# Patient Record
Sex: FEMALE | Race: WHITE | Employment: OTHER | URBAN - METROPOLITAN AREA
[De-identification: names, ages, dates, MRNs, and addresses within clinical notes are randomized per-mention and may not be internally consistent; named-entity substitution may affect disease eponyms.]

---

## 2017-04-25 ENCOUNTER — OFFICE VISIT (OUTPATIENT)
Dept: FAMILY MEDICINE CLINIC | Age: 62
End: 2017-04-25

## 2017-04-25 VITALS
DIASTOLIC BLOOD PRESSURE: 80 MMHG | BODY MASS INDEX: 32.01 KG/M2 | RESPIRATION RATE: 20 BRPM | TEMPERATURE: 101.7 F | WEIGHT: 175 LBS | SYSTOLIC BLOOD PRESSURE: 120 MMHG | HEART RATE: 96 BPM | OXYGEN SATURATION: 97 %

## 2017-04-25 DIAGNOSIS — R50.9 FEVER, UNSPECIFIED FEVER CAUSE: ICD-10-CM

## 2017-04-25 DIAGNOSIS — R05.9 COUGH: Primary | ICD-10-CM

## 2017-04-25 DIAGNOSIS — R09.81 SINUS CONGESTION: ICD-10-CM

## 2017-04-25 PROCEDURE — 99213 OFFICE O/P EST LOW 20 MIN: CPT | Performed by: NURSE PRACTITIONER

## 2017-04-25 RX ORDER — AZITHROMYCIN 250 MG/1
250 TABLET, FILM COATED ORAL DAILY
Qty: 6 TABLET | Refills: 0 | Status: SHIPPED | OUTPATIENT
Start: 2017-04-25 | End: 2017-08-02 | Stop reason: ALTCHOICE

## 2017-04-25 ASSESSMENT — ENCOUNTER SYMPTOMS
NAUSEA: 1
DIARRHEA: 1
SORE THROAT: 1
WHEEZING: 1
COUGH: 1

## 2017-05-17 RX ORDER — LISINOPRIL 20 MG/1
TABLET ORAL
Qty: 90 TABLET | Refills: 0 | Status: SHIPPED | OUTPATIENT
Start: 2017-05-17 | End: 2017-09-11 | Stop reason: SDUPTHER

## 2017-06-15 DIAGNOSIS — B00.9 HSV-2 (HERPES SIMPLEX VIRUS 2) INFECTION: ICD-10-CM

## 2017-06-16 RX ORDER — VALACYCLOVIR HYDROCHLORIDE 500 MG/1
TABLET, FILM COATED ORAL
Qty: 90 TABLET | Refills: 0 | Status: SHIPPED | OUTPATIENT
Start: 2017-06-16 | End: 2017-09-14 | Stop reason: SDUPTHER

## 2017-06-16 RX ORDER — ATENOLOL 100 MG/1
TABLET ORAL
Qty: 90 TABLET | Refills: 1 | Status: SHIPPED | OUTPATIENT
Start: 2017-06-16 | End: 2019-02-13

## 2017-08-02 ENCOUNTER — OFFICE VISIT (OUTPATIENT)
Dept: FAMILY MEDICINE CLINIC | Age: 62
End: 2017-08-02

## 2017-08-02 VITALS
BODY MASS INDEX: 34.16 KG/M2 | DIASTOLIC BLOOD PRESSURE: 80 MMHG | HEART RATE: 63 BPM | SYSTOLIC BLOOD PRESSURE: 124 MMHG | WEIGHT: 174 LBS | HEIGHT: 60 IN | RESPIRATION RATE: 16 BRPM | OXYGEN SATURATION: 99 %

## 2017-08-02 DIAGNOSIS — Z00.00 ANNUAL PHYSICAL EXAM: Primary | ICD-10-CM

## 2017-08-02 DIAGNOSIS — M72.2 PLANTAR FASCIITIS: ICD-10-CM

## 2017-08-02 DIAGNOSIS — L82.1 SEBORRHEIC KERATOSES: ICD-10-CM

## 2017-08-02 LAB
A/G RATIO: 1.3 (ref 1.1–2.2)
ALBUMIN SERPL-MCNC: 4.3 G/DL (ref 3.4–5)
ALP BLD-CCNC: 162 U/L (ref 40–129)
ALT SERPL-CCNC: 51 U/L (ref 10–40)
ANION GAP SERPL CALCULATED.3IONS-SCNC: 14 MMOL/L (ref 3–16)
AST SERPL-CCNC: 38 U/L (ref 15–37)
BILIRUB SERPL-MCNC: 0.8 MG/DL (ref 0–1)
BUN BLDV-MCNC: 9 MG/DL (ref 7–20)
CALCIUM SERPL-MCNC: 9.8 MG/DL (ref 8.3–10.6)
CHLORIDE BLD-SCNC: 99 MMOL/L (ref 99–110)
CHOLESTEROL, TOTAL: 218 MG/DL (ref 0–199)
CO2: 24 MMOL/L (ref 21–32)
CREAT SERPL-MCNC: <0.5 MG/DL (ref 0.6–1.2)
GFR AFRICAN AMERICAN: >60
GFR NON-AFRICAN AMERICAN: >60
GLOBULIN: 3.4 G/DL
GLUCOSE BLD-MCNC: 82 MG/DL (ref 70–99)
HCT VFR BLD CALC: 40.4 % (ref 36–48)
HDLC SERPL-MCNC: 78 MG/DL (ref 40–60)
HEMOGLOBIN: 13.6 G/DL (ref 12–16)
HEPATITIS C ANTIBODY INTERPRETATION: NORMAL
LDL CHOLESTEROL CALCULATED: 102 MG/DL
MCH RBC QN AUTO: 36.5 PG (ref 26–34)
MCHC RBC AUTO-ENTMCNC: 33.7 G/DL (ref 31–36)
MCV RBC AUTO: 108.2 FL (ref 80–100)
PDW BLD-RTO: 13 % (ref 12.4–15.4)
PLATELET # BLD: 234 K/UL (ref 135–450)
PMV BLD AUTO: 9 FL (ref 5–10.5)
POTASSIUM SERPL-SCNC: 4.5 MMOL/L (ref 3.5–5.1)
RBC # BLD: 3.73 M/UL (ref 4–5.2)
SODIUM BLD-SCNC: 137 MMOL/L (ref 136–145)
TOTAL PROTEIN: 7.7 G/DL (ref 6.4–8.2)
TRIGL SERPL-MCNC: 192 MG/DL (ref 0–150)
TSH REFLEX FT4: 2.23 UIU/ML (ref 0.27–4.2)
VITAMIN D 25-HYDROXY: 42.1 NG/ML
VLDLC SERPL CALC-MCNC: 38 MG/DL
WBC # BLD: 7.7 K/UL (ref 4–11)

## 2017-08-02 PROCEDURE — 99396 PREV VISIT EST AGE 40-64: CPT | Performed by: FAMILY MEDICINE

## 2017-08-02 PROCEDURE — 36415 COLL VENOUS BLD VENIPUNCTURE: CPT | Performed by: FAMILY MEDICINE

## 2017-08-02 ASSESSMENT — PATIENT HEALTH QUESTIONNAIRE - PHQ9
SUM OF ALL RESPONSES TO PHQ QUESTIONS 1-9: 0
2. FEELING DOWN, DEPRESSED OR HOPELESS: 0
1. LITTLE INTEREST OR PLEASURE IN DOING THINGS: 0
SUM OF ALL RESPONSES TO PHQ9 QUESTIONS 1 & 2: 0

## 2017-08-03 DIAGNOSIS — D75.89 MACROCYTOSIS: Primary | ICD-10-CM

## 2017-08-03 LAB
FOLATE: 19 NG/ML (ref 4.78–24.2)
HIV-1 AND HIV-2 ANTIBODIES: NORMAL
VITAMIN B-12: 421 PG/ML (ref 211–911)

## 2017-09-11 RX ORDER — METOPROLOL SUCCINATE 50 MG/1
TABLET, EXTENDED RELEASE ORAL
Qty: 90 TABLET | Refills: 1 | Status: SHIPPED | OUTPATIENT
Start: 2017-09-11 | End: 2018-05-06 | Stop reason: SDUPTHER

## 2017-09-11 RX ORDER — LISINOPRIL 20 MG/1
TABLET ORAL
Qty: 90 TABLET | Refills: 1 | Status: SHIPPED | OUTPATIENT
Start: 2017-09-11 | End: 2018-03-19 | Stop reason: SDUPTHER

## 2018-03-19 RX ORDER — LISINOPRIL 20 MG/1
TABLET ORAL
Qty: 90 TABLET | Refills: 0 | Status: SHIPPED | OUTPATIENT
Start: 2018-03-19 | End: 2018-06-20 | Stop reason: SDUPTHER

## 2018-05-07 RX ORDER — METOPROLOL SUCCINATE 50 MG/1
TABLET, EXTENDED RELEASE ORAL
Qty: 90 TABLET | Refills: 3 | Status: ON HOLD | OUTPATIENT
Start: 2018-05-07 | End: 2019-07-19 | Stop reason: HOSPADM

## 2018-06-07 ENCOUNTER — TELEPHONE (OUTPATIENT)
Dept: FAMILY MEDICINE CLINIC | Age: 63
End: 2018-06-07

## 2018-06-07 RX ORDER — SULFACETAMIDE SODIUM 100 MG/ML
1 SOLUTION/ DROPS OPHTHALMIC 4 TIMES DAILY
Qty: 1 BOTTLE | Refills: 0 | Status: SHIPPED | OUTPATIENT
Start: 2018-06-07 | End: 2018-06-12

## 2018-06-20 RX ORDER — LISINOPRIL 20 MG/1
TABLET ORAL
Qty: 90 TABLET | Refills: 0 | Status: SHIPPED | OUTPATIENT
Start: 2018-06-20 | End: 2018-09-21 | Stop reason: SDUPTHER

## 2018-09-21 DIAGNOSIS — B00.9 HSV-2 (HERPES SIMPLEX VIRUS 2) INFECTION: ICD-10-CM

## 2018-09-21 RX ORDER — VALACYCLOVIR HYDROCHLORIDE 500 MG/1
TABLET, FILM COATED ORAL
Qty: 90 TABLET | Refills: 0 | Status: SHIPPED | OUTPATIENT
Start: 2018-09-21 | End: 2019-01-02 | Stop reason: SDUPTHER

## 2018-09-21 RX ORDER — LISINOPRIL 20 MG/1
TABLET ORAL
Qty: 90 TABLET | Refills: 0 | Status: SHIPPED | OUTPATIENT
Start: 2018-09-21 | End: 2019-01-02 | Stop reason: SDUPTHER

## 2019-02-13 ENCOUNTER — OFFICE VISIT (OUTPATIENT)
Dept: FAMILY MEDICINE CLINIC | Age: 64
End: 2019-02-13
Payer: COMMERCIAL

## 2019-02-13 VITALS
BODY MASS INDEX: 36.32 KG/M2 | HEIGHT: 60 IN | OXYGEN SATURATION: 98 % | WEIGHT: 185 LBS | HEART RATE: 74 BPM | DIASTOLIC BLOOD PRESSURE: 72 MMHG | RESPIRATION RATE: 16 BRPM | SYSTOLIC BLOOD PRESSURE: 108 MMHG

## 2019-02-13 DIAGNOSIS — I10 BENIGN ESSENTIAL HTN: ICD-10-CM

## 2019-02-13 DIAGNOSIS — Z12.39 BREAST CANCER SCREENING: ICD-10-CM

## 2019-02-13 DIAGNOSIS — B00.9 HSV-2 (HERPES SIMPLEX VIRUS 2) INFECTION: ICD-10-CM

## 2019-02-13 DIAGNOSIS — Z23 NEED FOR VACCINATION FOR PNEUMOCOCCUS: ICD-10-CM

## 2019-02-13 DIAGNOSIS — Z00.00 ANNUAL PHYSICAL EXAM: Primary | ICD-10-CM

## 2019-02-13 DIAGNOSIS — M25.562 CHRONIC PAIN OF LEFT KNEE: ICD-10-CM

## 2019-02-13 DIAGNOSIS — G89.29 CHRONIC PAIN OF LEFT KNEE: ICD-10-CM

## 2019-02-13 LAB
A/G RATIO: 1.2 (ref 1.1–2.2)
ALBUMIN SERPL-MCNC: 4.1 G/DL (ref 3.4–5)
ALP BLD-CCNC: 117 U/L (ref 40–129)
ALT SERPL-CCNC: 16 U/L (ref 10–40)
ANION GAP SERPL CALCULATED.3IONS-SCNC: 16 MMOL/L (ref 3–16)
AST SERPL-CCNC: 20 U/L (ref 15–37)
BILIRUB SERPL-MCNC: 0.9 MG/DL (ref 0–1)
BUN BLDV-MCNC: 7 MG/DL (ref 7–20)
CALCIUM SERPL-MCNC: 10.3 MG/DL (ref 8.3–10.6)
CHLORIDE BLD-SCNC: 100 MMOL/L (ref 99–110)
CHOLESTEROL, TOTAL: 207 MG/DL (ref 0–199)
CO2: 23 MMOL/L (ref 21–32)
CREAT SERPL-MCNC: 0.5 MG/DL (ref 0.6–1.2)
GFR AFRICAN AMERICAN: >60
GFR NON-AFRICAN AMERICAN: >60
GLOBULIN: 3.4 G/DL
GLUCOSE BLD-MCNC: 103 MG/DL (ref 70–99)
HDLC SERPL-MCNC: 74 MG/DL (ref 40–60)
LDL CHOLESTEROL CALCULATED: 116 MG/DL
POTASSIUM SERPL-SCNC: 5.2 MMOL/L (ref 3.5–5.1)
SODIUM BLD-SCNC: 139 MMOL/L (ref 136–145)
TOTAL PROTEIN: 7.5 G/DL (ref 6.4–8.2)
TRIGL SERPL-MCNC: 85 MG/DL (ref 0–150)
VLDLC SERPL CALC-MCNC: 17 MG/DL

## 2019-02-13 PROCEDURE — 99397 PER PM REEVAL EST PAT 65+ YR: CPT | Performed by: FAMILY MEDICINE

## 2019-02-13 PROCEDURE — 90670 PCV13 VACCINE IM: CPT | Performed by: FAMILY MEDICINE

## 2019-02-13 PROCEDURE — 90471 IMMUNIZATION ADMIN: CPT | Performed by: FAMILY MEDICINE

## 2019-02-13 PROCEDURE — 36415 COLL VENOUS BLD VENIPUNCTURE: CPT | Performed by: FAMILY MEDICINE

## 2019-02-13 PROCEDURE — G8484 FLU IMMUNIZE NO ADMIN: HCPCS | Performed by: FAMILY MEDICINE

## 2019-02-13 RX ORDER — VALACYCLOVIR HYDROCHLORIDE 500 MG/1
TABLET, FILM COATED ORAL
Qty: 90 TABLET | Refills: 3 | Status: SHIPPED | OUTPATIENT
Start: 2019-02-13 | End: 2019-12-27

## 2019-02-13 ASSESSMENT — PATIENT HEALTH QUESTIONNAIRE - PHQ9
SUM OF ALL RESPONSES TO PHQ QUESTIONS 1-9: 0
2. FEELING DOWN, DEPRESSED OR HOPELESS: 0
1. LITTLE INTEREST OR PLEASURE IN DOING THINGS: 0
SUM OF ALL RESPONSES TO PHQ QUESTIONS 1-9: 0
SUM OF ALL RESPONSES TO PHQ9 QUESTIONS 1 & 2: 0

## 2019-02-13 ASSESSMENT — ENCOUNTER SYMPTOMS
DIARRHEA: 0
WHEEZING: 0
COLOR CHANGE: 0
SHORTNESS OF BREATH: 0
EYES NEGATIVE: 1
COUGH: 0
NAUSEA: 0
ABDOMINAL PAIN: 0
CONSTIPATION: 0
ABDOMINAL DISTENTION: 0
CHEST TIGHTNESS: 0
BLOOD IN STOOL: 0
VOMITING: 0

## 2019-02-14 DIAGNOSIS — E87.5 HYPERKALEMIA: Primary | ICD-10-CM

## 2019-02-14 DIAGNOSIS — R73.01 IFG (IMPAIRED FASTING GLUCOSE): ICD-10-CM

## 2019-02-20 ENCOUNTER — OFFICE VISIT (OUTPATIENT)
Dept: ORTHOPEDIC SURGERY | Age: 64
End: 2019-02-20
Payer: COMMERCIAL

## 2019-02-20 VITALS — BODY MASS INDEX: 35.34 KG/M2 | WEIGHT: 180 LBS | HEIGHT: 60 IN

## 2019-02-20 DIAGNOSIS — M17.12 ARTHRITIS OF LEFT KNEE: ICD-10-CM

## 2019-02-20 DIAGNOSIS — M25.562 LEFT KNEE PAIN, UNSPECIFIED CHRONICITY: Primary | ICD-10-CM

## 2019-02-20 PROCEDURE — 4040F PNEUMOC VAC/ADMIN/RCVD: CPT | Performed by: ORTHOPAEDIC SURGERY

## 2019-02-20 PROCEDURE — G8427 DOCREV CUR MEDS BY ELIG CLIN: HCPCS | Performed by: ORTHOPAEDIC SURGERY

## 2019-02-20 PROCEDURE — 1036F TOBACCO NON-USER: CPT | Performed by: ORTHOPAEDIC SURGERY

## 2019-02-20 PROCEDURE — G8484 FLU IMMUNIZE NO ADMIN: HCPCS | Performed by: ORTHOPAEDIC SURGERY

## 2019-02-20 PROCEDURE — G8400 PT W/DXA NO RESULTS DOC: HCPCS | Performed by: ORTHOPAEDIC SURGERY

## 2019-02-20 PROCEDURE — G8417 CALC BMI ABV UP PARAM F/U: HCPCS | Performed by: ORTHOPAEDIC SURGERY

## 2019-02-20 PROCEDURE — 20611 DRAIN/INJ JOINT/BURSA W/US: CPT | Performed by: ORTHOPAEDIC SURGERY

## 2019-02-20 PROCEDURE — 99203 OFFICE O/P NEW LOW 30 MIN: CPT | Performed by: ORTHOPAEDIC SURGERY

## 2019-02-20 PROCEDURE — 1123F ACP DISCUSS/DSCN MKR DOCD: CPT | Performed by: ORTHOPAEDIC SURGERY

## 2019-02-20 PROCEDURE — 3017F COLORECTAL CA SCREEN DOC REV: CPT | Performed by: ORTHOPAEDIC SURGERY

## 2019-02-20 PROCEDURE — 1090F PRES/ABSN URINE INCON ASSESS: CPT | Performed by: ORTHOPAEDIC SURGERY

## 2019-02-20 PROCEDURE — 1101F PT FALLS ASSESS-DOCD LE1/YR: CPT | Performed by: ORTHOPAEDIC SURGERY

## 2019-03-20 ENCOUNTER — OFFICE VISIT (OUTPATIENT)
Dept: ORTHOPEDIC SURGERY | Age: 64
End: 2019-03-20
Payer: COMMERCIAL

## 2019-03-20 VITALS — HEIGHT: 60 IN | WEIGHT: 179.9 LBS | BODY MASS INDEX: 35.32 KG/M2

## 2019-03-20 DIAGNOSIS — M17.12 ARTHRITIS OF LEFT KNEE: Primary | ICD-10-CM

## 2019-03-20 PROCEDURE — 1036F TOBACCO NON-USER: CPT | Performed by: ORTHOPAEDIC SURGERY

## 2019-03-20 PROCEDURE — G8400 PT W/DXA NO RESULTS DOC: HCPCS | Performed by: ORTHOPAEDIC SURGERY

## 2019-03-20 PROCEDURE — 1090F PRES/ABSN URINE INCON ASSESS: CPT | Performed by: ORTHOPAEDIC SURGERY

## 2019-03-20 PROCEDURE — 1101F PT FALLS ASSESS-DOCD LE1/YR: CPT | Performed by: ORTHOPAEDIC SURGERY

## 2019-03-20 PROCEDURE — L1830 KO IMMOB CANVAS LONG PRE OTS: HCPCS | Performed by: ORTHOPAEDIC SURGERY

## 2019-03-20 PROCEDURE — 99213 OFFICE O/P EST LOW 20 MIN: CPT | Performed by: ORTHOPAEDIC SURGERY

## 2019-03-20 PROCEDURE — 3017F COLORECTAL CA SCREEN DOC REV: CPT | Performed by: ORTHOPAEDIC SURGERY

## 2019-03-20 PROCEDURE — G8417 CALC BMI ABV UP PARAM F/U: HCPCS | Performed by: ORTHOPAEDIC SURGERY

## 2019-03-20 PROCEDURE — 4040F PNEUMOC VAC/ADMIN/RCVD: CPT | Performed by: ORTHOPAEDIC SURGERY

## 2019-03-20 PROCEDURE — G8484 FLU IMMUNIZE NO ADMIN: HCPCS | Performed by: ORTHOPAEDIC SURGERY

## 2019-03-20 PROCEDURE — 1123F ACP DISCUSS/DSCN MKR DOCD: CPT | Performed by: ORTHOPAEDIC SURGERY

## 2019-03-20 PROCEDURE — G8427 DOCREV CUR MEDS BY ELIG CLIN: HCPCS | Performed by: ORTHOPAEDIC SURGERY

## 2019-04-25 ENCOUNTER — TELEPHONE (OUTPATIENT)
Dept: ORTHOPEDIC SURGERY | Age: 64
End: 2019-04-25

## 2019-04-29 ENCOUNTER — TELEPHONE (OUTPATIENT)
Dept: ORTHOPEDIC SURGERY | Age: 64
End: 2019-04-29

## 2019-04-29 NOTE — TELEPHONE ENCOUNTER
ORTHOPAEDIC NURSE NAVIGATOR SUMMARY NOTE  RN attempted to reach pt pre-op. Information pulled from Epic. Anticipated Date of Surgery: 5/23/19    Using OrthoVitals? No, Are they Registered:  NA   If No, why not? N/A    Attended Pre-Op Education Class: no   If No, why not? PCP: Juan Pablo Stallworth MD   Phone #: [phone]    Date of PCP Visit for H&P:5/10/19    Any Noted Concerns from PCP prior to surgery:  No   If Yes, what concerns?:        IS THE PATIENT IN A PAIN MANAGEMENT PROGRAM?:        Review of Past Medical History Reveals History of:      Critical Lab Values:   Hgb/Hct:   Hemoglobin (g/dL)   Date Value   08/02/2017 13.6   /  Hematocrit (%)   Date Value   08/02/2017 40.4      HgbA1C:  No results found for: LABA1C   Albumin:    Lab Results   Component Value Date    LABALBU 4.1 02/13/2019      BUN/Cr:   BUN (mg/dL)   Date Value   02/13/2019 7   /  CREATININE (mg/dL)   Date Value   02/13/2019 0.5 (L)      BMI:    BMI Readings from Last 1 Encounters:   03/20/19 35.13 kg/m²        Coronary Artery Disease/HTN/CHF History: Yes- HTN managed by PCP and meds      Cardiologist:     Cardiac Clearance Necessary: No    Date of Cardiac Clearance Appt: On Plavix? No,  If YES, when will they stop taking? Final Cardiac Recommendations:N/A   -On any anticoagulation-None listed       Diabetes History: No    Most Recent HgbA1C: N/A    PCP or Endocrine Recommendations: N/A    Nutritionist/Dietician Consult Scheduled: N/A    Final Plan For Diabetic Control: N/A   Pulmonary: COPD/Emphysema/ Use of home oxygen:      Alcohol use:           DVT Risk Stratification:  Unknown      Vascular Consult Ordered:  NA    Date of Vascular Appt:     Hematology/Oncology Consult Ordered:  NA    Date of Hematology/Oncology Appt:     Final Recommendation For DVT Prophylaxis:   -Smoking history or use of estrogen-         BMI Greater than 40 at time of scheduling?: No    Has Surgeon been notified of BMI concern?  Not

## 2019-05-10 ENCOUNTER — HOSPITAL ENCOUNTER (OUTPATIENT)
Dept: PREADMISSION TESTING | Age: 64
Discharge: HOME | End: 2019-05-14
Payer: COMMERCIAL

## 2019-05-10 ENCOUNTER — OFFICE VISIT (OUTPATIENT)
Dept: FAMILY MEDICINE CLINIC | Age: 64
End: 2019-05-10
Payer: COMMERCIAL

## 2019-05-10 VITALS
BODY MASS INDEX: 35.7 KG/M2 | TEMPERATURE: 98 F | SYSTOLIC BLOOD PRESSURE: 122 MMHG | OXYGEN SATURATION: 100 % | DIASTOLIC BLOOD PRESSURE: 80 MMHG | HEART RATE: 73 BPM | WEIGHT: 182.8 LBS

## 2019-05-10 DIAGNOSIS — Z01.818 PRE-OP EXAMINATION: Primary | ICD-10-CM

## 2019-05-10 DIAGNOSIS — M17.12 ARTHRITIS OF KNEE, LEFT: ICD-10-CM

## 2019-05-10 LAB
ABO/RH: NORMAL
ALBUMIN SERPL-MCNC: 4.2 G/DL (ref 3.4–5)
ANION GAP SERPL CALCULATED.3IONS-SCNC: 16 MMOL/L (ref 3–16)
ANTIBODY SCREEN: NORMAL
APTT: 33.6 SEC (ref 26–36)
BACTERIA: ABNORMAL /HPF
BASOPHILS ABSOLUTE: 0.1 K/UL (ref 0–0.2)
BASOPHILS RELATIVE PERCENT: 0.7 %
BILIRUBIN URINE: NEGATIVE
BLOOD, URINE: NEGATIVE
BUN BLDV-MCNC: 6 MG/DL (ref 7–20)
CALCIUM SERPL-MCNC: 10.1 MG/DL (ref 8.3–10.6)
CHLORIDE BLD-SCNC: 100 MMOL/L (ref 99–110)
CLARITY: CLEAR
CO2: 24 MMOL/L (ref 21–32)
COLOR: YELLOW
CREAT SERPL-MCNC: <0.5 MG/DL (ref 0.6–1.2)
EKG ATRIAL RATE: 72 BPM
EKG DIAGNOSIS: NORMAL
EKG P AXIS: 23 DEGREES
EKG P-R INTERVAL: 206 MS
EKG Q-T INTERVAL: 384 MS
EKG QRS DURATION: 86 MS
EKG QTC CALCULATION (BAZETT): 420 MS
EKG R AXIS: 30 DEGREES
EKG T AXIS: 13 DEGREES
EKG VENTRICULAR RATE: 72 BPM
EOSINOPHILS ABSOLUTE: 0 K/UL (ref 0–0.6)
EOSINOPHILS RELATIVE PERCENT: 0.5 %
EPITHELIAL CELLS, UA: ABNORMAL /HPF
GFR AFRICAN AMERICAN: >60
GFR NON-AFRICAN AMERICAN: >60
GLUCOSE BLD-MCNC: 97 MG/DL (ref 70–99)
GLUCOSE URINE: NEGATIVE MG/DL
HCT VFR BLD CALC: 40.3 % (ref 36–48)
HEMOGLOBIN: 13.9 G/DL (ref 12–16)
INR BLD: 1.06 (ref 0.86–1.14)
KETONES, URINE: NEGATIVE MG/DL
LEUKOCYTE ESTERASE, URINE: ABNORMAL
LYMPHOCYTES ABSOLUTE: 1.6 K/UL (ref 1–5.1)
LYMPHOCYTES RELATIVE PERCENT: 16.6 %
MCH RBC QN AUTO: 37.2 PG (ref 26–34)
MCHC RBC AUTO-ENTMCNC: 34.5 G/DL (ref 31–36)
MCV RBC AUTO: 107.9 FL (ref 80–100)
MICROSCOPIC EXAMINATION: YES
MONOCYTES ABSOLUTE: 1 K/UL (ref 0–1.3)
MONOCYTES RELATIVE PERCENT: 10.3 %
NEUTROPHILS ABSOLUTE: 7.1 K/UL (ref 1.7–7.7)
NEUTROPHILS RELATIVE PERCENT: 71.9 %
NITRITE, URINE: POSITIVE
PDW BLD-RTO: 13.4 % (ref 12.4–15.4)
PH UA: 6 (ref 5–8)
PLATELET # BLD: 237 K/UL (ref 135–450)
PMV BLD AUTO: 9.3 FL (ref 5–10.5)
POTASSIUM SERPL-SCNC: 4.6 MMOL/L (ref 3.5–5.1)
PROTEIN UA: NEGATIVE MG/DL
PROTHROMBIN TIME: 12.1 SEC (ref 9.8–13)
RBC # BLD: 3.73 M/UL (ref 4–5.2)
RBC UA: ABNORMAL /HPF (ref 0–2)
SODIUM BLD-SCNC: 140 MMOL/L (ref 136–145)
SPECIFIC GRAVITY UA: 1.01 (ref 1–1.03)
TRANSFERRIN: 205 MG/DL (ref 200–360)
URINE TYPE: ABNORMAL
UROBILINOGEN, URINE: 0.2 E.U./DL
WBC # BLD: 9.9 K/UL (ref 4–11)
WBC UA: ABNORMAL /HPF (ref 0–5)

## 2019-05-10 PROCEDURE — 81001 URINALYSIS AUTO W/SCOPE: CPT

## 2019-05-10 PROCEDURE — G8417 CALC BMI ABV UP PARAM F/U: HCPCS | Performed by: NURSE PRACTITIONER

## 2019-05-10 PROCEDURE — 99242 OFF/OP CONSLTJ NEW/EST SF 20: CPT | Performed by: NURSE PRACTITIONER

## 2019-05-10 PROCEDURE — 87077 CULTURE AEROBIC IDENTIFY: CPT

## 2019-05-10 PROCEDURE — 36415 COLL VENOUS BLD VENIPUNCTURE: CPT

## 2019-05-10 PROCEDURE — 82040 ASSAY OF SERUM ALBUMIN: CPT

## 2019-05-10 PROCEDURE — G8427 DOCREV CUR MEDS BY ELIG CLIN: HCPCS | Performed by: NURSE PRACTITIONER

## 2019-05-10 PROCEDURE — 93005 ELECTROCARDIOGRAM TRACING: CPT | Performed by: ORTHOPAEDIC SURGERY

## 2019-05-10 PROCEDURE — 84466 ASSAY OF TRANSFERRIN: CPT

## 2019-05-10 PROCEDURE — 80048 BASIC METABOLIC PNL TOTAL CA: CPT

## 2019-05-10 PROCEDURE — 85610 PROTHROMBIN TIME: CPT

## 2019-05-10 PROCEDURE — 85730 THROMBOPLASTIN TIME PARTIAL: CPT

## 2019-05-10 PROCEDURE — 86901 BLOOD TYPING SEROLOGIC RH(D): CPT

## 2019-05-10 PROCEDURE — 93010 ELECTROCARDIOGRAM REPORT: CPT | Performed by: INTERNAL MEDICINE

## 2019-05-10 PROCEDURE — 86900 BLOOD TYPING SEROLOGIC ABO: CPT

## 2019-05-10 PROCEDURE — 83036 HEMOGLOBIN GLYCOSYLATED A1C: CPT

## 2019-05-10 PROCEDURE — 85025 COMPLETE CBC W/AUTO DIFF WBC: CPT

## 2019-05-10 PROCEDURE — 87186 SC STD MICRODIL/AGAR DIL: CPT

## 2019-05-10 PROCEDURE — 86850 RBC ANTIBODY SCREEN: CPT

## 2019-05-10 PROCEDURE — 87086 URINE CULTURE/COLONY COUNT: CPT

## 2019-05-10 PROCEDURE — 1090F PRES/ABSN URINE INCON ASSESS: CPT | Performed by: NURSE PRACTITIONER

## 2019-05-10 SDOH — HEALTH STABILITY: MENTAL HEALTH: HOW MANY STANDARD DRINKS CONTAINING ALCOHOL DO YOU HAVE ON A TYPICAL DAY?: 3 OR 4

## 2019-05-10 NOTE — PROGRESS NOTES
[facility]  [phone]  Fax: [phone]   Pre-operative History and Physical      DIAGNOSIS:  Left knee pain/arthritis     PROCEDURE:  LEFT TOTAL KNEE REPLACEMENT [employer]      History Obtained From:  patient    HISTORY OF PRESENT ILLNESS:    The patient is a 70 y.o. female with significant past medical history of left knee pain and arthritis who presents today for a pre-op. Past Medical History:   Diagnosis Date    Arthritis     EtOH dependence ([address]     HSV-2 (herpes simplex virus 2) infection     Hypertension     Tx since late 45s    Kidney stone      Past Surgical History:   Procedure Laterality Date    COLONOSCOPY  1999    Rectal ulcer, s/p cautery    COLONOSCOPY  13    next due 10 yrs   [address] [facility]    Ovaries in. Had fibroids, DUB. Current Outpatient Medications   Medication Sig Dispense Refill    verapamil (CALAN SR) 180 MG extended release tablet TAKE 1 TABLET BY MOUTH EVERY EVENING 90 tablet 0    valACYclovir (VALTREX) 500 MG tablet TAKE 1 TABLET BY MOUTH EVERY DAY 90 tablet 3    lisinopril (PRINIVIL;ZESTRIL) 20 MG tablet TAKE 1 TABLET BY MOUTH DAILY 90 tablet 3    metoprolol succinate (TOPROL XL) 50 MG extended release tablet TAKE 1 TABLET BY MOUTH EVERY DAY, TO REPLACE ATENOLOL 90 tablet 3     No current facility-administered medications for this visit. Allergies:  Patient has no known allergies. History of allergic reaction to anesthesia:  No     Social History     Tobacco Use   Smoking Status Former Smoker    Packs/day: 1.50    Years: 5.00    Pack years: 7.50    Last attempt to quit: 1977    Years since quittin.3   Smokeless Tobacco Never Used     The patient states she drinks 10 per week.     Family History   Problem Relation Age of Onset    Cancer Father 61        Lung    Hypertension Father     Cancer Brother         Bladder,  64    Diabetes Other     Heart Failure Other        REVIEW OF SYSTEMS:    CONSTITUTIONAL:  negative  EYES:  positive for  glasses  HEENT:  negative  RESPIRATORY:  negative  CARDIOVASCULAR:  negative  GASTROINTESTINAL:  negative  GENITOURINARY:  negative  INTEGUMENT/BREAST:  negative  HEMATOLOGIC/LYMPHATIC:  negative  ALLERGIC/IMMUNOLOGIC:  negative  ENDOCRINE:  negative  MUSCULOSKELETAL:  positive for  arthralgias  NEUROLOGICAL:  negative    PHYSICAL EXAM:      /80   Pulse 73   Temp 98 °F (36.7 °C) (Oral)   Wt 182 lb 12.8 oz (82.9 kg)   SpO2 100%   BMI 35.70 kg/m²     CONSTITUTIONAL:  awake, alert, cooperative, no apparent distress, and appears stated age    Eyes:  Lids and lashes normal, pupils equal, round and reactive to light, extra ocular muscles intact, sclera clear, conjunctiva normal    Head/ENT:  Normocephalic, without obvious abnormality, atramatic, sinuses nontender on palpation, external ears without lesions, oral pharynx with moist mucus membranes, tonsils without erythema or exudates, gums normal and good dentition. Neck:  Supple, symmetrical, trachea midline, no adenopathy, thyroid symmetric, not enlarged and no tenderness, skin normal, No carotid bruit    Heart:  Normal apical impulse, regular rate and rhythm, normal S1 and S2, no S3 or S4, and no murmur noted    Lungs:  No increased work of breathing, good air exchange, clear to auscultation bilaterally, no crackles or wheezing    Abdomen:  Normal bowel sounds, soft, non-distended, non-tender, no masses palpated, no hepatosplenomegally    Extremities:  No clubbing, cyanosis, or edema    NEUROLOGIC:  Awake, alert, oriented to name, place and time. Cranial nerves II-XII are grossly intact. Motor is 5 out of 5 bilaterally. DATA:  Will have EKG completed today at [facility]    Will have labs completed today at [address] [place] [address] Avenue:    1. Patient is a 70 y.o. female with above specified procedure planned on 5/23/19 with Dr. Anneliese Xie at [facility]. She will have EKG completed today at [facility]. 2. Stop NSAIDs, vitamins, aspirin medications 7-10 days prior to procedure.   3.Patient is cleared for surgery    Emre Chavez, [address], [address] [place] [address]  [phone]

## 2019-05-11 LAB
ESTIMATED AVERAGE GLUCOSE: 102.5 MG/DL
HBA1C MFR BLD: 5.2 %

## 2019-05-12 LAB
ORGANISM: ABNORMAL
URINE CULTURE, ROUTINE: ABNORMAL
URINE CULTURE, ROUTINE: ABNORMAL

## 2019-05-20 RX ORDER — SULFAMETHOXAZOLE AND TRIMETHOPRIM 800; 160 MG/1; MG/1
1 TABLET ORAL 2 TIMES DAILY
Qty: 20 TABLET | Refills: 0 | Status: SHIPPED | OUTPATIENT
Start: 2019-05-20 | End: 2019-05-30

## 2019-05-20 NOTE — PROGRESS NOTES
Obstructive Sleep Apnea (ZAKI) Screening     Patient:  Jamila Nicole    YOB: 1955      Medical Record #:  [id number]                     Date:  5/20/2019     1. Are you a loud and/or regular snorer? []  Yes       [x] No    2. Have you been observed to gasp or stop breathing during sleep? []  Yes       [x] No    3. Do you feel tired or groggy upon awakening or do you awaken with a headache?           []  Yes       [x] No    4. Are you often tired or fatigued during the wake time hours? []  Yes       [x] No    5. Do you fall asleep sitting, reading, watching TV or driving? []  Yes       [x] No    6. Do you often have problems with memory or concentration? []  Yes       [x] No    **If patient's score is ? 3 they are considered high risk for ZAKI. Notify the anesthesiologist of the high risk and document in focus note. Note:  If the patient's BMI is more than 35 kg m¯² , has neck circumference > 40 cm, and/or high blood pressure the risk is greater (© American Sleep Apnea Association, 2006).

## 2019-05-23 ENCOUNTER — ANESTHESIA (OUTPATIENT)
Dept: OPERATING ROOM | Age: 64
DRG: 470 | End: 2019-05-23
Payer: COMMERCIAL

## 2019-05-23 ENCOUNTER — ANESTHESIA EVENT (OUTPATIENT)
Dept: OPERATING ROOM | Age: 64
DRG: 470 | End: 2019-05-23
Payer: COMMERCIAL

## 2019-05-23 ENCOUNTER — HOSPITAL ENCOUNTER (INPATIENT)
Age: 64
LOS: 1 days | Discharge: HOME HEALTH | DRG: 470 | End: 2019-05-24
Attending: ORTHOPAEDIC SURGERY | Admitting: ORTHOPAEDIC SURGERY
Payer: COMMERCIAL

## 2019-05-23 ENCOUNTER — APPOINTMENT (OUTPATIENT)
Dept: GENERAL RADIOLOGY | Age: 64
DRG: 470 | End: 2019-05-23
Attending: ORTHOPAEDIC SURGERY
Payer: COMMERCIAL

## 2019-05-23 VITALS — SYSTOLIC BLOOD PRESSURE: 76 MMHG | OXYGEN SATURATION: 100 % | DIASTOLIC BLOOD PRESSURE: 66 MMHG

## 2019-05-23 DIAGNOSIS — M17.12 ARTHRITIS OF LEFT KNEE: ICD-10-CM

## 2019-05-23 DIAGNOSIS — Z96.652 STATUS POST TOTAL LEFT KNEE REPLACEMENT: Primary | ICD-10-CM

## 2019-05-23 LAB
ABO/RH: NORMAL
ANTIBODY SCREEN: NORMAL

## 2019-05-23 PROCEDURE — 1200000000 HC SEMI PRIVATE

## 2019-05-23 PROCEDURE — 86901 BLOOD TYPING SEROLOGIC RH(D): CPT

## 2019-05-23 PROCEDURE — 6370000000 HC RX 637 (ALT 250 FOR IP): Performed by: ANESTHESIOLOGY

## 2019-05-23 PROCEDURE — 2500000003 HC RX 250 WO HCPCS: Performed by: NURSE ANESTHETIST, CERTIFIED REGISTERED

## 2019-05-23 PROCEDURE — 2580000003 HC RX 258: Performed by: ORTHOPAEDIC SURGERY

## 2019-05-23 PROCEDURE — 2700000000 HC OXYGEN THERAPY PER DAY

## 2019-05-23 PROCEDURE — 2580000003 HC RX 258: Performed by: ANESTHESIOLOGY

## 2019-05-23 PROCEDURE — 6360000002 HC RX W HCPCS: Performed by: ORTHOPAEDIC SURGERY

## 2019-05-23 PROCEDURE — C9290 INJ, BUPIVACAINE LIPOSOME: HCPCS | Performed by: ORTHOPAEDIC SURGERY

## 2019-05-23 PROCEDURE — 6360000002 HC RX W HCPCS: Performed by: PHYSICIAN ASSISTANT

## 2019-05-23 PROCEDURE — 0SRD0J9 REPLACEMENT OF LEFT KNEE JOINT WITH SYNTHETIC SUBSTITUTE, CEMENTED, OPEN APPROACH: ICD-10-PCS | Performed by: ORTHOPAEDIC SURGERY

## 2019-05-23 PROCEDURE — 97110 THERAPEUTIC EXERCISES: CPT

## 2019-05-23 PROCEDURE — 97166 OT EVAL MOD COMPLEX 45 MIN: CPT

## 2019-05-23 PROCEDURE — 97530 THERAPEUTIC ACTIVITIES: CPT

## 2019-05-23 PROCEDURE — 76942 ECHO GUIDE FOR BIOPSY: CPT | Performed by: ANESTHESIOLOGY

## 2019-05-23 PROCEDURE — 3700000001 HC ADD 15 MINUTES (ANESTHESIA): Performed by: ORTHOPAEDIC SURGERY

## 2019-05-23 PROCEDURE — 2580000003 HC RX 258: Performed by: PHYSICIAN ASSISTANT

## 2019-05-23 PROCEDURE — 97162 PT EVAL MOD COMPLEX 30 MIN: CPT

## 2019-05-23 PROCEDURE — 7100000000 HC PACU RECOVERY - FIRST 15 MIN: Performed by: ORTHOPAEDIC SURGERY

## 2019-05-23 PROCEDURE — 6360000002 HC RX W HCPCS: Performed by: ANESTHESIOLOGY

## 2019-05-23 PROCEDURE — 86850 RBC ANTIBODY SCREEN: CPT

## 2019-05-23 PROCEDURE — 3700000000 HC ANESTHESIA ATTENDED CARE: Performed by: ORTHOPAEDIC SURGERY

## 2019-05-23 PROCEDURE — 3600000006 HC SURGERY LEVEL 6 BASE: Performed by: ORTHOPAEDIC SURGERY

## 2019-05-23 PROCEDURE — 2500000003 HC RX 250 WO HCPCS: Performed by: ANESTHESIOLOGY

## 2019-05-23 PROCEDURE — 88305 TISSUE EXAM BY PATHOLOGIST: CPT

## 2019-05-23 PROCEDURE — C1776 JOINT DEVICE (IMPLANTABLE): HCPCS | Performed by: ORTHOPAEDIC SURGERY

## 2019-05-23 PROCEDURE — C1713 ANCHOR/SCREW BN/BN,TIS/BN: HCPCS | Performed by: ORTHOPAEDIC SURGERY

## 2019-05-23 PROCEDURE — 73560 X-RAY EXAM OF KNEE 1 OR 2: CPT

## 2019-05-23 PROCEDURE — 2720000010 HC SURG SUPPLY STERILE: Performed by: ORTHOPAEDIC SURGERY

## 2019-05-23 PROCEDURE — 7100000001 HC PACU RECOVERY - ADDTL 15 MIN: Performed by: ORTHOPAEDIC SURGERY

## 2019-05-23 PROCEDURE — 2500000003 HC RX 250 WO HCPCS: Performed by: ORTHOPAEDIC SURGERY

## 2019-05-23 PROCEDURE — 2709999900 HC NON-CHARGEABLE SUPPLY: Performed by: ORTHOPAEDIC SURGERY

## 2019-05-23 PROCEDURE — 6370000000 HC RX 637 (ALT 250 FOR IP): Performed by: PHYSICIAN ASSISTANT

## 2019-05-23 PROCEDURE — 6360000002 HC RX W HCPCS: Performed by: NURSE ANESTHETIST, CERTIFIED REGISTERED

## 2019-05-23 PROCEDURE — 3600000016 HC SURGERY LEVEL 6 ADDTL 15MIN: Performed by: ORTHOPAEDIC SURGERY

## 2019-05-23 PROCEDURE — 88311 DECALCIFY TISSUE: CPT

## 2019-05-23 PROCEDURE — 86900 BLOOD TYPING SEROLOGIC ABO: CPT

## 2019-05-23 PROCEDURE — 97116 GAIT TRAINING THERAPY: CPT

## 2019-05-23 PROCEDURE — 64447 NJX AA&/STRD FEMORAL NRV IMG: CPT | Performed by: ANESTHESIOLOGY

## 2019-05-23 PROCEDURE — 94761 N-INVAS EAR/PLS OXIMETRY MLT: CPT

## 2019-05-23 DEVICE — LEGION PS OXINIUM FEMORAL SZ 4 LEFT
Type: IMPLANTABLE DEVICE | Site: KNEE | Status: FUNCTIONAL
Brand: LEGION

## 2019-05-23 DEVICE — GENESIS II RESURFACING PATELLAR                                    PROSTHESIS  29MM
Type: IMPLANTABLE DEVICE | Site: KNEE | Status: FUNCTIONAL
Brand: GENESIS II

## 2019-05-23 DEVICE — RALLY HV BONE CEMENT 40 GRAMS
Type: IMPLANTABLE DEVICE | Site: KNEE | Status: FUNCTIONAL
Brand: RALLY

## 2019-05-23 DEVICE — GENESIS II NON-POROUS TIBIAL                                    BASEPLATE SIZE 3 LEFT
Type: IMPLANTABLE DEVICE | Site: KNEE | Status: FUNCTIONAL
Brand: GENESIS II

## 2019-05-23 DEVICE — LEGION POSTERIOR STABILIZED HIGH                                    FLEX HIGHLY CROSS LINKED                                    POLYETHYLENE SIZE 3-4 11MM
Type: IMPLANTABLE DEVICE | Site: KNEE | Status: FUNCTIONAL
Brand: LEGION

## 2019-05-23 RX ORDER — CELECOXIB 100 MG/1
100 CAPSULE ORAL 2 TIMES DAILY
Status: DISCONTINUED | OUTPATIENT
Start: 2019-05-23 | End: 2019-05-24 | Stop reason: HOSPADM

## 2019-05-23 RX ORDER — SODIUM CHLORIDE, SODIUM LACTATE, POTASSIUM CHLORIDE, CALCIUM CHLORIDE 600; 310; 30; 20 MG/100ML; MG/100ML; MG/100ML; MG/100ML
INJECTION, SOLUTION INTRAVENOUS CONTINUOUS
Status: DISCONTINUED | OUTPATIENT
Start: 2019-05-23 | End: 2019-05-23

## 2019-05-23 RX ORDER — ACETAMINOPHEN 500 MG
1000 TABLET ORAL ONCE
Status: COMPLETED | OUTPATIENT
Start: 2019-05-23 | End: 2019-05-23

## 2019-05-23 RX ORDER — PROMETHAZINE HYDROCHLORIDE 25 MG/ML
6.25 INJECTION, SOLUTION INTRAMUSCULAR; INTRAVENOUS
Status: DISCONTINUED | OUTPATIENT
Start: 2019-05-23 | End: 2019-05-23 | Stop reason: HOSPADM

## 2019-05-23 RX ORDER — MORPHINE SULFATE 2 MG/ML
2 INJECTION, SOLUTION INTRAMUSCULAR; INTRAVENOUS
Status: DISCONTINUED | OUTPATIENT
Start: 2019-05-23 | End: 2019-05-24 | Stop reason: HOSPADM

## 2019-05-23 RX ORDER — OXYCODONE HYDROCHLORIDE AND ACETAMINOPHEN 5; 325 MG/1; MG/1
2 TABLET ORAL EVERY 4 HOURS PRN
Status: DISCONTINUED | OUTPATIENT
Start: 2019-05-23 | End: 2019-05-24 | Stop reason: HOSPADM

## 2019-05-23 RX ORDER — MORPHINE SULFATE 2 MG/ML
2 INJECTION, SOLUTION INTRAMUSCULAR; INTRAVENOUS EVERY 5 MIN PRN
Status: DISCONTINUED | OUTPATIENT
Start: 2019-05-23 | End: 2019-05-23 | Stop reason: HOSPADM

## 2019-05-23 RX ORDER — LORAZEPAM 2 MG/ML
2 INJECTION INTRAMUSCULAR
Status: DISCONTINUED | OUTPATIENT
Start: 2019-05-23 | End: 2019-05-24 | Stop reason: HOSPADM

## 2019-05-23 RX ORDER — DOCUSATE SODIUM 100 MG/1
100 CAPSULE, LIQUID FILLED ORAL 2 TIMES DAILY
Status: DISCONTINUED | OUTPATIENT
Start: 2019-05-23 | End: 2019-05-24 | Stop reason: HOSPADM

## 2019-05-23 RX ORDER — LORAZEPAM 2 MG/ML
3 INJECTION INTRAMUSCULAR
Status: DISCONTINUED | OUTPATIENT
Start: 2019-05-23 | End: 2019-05-24 | Stop reason: HOSPADM

## 2019-05-23 RX ORDER — SULFAMETHOXAZOLE AND TRIMETHOPRIM 800; 160 MG/1; MG/1
1 TABLET ORAL EVERY 12 HOURS SCHEDULED
Status: DISCONTINUED | OUTPATIENT
Start: 2019-05-23 | End: 2019-05-24 | Stop reason: HOSPADM

## 2019-05-23 RX ORDER — CELECOXIB 100 MG/1
200 CAPSULE ORAL ONCE
Status: COMPLETED | OUTPATIENT
Start: 2019-05-23 | End: 2019-05-23

## 2019-05-23 RX ORDER — SODIUM CHLORIDE 0.9 % (FLUSH) 0.9 %
10 SYRINGE (ML) INJECTION EVERY 12 HOURS SCHEDULED
Status: DISCONTINUED | OUTPATIENT
Start: 2019-05-23 | End: 2019-05-24 | Stop reason: HOSPADM

## 2019-05-23 RX ORDER — SODIUM CHLORIDE 0.9 % (FLUSH) 0.9 %
10 SYRINGE (ML) INJECTION PRN
Status: DISCONTINUED | OUTPATIENT
Start: 2019-05-23 | End: 2019-05-24 | Stop reason: HOSPADM

## 2019-05-23 RX ORDER — HYDRALAZINE HYDROCHLORIDE 20 MG/ML
5 INJECTION INTRAMUSCULAR; INTRAVENOUS EVERY 10 MIN PRN
Status: DISCONTINUED | OUTPATIENT
Start: 2019-05-23 | End: 2019-05-23 | Stop reason: HOSPADM

## 2019-05-23 RX ORDER — ONDANSETRON 2 MG/ML
4 INJECTION INTRAMUSCULAR; INTRAVENOUS
Status: DISCONTINUED | OUTPATIENT
Start: 2019-05-23 | End: 2019-05-23 | Stop reason: HOSPADM

## 2019-05-23 RX ORDER — MORPHINE SULFATE 2 MG/ML
1 INJECTION, SOLUTION INTRAMUSCULAR; INTRAVENOUS EVERY 5 MIN PRN
Status: DISCONTINUED | OUTPATIENT
Start: 2019-05-23 | End: 2019-05-23 | Stop reason: HOSPADM

## 2019-05-23 RX ORDER — LORAZEPAM 1 MG/1
4 TABLET ORAL
Status: DISCONTINUED | OUTPATIENT
Start: 2019-05-23 | End: 2019-05-24 | Stop reason: HOSPADM

## 2019-05-23 RX ORDER — LIDOCAINE HYDROCHLORIDE 10 MG/ML
0.3 INJECTION, SOLUTION EPIDURAL; INFILTRATION; INTRACAUDAL; PERINEURAL
Status: DISCONTINUED | OUTPATIENT
Start: 2019-05-23 | End: 2019-05-23 | Stop reason: HOSPADM

## 2019-05-23 RX ORDER — BUPIVACAINE HYDROCHLORIDE 5 MG/ML
INJECTION, SOLUTION EPIDURAL; INTRACAUDAL PRN
Status: DISCONTINUED | OUTPATIENT
Start: 2019-05-23 | End: 2019-05-23 | Stop reason: SDUPTHER

## 2019-05-23 RX ORDER — MIDAZOLAM HYDROCHLORIDE 1 MG/ML
INJECTION INTRAMUSCULAR; INTRAVENOUS PRN
Status: DISCONTINUED | OUTPATIENT
Start: 2019-05-23 | End: 2019-05-23 | Stop reason: SDUPTHER

## 2019-05-23 RX ORDER — LORAZEPAM 1 MG/1
2 TABLET ORAL
Status: DISCONTINUED | OUTPATIENT
Start: 2019-05-23 | End: 2019-05-24 | Stop reason: HOSPADM

## 2019-05-23 RX ORDER — LISINOPRIL 20 MG/1
20 TABLET ORAL DAILY
Status: DISCONTINUED | OUTPATIENT
Start: 2019-05-23 | End: 2019-05-23

## 2019-05-23 RX ORDER — MEPERIDINE HYDROCHLORIDE 50 MG/ML
12.5 INJECTION INTRAMUSCULAR; INTRAVENOUS; SUBCUTANEOUS EVERY 5 MIN PRN
Status: DISCONTINUED | OUTPATIENT
Start: 2019-05-23 | End: 2019-05-23 | Stop reason: HOSPADM

## 2019-05-23 RX ORDER — LORAZEPAM 1 MG/1
3 TABLET ORAL
Status: DISCONTINUED | OUTPATIENT
Start: 2019-05-23 | End: 2019-05-24 | Stop reason: HOSPADM

## 2019-05-23 RX ORDER — OXYCODONE HYDROCHLORIDE AND ACETAMINOPHEN 5; 325 MG/1; MG/1
1 TABLET ORAL EVERY 4 HOURS PRN
Status: DISCONTINUED | OUTPATIENT
Start: 2019-05-23 | End: 2019-05-24 | Stop reason: HOSPADM

## 2019-05-23 RX ORDER — LIDOCAINE HYDROCHLORIDE 20 MG/ML
INJECTION, SOLUTION INFILTRATION; PERINEURAL PRN
Status: DISCONTINUED | OUTPATIENT
Start: 2019-05-23 | End: 2019-05-23 | Stop reason: SDUPTHER

## 2019-05-23 RX ORDER — ONDANSETRON 2 MG/ML
4 INJECTION INTRAMUSCULAR; INTRAVENOUS EVERY 6 HOURS PRN
Status: DISCONTINUED | OUTPATIENT
Start: 2019-05-23 | End: 2019-05-24 | Stop reason: HOSPADM

## 2019-05-23 RX ORDER — OXYCODONE HYDROCHLORIDE AND ACETAMINOPHEN 5; 325 MG/1; MG/1
1 TABLET ORAL PRN
Status: DISCONTINUED | OUTPATIENT
Start: 2019-05-23 | End: 2019-05-23 | Stop reason: HOSPADM

## 2019-05-23 RX ORDER — SODIUM CHLORIDE 0.9 % (FLUSH) 0.9 %
10 SYRINGE (ML) INJECTION PRN
Status: DISCONTINUED | OUTPATIENT
Start: 2019-05-23 | End: 2019-05-23 | Stop reason: HOSPADM

## 2019-05-23 RX ORDER — MORPHINE SULFATE 4 MG/ML
4 INJECTION, SOLUTION INTRAMUSCULAR; INTRAVENOUS
Status: DISCONTINUED | OUTPATIENT
Start: 2019-05-23 | End: 2019-05-24 | Stop reason: HOSPADM

## 2019-05-23 RX ORDER — SODIUM CHLORIDE 0.9 % (FLUSH) 0.9 %
10 SYRINGE (ML) INJECTION EVERY 12 HOURS SCHEDULED
Status: DISCONTINUED | OUTPATIENT
Start: 2019-05-23 | End: 2019-05-23 | Stop reason: HOSPADM

## 2019-05-23 RX ORDER — OXYCODONE HYDROCHLORIDE AND ACETAMINOPHEN 5; 325 MG/1; MG/1
2 TABLET ORAL PRN
Status: DISCONTINUED | OUTPATIENT
Start: 2019-05-23 | End: 2019-05-23 | Stop reason: HOSPADM

## 2019-05-23 RX ORDER — LORAZEPAM 2 MG/ML
1 INJECTION INTRAMUSCULAR
Status: DISCONTINUED | OUTPATIENT
Start: 2019-05-23 | End: 2019-05-24 | Stop reason: HOSPADM

## 2019-05-23 RX ORDER — CYCLOBENZAPRINE HCL 10 MG
10 TABLET ORAL 3 TIMES DAILY PRN
Status: DISCONTINUED | OUTPATIENT
Start: 2019-05-23 | End: 2019-05-24 | Stop reason: HOSPADM

## 2019-05-23 RX ORDER — METOPROLOL SUCCINATE 50 MG/1
50 TABLET, EXTENDED RELEASE ORAL DAILY
Status: DISCONTINUED | OUTPATIENT
Start: 2019-05-23 | End: 2019-05-23

## 2019-05-23 RX ORDER — DIPHENHYDRAMINE HYDROCHLORIDE 50 MG/ML
12.5 INJECTION INTRAMUSCULAR; INTRAVENOUS
Status: DISCONTINUED | OUTPATIENT
Start: 2019-05-23 | End: 2019-05-23 | Stop reason: HOSPADM

## 2019-05-23 RX ORDER — LORAZEPAM 1 MG/1
1 TABLET ORAL
Status: DISCONTINUED | OUTPATIENT
Start: 2019-05-23 | End: 2019-05-24 | Stop reason: HOSPADM

## 2019-05-23 RX ORDER — CYCLOBENZAPRINE HCL 10 MG
10 TABLET ORAL ONCE
Status: COMPLETED | OUTPATIENT
Start: 2019-05-23 | End: 2019-05-23

## 2019-05-23 RX ORDER — SULFAMETHOXAZOLE AND TRIMETHOPRIM 800; 160 MG/1; MG/1
1 TABLET ORAL 2 TIMES DAILY
Status: DISCONTINUED | OUTPATIENT
Start: 2019-05-23 | End: 2019-05-23

## 2019-05-23 RX ORDER — VALACYCLOVIR HYDROCHLORIDE 500 MG/1
500 TABLET, FILM COATED ORAL DAILY
Status: DISCONTINUED | OUTPATIENT
Start: 2019-05-23 | End: 2019-05-24 | Stop reason: HOSPADM

## 2019-05-23 RX ORDER — BUPIVACAINE HYDROCHLORIDE 5 MG/ML
INJECTION, SOLUTION EPIDURAL; INTRACAUDAL PRN
Status: DISCONTINUED | OUTPATIENT
Start: 2019-05-23 | End: 2019-05-23

## 2019-05-23 RX ORDER — SODIUM CHLORIDE 9 MG/ML
INJECTION, SOLUTION INTRAVENOUS CONTINUOUS
Status: DISCONTINUED | OUTPATIENT
Start: 2019-05-23 | End: 2019-05-24 | Stop reason: HOSPADM

## 2019-05-23 RX ORDER — PROPOFOL 10 MG/ML
INJECTION, EMULSION INTRAVENOUS CONTINUOUS PRN
Status: DISCONTINUED | OUTPATIENT
Start: 2019-05-23 | End: 2019-05-23 | Stop reason: SDUPTHER

## 2019-05-23 RX ORDER — LORAZEPAM 2 MG/ML
4 INJECTION INTRAMUSCULAR
Status: DISCONTINUED | OUTPATIENT
Start: 2019-05-23 | End: 2019-05-24 | Stop reason: HOSPADM

## 2019-05-23 RX ORDER — LABETALOL HYDROCHLORIDE 5 MG/ML
5 INJECTION, SOLUTION INTRAVENOUS EVERY 10 MIN PRN
Status: DISCONTINUED | OUTPATIENT
Start: 2019-05-23 | End: 2019-05-23 | Stop reason: HOSPADM

## 2019-05-23 RX ORDER — PROPOFOL 10 MG/ML
INJECTION, EMULSION INTRAVENOUS PRN
Status: DISCONTINUED | OUTPATIENT
Start: 2019-05-23 | End: 2019-05-23 | Stop reason: SDUPTHER

## 2019-05-23 RX ADMIN — SODIUM CHLORIDE: 9 INJECTION, SOLUTION INTRAVENOUS at 23:47

## 2019-05-23 RX ADMIN — PROPOFOL 50 MG: 10 INJECTION, EMULSION INTRAVENOUS at 08:22

## 2019-05-23 RX ADMIN — OXYCODONE HYDROCHLORIDE AND ACETAMINOPHEN 2 TABLET: 5; 325 TABLET ORAL at 20:33

## 2019-05-23 RX ADMIN — PHENYLEPHRINE HYDROCHLORIDE 40 MCG: 10 INJECTION INTRAVENOUS at 08:27

## 2019-05-23 RX ADMIN — PHENYLEPHRINE HYDROCHLORIDE 40 MCG: 10 INJECTION INTRAVENOUS at 09:09

## 2019-05-23 RX ADMIN — PHENYLEPHRINE HYDROCHLORIDE 40 MCG: 10 INJECTION INTRAVENOUS at 08:35

## 2019-05-23 RX ADMIN — CELECOXIB 200 MG: 100 CAPSULE ORAL at 07:43

## 2019-05-23 RX ADMIN — PHENYLEPHRINE HYDROCHLORIDE 40 MCG: 10 INJECTION INTRAVENOUS at 09:18

## 2019-05-23 RX ADMIN — PHENYLEPHRINE HYDROCHLORIDE 40 MCG: 10 INJECTION INTRAVENOUS at 08:43

## 2019-05-23 RX ADMIN — SULFAMETHOXAZOLE AND TRIMETHOPRIM 1 TABLET: 800; 160 TABLET ORAL at 17:20

## 2019-05-23 RX ADMIN — PROPOFOL 100 MCG/KG/MIN: 10 INJECTION, EMULSION INTRAVENOUS at 08:20

## 2019-05-23 RX ADMIN — PHENYLEPHRINE HYDROCHLORIDE 40 MCG: 10 INJECTION INTRAVENOUS at 08:58

## 2019-05-23 RX ADMIN — PHENYLEPHRINE HYDROCHLORIDE 40 MCG: 10 INJECTION INTRAVENOUS at 08:39

## 2019-05-23 RX ADMIN — PROPOFOL 50 MG: 10 INJECTION, EMULSION INTRAVENOUS at 08:33

## 2019-05-23 RX ADMIN — SODIUM CHLORIDE, POTASSIUM CHLORIDE, SODIUM LACTATE AND CALCIUM CHLORIDE: 600; 310; 30; 20 INJECTION, SOLUTION INTRAVENOUS at 07:43

## 2019-05-23 RX ADMIN — BUPIVACAINE HYDROCHLORIDE 30 ML: 5 INJECTION, SOLUTION EPIDURAL; INTRACAUDAL; PERINEURAL at 07:46

## 2019-05-23 RX ADMIN — PHENYLEPHRINE HYDROCHLORIDE 40 MCG: 10 INJECTION INTRAVENOUS at 08:31

## 2019-05-23 RX ADMIN — CELECOXIB 100 MG: 100 CAPSULE ORAL at 22:35

## 2019-05-23 RX ADMIN — MIDAZOLAM HYDROCHLORIDE 4 MG: 2 INJECTION, SOLUTION INTRAMUSCULAR; INTRAVENOUS at 07:46

## 2019-05-23 RX ADMIN — SODIUM CHLORIDE: 9 INJECTION, SOLUTION INTRAVENOUS at 10:20

## 2019-05-23 RX ADMIN — Medication 2 G: at 08:24

## 2019-05-23 RX ADMIN — OXYCODONE HYDROCHLORIDE AND ACETAMINOPHEN 2 TABLET: 5; 325 TABLET ORAL at 15:36

## 2019-05-23 RX ADMIN — LIDOCAINE HYDROCHLORIDE 40 MG: 20 INJECTION, SOLUTION INFILTRATION; PERINEURAL at 08:22

## 2019-05-23 RX ADMIN — SODIUM CHLORIDE, POTASSIUM CHLORIDE, SODIUM LACTATE AND CALCIUM CHLORIDE: 600; 310; 30; 20 INJECTION, SOLUTION INTRAVENOUS at 09:00

## 2019-05-23 RX ADMIN — PROPOFOL 50 MG: 10 INJECTION, EMULSION INTRAVENOUS at 08:28

## 2019-05-23 RX ADMIN — ACETAMINOPHEN 1000 MG: 500 TABLET ORAL at 07:43

## 2019-05-23 RX ADMIN — PHENYLEPHRINE HYDROCHLORIDE 40 MCG: 10 INJECTION INTRAVENOUS at 09:05

## 2019-05-23 RX ADMIN — PHENYLEPHRINE HYDROCHLORIDE 40 MCG: 10 INJECTION INTRAVENOUS at 09:02

## 2019-05-23 RX ADMIN — PHENYLEPHRINE HYDROCHLORIDE 40 MCG: 10 INJECTION INTRAVENOUS at 08:48

## 2019-05-23 RX ADMIN — DOCUSATE SODIUM 100 MG: 100 CAPSULE, LIQUID FILLED ORAL at 15:36

## 2019-05-23 RX ADMIN — Medication 2 G: at 17:20

## 2019-05-23 RX ADMIN — PHENYLEPHRINE HYDROCHLORIDE 40 MCG: 10 INJECTION INTRAVENOUS at 08:53

## 2019-05-23 RX ADMIN — PHENYLEPHRINE HYDROCHLORIDE 40 MCG: 10 INJECTION INTRAVENOUS at 09:24

## 2019-05-23 RX ADMIN — CYCLOBENZAPRINE HYDROCHLORIDE 10 MG: 10 TABLET, FILM COATED ORAL at 15:36

## 2019-05-23 RX ADMIN — PHENYLEPHRINE HYDROCHLORIDE 40 MCG: 10 INJECTION INTRAVENOUS at 09:14

## 2019-05-23 RX ADMIN — DOCUSATE SODIUM 100 MG: 100 CAPSULE, LIQUID FILLED ORAL at 20:33

## 2019-05-23 RX ADMIN — CYCLOBENZAPRINE HYDROCHLORIDE 10 MG: 10 TABLET, FILM COATED ORAL at 07:43

## 2019-05-23 RX ADMIN — PHENYLEPHRINE HYDROCHLORIDE 40 MCG: 10 INJECTION INTRAVENOUS at 09:28

## 2019-05-23 RX ADMIN — SODIUM CHLORIDE, POTASSIUM CHLORIDE, SODIUM LACTATE AND CALCIUM CHLORIDE: 600; 310; 30; 20 INJECTION, SOLUTION INTRAVENOUS at 08:02

## 2019-05-23 RX ADMIN — VALACYCLOVIR HYDROCHLORIDE 500 MG: 500 TABLET, FILM COATED ORAL at 15:36

## 2019-05-23 ASSESSMENT — PULMONARY FUNCTION TESTS
PIF_VALUE: 0
PIF_VALUE: 1
PIF_VALUE: 0
PIF_VALUE: 1
PIF_VALUE: 0
PIF_VALUE: 2
PIF_VALUE: 0

## 2019-05-23 ASSESSMENT — PAIN DESCRIPTION - ORIENTATION
ORIENTATION: LEFT

## 2019-05-23 ASSESSMENT — PAIN DESCRIPTION - PAIN TYPE
TYPE: ACUTE PAIN
TYPE: SURGICAL PAIN
TYPE: CHRONIC PAIN
TYPE: SURGICAL PAIN
TYPE: ACUTE PAIN
TYPE: SURGICAL PAIN
TYPE: ACUTE PAIN;SURGICAL PAIN

## 2019-05-23 ASSESSMENT — PAIN DESCRIPTION - LOCATION
LOCATION: KNEE

## 2019-05-23 ASSESSMENT — PAIN SCALES - GENERAL
PAINLEVEL_OUTOF10: 6
PAINLEVEL_OUTOF10: 5
PAINLEVEL_OUTOF10: 5
PAINLEVEL_OUTOF10: 3
PAINLEVEL_OUTOF10: 3
PAINLEVEL_OUTOF10: 0
PAINLEVEL_OUTOF10: 4
PAINLEVEL_OUTOF10: 7
PAINLEVEL_OUTOF10: 7

## 2019-05-23 NOTE — PROGRESS NOTES
4 Eyes Skin Assessment     The patient is being assess for   Admission    I agree that 2 RN's have performed a thorough Head to Toe Skin Assessment on the patient. ALL assessment sites listed below have been assessed. Areas assessed by both nurses:   [x]   Head, Face, and Ears   [x]   Shoulders, Back, and Chest, Abdomen  [x]   Arms, Elbows, and Hands   [x]   Coccyx, Sacrum, and Ischium  [x]   Legs, Feet, and Heels        Pt free from break down    **SHARE this note so that the co-signing nurse is able to place an eSignature**    Co-signer eSignature: Electronically signed by Inez Coughlin RN on 5/23/19 at 7:22 AM    Does the Patient have Skin Breakdown?   No          Genaro Prevention initiated:  No   Wound Care Orders initiated:  No      [facility] nurse consulted for Pressure Injury (Stage 3,4, Unstageable, DTI, NWPT, Complex wounds)and New or Established Ostomies:  No      Primary Nurse eSignature: Electronically signed by Parrish Brennan RN on 5/23/19 at 7:21 AM

## 2019-05-23 NOTE — CONSULTS
Hospital Medicine  Consult History & Physical        Chief Complaint:  Left knee osteoarthritis    Date of Service: Pt seen/examined in consultation on 5/23/2019. History Of Present Illness:      70 y.o. female who we are asked to see/evaluate by Jus Sheffield MD for medical management . Patient is up in bed, comfortable, not in distress. Denies any chest pain shortness of breath or dizziness. Past Medical History:        Diagnosis Date    Arthritis     EtOH dependence ([address]     HSV-2 (herpes simplex virus 2) infection     Hypertension     Tx since late 45s    Kidney stone        Past Surgical History:        Procedure Laterality Date    COLONOSCOPY  02/16/1999    Rectal ulcer, s/p cautery    COLONOSCOPY  4/19/13    next due 10 yrs   [address] [facility]    Ovaries in. Had fibroids, DUB. Medications Prior to Admission:    Prior to Admission medications    Medication Sig Start Date End Date Taking? Authorizing Provider   sulfamethoxazole-trimethoprim (BACTRIM DS;SEPTRA DS) 800-160 MG per tablet Take 1 tablet by mouth 2 times daily for 10 days 5/20/19 5/30/19 Yes Carol Del Cid [place], [place]   verapamil (CALAN SR) 180 MG extended release tablet TAKE 1 TABLET BY MOUTH EVERY EVENING 2/13/19  Yes Kristi Navas MD   valACYclovir (VALTREX) 500 MG tablet TAKE 1 TABLET BY MOUTH EVERY DAY 2/13/19  Yes Mindi Siddiqui MD   lisinopril (PRINIVIL;ZESTRIL) 20 MG tablet TAKE 1 TABLET BY MOUTH DAILY 1/2/19  Yes Mindi Siddiqui MD   metoprolol succinate (TOPROL XL) 50 MG extended release tablet TAKE 1 TABLET BY MOUTH EVERY DAY, TO REPLACE ATENOLOL 5/7/18  Yes Mindi Siddiqui MD       Allergies:  Patient has no known allergies. Social History:      The patient currently lives at home    TOBACCO:   reports that she quit smoking about 42 years ago. She has a 7.50 pack-year smoking history.  She has never used smokeless tobacco.  ETOH:   reports that she drinks about 21.0 oz of alcohol per week. Family History:      Reviewed in detail and negative for DM, CAD, Cancer, CVA. Positive as follows:        Problem Relation Age of Onset    Cancer Father 61        Lung    Hypertension Father     Cancer Brother         Bladder,  64    Diabetes Other     Heart Failure Other        REVIEW OF SYSTEMS:   Pertinent positives as noted in the HPI. All other systems reviewed and negative. PHYSICAL EXAM PERFORMED:  BP 96/68   Pulse 66   Temp 97.3 °F (36.3 °C) (Oral)   Resp 16   Ht 5' 2\" (1.575 m)   Wt 181 lb 6.4 oz (82.3 kg)   SpO2 100%   BMI 33.18 kg/m²   General appearance: No apparent distress, appears stated age and cooperative. HEENT: Normal cephalic, atraumatic without obvious deformity. Pupils equal, round, and reactive to light. Extra ocular muscles intact. Conjunctivae/corneas clear. Neck: Supple, with full range of motion. No jugular venous distention. Trachea midline. Respiratory:  Normal respiratory effort. Clear to auscultation, bilaterally without Rales/Wheezes/Rhonchi. Cardiovascular: Regular rate and rhythm with normal S1/S2 without murmurs, rubs or gallops. Abdomen: Soft, non-tender, non-distended with normal bowel sounds. Musculoskeletal: Status post left total knee replacement No clubbing, cyanosis or edema bilaterally. Skin: Skin color, texture, turgor normal.  No rashes or lesions. Neurologic:  Neurovascularly intact without any focal sensory/motor deficits. Cranial nerves: II-XII intact, grossly non-focal.  Psychiatric: Alert and oriented, thought content appropriate, normal insight  Capillary Refill: Brisk,< 3 seconds   Peripheral Pulses: +2 palpable, equal bilaterally     Labs:     No results for input(s): WBC, HGB, HCT, PLT in the last 72 hours. No results for input(s): NA, K, CL, CO2, BUN, CREATININE, CALCIUM, PHOS in the last 72 hours.     Invalid input(s): MAGNES  No results for input(s): AST, ALT, BILIDIR, BILITOT, ALKPHOS in the last 72 hours. No results for input(s): INR in the last 72 hours. No results for input(s): Abe Aver in the last 72 hours. Urinalysis:    Lab Results   Component Value Date    NITRU POSITIVE 05/10/2019    WBCUA  05/10/2019    BACTERIA 4+ 05/10/2019    RBCUA 0-2 05/10/2019    BLOODU Negative 05/10/2019    SPECGRAV 1.010 05/10/2019    GLUCOSEU Negative 05/10/2019       Radiology: I have reviewed the radiology reports with the following interpretation:     XR KNEE LEFT (1-2 VIEWS)   Final Result   Status post left knee arthroplasty as described above. EKG:  I have reviewed the EKG with the following interpretation:    @ekgread@      ASSESSMENT:    Active Hospital Problems    Diagnosis Date Noted    Status post total left knee replacement [Z96.652] 05/23/2019     Priority: High    Benign essential HTN [I10] 02/13/2019       PLAN:    Osteoarthritis of left knee, status post left total knee replacement, admitted under orthopedic surgery service, pain management, wound care and therapy as per surgery. Hypertension, reported stable with current regimen, currently patient is hypotensive, will hold home regimen, continue IV fluid, monitor. Patient was started on Bactrim DS perioperatively by orthopedic surgery, in view of hypotension would consider discontinuing Bactrim and advice on different antibiotic to start with. Monitor renal function daily.     DVT Prophylaxis: Eliquis  Diet: DIET GENERAL;  Code Status: Full Code    PT/OT Eval Status: Active and ongoing    Dispo - as per surgery    Thank you for the consultation, will follow up as needed    Michail Bence MD

## 2019-05-23 NOTE — PROGRESS NOTES
Consult has been PerfectServed to [facility] on 5/23/2019 at 13:55.   Israel Dinero  5/23/2019

## 2019-05-23 NOTE — ANESTHESIA PRE PROCEDURE
Arthritis     [address] dependence ([facility] [address]     HSV-2 (herpes simplex virus 2) infection     Hypertension     Tx since late 45s    Kidney stone        Past Surgical History:        Procedure Laterality Date    COLONOSCOPY  1999    Rectal ulcer, s/p cautery    COLONOSCOPY  13    next due 10 yrs   [address] [facility]    Ovaries in. Had fibroids, DUB. Social History:    Social History     Tobacco Use    Smoking status: Former Smoker     Packs/day: 1.50     Years: 5.00     Pack years: 7.50     Last attempt to quit: 1977     Years since quittin.4    Smokeless tobacco: Never Used   Substance Use Topics    Alcohol use: Yes     Alcohol/week: 21.0 oz     Types: 35 Cans of beer per week     Drinks per session: 3 or 4     Comment: 4-5 beers per noc                                Counseling given: Not Answered      Vital Signs (Current):   Vitals:    19 1055 19 0700   BP:  120/66   Pulse:  79   Resp:  16   Temp:  97.2 °F (36.2 °C)   TempSrc:  Temporal   SpO2:  97%   Weight: 182 lb (82.6 kg) 181 lb 6.4 oz (82.3 kg)   Height: 5' 2\" (1.575 m) 5' 2\" (1.575 m)                                              BP Readings from Last 3 Encounters:   19 120/66   05/10/19 122/80   19 108/72       NPO Status: Time of last liquid consumption:                         Time of last solid consumption:                         Date of last liquid consumption: 19                        Date of last solid food consumption: 19    BMI:   Wt Readings from Last 3 Encounters:   19 181 lb 6.4 oz (82.3 kg)   05/10/19 182 lb 12.8 oz (82.9 kg)   19 179 lb 14.3 oz (81.6 kg)     Body mass index is 33.18 kg/m².     CBC:   Lab Results   Component Value Date    WBC 9.9 05/10/2019    RBC 3.73 05/10/2019    HGB 13.9 05/10/2019    HCT 40.3 05/10/2019    .9 05/10/2019    RDW 13.4 05/10/2019     05/10/2019       CMP:   Lab Results Component Value Date     05/10/2019    K 4.6 05/10/2019     05/10/2019    CO2 24 05/10/2019    BUN 6 05/10/2019    CREATININE <0.5 05/10/2019    GFRAA >60 05/10/2019    AGRATIO 1.2 02/13/2019    LABGLOM >60 05/10/2019    GLUCOSE 97 05/10/2019    PROT 7.5 02/13/2019    CALCIUM 10.1 05/10/2019    BILITOT 0.9 02/13/2019    ALKPHOS 117 02/13/2019    AST 20 02/13/2019    ALT 16 02/13/2019       POC Tests: No results for input(s): POCGLU, POCNA, POCK, POCCL, POCBUN, POCHEMO, POCHCT in the last 72 hours. Coags:   Lab Results   Component Value Date    PROTIME 12.1 05/10/2019    INR 1.06 05/10/2019    APTT 33.6 05/10/2019       HCG (If Applicable): No results found for: PREGTESTUR, PREGSERUM, HCG, HCGQUANT     ABGs: No results found for: PHART, PO2ART, PWM9WSJ, RLV5FEB, BEART, M4VLOTXR     Type & Screen (If Applicable):  No results found for: LABABO, LABRH    Anesthesia Evaluation   no history of anesthetic complications:   Airway: Mallampati: II  TM distance: >3 FB   Neck ROM: full  Mouth opening: > = 3 FB Dental: normal exam         Pulmonary:                             ROS comment: H/o tob   Cardiovascular:    (+) hypertension:,                   Neuro/Psych:   (+) psychiatric history:            GI/Hepatic/Renal:   (+) renal disease: kidney stones,          ROS comment: EtOH use abuse. Endo/Other: Negative Endo/Other ROS                    Abdominal:           Vascular:                                   Pre-Operative Diagnosis: ARTHRITIS LEFT KNEE    70 y.o.   BMI:  Body mass index is 33.18 kg/m².      Vitals:    05/20/19 1055 05/23/19 0700   BP:  120/66   Pulse:  79   Resp:  16   Temp:  97.2 °F (36.2 °C)   TempSrc:  Temporal   SpO2:  97%   Weight: 182 lb (82.6 kg) 181 lb 6.4 oz (82.3 kg)   Height: 5' 2\" (1.575 m) 5' 2\" (1.575 m)       No Known Allergies    Social History     Tobacco Use    Smoking status: Former Smoker     Packs/day: 1.50     Years: 5.00     Pack years: 7.50     Last attempt to quit: 1977     Years since quittin.4    Smokeless tobacco: Never Used   Substance Use Topics    Alcohol use: Yes     Alcohol/week: 21.0 oz     Types: 35 Cans of beer per week     Drinks per session: 3 or 4     Comment: 4-5 beers per noc       LABS:    CBC  Lab Results   Component Value Date/Time    WBC 9.9 05/10/2019 10:00 AM    HGB 13.9 05/10/2019 10:00 AM    HCT 40.3 05/10/2019 10:00 AM     05/10/2019 10:00 AM     RENAL  Lab Results   Component Value Date/Time     05/10/2019 10:00 AM    K 4.6 05/10/2019 10:00 AM     05/10/2019 10:00 AM    CO2 24 05/10/2019 10:00 AM    BUN 6 (L) 05/10/2019 10:00 AM    CREATININE <0.5 (L) 05/10/2019 10:00 AM    GLUCOSE 97 05/10/2019 10:00 AM     COAGS  Lab Results   Component Value Date/Time    PROTIME 12.1 05/10/2019 10:00 AM    INR 1.06 05/10/2019 10:00 AM    APTT 33.6 05/10/2019 10:00 AM        Anesthesia Plan      spinal     ASA 2     (Risks, benefits and alternatives of spinal anesthetic (+/- GA if needed) discussed with pt as well as ultrasound guided adductor canal block and ipack block for post op analgesia. Questions answered. Willing to proceed.)  Induction: intravenous. Anesthetic plan and risks discussed with patient.                       Bertrand Petty MD   2019

## 2019-05-23 NOTE — PROGRESS NOTES
Occupational Therapy   Occupational Therapy Initial Assessment/Treatment  Date: 2019   Patient Name: Arpita Castillo  MRN: [id number]     : 1955    Date of Service: 2019    Discharge Recommendations:  24 hour supervision or assist  OT Equipment Recommendations  Other: CTA    Assessment   Performance deficits / Impairments: Decreased functional mobility ; Decreased safe awareness;Decreased balance;Decreased ADL status; Decreased high-level IADLs;Decreased strength  Assessment: Pt 71 yo female POD #0 L knee replacement. Pt functioning below baseline and reported higher PLOF. Pt previously independent for all adls, skilled OT services needed prior to d/c. Prognosis: Good  Decision Making: Medium Complexity  Patient Education: role of OT, discharge planning, safety  REQUIRES OT FOLLOW UP: Yes  Activity Tolerance  Activity Tolerance: Patient Tolerated treatment well  Activity Tolerance: BP 99/68 at rest, BP 11/71 standing, /80 after ambulation  Safety Devices  Safety Devices in place: Yes  Type of devices: Call light within reach; Chair alarm in place; Left in chair;Gait belt;Nurse notified           Patient Diagnosis(es): The encounter diagnosis was Arthritis of left knee. has a past medical history of Arthritis, EtOH dependence ([facility] [address] HSV-2 (herpes simplex virus 2) infection, Hypertension, and Kidney stone. has a past surgical history that includes Hysterectomy (); Dilation and curettage of uterus (); Colonoscopy (1999); Colonoscopy (13); and Total knee arthroplasty (Left, 2019). Restrictions  Restrictions/Precautions  Restrictions/Precautions: Weight Bearing, General Precautions, Fall Risk  Required Braces or Orthoses?: Yes  Lower Extremity Weight Bearing Restrictions  Left Lower Extremity Weight Bearing: Weight Bearing As Tolerated  Required Braces or Orthoses  Left Lower Extremity Brace: Knee Immobilizer  LLE Brace Type:  May remove once patient can do a

## 2019-05-23 NOTE — PROGRESS NOTES
4 Eyes Skin Assessment     The patient is being assess for   Post-Op Surgical    I agree that 2 RN's have performed a thorough Head to Toe Skin Assessment on the patient. ALL assessment sites listed below have been assessed. Areas assessed by both nurses:   [x]   Head, Face, and Ears   [x]   Shoulders, Back, and Chest, Abdomen  [x]   Arms, Elbows, and Hands   [x]   Coccyx, Sacrum, and Ischium  [x]   Legs, Feet, and Heels            Co-signer eSignature: Electronically signed by Gonzalo Temple RN on 5/23/19 at 1:13 PM    Does the Patient have Skin Breakdown?   No          Genaro Prevention initiated:  No   Wound Care Orders initiated:  No      WOC nurse consulted for Pressure Injury (Stage 3,4, Unstageable, DTI, NWPT, Complex wounds)and New or Established Ostomies:  No      Primary Nurse eSignature: Electronically signed by Raphael Link RN on 5/23/19 at 1:34 PM

## 2019-05-23 NOTE — BRIEF OP NOTE
Brief Postoperative Note  ______________________________________________________________    Patient: Petr oNel  YOB: 1955  MRN: [id number]  Date of Procedure: 5/23/2019    Pre-Op Diagnosis: ARTHRITIS LEFT KNEE    Post-Op Diagnosis: Same       Procedure(s):  LEFT TOTAL KNEE REPLACEMENT            [employer]    Anesthesia: Spinal    Surgeon(s):  Essie Bueno MD    Assistant: Baudilio WADE    Estimated Blood Loss (mL): 100     Complications: None    Specimens:   ID Type Source Tests Collected by Time Destination   A : LEFT KNEE BONE Specimen Joint, Knee SURGICAL PATHOLOGY Essie Bueno MD 5/23/2019 3898        Implants:  Implant Name Type Inv.  Item Serial No.  Lot No. LRB No. Used   CEMENT BONE RALLY HV Cement CEMENT BONE RALLY HV  [employer]  [id number] Left 2   IMPL KNEE FEM COMP OXINIUM DESTINEE  4 Knee IMPL KNEE FEM COMP OXINIUM DESTINEE  4  [employer]  [id number] Left 1   IMPL KNEE PATELLA COMP RESURF 29MM Knee IMPL KNEE PATELLA COMP RESURF 29MM  [employer]  [id number] Left 1   IMPL KNEE TIB BASE NON PORS SZ 3 Knee IMPL KNEE TIB BASE NON PORS  3  [employer]  [id number] Left 1   IMPL KNEE INSRT LGN PS HI-FLX XLPE SZ3-4 11MM Knee IMPL KNEE INSRT LGN PS HI-FLX XLPE SZ3-4 11MM  [employer]  [id number] Left 1         Drains: * No LDAs found *    Findings: OA    SHERI Calderon  Date: 5/23/2019  Time: 9:59 AM

## 2019-05-23 NOTE — H&P
I have reviewed the history and physical and examined the patient and find no relevant changes. I have reviewed with the patient and/or family the risks, benefits, and alternatives to the procedure. Controlled Substances Monitoring:     RX Monitoring 5/23/2019   Attestation The Prescription Monitoring Report for this patient was reviewed today. Acute Pain Prescriptions Severe pain not adequately treated with lower dose. Chronic Pain Routine Monitoring No signs of potential drug abuse or diversion identified: otherwise, see note documentation       Patient being given increased dosage/quantity of opoid pain medication in excess of OSMB guidelines which noted a 30 MED daily of opioids due to the fact that he/she has undergone major orthopaedic surgery as outlined in rule 4731-11-13. Dosages and further duration of the pain medication will be re-evaluated at her post op visit in 2 weeks. Patient was educated on dosing expectations and limits of prescribing as a result of the new [facility] Board rules enacted August 31, 2017. Please also note that this is not the initial opoid prescription issued to this patient but a continuation of medication utilized during the hospital admission as noted in the medical record. OARRS report has also been utilized to screen for any abuse history or suspicious activity as outlined in [place]. All efforts have been taken to prevent abuse potential and misuse of opioid medications including education, screening, and close clinical follow up.

## 2019-05-23 NOTE — PROGRESS NOTES
Physical Therapy    Facility/Department: [place] [address] - MED SURG/ORTHO  Initial Assessment    NAME: Rhona Rivera  : 1955  MRN: [id number]    Date of Service: 2019    Discharge Recommendations:  Home with assist PRN, Outpatient PT   PT Equipment Recommendations  Equipment Needed: Yes  Mobility Devices: [place]: Rolling    Assessment   Body structures, Functions, Activity limitations: Decreased functional mobility ; Decreased endurance;Decreased ROM; Decreased sensation;Decreased strength;Decreased balance;Decreased safe awareness  Assessment: Pt is 71 yo female who presents s/p left TKA. Pt SBA-CGA with mobility with RW this date. BP stable throughout session and pt denies dizziness throughout session. Pain stable throughout session. Pt would benefit from continued skilled therapy to address deficits. Recommend home with assist prn and OP PT at d/c. Treatment Diagnosis: decreased (I) with mobility s/p TKA  Specific instructions for Next Treatment: progress mobility as tolerated  Prognosis: Good  Decision Making: Medium Complexity  Patient Education: Role of PT, safety with mobility, POC, d/c recs, knee immobilizer, WBAT; pt verbalized understanding  Barriers to Learning: none  REQUIRES PT FOLLOW UP: Yes  Activity Tolerance  Activity Tolerance: Patient Tolerated treatment well  Activity Tolerance: BP seated /74, HR 79; SPO2 98% on room air; post ambulation seated in chair /80       Patient Diagnosis(es): The encounter diagnosis was Arthritis of left knee. has a past medical history of Arthritis, EtOH dependence ([facility] [address] HSV-2 (herpes simplex virus 2) infection, Hypertension, and Kidney stone. has a past surgical history that includes Hysterectomy (); Dilation and curettage of uterus (); Colonoscopy (1999); Colonoscopy (13); and Total knee arthroplasty (Left, 2019).     Restrictions  Restrictions/Precautions  Restrictions/Precautions: Weight Bearing, General Precautions, Fall Risk  Required Braces or Orthoses?: Yes  Lower Extremity Weight Bearing Restrictions  Left Lower Extremity Weight Bearing: Weight Bearing As Tolerated  Required Braces or Orthoses  Left Lower Extremity Brace: Knee Immobilizer  LLE Brace Type:  May remove once patient can do a straight leg raise   Vision/Hearing  Vision: Within Functional Limits  Hearing: Within functional limits     Subjective  General  Chart Reviewed: Yes  Patient assessed for rehabilitation services?: Yes  Response To Previous Treatment: Not applicable  Family / Caregiver Present: No  Referring Practitioner: SHERI Israel  Referral Date : 05/23/19  Diagnosis: left TKA 5/23/19  Follows Commands: Within Functional Limits  General Comment  Comments: Pt resting in bed on approach; RN cleared pt for therapy  Subjective  Subjective: pt agreeable to therapy  Pain Screening  Patient Currently in Pain: Yes(reports pain 5/10 throughout session)  Pain Assessment  Pain Assessment: 0-10  Pain Level: 5  Pain Type: Acute pain;Surgical pain  Pain Location: Knee  Non-Pharmaceutical Pain Intervention(s): Ambulation/Increased Activity;Repositioned;Cold applied  Response to Pain Intervention: Patient Satisfied       Orientation  Orientation  Overall Orientation Status: Within Functional Limits  Social/Functional History  Social/Functional History  Lives With: Alone  Type of Home: House(condo)  Home Layout: Two level, Able to Live on Main level with bedroom/bathroom  Home Access: Stairs to enter without rails  Entrance Stairs - Number of Steps: 1 MABEL  Bathroom Shower/Tub: Tub/Shower unit  Bathroom Toilet: Standard  ADL Assistance: Independent  Homemaking Responsibilities: Yes  Meal Prep Responsibility: Primary  Laundry Responsibility: Primary  Cleaning Responsibility: Primary  Bill Paying/Finance Responsibility: Primary  Shopping Responsibility: Primary  Ambulation Assistance: Independent  Transfer Assistance: Independent  Active : Yes  Occupation: Full time employment  Type of occupation: [employer]  Leisure & Hobbies: bike  Additional Comments: Daughter will be staying with pt initally    Objective   RLE AROM; WFL  AROM LLE (degrees)  LLE General AROM: hip and ankle WFL; knee ~0-90 degrees  Strength RLE  Strength RLE: WFL  Strength LLE  Comment: Hip and ankle WFL; knee 3/5     Sensation  Overall Sensation Status: WFL     Bed mobility  Supine to Sit: Minimal assistance(HOB elevated, cues for technique)  Sit to Supine: Unable to assess(pt up in chair at end of session)     Transfers  Sit to Stand: Contact guard assistance  Stand to sit: Contact guard assistance     Ambulation  Ambulation?: Yes  WB Status: WBAT LLE  Ambulation 1  Surface: level tile  Device: Rolling Walker  Other Apparatus: Knee Immobilizer  Assistance: Contact guard assistance;Stand by assistance  Gait Deviations: Slow Kate;Decreased step length;Decreased step height  Comments: Cues for sequencing, no LOB or unsteadiness   Distance: 15 ft    Balance  Sitting - Static: Good  Sitting - Dynamic: Good  Standing - Static: +;Fair  Standing - Dynamic: Fair     Exercises  Straight Leg Raise: x 10 LLE  Quad Sets: x 10 BLE  Heelslides: x 10 LLE supine and seated  Gluteal Sets: x 10 BLE  Knee Short Arc Quad: x 10 LLE  Ankle Pumps: x 10 BLE     Plan   Plan  Times per week: BID 7 days/wk  Times per day: Twice a day  Specific instructions for Next Treatment: progress mobility as tolerated  Current Treatment Recommendations: Strengthening, Gait Training, Patient/Caregiver Education & Training, ROM, Stair training, Equipment Evaluation, Education, & procurement, Balance Training, Neuromuscular Re-education, Functional Mobility Training, Endurance Training, Home Exercise Program, Transfer Training, Safety Education & Training  Safety Devices  Type of devices:  All fall risk precautions in place, Call light within reach, Gait belt, Chair alarm in place, Nurse notified, Left in chair, Patient at risk for falls             AM-PAC Score     AM-PAC Inpatient Mobility without Stair Climbing Raw Score : 15  AM-PAC Inpatient without Stair Climbing T-Scale Score : 43.03  Mobility Inpatient CMS 0-100% Score: 47.43  Mobility Inpatient without Stair CMS G-Code Modifier : CK       Goals  Short term goals  Time Frame for Short term goals: 3 days (5/26) unless otherwise specified  Short term goal 1: Pt will be indep with bed mobility. Short term goal 2: pt will be mod I with transfers with LRAD. Short term goal 3: Pt will be mod I with ambulation 100 ft. Short term goal 4: Pt will negotiate curb step with RW and SBA. Short term goal 5: 5/24: Pt will be indep with TKA protocol exercises.   Patient Goals   Patient goals : \"to go home\"       Therapy Time   Individual Concurrent Group Co-treatment   Time In 3963         Time Out 1438         Minutes 40         Timed Code Treatment Minutes: 10 [facility], [address] [facility], T #[id number]

## 2019-05-23 NOTE — CARE COORDINATION
[facility]    Referral received from JOHN Reis MSW with request for home care- Skilled Nursing,  Physical Therapy and Occupational Therapy. Pt is part of the Ortho Bundle Program.  Demographics verified by MSW. Writer has reviewed H&P and most recent ortho note. Writer is aware the pt is s/p left total knee replacement with Dr. Florin Stovall on 5/23/19, see Brief Op Note. I will continue to follow patient for needs, and arrange [place] [address] services prior to DC. Should pt dc over the weekend please fax facesheet, order, CAM, and H&P to [facility].      Dawson Kenneyd RN CTN with [facility] [address]  DAC:[phone]

## 2019-05-23 NOTE — ANESTHESIA PROCEDURE NOTES
Peripheral Block    Patient location during procedure: pre-op  Staffing  Anesthesiologist: Justin Devi MD  Performed: anesthesiologist   Preanesthetic Checklist  Completed: patient identified, site marked, surgical consent, pre-op evaluation, timeout performed, IV checked, risks and benefits discussed, monitors and equipment checked, anesthesia consent given, oxygen available and patient being monitored  Peripheral Block  Patient position: supine  Prep: ChloraPrep  Patient monitoring: cardiac monitor, continuous pulse ox, frequent blood pressure checks and IV access  Block type: Femoral  Laterality: left  Injection technique: single-shot  Procedures: ultrasound guided  Infiltration strength: 1 %  Dose: 3 mL  Adductor canal (Low Femoral)  Needle  Needle type: combined needle/nerve stimulator   Needle gauge: 21 G  Needle length: 10 cm  Needle localization: ultrasound guidance  Assessment  Injection assessment: negative aspiration for heme, no paresthesia on injection and local visualized surrounding nerve on ultrasound  Paresthesia pain: none  Slow fractionated injection: yes  Hemodynamics: stable  Additional Notes  Immediately prior to procedure a \"time out\" was called to verify the correct patient, allergies, laterality, procedure and equipment. Time out performed with  RN    Local Anesthetic: 0.5 %  Bupivacaine plus epi 1 to 200K  Amount: 20 ml  in 5 ml increments after negative aspiration each time.         Reason for block: post-op pain management and at surgeon's request

## 2019-05-23 NOTE — ANESTHESIA PROCEDURE NOTES
Peripheral Block    Patient location during procedure: pre-op  Staffing  Anesthesiologist: Lilibeth Seymour MD  Performed: anesthesiologist   Preanesthetic Checklist  Completed: patient identified, site marked, surgical consent, pre-op evaluation, timeout performed, IV checked, risks and benefits discussed, monitors and equipment checked, anesthesia consent given, oxygen available and patient being monitored  Peripheral Block  Patient position: right lateral decubitus  Prep: ChloraPrep  Patient monitoring: continuous pulse ox, frequent blood pressure checks and IV access  Block type: iPacks  Laterality: left  Injection technique: single-shot  Procedures: ultrasound guided  Local infiltration: lidocaine  Infiltration strength: 1 %  Dose: 3 mL  Local infiltration: lidocaine  Needle  Needle type: combined needle/nerve stimulator   Needle gauge: 21 G  Needle length: 10 cm  Needle localization: ultrasound guidance  Assessment  Injection assessment: negative aspiration for heme, no paresthesia on injection and local visualized surrounding nerve on ultrasound  Paresthesia pain: none  Slow fractionated injection: yes  Hemodynamics: stable  Additional Notes  Immediately prior to procedure a \"time out\" was called to verify the correct patient, allergies, laterality, procedure and equipment. Time out performed with  RN    Local Anesthetic: 0.125 %  Bupivacaine  Plus epi 1 to 400K Amount: 20 ml  in 5 ml increments after negative aspiration each time.         Reason for block: post-op pain management and at surgeon's request

## 2019-05-24 VITALS
TEMPERATURE: 97.8 F | DIASTOLIC BLOOD PRESSURE: 69 MMHG | SYSTOLIC BLOOD PRESSURE: 108 MMHG | WEIGHT: 181.4 LBS | OXYGEN SATURATION: 94 % | RESPIRATION RATE: 16 BRPM | HEIGHT: 62 IN | HEART RATE: 89 BPM | BODY MASS INDEX: 33.38 KG/M2

## 2019-05-24 LAB
ANION GAP SERPL CALCULATED.3IONS-SCNC: 10 MMOL/L (ref 3–16)
BUN BLDV-MCNC: 11 MG/DL (ref 7–20)
CALCIUM SERPL-MCNC: 8.4 MG/DL (ref 8.3–10.6)
CHLORIDE BLD-SCNC: 95 MMOL/L (ref 99–110)
CO2: 22 MMOL/L (ref 21–32)
CREAT SERPL-MCNC: 0.9 MG/DL (ref 0.6–1.2)
GFR AFRICAN AMERICAN: >60
GFR NON-AFRICAN AMERICAN: >60
GLUCOSE BLD-MCNC: 100 MG/DL (ref 70–99)
HCT VFR BLD CALC: 27.7 % (ref 36–48)
HEMOGLOBIN: 9.6 G/DL (ref 12–16)
MCH RBC QN AUTO: 37.5 PG (ref 26–34)
MCHC RBC AUTO-ENTMCNC: 34.8 G/DL (ref 31–36)
MCV RBC AUTO: 107.8 FL (ref 80–100)
PDW BLD-RTO: 12.9 % (ref 12.4–15.4)
PLATELET # BLD: 159 K/UL (ref 135–450)
PMV BLD AUTO: 8.2 FL (ref 5–10.5)
POTASSIUM REFLEX MAGNESIUM: 3.9 MMOL/L (ref 3.5–5.1)
RBC # BLD: 2.57 M/UL (ref 4–5.2)
SODIUM BLD-SCNC: 127 MMOL/L (ref 136–145)
WBC # BLD: 10.4 K/UL (ref 4–11)

## 2019-05-24 PROCEDURE — 6360000002 HC RX W HCPCS: Performed by: PHYSICIAN ASSISTANT

## 2019-05-24 PROCEDURE — 97530 THERAPEUTIC ACTIVITIES: CPT

## 2019-05-24 PROCEDURE — 97535 SELF CARE MNGMENT TRAINING: CPT

## 2019-05-24 PROCEDURE — 97110 THERAPEUTIC EXERCISES: CPT

## 2019-05-24 PROCEDURE — 6370000000 HC RX 637 (ALT 250 FOR IP): Performed by: PHYSICIAN ASSISTANT

## 2019-05-24 PROCEDURE — 36415 COLL VENOUS BLD VENIPUNCTURE: CPT

## 2019-05-24 PROCEDURE — 97116 GAIT TRAINING THERAPY: CPT

## 2019-05-24 PROCEDURE — 80048 BASIC METABOLIC PNL TOTAL CA: CPT

## 2019-05-24 PROCEDURE — 6370000000 HC RX 637 (ALT 250 FOR IP): Performed by: ANESTHESIOLOGY

## 2019-05-24 PROCEDURE — 85027 COMPLETE CBC AUTOMATED: CPT

## 2019-05-24 RX ORDER — PSEUDOEPHEDRINE HCL 30 MG
100 TABLET ORAL 2 TIMES DAILY
Qty: 30 CAPSULE | Refills: 0 | Status: SHIPPED | OUTPATIENT
Start: 2019-05-24 | End: 2019-07-19 | Stop reason: ALTCHOICE

## 2019-05-24 RX ORDER — OXYCODONE HYDROCHLORIDE AND ACETAMINOPHEN 5; 325 MG/1; MG/1
1-2 TABLET ORAL EVERY 4 HOURS PRN
Qty: 36 TABLET | Refills: 0 | Status: SHIPPED | OUTPATIENT
Start: 2019-05-24 | End: 2019-05-27

## 2019-05-24 RX ADMIN — Medication 2 G: at 00:38

## 2019-05-24 RX ADMIN — CELECOXIB 100 MG: 100 CAPSULE ORAL at 09:07

## 2019-05-24 RX ADMIN — OXYCODONE HYDROCHLORIDE AND ACETAMINOPHEN 1 TABLET: 5; 325 TABLET ORAL at 01:44

## 2019-05-24 RX ADMIN — APIXABAN 2.5 MG: 2.5 TABLET, FILM COATED ORAL at 09:06

## 2019-05-24 RX ADMIN — OXYCODONE HYDROCHLORIDE AND ACETAMINOPHEN 2 TABLET: 5; 325 TABLET ORAL at 15:31

## 2019-05-24 RX ADMIN — DOCUSATE SODIUM 100 MG: 100 CAPSULE, LIQUID FILLED ORAL at 09:07

## 2019-05-24 RX ADMIN — VALACYCLOVIR HYDROCHLORIDE 500 MG: 500 TABLET, FILM COATED ORAL at 09:56

## 2019-05-24 RX ADMIN — OXYCODONE HYDROCHLORIDE AND ACETAMINOPHEN 2 TABLET: 5; 325 TABLET ORAL at 09:07

## 2019-05-24 RX ADMIN — SULFAMETHOXAZOLE AND TRIMETHOPRIM 1 TABLET: 800; 160 TABLET ORAL at 05:46

## 2019-05-24 ASSESSMENT — PAIN SCALES - GENERAL
PAINLEVEL_OUTOF10: 6
PAINLEVEL_OUTOF10: 8
PAINLEVEL_OUTOF10: 6
PAINLEVEL_OUTOF10: 8

## 2019-05-24 ASSESSMENT — PAIN DESCRIPTION - ORIENTATION
ORIENTATION: LEFT

## 2019-05-24 ASSESSMENT — PAIN DESCRIPTION - PAIN TYPE
TYPE: SURGICAL PAIN

## 2019-05-24 ASSESSMENT — PAIN DESCRIPTION - LOCATION
LOCATION: KNEE

## 2019-05-24 ASSESSMENT — PAIN DESCRIPTION - DESCRIPTORS: DESCRIPTORS: ACHING

## 2019-05-24 NOTE — DISCHARGE SUMMARY
Department of Orthopedic Surgery  Nurse Practitioner   Discharge Summary    The Rober Geiger is a 70 y.o. female underwent total knee replacement procedure without complication. Rober Geiger was admitted to the floor following Her recovery in the PACU.      Discharge Diagnosis  left Knee Replacement    Current Inpatient Medications    Current Facility-Administered Medications: celecoxib (CELEBREX) capsule 100 mg, 100 mg, Oral, BID  cyclobenzaprine (FLEXERIL) tablet 10 mg, 10 mg, Oral, TID PRN  valACYclovir (VALTREX) tablet 500 mg, 500 mg, Oral, Daily  0.9 % sodium chloride infusion, , Intravenous, Continuous  sodium chloride flush 0.9 % injection 10 mL, 10 mL, Intravenous, 2 times per day  sodium chloride flush 0.9 % injection 10 mL, 10 mL, Intravenous, PRN  oxyCODONE-acetaminophen (PERCOCET) 5-325 MG per tablet 1 tablet, 1 tablet, Oral, Q4H PRN **OR** oxyCODONE-acetaminophen (PERCOCET) 5-325 MG per tablet 2 tablet, 2 tablet, Oral, Q4H PRN  morphine (PF) injection 2 mg, 2 mg, Intravenous, Q2H PRN **OR** morphine injection 4 mg, 4 mg, Intravenous, Q2H PRN  docusate sodium (COLACE) capsule 100 mg, 100 mg, Oral, BID  ondansetron (ZOFRAN) injection 4 mg, 4 mg, Intravenous, Q6H PRN  apixaban (ELIQUIS) tablet 2.5 mg, 2.5 mg, Oral, BID  LORazepam (ATIVAN) tablet 1 mg, 1 mg, Oral, Q1H PRN **OR** LORazepam (ATIVAN) injection 1 mg, 1 mg, Intravenous, Q1H PRN **OR** LORazepam (ATIVAN) tablet 2 mg, 2 mg, Oral, Q1H PRN **OR** LORazepam (ATIVAN) injection 2 mg, 2 mg, Intravenous, Q1H PRN **OR** LORazepam (ATIVAN) tablet 3 mg, 3 mg, Oral, Q1H PRN **OR** LORazepam (ATIVAN) injection 3 mg, 3 mg, Intravenous, Q1H PRN **OR** LORazepam (ATIVAN) tablet 4 mg, 4 mg, Oral, Q1H PRN **OR** LORazepam (ATIVAN) injection 4 mg, 4 mg, Intravenous, Q1H PRN  sulfamethoxazole-trimethoprim (BACTRIM DS;SEPTRA DS) 800-160 MG per tablet 1 tablet, 1 tablet, Oral, 2 times per day    Post-operatively the patients diet was advanced as tolerated expectations and limits of prescribing as a result of the new [facility] rules enacted August 31, 2017. Please also note that this is not the initial opoid prescription issued to this patient but a continuation of medication utilized during the hospital admission as noted in the medical record. OARRS report has also been utilized to screen for any abuse history or suspicious activity as outlined in [place]. All efforts have been taken to prevent abuse potential and misuse of opioid medications including education, screening, and close clinical follow up.

## 2019-05-24 NOTE — PROGRESS NOTES
Good  Patient Education: Role of PT, safety with mobility, POC, d/c recs, knee immobilizer, WBAT; pt verbalized understanding  REQUIRES PT FOLLOW UP: Yes  Activity Tolerance  Activity Tolerance: Patient Tolerated treatment well       AM-PAC Score  AM-PAC Inpatient Mobility Raw Score : 23  AM-PAC Inpatient T-Scale Score : 56.93  Mobility Inpatient CMS 0-100% Score: 11.2  Mobility Inpatient CMS G-Code Modifier : CI          Goals  Short term goals  Time Frame for Short term goals: 3 days (5/26) unless otherwise specified  Short term goal 1: Pt will be indep with bed mobility.; goal met 5/24/19  Short term goal 2: pt will be mod I with transfers with LRAD.; goal met 5/24/19  Short term goal 3: Pt will be mod I with ambulation 100 ft.; goal met 5/24/19  Short term goal 4: Pt will negotiate curb step with RW and SBA.; goal met 5/24/19  Short term goal 5: 5/24: Pt will be indep with TKA protocol exercises.; goal met 5/24/19  Patient Goals   Patient goals : \"to go home\"    Plan    Plan  Times per week: BID 7 days/wk  Times per day: Twice a day  Specific instructions for Next Treatment: progress mobility as tolerated  Current Treatment Recommendations: Strengthening, Gait Training, Patient/Caregiver Education & Training, ROM, Stair training, Equipment Evaluation, Education, & procurement, Balance Training, Neuromuscular Re-education, Functional Mobility Training, Endurance Training, Home Exercise Program, Transfer Training, Safety Education & Training  Safety Devices  Type of devices:  All fall risk precautions in place, Call light within reach, Gait belt, Chair alarm in place, Nurse notified, Left in chair, Patient at risk for falls     Therapy Time   Individual Concurrent Group Co-treatment   Time In 0820         Time Out 0859         Minutes 100 [facility] [place], PT

## 2019-05-24 NOTE — PROGRESS NOTES
Department of Orthopedic Surgery  Nurse Practitioner   Progress Note    Subjective:     Post-Operative Day: 1 Status Post left Total Knee Arthroplasty  Systemic or Specific Complaints:No Complaints. Patient sitting up in the chair. Pain well controlled. Denies dizziness, lightheadedness, chest pain, SOB. Pt ready to go home. No family at bedside, coming at 1430. Objective:     Patient Vitals for the past 24 hrs:   BP Temp Temp src Pulse Resp SpO2   05/24/19 0745 108/69 97.8 °F (36.6 °C) Oral 89 16 --   05/24/19 0317 111/63 97.7 °F (36.5 °C) Oral 75 16 94 %   05/23/19 2332 (!) 140/75 97.5 °F (36.4 °C) Oral 78 18 92 %   05/23/19 2019 112/74 98 °F (36.7 °C) Oral 73 18 99 %   05/23/19 1540 114/78 97.7 °F (36.5 °C) Oral 69 18 99 %   05/23/19 1457 133/81 -- -- 58 -- --   05/23/19 1447 125/74 -- -- 58 19 97 %   05/23/19 1439 121/68 97.3 °F (36.3 °C) Oral 61 18 96 %       General: alert, appears stated age, cooperative and no distress   Wound: Wound clean and dry no evidence of infection. Motion: Painful range of Motion   DVT Exam: No evidence of DVT seen on physical exam.       Knee swollen but thigh soft to palpation. Moving foot and ankle. Good distal pulses. Data Review  CBC:   Lab Results   Component Value Date    WBC 10.4 05/24/2019    RBC 2.57 05/24/2019    HGB 9.6 05/24/2019    HCT 27.7 05/24/2019     05/24/2019       Assessment:     Status Post left Total Knee Arthroplasty. Doing well postoperatively. Plan:      1: Discharge today, Return to Clinic: 2 weeks : Post-op labs reviewed and stable. Acute blood loss anemia, expected after surgery. Monitor. Medicine following. Hyponatremia noted, medicine okay with discharge since orthostatics okay. Pt to have rechecked. 2:  Continue Deep venous thrombosis prophylaxis - Eliquis  3:  Continue physical therapy, OT, WBAT  4:  Continue Pain Control  5:  Discharge home today with [facility] and assistance. Pt will need bloodwork checked with [facility].  Scripts sent

## 2019-05-24 NOTE — PLAN OF CARE
Problem: Pain:  Goal: Control of acute pain  Description  Control of acute pain  Outcome: Ongoing  Note:   Pt rating pain on scale of 0-10. Instructed to use call light when needing something for pain. Pt verbalizes understanding. Will continue to monitor. Problem: Falls - Risk of:  Goal: Will remain free from falls  Description  Will remain free from falls  Outcome: Ongoing  Note:   Patient encouraged to call out when needing assistance. Bed locked and in the lowest position. Bedside table, pt belongings, along with call light within reach. Verbal understanding from pt.

## 2019-05-24 NOTE — CARE COORDINATION
[facility]    Spoke with pt at bedside about [facility], all questions answered. Pt is agreeable to SN/PT/OT, pt is part of the Ortho Bundle Program. Pt aware agency will call to schedule SOC, pt prefers for her cell phone to be called first. Pt is agreeable to having a SOC within 2 days of DC. Demographics verified. Orders will be faxed to [facility]. Should pt dc over the weekend please fax facesheet, order, CAM, and H&P to [facility]. Update: 2:08- dc order noted. Orders faxed to [facility]. See CAM, pt to have labs drawn at home.    Ann Dinh RN CTN with [facility] [address]  TOS:[phone]

## 2019-05-24 NOTE — PROGRESS NOTES
Patient educated on discharge instructions. IV removed and dressing was changed.  Patient to follow up with melissa

## 2019-05-24 NOTE — PROGRESS NOTES
Hospitalist Progress Note      PCP: Clifford Carreon MD    Date of Admission: 5/23/2019    Chief Complaint: OA knee    Hospital Course:      Subjective:   Patient is up in chair, comfortable, not in distress. Denies any chest pain or shortness of breath. No new event overnight noted. Medications:  Reviewed    Infusion Medications    sodium chloride 75 mL/hr at 05/23/19 2347     Scheduled Medications    celecoxib  100 mg Oral BID    valACYclovir  500 mg Oral Daily    sodium chloride flush  10 mL Intravenous 2 times per day    docusate sodium  100 mg Oral BID    apixaban  2.5 mg Oral BID    sulfamethoxazole-trimethoprim  1 tablet Oral 2 times per day     PRN Meds: cyclobenzaprine, sodium chloride flush, oxyCODONE-acetaminophen **OR** oxyCODONE-acetaminophen, morphine **OR** morphine, ondansetron, LORazepam **OR** LORazepam **OR** LORazepam **OR** LORazepam **OR** LORazepam **OR** LORazepam **OR** LORazepam **OR** LORazepam    No intake or output data in the 24 hours ending 05/24/19 1054    Physical Exam Performed:    /69   Pulse 89   Temp 97.8 °F (36.6 °C) (Oral)   Resp 16   Ht 5' 2\" (1.575 m)   Wt 181 lb 6.4 oz (82.3 kg)   SpO2 94%   BMI 33.18 kg/m²     General appearance: No apparent distress, appears stated age and cooperative. HEENT: Pupils equal, round, and reactive to light. Conjunctivae/corneas clear. Neck: Supple, with full range of motion. No jugular venous distention. Trachea midline. Respiratory:  Normal respiratory effort. Clear to auscultation, bilaterally without Rales/Wheezes/Rhonchi. Cardiovascular: Regular rate and rhythm with normal S1/S2 without murmurs, rubs or gallops. Abdomen: Soft, non-tender, non-distended with normal bowel sounds. Musculoskeletal: No clubbing, cyanosis or edema bilaterally. Full range of motion without deformity. Skin: Skin color, texture, turgor normal.  No rashes or lesions.   Neurologic:  Neurovascularly intact without any focal sensory/motor deficits. Cranial nerves: II-XII intact, grossly non-focal.  Psychiatric: Alert and oriented, thought content appropriate, normal insight  Capillary Refill: Brisk,< 3 seconds   Peripheral Pulses: +2 palpable, equal bilaterally       Labs:   Recent Labs     05/24/19  0750   WBC 10.4   HGB 9.6*   HCT 27.7*        Recent Labs     05/24/19  0750   *   K 3.9   CL 95*   CO2 22   BUN 11   CREATININE 0.9   CALCIUM 8.4       Urinalysis:      Lab Results   Component Value Date    NITRU POSITIVE 05/10/2019    WBCUA  05/10/2019    BACTERIA 4+ 05/10/2019    RBCUA 0-2 05/10/2019    BLOODU Negative 05/10/2019    SPECGRAV 1.010 05/10/2019    GLUCOSEU Negative 05/10/2019       Radiology:  XR KNEE LEFT (1-2 VIEWS)   Final Result   Status post left knee arthroplasty as described above. Assessment/Plan:    Active Hospital Problems    Diagnosis    Status post total left knee replacement [Z96.652]     Priority: High    Benign essential HTN [I10]     Osteoarthritis of left knee, status post left total knee replacement, admitted under orthopedic surgery service, pain management, wound care and therapy as per surgery.     Hypertension, reported stable with current regimen, currently patient is hypotensive, will hold home regimen, continue IV fluid, monitor. Hold blood pressure medication upon discharge, patient was educated upon checking blood pressure at home closely and close follow-up with primary care physician as outpatient. Clinically improving, check orthostatic blood pressure. Mild hyponatremia, likely due to volume depletion, encourage by mouth intake.       DVT Prophylaxis: Eliquis  Diet: DIET GENERAL;  Code Status: Full Code    PT/OT Eval Status: Ongoing    Dispo - as per surgery    Moisés Espinoza MD

## 2019-05-24 NOTE — OP NOTE
[address] [address]                                OPERATIVE REPORT    PATIENT NAME: Maryjane Gomes                   :        1955  MED REC NO:   [id number]                          ROOM:       [id number]  ACCOUNT NO:   [de-identified]                           ADMIT DATE: 2019  PROVIDER:     Johny Corbett MD    DATE OF PROCEDURE:  2019    SERVICE:  Orthopedic Surgery. SURGEON:  Germaine Gonzalez MD    FIRST ASSISTANT:  SHERI Aguilera    PREOPERATIVE DIAGNOSIS:  Severe osteoarthritis of the left knee, 715. 36. POSTOPERATIVE DIAGNOSIS:  Severe osteoarthritis of the left knee,  715.36. OPERATIVE PROCEDURE:  Left Reed and Nephew total knee replacement -  [facility]. Size 4 Oxinium femoral component, size 3 tibial component,  11-mm deep flexion XLPE polyethylene insert, and 29-mm onlay patella. TOURNIQUET TIME:  350 mmHg for 37 minutes. DRAINS:  One Hemovac. COMPLICATIONS:  None. X-RAYS:  None. ANTIBIOTICS:  As per SCIP protocol, based upon patient's age, weight,  and allergies. INSTRUMENT COUNT:  Correct x2. ESTIMATED BLOOD LOSS:  Less than 100 mL. DISPOSITION:  To the recovery room. X-rays in the recovery room ordered  of the operative knee. INDICATIONS FOR SURGERY:  She is 70years old. The above-mentioned  patient presents for elective total knee replacement arthroplasty on the  date of surgery. The history is that the patient has well-outlined  records from [facility]. The patient has failed  nonoperative measures with respect to control of patient's pain and  function. There has been an extensive discussion regarding the risks,  benefits, and potential complications of this procedure, as well as  normal rehabilitative protocol.   The patient specifically voiced  understanding to concerns with respect to surgical infection, deep vein  thrombosis, dystrophy, arthrofibrosis, delayed rehabilitation potential,  need for manipulation, periprosthetic fractures, neurologic injury, etc.  The patient voiced understanding to all of this and elected to have the  procedure done. All questions were answered. DETAILS OF SURGERY:  The patient was seen in the preanesthesia holding  area, and I personally initialed the operative site and answered any  further questions. They were then taken to the operating room, where  anesthesia was induced and maintained. This was well documented in  their records from the hospital with respect to the anesthesia personnel  and the type of anesthesia which they performed, including nerve blocks. The patient's leg was then positioned, prepped and draped in the usual  fashion for total knee replacement arthroplasty. ChloraPrep was  utilized as the preparation. The left leg was prepped and draped, and  Ioban drape was applied after the surgical incision was marked. A  longitudinal incision was made and then carried down through the skin  and soft tissues after inflation of the pneumatic tourniquet at 350  mmHg. A medial arthrotomy was performed with an incision passing just  along the most medial side of the extensor mechanism and the vastus  medialis obliquus. The patella was then retracted laterally. At this  point, careful attention was made to removal of osteophytes and soft  tissue balancing, with removal of any adhesions, particularly around the  lateral peripatellar region. Utilizing the PATHEOS and [employer] protocol for the above-mentioned  prosthesis, the course of femoral and tibial cuts were made. Femoral  cuts were addressed first.  The distal cut first protocol was used with  the patient ultimately being sized for the aforementioned trial.  This  gave excellent fixation both in the anteroposterior and mediolateral  dimensions. Attention was then drawn toward the tibia.   The preliminary cut was made  in the tibia, removing approximately 2 mm of bone based upon the  patient's overall tibial anatomy. The trial tibia was sized and placed  in the aforementioned thickness and size as mentioned above. This gave  excellent position and range of motion after trial components were  utilized. Any further tissue balancing that was necessary was performed  at this juncture. The patella was addressed. Approximately 10 mm of bone was removed from  the patella using an oscillating saw. The onlay patellar component was  sized and appropriately positioned. The knee was then taken through a  trial range of motion with excellent patellar tracking. At this point, copious irrigation was performed with the pulsatile  lavage, and the bone was prepped for cement. The permanent components  were then cemented into position. After the cement hardened, the knee  was again taken through a range of motion and gave the same excellent  alignment and stability. The permanent polyethylene prosthesis was applied to the tibia. The  pneumatic tourniquet was deflated at this point, and hemostasis was  obtained with Bovie electrocauterization. The wound was then closed in  a layered fashion. A Hemovac drain had been placed into the lateral  gutter. The patient was stable and taken to the recovery room with a knee  immobilization device in place.         Robbin Delacruz MD    D: 05/24/2019 13:27:50       T: 05/24/2019 17:51:05     [email or web address]  Job#: [id number]     Doc#: [id number]    CC:

## 2019-05-24 NOTE — PROGRESS NOTES
Occupational Therapy  Facility/Department: [address] [address] - MED SURG/ORTHO  Daily Treatment Note  NAME: Gerarda Babinski  : 1955  MRN: [id number]    Date of Service: 2019    Discharge Recommendations:  Home with assist PRN  OT Equipment Recommendations  Equipment Needed: No    Assessment   Performance deficits / Impairments: Decreased functional mobility ; Decreased safe awareness;Decreased balance;Decreased ADL status; Decreased high-level IADLs;Decreased strength  Assessment: Pt pleasant and agreeable to treatment, with improved level of assistance for functional mobility/transfers and verbalizing understanding of all education provided this session. Pt and daughter report no OT concerns for home. Patient Education: safety, energy conservation, dressing/bathing techniques   REQUIRES OT FOLLOW UP: Yes  Activity Tolerance  Activity Tolerance: Patient Tolerated treatment well  Activity Tolerance: Orthostatics taken: BP supine 110/73 (HR 67), /61 (HR 90) at EOB, /53 (HR 94) after ~1 minute standing   Safety Devices  Safety Devices in place: Yes  Type of devices: Call light within reach; Chair alarm in place; Left in chair;Gait belt;Nurse notified         Patient Diagnosis(es): The primary encounter diagnosis was Status post total left knee replacement. A diagnosis of Arthritis of left knee was also pertinent to this visit. has a past medical history of Arthritis, EtOH dependence ([address] HSV-2 (herpes simplex virus 2) infection, Hypertension, and Kidney stone. has a past surgical history that includes Hysterectomy (); Dilation and curettage of uterus (); Colonoscopy (1999); Colonoscopy (13); and Total knee arthroplasty (Left, 2019).     Restrictions  Restrictions/Precautions  Restrictions/Precautions: Weight Bearing, General Precautions, Fall Risk  Required Braces or Orthoses?: Yes  Lower Extremity Weight Bearing Restrictions  Left Lower Extremity Weight Bearing: Weight Bearing As Tolerated  Required Braces or Orthoses  Left Lower Extremity Brace: Knee Immobilizer  LLE Brace Type: May remove once patient can do a straight leg raise   Subjective   General  Chart Reviewed: Yes  Patient assessed for rehabilitation services?: Yes  Family / Caregiver Present: Yes(Pt's daughter)  Diagnosis: s/p left Total Knee Arthroplasty 19  Subjective  Subjective: Pt up in chair on arrival, pleasant and agreeable to treatment  General Comment  Comments: Per RN okay to treat  Pain Assessment  Pain Assessment: 0-10  Pain Level: 8  Pain Type: Surgical pain  Pain Location: Knee  Pain Orientation: Left  Pain Descriptors: Aching  Non-Pharmaceutical Pain Intervention(s): Repositioned; Ambulation/Increased Activity;Cold applied  Response to Pain Intervention: Patient Satisfied  Vital Signs  Orthostatic B/P and Pulse?: Yes  Blood Pressure Lyin/73  Pulse Lyin PER MINUTE  Blood Pressure Sittin/61  Pulse Sittin PER MINUTE  Blood Pressure Standin/53  Pulse Standin PER MINUTE  Patient Currently in Pain: Yes  Orthostatic B/P and Pulse?: Yes   Orientation  Orientation  Overall Orientation Status: Within Functional Limits  Objective    ADL  LE Dressing: Modified independent (seated to doff/don B socks)  Additional Comments: Pt verbalized understanding of education on energy conservation for use with ADL/IADL, and dressing/bathing techniques. Pt agreeable to having daughter present for initial bathing at d/c.          Balance  Sitting Balance: Independent  Standing Balance: Modified independent   Standing Balance  Time: 2-3 minutes, 3-4 minutes  Activity: mobility  Functional Mobility  Functional - Mobility Device: Standard Walker  Activity: Other  Assist Level: Supervision  Bed mobility  Supine to Sit: Modified independent(HOB elevated)  Sit to Supine: Modified independent(HOB flat)  Transfers  Sit to stand: Modified independent  Stand to sit: Modified independent Cognition  Overall Cognitive Status: [facility] Transfers: Pt verbalized understanding of a safe car transfer this session. Plan   Plan  Times per week: 6x/wk  Current Treatment Recommendations: Endurance Training, Strengthening, Functional Mobility Training, Self-Care / ADL, Balance Training    Goals  Short term goals  Time Frame for Short term goals: 1 week (5/29/19)  Short term goal 1: Pt will verbalize understanding of car transfer. (5/25/19)- GOAL MET 5/24  Short term goal 2: Pt will complete LB dressing with min A. - GOAL MET 5/24  Short term goal 3: Pt will complete toilet transfer with SBA. - GOAL MET (via observation of transfers 5/24)  Patient Goals   Patient goals : Garry Celis ride my bike when this is all over\"       Therapy Time   Individual Concurrent Group Co-treatment   Time In 1130         Time Out 1155         Minutes 25         Timed Code Treatment Minutes: 25 Minutes     This note to serve as d/c summary should pt d/c prior to next session.     Iggy Doran, OTR/L

## 2019-05-27 ENCOUNTER — HOSPITAL ENCOUNTER (EMERGENCY)
Age: 64
Discharge: HOME | End: 2019-05-28
Attending: EMERGENCY MEDICINE
Payer: COMMERCIAL

## 2019-05-27 DIAGNOSIS — M79.89 LEG SWELLING: Primary | ICD-10-CM

## 2019-05-27 DIAGNOSIS — R58 POSTOPERATIVE ECCHYMOSIS: ICD-10-CM

## 2019-05-27 LAB
A/G RATIO: 0.9 (ref 1.1–2.2)
ALBUMIN SERPL-MCNC: 3.2 G/DL (ref 3.4–5)
ALP BLD-CCNC: 84 U/L (ref 40–129)
ALT SERPL-CCNC: 13 U/L (ref 10–40)
ANION GAP SERPL CALCULATED.3IONS-SCNC: 12 MMOL/L (ref 3–16)
AST SERPL-CCNC: 26 U/L (ref 15–37)
BILIRUB SERPL-MCNC: 1.1 MG/DL (ref 0–1)
BUN BLDV-MCNC: 5 MG/DL (ref 7–20)
CALCIUM SERPL-MCNC: 9 MG/DL (ref 8.3–10.6)
CHLORIDE BLD-SCNC: 96 MMOL/L (ref 99–110)
CO2: 20 MMOL/L (ref 21–32)
CREAT SERPL-MCNC: <0.5 MG/DL (ref 0.6–1.2)
GFR AFRICAN AMERICAN: >60
GFR NON-AFRICAN AMERICAN: >60
GLOBULIN: 3.4 G/DL
GLUCOSE BLD-MCNC: 109 MG/DL (ref 70–99)
HCT VFR BLD CALC: 22.7 % (ref 36–48)
HEMOGLOBIN: 8 G/DL (ref 12–16)
INR BLD: 1.22 (ref 0.86–1.14)
LACTIC ACID: 1.1 MMOL/L (ref 0.4–2)
MCH RBC QN AUTO: 37.2 PG (ref 26–34)
MCHC RBC AUTO-ENTMCNC: 35.1 G/DL (ref 31–36)
MCV RBC AUTO: 106.2 FL (ref 80–100)
PDW BLD-RTO: 12.9 % (ref 12.4–15.4)
PLATELET # BLD: 201 K/UL (ref 135–450)
PMV BLD AUTO: 8 FL (ref 5–10.5)
POTASSIUM SERPL-SCNC: 3.9 MMOL/L (ref 3.5–5.1)
PROTHROMBIN TIME: 13.9 SEC (ref 9.8–13)
RBC # BLD: 2.14 M/UL (ref 4–5.2)
SODIUM BLD-SCNC: 128 MMOL/L (ref 136–145)
TOTAL PROTEIN: 6.6 G/DL (ref 6.4–8.2)
WBC # BLD: 9 K/UL (ref 4–11)

## 2019-05-27 PROCEDURE — 6370000000 HC RX 637 (ALT 250 FOR IP): Performed by: NURSE PRACTITIONER

## 2019-05-27 PROCEDURE — 80053 COMPREHEN METABOLIC PANEL: CPT

## 2019-05-27 PROCEDURE — 96360 HYDRATION IV INFUSION INIT: CPT

## 2019-05-27 PROCEDURE — 85610 PROTHROMBIN TIME: CPT

## 2019-05-27 PROCEDURE — 83605 ASSAY OF LACTIC ACID: CPT

## 2019-05-27 PROCEDURE — 2580000003 HC RX 258: Performed by: NURSE PRACTITIONER

## 2019-05-27 PROCEDURE — 99283 EMERGENCY DEPT VISIT LOW MDM: CPT

## 2019-05-27 PROCEDURE — 85027 COMPLETE CBC AUTOMATED: CPT

## 2019-05-27 RX ORDER — 0.9 % SODIUM CHLORIDE 0.9 %
1000 INTRAVENOUS SOLUTION INTRAVENOUS ONCE
Status: COMPLETED | OUTPATIENT
Start: 2019-05-27 | End: 2019-05-28

## 2019-05-27 RX ORDER — OXYCODONE HYDROCHLORIDE AND ACETAMINOPHEN 5; 325 MG/1; MG/1
1 TABLET ORAL ONCE
Status: COMPLETED | OUTPATIENT
Start: 2019-05-27 | End: 2019-05-27

## 2019-05-27 RX ADMIN — SODIUM CHLORIDE 1000 ML: 9 INJECTION, SOLUTION INTRAVENOUS at 23:36

## 2019-05-27 RX ADMIN — OXYCODONE HYDROCHLORIDE AND ACETAMINOPHEN 1 TABLET: 5; 325 TABLET ORAL at 23:51

## 2019-05-27 ASSESSMENT — PAIN SCALES - GENERAL
PAINLEVEL_OUTOF10: 4
PAINLEVEL_OUTOF10: 6

## 2019-05-28 ENCOUNTER — CARE COORDINATION (OUTPATIENT)
Dept: CASE MANAGEMENT | Age: 64
End: 2019-05-28

## 2019-05-28 VITALS
RESPIRATION RATE: 16 BRPM | SYSTOLIC BLOOD PRESSURE: 105 MMHG | HEART RATE: 88 BPM | HEIGHT: 62 IN | TEMPERATURE: 97.9 F | BODY MASS INDEX: 33.13 KG/M2 | WEIGHT: 180 LBS | OXYGEN SATURATION: 97 % | DIASTOLIC BLOOD PRESSURE: 63 MMHG

## 2019-05-28 DIAGNOSIS — Z96.652 STATUS POST TOTAL LEFT KNEE REPLACEMENT: Primary | ICD-10-CM

## 2019-05-28 PROCEDURE — 96361 HYDRATE IV INFUSION ADD-ON: CPT

## 2019-05-28 RX ORDER — OXYCODONE HYDROCHLORIDE AND ACETAMINOPHEN 5; 325 MG/1; MG/1
1 TABLET ORAL EVERY 4 HOURS PRN
Qty: 28 TABLET | Refills: 0 | Status: SHIPPED | OUTPATIENT
Start: 2019-05-28 | End: 2019-06-04

## 2019-05-28 ASSESSMENT — PAIN SCALES - GENERAL: PAINLEVEL_OUTOF10: 3

## 2019-05-28 NOTE — CARE COORDINATION
Spoke with patient. Incision status: States dressing dry and intact today and appears no drainage. Patient went to ED yesterday for increased drainage from dressing and dressing changed and compression dressing applied. Edema/Swelling: States still has a lot of swelling but has improved some since yesterday. Discussed ice and elevating above heart. Pain level and status: States pain is tolerable with pain meds. Use of pain medications: States wanted to be off of pain meds, but has been taking them, states running low, and requesting a refill. Notified physician office. Bowels: No complaints. Home Care Agency active: [facility] nurse and therapist working today. Do you have all of your medications: Yes, states taking eliquis.     Changes in medications: No    Follow up appointments:    Future Appointments   Date Time Provider Diego Trujillo   6/6/2019 12:30 PM MD SLADE Hardy AND HERNESTO Webster BSN  Care Transition Coordinator for ortho bundle  [phone]

## 2019-05-28 NOTE — ED PROVIDER NOTES
[facility] Emergency Department    CHIEF COMPLAINT  Post-op Problem (Pt reports had left total knee with Dr Dorota Chatman on this past thursday, pt reports worsening bruising down left leg into feet, pt also reports dressing has started to have some bloody drainage. pt denies any CP/SOB. Pt reports pain is controlled. )      HISTORY OF PRESENT ILLNESS  Sergio Baird is a 70 y.o. female who presents to the ED complaining of a swelling and bruising to left leg over the past 2 days. Patient had a total knee replacement on Thursday denies any complications and was discharged home on Friday. Patient is on eliquis and denies missing any doses. Patient reports that over the past 2 days she has noticed increased swelling and bruising to left leg. Patient also reports noting blood from incision. No other drainage noted per patient. No fever or chills. No dizziness or lightheadedness. No chest pain or shortness of breath. No numbness or tingling in extremity. No unilateral weakness. Patient denies any fall or trauma. Patient reports that she has been weightbearing as direct it with use of walker. Patient reports that she has not started physical therapy yet but has been doing home exercises that she was instructed on doing. Patient reports that she has not been standing for long periods of time or over use. Patient denies any worsening pain associated with bruising and swelling. No other complaints, modifying factors or associated symptoms. Nursing notes reviewed. Past Medical History:   Diagnosis Date    Arthritis     EtOH dependence ([facility] [address]     HSV-2 (herpes simplex virus 2) infection     Hypertension     Tx since late 45s    Kidney stone      Past Surgical History:   Procedure Laterality Date    COLONOSCOPY  02/16/1999    Rectal ulcer, s/p cautery    COLONOSCOPY  4/19/13    next due 10 yrs   [address]    Ovaries in. Had fibroids, DUB. Medications   Medication Sig Dispense Refill    oxyCODONE-acetaminophen (PERCOCET) 5-325 MG per tablet Take 1-2 tablets by mouth every 4 hours as needed for Pain for up to 3 days. S/P LEFT TOTAL KNEE REPLACEMENT 36 tablet 0    apixaban (ELIQUIS) 2.5 MG TABS tablet Take 1 tablet by mouth 2 times daily for 12 days 24 tablet 0    docusate sodium (COLACE, DULCOLAX) 100 MG CAPS Take 100 mg by mouth 2 times daily 30 capsule 0    sulfamethoxazole-trimethoprim (BACTRIM DS;SEPTRA DS) 800-160 MG per tablet Take 1 tablet by mouth 2 times daily for 10 days 20 tablet 0    valACYclovir (VALTREX) 500 MG tablet TAKE 1 TABLET BY MOUTH EVERY DAY 90 tablet 3     No Known Allergies    REVIEW OF SYSTEMS  10 systems reviewed, pertinent positives per HPI otherwise noted to be negative    PHYSICAL EXAM  BP (!) 142/77   Pulse 114   Temp 97.9 °F (36.6 °C) (Oral)   Resp 18   Ht 5' 2\" (1.575 m)   Wt 180 lb (81.6 kg)   SpO2 99%   BMI 32.92 kg/m²   GENERAL APPEARANCE: Awake and alert. Cooperative. No acute distress. Non toxic in appearance. HEAD: Normocephalic. Atraumatic. EYES: PERRL. EOM's grossly intact. ENT: Mucous membranes are pink and moist.   NECK: Supple. HEART: RRR. No murmurs. LUNGS: Respirations unlabored. CTAB. Good air exchange. Speaking comfortably in full sentences. ABDOMEN: Soft. Non-distended. Non-tender. No guarding or rebound. EXTREMITIES: left lower extremity edema, +1 pedal edema. Brisk cap refill. Pulses palpable bilateral radial/post tib +2/2 equal bilaterally. Decreased flexion and range of motion of the left lower extremity due to recent surgery. All extremities neurovascularly intact. SKIN: Warm and dry. No acute rashes. Various stages of ecchymotic areas noted to entire left lower extremity. Incision present to anterior aspect of left patella with minimal dried blood noted to dressing. No other exudate noted. Blister present right of incision at distal end of suture line.  No sloughing or streaking present. NEUROLOGICAL: Alert and oriented. No focal/motor deficits. No gross facial drooping. Strength 5/5, sensation intact. PSYCHIATRIC: Normal mood and affect. RADIOLOGY  No radiological imaging needed at this time. ED COURSE   I have evaluated this patient in collaboration with Dr Alena Melo.      Vital signs stable. Patient was given home prescribed dose of Percocet while in emergency department for pain, with good relief. CBC revealed no leukocytosis with a hemoglobin of 8.0 and hematocrit of 22.7. CMP revealed slight dehydration. Patient was given 1L NS bolus. Lactic acid 1.1. PT/INR 13.9 and 1.22. Consult was performed per myself with on call orthopedic surgeon regarding patient's chief complaint and ED course of treatment and findings. All results were discussed with orthopedic surgeon. Recommendations for compression, ice, and elevation of the extremity for patient until follow-up with Dr. Anneliese Xie. Patient has physical therapy tomorrow and recommendations by on-call orthopedic surgeon to follow up with them and if any concerns they will send her over to the office for further evaluation and treatment. Patient aware of plan of care and follow-up. A discussion was had with Mrs. Santacruz regarding ED findings, results and follow up. All questions were answered. Patient will follow up with  Dr Anneliese Xie in 1 day for further evaluation/treatment. Patient will return to ED for new/worsening symptoms. Patient  comfortable upon discharge. Return precautions were discussed in detail with patient. Patient agreeable with plan of care, treatment, and discharge at this time.     MDM  Results for orders placed or performed during the hospital encounter of 05/27/19   CBC   Result Value Ref Range    WBC 9.0 4.0 - 11.0 K/uL    RBC 2.14 (L) 4.00 - 5.20 M/uL    Hemoglobin 8.0 (L) 12.0 - 16.0 g/dL    Hematocrit 22.7 (L) 36.0 - 48.0 %    .2 (H) 80.0 - 100.0 fL    MCH 37.2 (H) 26.0 - 34.0 pg    MCHC 35.1 31.0 -

## 2019-05-28 NOTE — ED NOTES
Replaced pt mepliex with a new mepliex dressing and wrapped with 4 inch ace wrap. Pt tolerated well and had family at bedside. RN notified.       Marcelo Padillaer  05/28/19 0022

## 2019-05-30 ENCOUNTER — TELEPHONE (OUTPATIENT)
Dept: ORTHOPEDIC SURGERY | Age: 64
End: 2019-05-30

## 2019-05-31 ENCOUNTER — CARE COORDINATION (OUTPATIENT)
Dept: CASE MANAGEMENT | Age: 64
End: 2019-05-31

## 2019-05-31 NOTE — CARE COORDINATION
Spoke with patient. Incision status: States dressing has bloody drainage. States going to change dressing today, states it has been changed twice since home. Discussed monitoring drainage, states light pink/bloody. Will notify physician if not stopping. Edema/Swelling: States swelling present. Continues to ice and elevate. Pain level and status: States pain is tolerable. Use of pain medications: States taking 2-3 pain pills everyday. Bowels: States bowels are moving. Home Care Agency active: Continues with nursing and therapy.     Do you have all of your medications: Yes    Changes in medications: No    Follow up appointments:    Future Appointments   Date Time Provider Diego Trujillo   2019 12:30 PM Elysa Apgar, MD [facility] HERNESTO Trujillo BSN  Care Transition Coordinator for ortho bundle  [phone]

## 2019-06-03 ENCOUNTER — CARE COORDINATION (OUTPATIENT)
Dept: CASE MANAGEMENT | Age: 64
End: 2019-06-03

## 2019-06-03 ENCOUNTER — OFFICE VISIT (OUTPATIENT)
Dept: ORTHOPEDIC SURGERY | Age: 64
End: 2019-06-03

## 2019-06-03 VITALS — WEIGHT: 179.9 LBS | HEIGHT: 62 IN | BODY MASS INDEX: 33.1 KG/M2

## 2019-06-03 DIAGNOSIS — Z96.652 STATUS POST TOTAL LEFT KNEE REPLACEMENT: ICD-10-CM

## 2019-06-03 DIAGNOSIS — M25.562 LEFT KNEE PAIN, UNSPECIFIED CHRONICITY: Primary | ICD-10-CM

## 2019-06-03 PROCEDURE — 99024 POSTOP FOLLOW-UP VISIT: CPT | Performed by: ORTHOPAEDIC SURGERY

## 2019-06-03 NOTE — PROGRESS NOTES
Status post left  total knee: 5/23/2019    History of present illness: This patient presents today for initial postop check. She started to notice some drainage about 48 hours ago involving her LEFT knee after a dressing change. Since then, she's had ongoing blood-tinged serous drainage out of the inferior aspect of the incision. She has 1 more day of her blood thinner. She's been using some additional gauze at home to pad at this area. Pain control has been satisfactory with oral medications. There have been no fevers or chills. She is really not taking any more narcotic pain medication. Pain Assessment  Location of Pain: Knee  Location Modifiers: Left  Severity of Pain: 3  Quality of Pain: Dull, Aching, Sharp  Duration of Pain: A few hours  Frequency of Pain: Several times daily  Aggravating Factors: Bending, Stretching, Walking  Limiting Behavior: Some  Relieving Factors: Rest  Result of Injury: No  Work-Related Injury: No  Are there other pain locations you wish to document?: No]    Physical examination: She has some blood-tinged serous drainage out of the distal 20% of the incision. Trace erythema but no lymphangitic streaking. Mild swelling as expected. . Range of motion is 0 degrees of extension to 80 degrees of flexion. There is expected swelling with no evidence of DVT and a negative Homans sign. Neurovascular exam is intact. Radiographs: 3 X-rays taken today including AP, lateral, and skyline views of the Left knee show excellent alignment of the prosthesis. No evidence of septic or aseptic loosening or periprosthetic fractures. Assessment/plan:     The inferior aspect of this patient's incision is draining. He was using sterile technique I am going to place nylon suture. We will put a clean dry dressing and clean the area well. Today she finished up antibiotics for a urinary tract infection. .  We will see her back on Thursday for wound check.   I have personally performed and/or participated in the history, exam and medical decision making and agree with all pertinent clinical information. I have also reviewed and agree with the past medical, family and social history unless otherwise noted. This dictation was performed with a verbal recognition program (DRAGON) and it was checked for errors. It is possible that there are still dictated errors within this office note. If so, please bring any errors to my attention for an addendum. All efforts were made to ensure that this office note is accurate.           Ryann Sánchez MD

## 2019-06-04 ENCOUNTER — TELEPHONE (OUTPATIENT)
Dept: FAMILY MEDICINE CLINIC | Age: 64
End: 2019-06-04

## 2019-06-04 NOTE — TELEPHONE ENCOUNTER
Pls have her resume the lisinopril only. Have her check her bp 1-2 times per wk and call us in 2 wk with a list of readings.

## 2019-06-06 ENCOUNTER — OFFICE VISIT (OUTPATIENT)
Dept: ORTHOPEDIC SURGERY | Age: 64
End: 2019-06-06

## 2019-06-06 VITALS — HEIGHT: 62 IN | BODY MASS INDEX: 33.1 KG/M2 | WEIGHT: 179.9 LBS

## 2019-06-06 DIAGNOSIS — Z96.652 STATUS POST TOTAL LEFT KNEE REPLACEMENT: Primary | ICD-10-CM

## 2019-06-06 PROCEDURE — 99024 POSTOP FOLLOW-UP VISIT: CPT | Performed by: PHYSICIAN ASSISTANT

## 2019-06-06 RX ORDER — HYDROCODONE BITARTRATE AND ACETAMINOPHEN 5; 325 MG/1; MG/1
1 TABLET ORAL EVERY 6 HOURS PRN
Qty: 28 TABLET | Refills: 0 | Status: SHIPPED | OUTPATIENT
Start: 2019-06-06 | End: 2019-06-13

## 2019-06-06 NOTE — PROGRESS NOTES
5/23/2019 LEFT total knee replacement  Wound check    History of present illness: This patient returns today for wound check. She was having some drainage along the inferior aspect of her incision at her last visit earlier this week. 2 nylon sutures were placed using sterile technique and overall, she is seen a decrease in her drainage. She still has some scant rating it at the end of the day. She had finished up her antibiotic just yesterday for urinary tract infection. She denies any worsening pain or constitutional symptoms. Pain control has been satisfactory with oral medications. There have been no fevers or chills. Pain Assessment  Location of Pain: Knee  Location Modifiers: Left  Severity of Pain: 5  Quality of Pain: Dull, Sharp, Aching  Duration of Pain: Persistent  Frequency of Pain: Intermittent  Aggravating Factors: Bending, Stretching, Walking  Limiting Behavior: Some  Relieving Factors: Rest  Result of Injury: No  Work-Related Injury: No  Are there other pain locations you wish to document?: No]    Physical examination: The incision is clean, dry, and intact with minimal drainage or signs of infection. Range of motion is 0 degrees of extension to 95 degrees of flexion. There is expected swelling with no evidence of DVT and a negative Homans sign. Neurovascular exam is intact. Assessment/plan:   The patient's drainage has significantly decreased. She is given a monitor for evidence of infection such as increased drainage or erythema. She is given a follow-up in approximately 10 days for reevaluation and suture removal.  She will call next week if her incision appears more erythematous or painful and we will bring her in for a wound check.   I will send her a refill of pain medication, she is using this and frequently

## 2019-06-10 ENCOUNTER — CARE COORDINATION (OUTPATIENT)
Dept: CASE MANAGEMENT | Age: 64
End: 2019-06-10

## 2019-06-10 DIAGNOSIS — Z96.652 S/P TKR (TOTAL KNEE REPLACEMENT), LEFT: Primary | ICD-10-CM

## 2019-06-10 NOTE — CARE COORDINATION
Called patient for updates. Left message for return call.   Brian Braden BSN  Care Transition Coordinator for ortho bundle  [phone]

## 2019-06-12 ENCOUNTER — HOSPITAL ENCOUNTER (OUTPATIENT)
Dept: PHYSICAL THERAPY | Age: 64
Setting detail: THERAPIES SERIES
Discharge: HOME | End: 2019-06-12
Payer: COMMERCIAL

## 2019-06-12 PROCEDURE — 97140 MANUAL THERAPY 1/> REGIONS: CPT | Performed by: PHYSICAL THERAPIST

## 2019-06-12 PROCEDURE — 97161 PT EVAL LOW COMPLEX 20 MIN: CPT | Performed by: PHYSICAL THERAPIST

## 2019-06-12 PROCEDURE — 97110 THERAPEUTIC EXERCISES: CPT | Performed by: PHYSICAL THERAPIST

## 2019-06-12 PROCEDURE — G0283 ELEC STIM OTHER THAN WOUND: HCPCS | Performed by: PHYSICAL THERAPIST

## 2019-06-12 NOTE — PLAN OF CARE
[facility], [address]  Phone: [phone]  Fax [phone]     Physical Therapy Certification    Dear Referring Practitioner: Dori Lang MD,    We had the pleasure of evaluating the following patient for physical therapy services at [address] [place] [address]. A summary of our findings can be found in the initial assessment below. This includes our plan of care. If you have any questions or concerns regarding these findings, please do not hesitate to contact me at the office phone number checked above. Thank you for the referral.       Physician Signature:_______________________________Date:__________________  By signing above (or electronic signature), therapists plan is approved by physician    Patient: Rhona Rivera   : 1955   MRN: [id number]  Referring Physician: Referring Practitioner: Dori Lang MD      Evaluation Date: 2019      Medical Diagnosis Information:  Diagnosis: S95.289 (ICD-10-CM) - S/P TKR (total knee replacement), left   Treatment Diagnosis: L Knee paoin (M25.562)                                         Insurance information: PT Insurance Information:  BCBS  - 5000D-100%-$0CP-20PT - NEEDS AUTH       Precautions/ Contra-indications: TKR protocol  Latex Allergy:  [x]NO      []YES NO pacemaker NO adgsive allergy  Preferred Language for Healthcare:   [x]English       []other:    SUBJECTIVE: Patient stated complaint:Pt states that she was so swollen in the LLE and foot was purple and she went to the ER. She was given fluids and sent home.     Relevant Medical History:NA  Functional Disability Index: LEFS 55%    Height: 5'2\" Weight: 180lbs  Pain Scale: 3-5/10  Easing factors: ice  Provocative factors: walking, bening     Type: [x]Constant   [x]Intermittent  []Radiating []Localized []other:     Numbness/Tingling:  Lateral knee numb    Occupation/School: office work    Living Status/Prior Level of Function: Independent with ADLs and IADLs, usually rides bike trail 3-4x week 5-10 miles at a time. Pt is currently not working I office (working at home) Pt has 12 steps with rail and goes one step at a time as she has for 10 years. OBJECTIVE:     ROM LEFT RIGHT   HIP Flex     HIP Abd     HIP Ext     HIP IR     HIP ER     Knee ext GA with QS:0    Knee Flex On SB:95    Ankle PF     Ankle DF     Ankle In     Ankle Ev     Strength  LEFT RIGHT   HIP Flexors     HIP Abductors     HIP Ext     Hip ER     Knee EXT (quad)     Knee Flex (HS)     Ankle DF     Ankle PF     Ankle Inv     Ankle EV     Quad tone Poor+    Circumference  IP  MP  SP   (cm)  41  47  52          Reflexes/Sensation: NT due to post op stats   []Dermatomes/Myotomes intact    [x]Reflexes equal and normal bilaterally   []Other:    Joint mobility: patella   []Normal    [x]Hypo   []Hyper    Palpation: some pitting edema laterally. Pt LLE is slightly discolored with edema to ankle. No significant pitting at ankle. No compression stocking. Functional Mobility/Transfers: WNL with some difficulty    Posture: Genu varus RLE    Bandages/Dressings/Incisions: incision has two post surgical sutures where it had broken open. Entire length of incision looks closed and dry, no redness. Pt instructed on moisturizing with bacitracin. Gait: (include devices/WB status) Currently using standard walker which is more of a hindrance then help. She is able to ambulate with training with SPC in R hand, minimal limp. Orthopedic Special Tests: No evidence of DVT (no warmth, TTP,drainage) and Jesu's (-)                        [x] Patient history, allergies, meds reviewed. Medical chart reviewed. See intake form. Review Of Systems (ROS):  [x]Performed Review of systems (Integumentary, CardioPulmonary, Neurological) by intake and observation. Intake form has been scanned into medical record.  Patient has been instructed to contact their primary care physician regarding ROS issues if not already being addressed at this time. Co-morbidities/Complexities (which will affect course of rehabilitation):   []None           Arthritic conditions   []Rheumatoid arthritis (M05.9)  [x]Osteoarthritis (M19.91)   Cardiovascular conditions   [x]Hypertension (I10)  []Hyperlipidemia (E78.5)  []Angina pectoris (I20)  []Atherosclerosis (I70)   Musculoskeletal conditions   []Disc pathology   []Congenital spine pathologies   []Prior surgical intervention  []Osteoporosis (M81.8)  []Osteopenia (M85.8)   Endocrine conditions   []Hypothyroid (E03.9)  []Hyperthyroid Gastrointestinal conditions   []Constipation (P59.30)   Metabolic conditions   []Morbid obesity (E66.01)  []Diabetes type 1(E10.65) or 2 (E11.65)   []Neuropathy (G60.9)     Pulmonary conditions   []Asthma (J45)  []Coughing   []COPD (J44.9)   Psychological Disorders  []Anxiety (F41.9)  []Depression (F32.9)   []Other:   []Other:          Barriers to/and or personal factors that will affect rehab potential:              []Age  []Sex              []Motivation/Lack of Motivation                        []Co-Morbidities              []Cognitive Function, education/learning barriers              []Environmental, home barriers              []profession/work barriers  []past PT/medical experience  []other:  Justification:     Falls Risk Assessment (30 days): TUG 26 s  [] Falls Risk assessed and no intervention required.   [x] Falls Risk assessed and Patient requires intervention due to being higher risk   TUG score (>12s at risk):     [x] Falls education provided, including  Cane and walker use, flexibility and strength instruction        ASSESSMENT:   Functional Impairments:     []Noted lumbar/proximal hip/LE joint hypomobility   [x]Decreased LE functional ROM   [x]Decreased core/proximal hip strength and neuromuscular control   [x]Decreased LE functional strength   [x]Reduced balance/proprioceptive comorbidities that impact the plan of care;  [x]1-2 personal factors and/or comorbidities that impact the plan of care  []3 personal factors and/or comorbidities that impact the plan of care  [x] An examination of body systems using standardized tests and measures addressing any of the following: body structures and functions (impairments), activity limitations, and/or participation restrictions;:  [] a total of 1-2 or more elements   [] a total of 3 or more elements   [] a total of 4 or more elements   [x] A clinical presentation with:  [] stable and/or uncomplicated characteristics   [x] evolving clinical presentation with changing characteristics  [] unstable and unpredictable characteristics;   [x] Clinical decision making of [x] low, [] moderate, [] high complexity using standardized patient assessment instrument and/or measurable assessment of functional outcome. [x] EVAL (LOW) 97520 (typically 20 minutes face-to-face)  [] EVAL (MOD) 53063 (typically 30 minutes face-to-face)  [] EVAL (HIGH) 30682 (typically 45 minutes face-to-face)  [] RE-EVAL       PLAN:   Frequency/Duration:  2 days per week for 8 Weeks:  Interventions:  [x]  Therapeutic exercise including: strength training, ROM, for Lower extremity and core   [x]  NMR activation and proprioception for LE, Glutes and Core   [x]  Manual therapy as indicated for LE, Hip and spine to include: Dry Needling/IASTM, STM, PROM, Gr I-IV mobilizations, manipulation. [x] Modalities as needed that may include: thermal agents, E-stim, Biofeedback, US, iontophoresis as indicated  [x] Patient education on joint protection, postural re-education, activity modification, progression of HEP. HEP instruction: HEP instruction was provided and handouts given to include:  (see scanned forms)    GOALS:  Patient stated goal: To walk normally again    Therapist goals for Patient:   Short Term Goals: To be achieved in: 2 weeks  1.  Independent in HEP and progression per patient tolerance, in order to prevent re-injury. 2. Patient will have a decrease in pain to facilitate improvement in movement, function, and ADLs as indicated by Functional Deficits. Long Term Goals: To be achieved in: 8 weeks  1. Disability index score of 25% or less for the LEFS to assist with reaching prior level of function. 2. Patient will demonstrate increased AROM to 0-120 to allow for proper joint functioning as indicated by patients Functional Deficits. 3. Patient will demonstrate an increase in Strength to good proximal hip strength and control, 4+/5 in LE to allow for proper functional mobility as indicated by patients Functional Deficits. 4. Patient will return to daily household functional activities without increased symptoms or restriction.    5. Pt will be bale to ambulate community distances with no AD including getting to and from work.(patient specific functional goal)       Electronically signed by:  Nicholas Posadas [id number]

## 2019-06-12 NOTE — FLOWSHEET NOTE
[facility],  [address] [place]  Phone: [phone]  Fax [phone]        Physical Therapy Daily Treatment Note  Date:  2019    Patient Name:  Ryan Urrutia    :  1955  MRN: [id number]       Date of Onset of injury/condition:sx:19    Medical/Treatment Diagnosis Information:  · Diagnosis: J79.510 (ICD-10-CM) - S/P TKR (total knee replacement), left  · Treatment Diagnosis: L Knee paoin (O53.592)  Insurance/Certification information:  PT Insurance Information:  BCBS  - 5000D-100%-$0CP-20PT - NEEDS AUTH    Physician Information:  Referring Practitioner: Arpit Faustin MD    Plan of care signed (Y/N): N       Date of Patient follow up with Physician: 19    Date of onset of PT:  19      POC Due: 18  OP NOTE Due: 19  10th VISIT NOTE  Completed:        Latex Allergy:  [x]NO      []YES  Preferred Language for Healthcare:   [x]English       []other:     Visit # Insurance Allowable Requires auth   1 20    []no        [x]yes:     Pain level:  3-5/10     SUBJECTIVE:  See eval    OBJECTIVE: See eval   Observation:    Palpation:     OBJECTIVE  Test used    Initial score 19   Current Score   Pain Summary 0-1/10 2-5/10    Functional questionnaire LEFS 55%                ROM KE 0     KF 95                            Strength KE      KF      Quad tone POOR +                          RESTRICTIONS/PRECAUTIONS: per protocol    Exercises/Interventions: unless otherwise noted interventions are performed on involved limb/side    Therapeutic Ex (15') Sets/Reps/Sec Notes   Seated HS stretch table side 3x30\" HEP   Strap stretch for gastroc 3x30\" HEP   QS  10x10\" HEP   SB rolls for FLX 20x5\" HEP   SLR FLX 10x HEP                                           Pt education/family education  x5' importance of icing (pt has not been) and elevating to decrease LE edema. Icincg 3x daily min for now.         Manual Intervention (8')     Edema massage and patellar mobility  8'                             NMR re-education                                       Therapeutic Exercise and NMR EXR  [x] (04132) Provided verbal/tactile cueing for activities related to strengthening, flexibility, endurance, ROM for improvements in extrenmity and core control with self care, mobility, lifting, ambulation.  [] (48516) Provided verbal/tactile cueing for activities related to improving balance, coordination, kinesthetic sense, posture, motor skill, proprioception  to assist with exremity and core control in self care, mobility, lifting, ambulation and eccentric single leg and arm  control. NMR and Therapeutic Activities:    [] (89852 or 26738) Provided verbal/tactile cueing for activities related to improving balance, coordination, kinesthetic sense, posture, motor skill, proprioception and motor activation to allow for proper function of core and extremities with self care and ADLs  [] (28599) Gait Re-education- Provided training and instruction to the patient for proper LE, core and proximal hip recruitment and positioning and eccentric body weight control with ambulation re-education including up and down stairs     Home Exercise Program:    [x] (65897) Reviewed/Progressed HEP activities related to strengthening, flexibility, endurance, ROM of core and extremity for functional self-care, mobility, lifting and ambulation.  [] (87752)Reviewed/Progressed HEP activities related to improving balance, coordination, kinesthetic sense, posture, motor skill, proprioception of core and extremity for functional self-care, mobility, lifting and ambulation.       Manual Treatments:  PROM / STM / Oscillations-Mobs:  G-I, II, III, IV (PA's, Inf., Post.)  [x] (88455) Provided manual therapy to mobilize upper and/or LE joints, spine, soft tissue/joints for the purpose of modulating pain, promoting relaxation,  increasing ROM, reducing/eliminating soft tissue swelling/inflammation/restriction, improving soft tissue extensibility and allowing for proper ROM for normal function with self care, mobility, lifting and ambulation. Modalities:  HV/CP x15' med/lat joint line    Charges:  Timed Code Treatment Minutes: 23   Total Treatment Minutes: 58     [x] EVAL (LOW) 45206 (typically 20 minutes face-to-face)  [] EVAL (MOD) 97162 (typically 30 minutes face-to-face)  [] EVAL (HIGH) 29988 (typically 45 minutes face-to-face)  [] RE-EVAL     [x] LC(71419) x 1    [] IONTO  [] NMR (65951) x     [] VASO  [x] Manual (20285) x 1     [] Other:  [] TA x      [] Mech Traction (61588)  [] ES(attended) (58841)      [x] ES (un) (66154):     GOALS:     Progression Towards Functional goals:  [] Patient is progressing as expected towards functional goals listed. [] Progression is slowed due to complexities listed. [] Progression has been slowed due to co-morbidities. [x] Plan just implemented, too soon to assess goals progression  [] Other:     ASSESSMENT:  See eval for current assessment.     Treatment/Activity Tolerance:  [x] Patient tolerated treatment well [] Patient limited by fatique  [] Patient limited by pain  [] Patient limited by other medical complications  [] Other:     Prognosis: [x] Good [] Fair  [] Poor    Patient Requires Follow-up: [x] Yes  [] No    PLAN: See eval for full POC  [] Continue per plan of care [] Alter current plan (see comments)  [x] Plan of care initiated [] Hold pending MD visit [] Discharge    Electronically signed by: Talat Calderón, [id number]

## 2019-06-17 ENCOUNTER — CARE COORDINATION (OUTPATIENT)
Dept: CASE MANAGEMENT | Age: 64
End: 2019-06-17

## 2019-06-17 ENCOUNTER — TELEPHONE (OUTPATIENT)
Dept: FAMILY MEDICINE CLINIC | Age: 64
End: 2019-06-17

## 2019-06-17 ENCOUNTER — OFFICE VISIT (OUTPATIENT)
Dept: ORTHOPEDIC SURGERY | Age: 64
End: 2019-06-17

## 2019-06-17 VITALS — WEIGHT: 179.9 LBS | BODY MASS INDEX: 33.1 KG/M2 | HEIGHT: 62 IN

## 2019-06-17 DIAGNOSIS — Z96.652 S/P TKR (TOTAL KNEE REPLACEMENT), LEFT: Primary | ICD-10-CM

## 2019-06-17 PROCEDURE — 99024 POSTOP FOLLOW-UP VISIT: CPT | Performed by: PHYSICIAN ASSISTANT

## 2019-06-17 NOTE — PROGRESS NOTES
Status post LEFT total knee replacement from 5/23/2019    This patient returns today for a wound check. She was having some drainage along the inferior aspect of her incision and about 10 days ago 2 stitches were placed by myself. She is doing much better. She really has not had any drainage now in several days. 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 She is ambulate with a cane. She has minimal pain. She denies any constitutional symptoms. Upon inspection the patient has a well-healed surgical incision. 2 nylon sutures in place. No active drainage. Mild swelling as anticipated. Range of motion is about 0 to 100 degrees today. Ligamentously stable. Negative Homans sign    Assessment/plan  Status post LEFT total knee replacement    The patient's incision is healing well. She has no active drainage. She is been using some antibiotic ointment and her skin is a little bit soft. Therefore I recommended that her stitches stay in until Thursday. If the physical therapy team is comfortable, I will have them remove her stitches on Thursday, otherwise they can come get me in clinic and I will remove them.

## 2019-06-18 ENCOUNTER — HOSPITAL ENCOUNTER (OUTPATIENT)
Dept: PHYSICAL THERAPY | Age: 64
Setting detail: THERAPIES SERIES
Discharge: HOME | End: 2019-06-18
Payer: COMMERCIAL

## 2019-06-18 PROCEDURE — G0283 ELEC STIM OTHER THAN WOUND: HCPCS

## 2019-06-18 PROCEDURE — 97140 MANUAL THERAPY 1/> REGIONS: CPT

## 2019-06-18 PROCEDURE — 97110 THERAPEUTIC EXERCISES: CPT

## 2019-06-18 NOTE — FLOWSHEET NOTE
[facility],  [address] [place]  Phone: [phone]  Fax [phone]        Physical Therapy Daily Treatment Note  Date:  2019    Patient Name:  Kvng Sands    :  1955  MRN: [id number]       Date of Onset of injury/condition:sx:19    Medical/Treatment Diagnosis Information:  · Diagnosis: A78.781 (ICD-10-CM) - S/P TKR (total knee replacement), left  · Treatment Diagnosis: L Knee paoin (I18.809)  Insurance/Certification information:  PT Insurance Information:  BCBS  - 5000D-100%-$0CP-20PT - NEEDS AUTH    Physician Information:  Referring Practitioner: Cesar Hemphill MD    Plan of care signed (Y/N): N       Date of Patient follow up with Physician: 19    Date of onset of PT:  19      POC Due: 18  OP NOTE Due: 19  10th VISIT NOTE  Completed:        Latex Allergy:  [x]NO      []YES  Preferred Language for Healthcare:   [x]English       []other:     Visit # Insurance Allowable Requires auth   2 20    []no        [x]yes:     Pain level:  3-4/10     SUBJECTIVE:  Pt states she thought she would be much better by now, states she has been doing her exercises. States she has been sleeping with a pillow underneath her knee.     OBJECTIVE: See eval   Observation: pt with difficulty laying w/ knee straight for manuals    Palpation:     OBJECTIVE  Test used    Initial score 19   Current Score   Pain Summary 0-1/10 2-5/10    Functional questionnaire LEFS 55%                ROM KE 0     KF 95                            Strength KE      KF      Quad tone POOR +                          RESTRICTIONS/PRECAUTIONS: per protocol    Exercises/Interventions: unless otherwise noted interventions are performed on involved limb/side    Therapeutic Ex  Sets/Reps/Sec Notes   Seated HS stretch table side 3x30\" HEP   Incline stretch 3x30\" HEP   Heel slides 10\" x 10 HEP   SLR FLX 2 x 10 HEP; cues for quad SAQ 2 x 10    Bridge w/ ADD 2 x 10    DHR 2 10   Bike for ROM 5' oscillations                       Pt ed 3' Importance of elevating from ankle, nothing under knee. Edu on healing process and rate        Manual Intervention      Edema massage and patellar mobility   HS stretch  PROM flex 10'                             NMR re-education                                       Therapeutic Exercise and NMR EXR  [x] (76052) Provided verbal/tactile cueing for activities related to strengthening, flexibility, endurance, ROM for improvements in extrenmity and core control with self care, mobility, lifting, ambulation.  [] (62528) Provided verbal/tactile cueing for activities related to improving balance, coordination, kinesthetic sense, posture, motor skill, proprioception  to assist with exremity and core control in self care, mobility, lifting, ambulation and eccentric single leg and arm  control. NMR and Therapeutic Activities:    [] (42819 or 74530) Provided verbal/tactile cueing for activities related to improving balance, coordination, kinesthetic sense, posture, motor skill, proprioception and motor activation to allow for proper function of core and extremities with self care and ADLs  [] (18985) Gait Re-education- Provided training and instruction to the patient for proper LE, core and proximal hip recruitment and positioning and eccentric body weight control with ambulation re-education including up and down stairs     Home Exercise Program:    [x] (43680) Reviewed/Progressed HEP activities related to strengthening, flexibility, endurance, ROM of core and extremity for functional self-care, mobility, lifting and ambulation.  [] (21847)Reviewed/Progressed HEP activities related to improving balance, coordination, kinesthetic sense, posture, motor skill, proprioception of core and extremity for functional self-care, mobility, lifting and ambulation.       Manual Treatments:  PROM / STM / Oscillations-Mobs: G-I, II, III, IV (PA's, Inf., Post.)  [x] (22522) Provided manual therapy to mobilize upper and/or LE joints, spine, soft tissue/joints for the purpose of modulating pain, promoting relaxation,  increasing ROM, reducing/eliminating soft tissue swelling/inflammation/restriction, improving soft tissue extensibility and allowing for proper ROM for normal function with self care, mobility, lifting and ambulation. Modalities:  HV/CP x15' med/lat joint line    Charges:  Timed Code Treatment Minutes: 45   Total Treatment Minutes: 60     [] EVAL (LOW) 10023 (typically 20 minutes face-to-face)  [] EVAL (MOD) 93772 (typically 30 minutes face-to-face)  [] EVAL (HIGH) 26567 (typically 45 minutes face-to-face)  [] RE-EVAL     [x] TR(97507) x 2    [] IONTO  [] NMR (77695) x     [] VASO  [x] Manual (33430) x 1     [] Other:  [] TA x      [] Mech Traction (00835)  [] ES(attended) (25460)      [x] ES (un) (69071):     GOALS:   Patient stated goal: To walk normally again     Therapist goals for Patient:   Short Term Goals: To be achieved in: 2 weeks  1. Independent in HEP and progression per patient tolerance, in order to prevent re-injury. 2. Patient will have a decrease in pain to facilitate improvement in movement, function, and ADLs as indicated by Functional Deficits.     Long Term Goals: To be achieved in: 8 weeks  1. Disability index score of 25% or less for the LEFS to assist with reaching prior level of function. 2. Patient will demonstrate increased AROM to 0-120 to allow for proper joint functioning as indicated by patients Functional Deficits. 3. Patient will demonstrate an increase in Strength to good proximal hip strength and control, 4+/5 in LE to allow for proper functional mobility as indicated by patients Functional Deficits. 4. Patient will return to daily household functional activities without increased symptoms or restriction.    5. Pt will be bale to ambulate community distances with no AD including getting to and from work.(patient specific functional goal)         Progression Towards Functional goals:  [] Patient is progressing as expected towards functional goals listed. [] Progression is slowed due to complexities listed. [] Progression has been slowed due to co-morbidities. [x] Plan just implemented, too soon to assess goals progression  [] Other:     ASSESSMENT:  Pt did well with TE, was reminded to not sleep with anything under the knee as she was having discomfort with full extension during manuals today. Educated on the healing process after surgery and that it is a major surgery that will take more than one month to return to normal as this was her previous expectation.     Treatment/Activity Tolerance:  [x] Patient tolerated treatment well [] Patient limited by fatique  [] Patient limited by pain  [] Patient limited by other medical complications  [] Other:     Prognosis: [x] Good [] Fair  [] Poor    Patient Requires Follow-up: [x] Yes  [] No    PLAN: See eval for full POC  [x] Continue per plan of care [] Alter current plan (see comments)  [] Plan of care initiated [] Hold pending MD visit [] Discharge    Electronically signed by: Claus Dickey, [facility] ,DPT [id number]

## 2019-06-20 ENCOUNTER — HOSPITAL ENCOUNTER (OUTPATIENT)
Dept: PHYSICAL THERAPY | Age: 64
Setting detail: THERAPIES SERIES
Discharge: HOME | End: 2019-06-20
Payer: COMMERCIAL

## 2019-06-20 PROCEDURE — 97140 MANUAL THERAPY 1/> REGIONS: CPT | Performed by: PHYSICAL THERAPIST

## 2019-06-20 PROCEDURE — 97110 THERAPEUTIC EXERCISES: CPT | Performed by: PHYSICAL THERAPIST

## 2019-06-20 PROCEDURE — G0283 ELEC STIM OTHER THAN WOUND: HCPCS | Performed by: PHYSICAL THERAPIST

## 2019-06-20 NOTE — FLOWSHEET NOTE
[facility],  [address] [place]  Phone: [phone]  Fax [phone]        Physical Therapy Daily Treatment Note  Date:  2019    Patient Name:  Petr Noel    :  1955  MRN: [id number]       Date of Onset of injury/condition:sx:19    Medical/Treatment Diagnosis Information:  · Diagnosis: P04.171 (ICD-10-CM) - S/P TKR (total knee replacement), left  · Treatment Diagnosis: L Knee pain (K42.077)  Insurance/Certification information:  PT Insurance Information:  BCBS  - 5000D-100%-$0CP-20PT - NEEDS AUTH    Physician Information:  Referring Practitioner: Andreia Rachel MD    Plan of care signed (Y/N): N       Date of Patient follow up with Physician: 19    Date of onset of PT:  19      POC Due: 18  OP NOTE Due: 19   10th VISIT NOTE  Completed:        Latex Allergy:  [x]NO      []YES  Preferred Language for Healthcare:   [x]English       []other:     Visit # Insurance Allowable Requires auth   3 20    []no        [x]yes:     Pain level:  5/10     SUBJECTIVE:  She states it is painful when she first gets up, she is icing but not taking any meds for it. She says we are supposed to take her stitches out.      OBJECTIVE: Robles Paredes removed sutures in PT clinic this visit   Observation: knee remains swollen and warm   Palpation:     OBJECTIVE  Test used    Initial score 19   Current Score   Pain Summary 0-1/10 2-5/10    Functional questionnaire LEFS 55%                ROM KE 0     KF 95                            Strength KE      KF      Quad tone POOR +                          RESTRICTIONS/PRECAUTIONS: per protocol    Exercises/Interventions: unless otherwise noted interventions are performed on involved limb/side    Therapeutic Ex (27') Sets/Reps/Sec Notes   Seated HS stretch table side 3x30\" HEP   Incline stretch 3x30\" HEP   Heel slides 10\" x 10 HEP   SLR FLX 3 x 10 HEP; cues for quad SAQ 2 x 10x3\" (1#)    Bridge w/ ADD 3 x 10    DHR 2x10 10   Bike for ROM 6' oscillations   SB rolls for FLX With strap 20x5\"    Mini squats with HHS 10x              Pt ed 3' Importance of elevating from ankle, nothing under knee. Edu on healing process and rate        Manual Intervention (10')     Edema massage and patellar mobility   HS stretch  PROM flex 10'                             NMR re-education (3')     SLS with HHS when needed 10x10\" LLE                                 Therapeutic Exercise and NMR EXR  [x] (15883) Provided verbal/tactile cueing for activities related to strengthening, flexibility, endurance, ROM for improvements in extrenmity and core control with self care, mobility, lifting, ambulation.  [] (20741) Provided verbal/tactile cueing for activities related to improving balance, coordination, kinesthetic sense, posture, motor skill, proprioception  to assist with exremity and core control in self care, mobility, lifting, ambulation and eccentric single leg and arm  control.      NMR and Therapeutic Activities:    [] (72971 or 42576) Provided verbal/tactile cueing for activities related to improving balance, coordination, kinesthetic sense, posture, motor skill, proprioception and motor activation to allow for proper function of core and extremities with self care and ADLs  [] (33398) Gait Re-education- Provided training and instruction to the patient for proper LE, core and proximal hip recruitment and positioning and eccentric body weight control with ambulation re-education including up and down stairs     Home Exercise Program:    [x] (62992) Reviewed/Progressed HEP activities related to strengthening, flexibility, endurance, ROM of core and extremity for functional self-care, mobility, lifting and ambulation.  [] (47174)Reviewed/Progressed HEP activities related to improving balance, coordination, kinesthetic sense, posture, motor skill, proprioception of core and extremity for functional self-care, mobility, lifting and ambulation. Manual Treatments:  PROM / STM / Oscillations-Mobs:  G-I, II, III, IV (PA's, Inf., Post.)  [x] (63709) Provided manual therapy to mobilize upper and/or LE joints, spine, soft tissue/joints for the purpose of modulating pain, promoting relaxation,  increasing ROM, reducing/eliminating soft tissue swelling/inflammation/restriction, improving soft tissue extensibility and allowing for proper ROM for normal function with self care, mobility, lifting and ambulation. Modalities:  HV/CP x15' med/lat joint line    Charges:  Timed Code Treatment Minutes: 40   Total Treatment Minutes: 58     [] EVAL (LOW) 45971 (typically 20 minutes face-to-face)  [] EVAL (MOD) 15147 (typically 30 minutes face-to-face)  [] EVAL (HIGH) 08798 (typically 45 minutes face-to-face)  [] RE-EVAL     [x] IL(67082) x 2    [] IONTO  [] NMR (96732) x     [] VASO  [x] Manual (89386) x 1     [] Other:  [] TA x      [] Mech Traction (78235)  [] ES(attended) (97340)      [x] ES (un) (60521):     GOALS:   Patient stated goal: To walk normally again     Therapist goals for Patient:   Short Term Goals: To be achieved in: 2 weeks  1. Independent in HEP and progression per patient tolerance, in order to prevent re-injury. 2. Patient will have a decrease in pain to facilitate improvement in movement, function, and ADLs as indicated by Functional Deficits.     Long Term Goals: To be achieved in: 8 weeks  1. Disability index score of 25% or less for the LEFS to assist with reaching prior level of function. 2. Patient will demonstrate increased AROM to 0-120 to allow for proper joint functioning as indicated by patients Functional Deficits. 3. Patient will demonstrate an increase in Strength to good proximal hip strength and control, 4+/5 in LE to allow for proper functional mobility as indicated by patients Functional Deficits.    4. Patient will return to daily household functional activities without increased symptoms or restriction. 5. Pt will be bale to ambulate community distances with no AD including getting to and from work.(patient specific functional goal)         Progression Towards Functional goals:  [] Patient is progressing as expected towards functional goals listed. [] Progression is slowed due to complexities listed. [] Progression has been slowed due to co-morbidities. [x] Plan just implemented, too soon to assess goals progression  [] Other:     ASSESSMENT:  PT demonstrates decreased edema and lower leg swelling. She still has knee edema and warmth. Pt has not increased her reps of HEP since onset of PT and was stringly encouraged to increase all present theres to 3 sets of 10 at home so we may progress.      Treatment/Activity Tolerance:  [x] Patient tolerated treatment well [] Patient limited by fatique  [] Patient limited by pain  [] Patient limited by other medical complications  [] Other:     Prognosis: [x] Good [] Fair  [] Poor    Patient Requires Follow-up: [x] Yes  [] No    PLAN: See eval for full POC; ROM NV  [x] Continue per plan of care [] Alter current plan (see comments)  [] Plan of care initiated [] Hold pending MD visit [] Discharge    Electronically signed by: Kleber Craven, [address] [facility] ,[id number]

## 2019-06-25 ENCOUNTER — CARE COORDINATION (OUTPATIENT)
Dept: CASE MANAGEMENT | Age: 64
End: 2019-06-25

## 2019-06-25 NOTE — CARE COORDINATION
Called patient for updates. Left message for return call.   Marialuisa SCOTTN  Care Transition Coordinator for [facility]  [phone]

## 2019-06-26 ENCOUNTER — HOSPITAL ENCOUNTER (OUTPATIENT)
Dept: PHYSICAL THERAPY | Age: 64
Setting detail: THERAPIES SERIES
Discharge: HOME | End: 2019-06-26
Payer: COMMERCIAL

## 2019-06-26 PROCEDURE — 97110 THERAPEUTIC EXERCISES: CPT | Performed by: PHYSICAL THERAPIST

## 2019-06-26 PROCEDURE — G0283 ELEC STIM OTHER THAN WOUND: HCPCS | Performed by: PHYSICAL THERAPIST

## 2019-06-26 PROCEDURE — 97140 MANUAL THERAPY 1/> REGIONS: CPT | Performed by: PHYSICAL THERAPIST

## 2019-06-26 NOTE — FLOWSHEET NOTE
[facility], [address] [place]  Phone: [phone]  Fax [phone]        Physical Therapy Daily Treatment Note  Date:  2019    Patient Name:  Fátima Negron    :  1955  MRN: [id number]       Date of Onset of injury/condition:sx:19    Medical/Treatment Diagnosis Information:  · Diagnosis: W83.558 (ICD-10-CM) - S/P TKR (total knee replacement), left  · Treatment Diagnosis: L Knee pain (I64.926)  Insurance/Certification information:  PT Insurance Information:  BCBS  - 5000D-100%-$0CP-20PT - NEEDS AUTH    Physician Information:  Referring Practitioner: Mami Cordero MD    Plan of care signed (Y/N): Y       Date of Patient follow up with Physician: 19    Date of onset of PT:  19      POC Due: 18  OP NOTE Due: 19   10th VISIT NOTE  Completed:        Latex Allergy:  [x]NO      []YES  Preferred Language for Healthcare:   [x]English       []other:     Visit # Insurance Allowable Requires auth   4 20    []no        [x]yes:     Pain level:  3-4/10     SUBJECTIVE:  Pt states her knee feels good. Pain is not off and on. Was sitting at desk for 3 hours so a little stiff. OBJECTIVE:    Observation: knee remains swollen and warm. Lower leg min swollen still.    Palpation:numbness lateral knee     OBJECTIVE  Test used    Initial score 19   Current Score  19   Pain Summary 0-1/10 2-5/10    Functional questionnaire LEFS 55%                ROM KE 0     KF 95 105                           Strength KE      KF      Quad tone POOR +                          RESTRICTIONS/PRECAUTIONS: per protocol    Exercises/Interventions: unless otherwise noted interventions are performed on involved limb/side    Therapeutic Ex (25') Sets/Reps/Sec Notes   Seated HS stretch table side 3x30\" HEP   Incline stretch 4x30\" HEP   Heel slides 10\" x 10 HEP   SLR FLX 3 x 10 HEP; cues for quad   SAQ supine 2 x 10x3\"  1x10x3\" (1#)    Bridge w/ ADD 2 x 10x3\"    10   Bike for ROM 5' oscillations   SB rolls for FLX With strap 20x5\"    Mini squats with HHS 10x    LAQ LLE EOB  (1#) 10x3\"  10x3\"                             Pt ed 3' importance of elevating and icing        Manual Intervention (10')     Edema massage and patellar mobility   HS stretch  PROM flex 10'                             NMR re-education (3')     SLS with HHS when needed 10x10\" LLE                                 Therapeutic Exercise and NMR EXR  [x] (68790) Provided verbal/tactile cueing for activities related to strengthening, flexibility, endurance, ROM for improvements in extrenmity and core control with self care, mobility, lifting, ambulation.  [] (80842) Provided verbal/tactile cueing for activities related to improving balance, coordination, kinesthetic sense, posture, motor skill, proprioception  to assist with exremity and core control in self care, mobility, lifting, ambulation and eccentric single leg and arm  control.      NMR and Therapeutic Activities:    [] (21678 or 55308) Provided verbal/tactile cueing for activities related to improving balance, coordination, kinesthetic sense, posture, motor skill, proprioception and motor activation to allow for proper function of core and extremities with self care and ADLs  [] (94540) Gait Re-education- Provided training and instruction to the patient for proper LE, core and proximal hip recruitment and positioning and eccentric body weight control with ambulation re-education including up and down stairs     Home Exercise Program:    [x] (26102) Reviewed/Progressed HEP activities related to strengthening, flexibility, endurance, ROM of core and extremity for functional self-care, mobility, lifting and ambulation.  [] (55645)Reviewed/Progressed HEP activities related to improving balance, coordination, kinesthetic sense, posture, motor skill, proprioception of core and extremity for functional self-care, mobility, lifting and ambulation. Manual Treatments:  PROM / STM / Oscillations-Mobs:  G-I, II, III, IV (PA's, Inf., Post.)  [x] (41733) Provided manual therapy to mobilize upper and/or LE joints, spine, soft tissue/joints for the purpose of modulating pain, promoting relaxation,  increasing ROM, reducing/eliminating soft tissue swelling/inflammation/restriction, improving soft tissue extensibility and allowing for proper ROM for normal function with self care, mobility, lifting and ambulation. Modalities:  HV/CP x15' med/lat joint line    Charges:  Timed Code Treatment Minutes: 38   Total Treatment Minutes: 53     [] EVAL (LOW) 32358 (typically 20 minutes face-to-face)  [] EVAL (MOD) 64650 (typically 30 minutes face-to-face)  [] EVAL (HIGH) 76367 (typically 45 minutes face-to-face)  [] RE-EVAL     [x] AK(00769) x 2    [] IONTO  [] NMR (03137) x     [] VASO  [x] Manual (89590) x 1     [] Other:  [] TA x      [] Mech Traction (55030)  [] ES(attended) (93932)      [x] ES (un) (72956):     GOALS:   Patient stated goal: To walk normally again     Therapist goals for Patient:   Short Term Goals: To be achieved in: 2 weeks  1. Independent in HEP and progression per patient tolerance, in order to prevent re-injury. 2. Patient will have a decrease in pain to facilitate improvement in movement, function, and ADLs as indicated by Functional Deficits.     Long Term Goals: To be achieved in: 8 weeks  1. Disability index score of 25% or less for the LEFS to assist with reaching prior level of function. 2. Patient will demonstrate increased AROM to 0-120 to allow for proper joint functioning as indicated by patients Functional Deficits. 3. Patient will demonstrate an increase in Strength to good proximal hip strength and control, 4+/5 in LE to allow for proper functional mobility as indicated by patients Functional Deficits.    4. Patient will return to daily household functional activities without increased symptoms or restriction. 5. Pt will be bale to ambulate community distances with no AD including getting to and from work.(patient specific functional goal)         Progression Towards Functional goals:  [] Patient is progressing as expected towards functional goals listed. [] Progression is slowed due to complexities listed. [] Progression has been slowed due to co-morbidities. [x] Plan just implemented, too soon to assess goals progression  [] Other:     ASSESSMENT:  Knee continues to become swollen and warm after session. Treatment/Activity Tolerance:  [x] Patient tolerated treatment well [] Patient limited by fatique  [] Patient limited by pain  [] Patient limited by other medical complications  [] Other:     Prognosis: [x] Good [] Fair  [] Poor    Patient Requires Follow-up: [x] Yes  [] No    PLAN: See eval for full POC.   [x] Continue per plan of care [] Alter current plan (see comments)  [] Plan of care initiated [] Hold pending MD visit [] Discharge    Electronically signed by: Arben Enriquez [place] ,[id number]

## 2019-06-28 ENCOUNTER — HOSPITAL ENCOUNTER (OUTPATIENT)
Dept: PHYSICAL THERAPY | Age: 64
Setting detail: THERAPIES SERIES
Discharge: HOME | End: 2019-06-28
Payer: COMMERCIAL

## 2019-06-28 PROCEDURE — 97110 THERAPEUTIC EXERCISES: CPT

## 2019-06-28 PROCEDURE — 97140 MANUAL THERAPY 1/> REGIONS: CPT

## 2019-06-28 PROCEDURE — G0283 ELEC STIM OTHER THAN WOUND: HCPCS

## 2019-06-28 NOTE — FLOWSHEET NOTE
[facility], [address] [place]  Phone: [phone]  Fax [phone]        Physical Therapy Daily Treatment Note  Date:  2019    Patient Name:  Altagracia Ji    :  1955  MRN: [id number]       Date of Onset of injury/condition:sx:19    Medical/Treatment Diagnosis Information:  · Diagnosis: F30.233 (ICD-10-CM) - S/P TKR (total knee replacement), left  · Treatment Diagnosis: L Knee pain (E57.600)  Insurance/Certification information:  PT Insurance Information:  BCBS  - 5000D-100%-$0CP-20PT - NEEDS AUTH    Physician Information:  Referring Practitioner: [place] Bartholomew MD    Plan of care signed (Y/N): Y       Date of Patient follow up with Physician: 19    Date of onset of PT:  19      POC Due: 18  OP NOTE Due: 19   10th VISIT NOTE  Completed:        Latex Allergy:  [x]NO      []YES  Preferred Language for Healthcare:   [x]English       []other:     Visit # Insurance Allowable Requires auth   5 20    []no        [x]yes:     Pain level:  0-2/10     SUBJECTIVE:  Pt states she felt good after last visit. OBJECTIVE:    Observation: knee remains swollen and warm. Lower leg min swollen still.    Palpation:numbness lateral knee     OBJECTIVE  Test used    Initial score 19   Current Score  19   Pain Summary 0-1/10 2-5/10    Functional questionnaire LEFS 55%                ROM KE 0     KF 95 105                           Strength KE      KF      Quad tone POOR +                          RESTRICTIONS/PRECAUTIONS: per protocol    Exercises/Interventions: unless otherwise noted interventions are performed on involved limb/side    Therapeutic Ex  Sets/Reps/Sec Notes   Seated HS stretch table side 3x30\" HEP   Incline stretch 4x30\" HEP   Heel slides 10\" x 10 HEP   SLR FLX 3 x 10 HEP; cues for quad        Bridge w/ ADD 2 x 10x3\"    DHR 2x10    Bike for ROM 5' oscillations   SB rolls for FLX With strap 20x5\"    Mini squats 2 x 15 cues   LAQ LLE EOB  (3#) 2 x 10                             Pt ed 3' importance of elevating and icing        Manual Intervention (10')     Edema massage and patellar mobility   HS stretch  PROM flex 10'                             NMR re-education (3')     SLS  10x10\" LLE    FSU 4\" 2 x 10    LSU 4\" 10x difficulty                      Therapeutic Exercise and NMR EXR  [x] (21938) Provided verbal/tactile cueing for activities related to strengthening, flexibility, endurance, ROM for improvements in extrenmity and core control with self care, mobility, lifting, ambulation.  [] (11141) Provided verbal/tactile cueing for activities related to improving balance, coordination, kinesthetic sense, posture, motor skill, proprioception  to assist with exremity and core control in self care, mobility, lifting, ambulation and eccentric single leg and arm  control. NMR and Therapeutic Activities:    [] (69169 or 35260) Provided verbal/tactile cueing for activities related to improving balance, coordination, kinesthetic sense, posture, motor skill, proprioception and motor activation to allow for proper function of core and extremities with self care and ADLs  [] (13321) Gait Re-education- Provided training and instruction to the patient for proper LE, core and proximal hip recruitment and positioning and eccentric body weight control with ambulation re-education including up and down stairs     Home Exercise Program:    [x] (55008) Reviewed/Progressed HEP activities related to strengthening, flexibility, endurance, ROM of core and extremity for functional self-care, mobility, lifting and ambulation.  [] (79071)Reviewed/Progressed HEP activities related to improving balance, coordination, kinesthetic sense, posture, motor skill, proprioception of core and extremity for functional self-care, mobility, lifting and ambulation.       Manual Treatments:  PROM / STM / Oscillations-Mobs: G-I, II, III, IV (PA's, Inf., Post.)  [x] (87241) Provided manual therapy to mobilize upper and/or LE joints, spine, soft tissue/joints for the purpose of modulating pain, promoting relaxation,  increasing ROM, reducing/eliminating soft tissue swelling/inflammation/restriction, improving soft tissue extensibility and allowing for proper ROM for normal function with self care, mobility, lifting and ambulation. Modalities:  HV/CP x15' med/lat joint line    Charges:  Timed Code Treatment Minutes: 45   Total Treatment Minutes: 60     [] EVAL (LOW) 11304 (typically 20 minutes face-to-face)  [] EVAL (MOD) 08904 (typically 30 minutes face-to-face)  [] EVAL (HIGH) 68540 (typically 45 minutes face-to-face)  [] RE-EVAL     [x] HL(77897) x 2    [] IONTO  [] NMR (59817) x     [] VASO  [x] Manual (85759) x 1     [] Other:  [] TA x      [] Mech Traction (65917)  [] ES(attended) (90808)      [x] ES (un) (20149):     GOALS:   Patient stated goal: To walk normally again     Therapist goals for Patient:   Short Term Goals: To be achieved in: 2 weeks  1. Independent in HEP and progression per patient tolerance, in order to prevent re-injury. 2. Patient will have a decrease in pain to facilitate improvement in movement, function, and ADLs as indicated by Functional Deficits.     Long Term Goals: To be achieved in: 8 weeks  1. Disability index score of 25% or less for the LEFS to assist with reaching prior level of function. 2. Patient will demonstrate increased AROM to 0-120 to allow for proper joint functioning as indicated by patients Functional Deficits. 3. Patient will demonstrate an increase in Strength to good proximal hip strength and control, 4+/5 in LE to allow for proper functional mobility as indicated by patients Functional Deficits. 4. Patient will return to daily household functional activities without increased symptoms or restriction.    5. Pt will be bale to ambulate community distances with no AD including

## 2019-07-02 ENCOUNTER — HOSPITAL ENCOUNTER (OUTPATIENT)
Dept: PHYSICAL THERAPY | Age: 64
Setting detail: THERAPIES SERIES
Discharge: HOME | End: 2019-07-02
Payer: COMMERCIAL

## 2019-07-02 ENCOUNTER — CARE COORDINATION (OUTPATIENT)
Dept: CASE MANAGEMENT | Age: 64
End: 2019-07-02

## 2019-07-02 PROCEDURE — 97110 THERAPEUTIC EXERCISES: CPT

## 2019-07-02 PROCEDURE — G0283 ELEC STIM OTHER THAN WOUND: HCPCS

## 2019-07-02 PROCEDURE — 97140 MANUAL THERAPY 1/> REGIONS: CPT

## 2019-07-02 NOTE — FLOWSHEET NOTE
15 cues   LAQ w/ ADD  (3#) 2 x 15                             Pt ed 3' importance of elevating and icing        Manual Intervention      Edema massage and patellar mobility   Hip flexor stretch  PROM flex 10'                             NMR re-education      SLS airex 10x10\" LLE    FSU 6\" 2 x 10 LIDIA support   LSU 6\" 20x difficulty                      Therapeutic Exercise and NMR EXR  [x] (65404) Provided verbal/tactile cueing for activities related to strengthening, flexibility, endurance, ROM for improvements in extrenmity and core control with self care, mobility, lifting, ambulation.  [] (82367) Provided verbal/tactile cueing for activities related to improving balance, coordination, kinesthetic sense, posture, motor skill, proprioception  to assist with exremity and core control in self care, mobility, lifting, ambulation and eccentric single leg and arm  control. NMR and Therapeutic Activities:    [] (13798 or 96341) Provided verbal/tactile cueing for activities related to improving balance, coordination, kinesthetic sense, posture, motor skill, proprioception and motor activation to allow for proper function of core and extremities with self care and ADLs  [] (78137) Gait Re-education- Provided training and instruction to the patient for proper LE, core and proximal hip recruitment and positioning and eccentric body weight control with ambulation re-education including up and down stairs     Home Exercise Program:    [x] (72301) Reviewed/Progressed HEP activities related to strengthening, flexibility, endurance, ROM of core and extremity for functional self-care, mobility, lifting and ambulation.  [] (88517)Reviewed/Progressed HEP activities related to improving balance, coordination, kinesthetic sense, posture, motor skill, proprioception of core and extremity for functional self-care, mobility, lifting and ambulation.       Manual Treatments:  PROM / STM / Oscillations-Mobs:  G-I, II, III, IV (PA's, work.(patient specific functional goal)         Progression Towards Functional goals:  [x] Patient is progressing as expected towards functional goals listed. [] Progression is slowed due to complexities listed. [] Progression has been slowed due to co-morbidities. [] Plan just implemented, too soon to assess goals progression  [] Other:     ASSESSMENT:  Pt able to progress to higher box height for step ups forward and sideways. Ambulation improving without AD. Overall making good progress towards goals.     Treatment/Activity Tolerance:  [x] Patient tolerated treatment well [] Patient limited by fatique  [] Patient limited by pain  [] Patient limited by other medical complications  [] Other:     Prognosis: [x] Good [] Fair  [] Poor    Patient Requires Follow-up: [x] Yes  [] No    PLAN: Hip strengthening  [x] Continue per plan of care [] Alter current plan (see comments)  [] Plan of care initiated [] Hold pending MD visit [] Discharge    Electronically signed by: Emeka Leiva PT ,[id number]

## 2019-07-05 ENCOUNTER — HOSPITAL ENCOUNTER (OUTPATIENT)
Dept: PHYSICAL THERAPY | Age: 64
Setting detail: THERAPIES SERIES
Discharge: HOME | End: 2019-07-05
Payer: COMMERCIAL

## 2019-07-05 PROCEDURE — 97110 THERAPEUTIC EXERCISES: CPT

## 2019-07-05 PROCEDURE — 97140 MANUAL THERAPY 1/> REGIONS: CPT

## 2019-07-05 PROCEDURE — G0283 ELEC STIM OTHER THAN WOUND: HCPCS

## 2019-07-05 PROCEDURE — 97016 VASOPNEUMATIC DEVICE THERAPY: CPT

## 2019-07-05 PROCEDURE — 97112 NEUROMUSCULAR REEDUCATION: CPT

## 2019-07-05 NOTE — FLOWSHEET NOTE
[facility],  [address] [place]  Phone: [phone]  Fax [phone]        Physical Therapy Daily Treatment Note  Date:  2019    Patient Name:  Swati Lancaster    :  1955  MRN: [id number]       Date of Onset of injury/condition:sx:19    Medical/Treatment Diagnosis Information:  · Diagnosis: N45.277 (ICD-10-CM) - S/P TKR (total knee replacement), left  · Treatment Diagnosis: L Knee pain (A13.614)  Insurance/Certification information:  PT Insurance Information:  BCBS  - 5000D-100%-$0CP-20PT - NEEDS AUTH    Physician Information:  Referring Practitioner: Matt Goldstein MD    Plan of care signed (Y/N): Y       Date of Patient follow up with Physician: 19    Date of onset of PT:  19      POC Due: 18  OP NOTE Due: 19   10th VISIT NOTE  Completed:        Latex Allergy:  [x]NO      []YES  Preferred Language for Healthcare:   [x]English       []other:     Visit # Insurance Allowable Requires auth   7     []no        [x]yes:     Pain level:  0-2/10     SUBJECTIVE:  Pt states her knee is stiff but has no pain. Pt only uses cane for long distances. OBJECTIVE:    Observation: Edema in L knee.  Antalgic gait w/o AD    Palpation:      OBJECTIVE  Test used    Initial score 19   Current Score  19   Pain Summary 0-1/10 2-5/10 0/10   Functional questionnaire LEFS 55%                ROM KE 0     KF 95 121 deg                           Strength KE      KF      Quad tone POOR + Good-                         RESTRICTIONS/PRECAUTIONS: per protocol    Exercises/Interventions: unless otherwise noted interventions are performed on involved limb/side    Therapeutic Ex  Sets/Reps/Sec Notes   HEP   Incline stretch 3x30\"         HEP; cues for quad   SLR abd 2 x 12 Cues for hip, +HEP   +HEP        Bike for ROM 5' Oscillations 2\", full rotation 3\"   Heel slides 10\" x 10 121 deg, +HEP   Mini squats w/ ball in between knees 2 x 15 Modified for back pain. Cues for knee alignment. LAQ w/ ADD  (3#) 2 x 15                             Pt ed 6' importance of elevating and icing, benefits of appropriate anti-inflammatory use as prescribed by Dr., [facility]. Manual Intervention      Edema massage and patellar mobility   Hip flexor stretch  PROM flex 18'                             NMR re-education      SLS airex 10x10\" LLE Cues for abdominal and glute contraction   FSU 6\" 2 x 10 LIDIA support   LSU 6\" 20x LIDIA support   Gait training 2' Antalgic gait, cues for increased heel strike and TKE   Stair training 2' Reciprocal sequence            Therapeutic Exercise and NMR EXR  [x] (15044) Provided verbal/tactile cueing for activities related to strengthening, flexibility, endurance, ROM for improvements in extrenmity and core control with self care, mobility, lifting, ambulation.  [] (75469) Provided verbal/tactile cueing for activities related to improving balance, coordination, kinesthetic sense, posture, motor skill, proprioception  to assist with exremity and core control in self care, mobility, lifting, ambulation and eccentric single leg and arm  control. NMR and Therapeutic Activities:    [x] (47160 or 27621) Provided verbal/tactile cueing for activities related to improving balance, coordination, kinesthetic sense, posture, motor skill, proprioception and motor activation to allow for proper function of core and extremities with self care and ADLs  [] (73509) Gait Re-education- Provided training and instruction to the patient for proper LE, core and proximal hip recruitment and positioning and eccentric body weight control with ambulation re-education including up and down stairs     Home Exercise Program:    [x] (61145) Reviewed/Progressed HEP activities related to strengthening, flexibility, endurance, ROM of core and extremity for functional self-care, mobility, lifting and ambulation.   []

## 2019-07-09 ENCOUNTER — HOSPITAL ENCOUNTER (OUTPATIENT)
Dept: PHYSICAL THERAPY | Age: 64
Setting detail: THERAPIES SERIES
Discharge: HOME | End: 2019-07-09
Payer: COMMERCIAL

## 2019-07-09 ENCOUNTER — CARE COORDINATION (OUTPATIENT)
Dept: CASE MANAGEMENT | Age: 64
End: 2019-07-09

## 2019-07-09 PROCEDURE — 97140 MANUAL THERAPY 1/> REGIONS: CPT | Performed by: PHYSICAL THERAPIST

## 2019-07-09 PROCEDURE — G0283 ELEC STIM OTHER THAN WOUND: HCPCS | Performed by: PHYSICAL THERAPIST

## 2019-07-09 PROCEDURE — 97110 THERAPEUTIC EXERCISES: CPT | Performed by: PHYSICAL THERAPIST

## 2019-07-09 NOTE — FLOWSHEET NOTE
Ecc.                               Inv.        Sole[employer] Bilat. Ecc.                           Inv.                             Ladders                Square               Jump/Hop  Low                      Med.                      High                                                            Modality HV/CP 15'   Initials                             DB/JLW   Time spent one on one (workers comp)    Time spent with PT assistant

## 2019-07-09 NOTE — CARE COORDINATION
Called patient for updates. Left message for return call.   Mateo Stephens BSN  Care Transition Coordinator for ortho bundle  [phone]

## 2019-07-11 ENCOUNTER — OFFICE VISIT (OUTPATIENT)
Dept: ORTHOPEDIC SURGERY | Age: 64
End: 2019-07-11

## 2019-07-11 ENCOUNTER — HOSPITAL ENCOUNTER (OUTPATIENT)
Dept: PHYSICAL THERAPY | Age: 64
Setting detail: THERAPIES SERIES
Discharge: HOME | End: 2019-07-11
Payer: COMMERCIAL

## 2019-07-11 VITALS — BODY MASS INDEX: 33.1 KG/M2 | WEIGHT: 179.9 LBS | HEIGHT: 62 IN

## 2019-07-11 DIAGNOSIS — Z96.652 S/P TKR (TOTAL KNEE REPLACEMENT), LEFT: Primary | ICD-10-CM

## 2019-07-11 PROCEDURE — 97110 THERAPEUTIC EXERCISES: CPT | Performed by: PHYSICAL THERAPIST

## 2019-07-11 PROCEDURE — 97140 MANUAL THERAPY 1/> REGIONS: CPT | Performed by: PHYSICAL THERAPIST

## 2019-07-11 PROCEDURE — 99024 POSTOP FOLLOW-UP VISIT: CPT | Performed by: ORTHOPAEDIC SURGERY

## 2019-07-11 NOTE — FLOWSHEET NOTE
[facility], [address] [place]  Phone: [phone]  Fax [phone]        Physical Therapy Daily Treatment Note  Date:  2019    Patient Name:  Jessica Luna    :  1955  MRN: [id number]       Date of Onset of injury/condition:sx:19    Medical/Treatment Diagnosis Information:  · Diagnosis: X93.378 (ICD-10-CM) - S/P TKR (total knee replacement), left  · Treatment Diagnosis: L Knee pain (N29.163)  Insurance/Certification information:  PT Insurance Information:  BCBS  - 5000D-100%-$0CP-20PT - NEEDS AUTH    Physician Information:  Referring Practitioner: Marisa Rebollar MD    Plan of care signed (Y/N): Y       Date of Patient follow up with Physician: 19    Date of onset of PT:  19      POC Due: 18  OP NOTE Due: 19   10th VISIT NOTE  Completed:        Latex Allergy:  [x]NO      []YES  Preferred Language for Healthcare:   [x]English       []other:     Visit # Insurance Allowable Requires auth   9 20    []no        [x]yes:     Pain level:  0-2/10     SUBJECTIVE:  Not using cane. Feels good, has been making herself use the stairs properly to increase confidence. Pt is in townhouse and can navigate stairs without difficulty. Going back to work in 2 weeks. Able to put sock and shoe on. OBJECTIVE:    Observation: Decreased edema in L knee. Good patellar mobility.    Palpation:      OBJECTIVE  Test used    Initial score 19   Current Score  19   Pain Summary 0-1/10 2-5/10 0/10   Functional questionnaire LEFS 55% 24%               ROM KE 0 0    KF 95 125 deg                           Strength KE  4+/5    KF  4+/5    Quad tone POOR + Good    HF  5/5                   RESTRICTIONS/PRECAUTIONS: per protocol    Exercises/Interventions: unless otherwise noted interventions are performed on involved limb/side    Therapeutic Ex (23') Sets/Reps/Sec Notes   Seated HS stretch table side 3x30\"    Incline stretch 3x30\"    Bike for ROM warm up 5'                                                                  Pt ed DC plan, RTW, vacation, HP pass issued  x8'    Re measurement of ROM and MMT to help solidify HEP. x5'    Manual Intervention (9')     Manual HF/Quad stretch/PROM 4'    Peripatellar STM 5'                        NMR re-education               Therapeutic Exercise and NMR EXR  [x] (49245) Provided verbal/tactile cueing for activities related to strengthening, flexibility, endurance, ROM for improvements in extrenmity and core control with self care, mobility, lifting, ambulation.  [] (48906) Provided verbal/tactile cueing for activities related to improving balance, coordination, kinesthetic sense, posture, motor skill, proprioception  to assist with exremity and core control in self care, mobility, lifting, ambulation and eccentric single leg and arm  control. NMR and Therapeutic Activities:    [x] (63221 or 31051) Provided verbal/tactile cueing for activities related to improving balance, coordination, kinesthetic sense, posture, motor skill, proprioception and motor activation to allow for proper function of core and extremities with self care and ADLs  [] (57763) Gait Re-education- Provided training and instruction to the patient for proper LE, core and proximal hip recruitment and positioning and eccentric body weight control with ambulation re-education including up and down stairs     Home Exercise Program:    [x] (14703) Reviewed/Progressed HEP activities related to strengthening, flexibility, endurance, ROM of core and extremity for functional self-care, mobility, lifting and ambulation.  [] (30838)Reviewed/Progressed HEP activities related to improving balance, coordination, kinesthetic sense, posture, motor skill, proprioception of core and extremity for functional self-care, mobility, lifting and ambulation.       Manual Treatments:  PROM / STM / Oscillations-Mobs:  G-I, II,

## 2019-07-16 ENCOUNTER — TELEPHONE (OUTPATIENT)
Dept: FAMILY MEDICINE CLINIC | Age: 64
End: 2019-07-16

## 2019-07-16 ENCOUNTER — APPOINTMENT (OUTPATIENT)
Dept: PHYSICAL THERAPY | Age: 64
End: 2019-07-16
Payer: COMMERCIAL

## 2019-07-16 ENCOUNTER — CARE COORDINATION (OUTPATIENT)
Dept: CASE MANAGEMENT | Age: 64
End: 2019-07-16

## 2019-07-18 ENCOUNTER — APPOINTMENT (OUTPATIENT)
Dept: PHYSICAL THERAPY | Age: 64
End: 2019-07-18
Payer: COMMERCIAL

## 2019-07-19 ENCOUNTER — OFFICE VISIT (OUTPATIENT)
Dept: FAMILY MEDICINE CLINIC | Age: 64
End: 2019-07-19
Payer: COMMERCIAL

## 2019-07-19 VITALS
WEIGHT: 181.6 LBS | SYSTOLIC BLOOD PRESSURE: 122 MMHG | OXYGEN SATURATION: 99 % | HEART RATE: 71 BPM | TEMPERATURE: 98.1 F | BODY MASS INDEX: 33.21 KG/M2 | DIASTOLIC BLOOD PRESSURE: 84 MMHG

## 2019-07-19 DIAGNOSIS — I10 BENIGN ESSENTIAL HTN: Primary | ICD-10-CM

## 2019-07-19 PROCEDURE — G8417 CALC BMI ABV UP PARAM F/U: HCPCS | Performed by: NURSE PRACTITIONER

## 2019-07-19 PROCEDURE — 4040F PNEUMOC VAC/ADMIN/RCVD: CPT | Performed by: NURSE PRACTITIONER

## 2019-07-19 PROCEDURE — G8400 PT W/DXA NO RESULTS DOC: HCPCS | Performed by: NURSE PRACTITIONER

## 2019-07-19 PROCEDURE — 99213 OFFICE O/P EST LOW 20 MIN: CPT | Performed by: NURSE PRACTITIONER

## 2019-07-19 PROCEDURE — 1090F PRES/ABSN URINE INCON ASSESS: CPT | Performed by: NURSE PRACTITIONER

## 2019-07-19 PROCEDURE — 3017F COLORECTAL CA SCREEN DOC REV: CPT | Performed by: NURSE PRACTITIONER

## 2019-07-19 PROCEDURE — G8427 DOCREV CUR MEDS BY ELIG CLIN: HCPCS | Performed by: NURSE PRACTITIONER

## 2019-07-19 PROCEDURE — 1123F ACP DISCUSS/DSCN MKR DOCD: CPT | Performed by: NURSE PRACTITIONER

## 2019-07-19 PROCEDURE — 1036F TOBACCO NON-USER: CPT | Performed by: NURSE PRACTITIONER

## 2019-07-19 ASSESSMENT — PATIENT HEALTH QUESTIONNAIRE - PHQ9
2. FEELING DOWN, DEPRESSED OR HOPELESS: 0
1. LITTLE INTEREST OR PLEASURE IN DOING THINGS: 0
SUM OF ALL RESPONSES TO PHQ QUESTIONS 1-9: 0
SUM OF ALL RESPONSES TO PHQ9 QUESTIONS 1 & 2: 0
SUM OF ALL RESPONSES TO PHQ QUESTIONS 1-9: 0

## 2019-07-19 ASSESSMENT — ENCOUNTER SYMPTOMS
BLURRED VISION: 0
SHORTNESS OF BREATH: 0

## 2019-07-31 ENCOUNTER — CARE COORDINATION (OUTPATIENT)
Dept: CASE MANAGEMENT | Age: 64
End: 2019-07-31

## 2019-07-31 NOTE — CARE COORDINATION
Called patient for updates. Left message for return call.   Mateo Stephens BSN  Care Transition Coordinator for ortho bundle  [phone]

## 2019-08-05 RX ORDER — METOPROLOL SUCCINATE 50 MG/1
TABLET, EXTENDED RELEASE ORAL
Qty: 90 TABLET | Refills: 0 | Status: SHIPPED | OUTPATIENT
Start: 2019-08-05 | End: 2019-11-10 | Stop reason: SDUPTHER

## 2019-08-13 ENCOUNTER — CARE COORDINATION (OUTPATIENT)
Dept: CASE MANAGEMENT | Age: 64
End: 2019-08-13

## 2019-08-15 ENCOUNTER — OFFICE VISIT (OUTPATIENT)
Dept: FAMILY MEDICINE CLINIC | Age: 64
End: 2019-08-15
Payer: COMMERCIAL

## 2019-08-15 VITALS
DIASTOLIC BLOOD PRESSURE: 78 MMHG | WEIGHT: 185 LBS | OXYGEN SATURATION: 98 % | HEART RATE: 80 BPM | BODY MASS INDEX: 33.83 KG/M2 | SYSTOLIC BLOOD PRESSURE: 122 MMHG | TEMPERATURE: 97.9 F

## 2019-08-15 DIAGNOSIS — B35.9 RINGWORM: Primary | ICD-10-CM

## 2019-08-15 PROCEDURE — 1123F ACP DISCUSS/DSCN MKR DOCD: CPT | Performed by: NURSE PRACTITIONER

## 2019-08-15 PROCEDURE — G8400 PT W/DXA NO RESULTS DOC: HCPCS | Performed by: NURSE PRACTITIONER

## 2019-08-15 PROCEDURE — G8417 CALC BMI ABV UP PARAM F/U: HCPCS | Performed by: NURSE PRACTITIONER

## 2019-08-15 PROCEDURE — 3017F COLORECTAL CA SCREEN DOC REV: CPT | Performed by: NURSE PRACTITIONER

## 2019-08-15 PROCEDURE — 99213 OFFICE O/P EST LOW 20 MIN: CPT | Performed by: NURSE PRACTITIONER

## 2019-08-15 PROCEDURE — G8427 DOCREV CUR MEDS BY ELIG CLIN: HCPCS | Performed by: NURSE PRACTITIONER

## 2019-08-15 PROCEDURE — 4040F PNEUMOC VAC/ADMIN/RCVD: CPT | Performed by: NURSE PRACTITIONER

## 2019-08-15 PROCEDURE — 1036F TOBACCO NON-USER: CPT | Performed by: NURSE PRACTITIONER

## 2019-08-15 PROCEDURE — 1090F PRES/ABSN URINE INCON ASSESS: CPT | Performed by: NURSE PRACTITIONER

## 2019-08-15 RX ORDER — LISINOPRIL 20 MG/1
20 TABLET ORAL DAILY
COMMUNITY
End: 2019-12-23

## 2019-08-15 RX ORDER — PRENATAL VIT 91/IRON/FOLIC/DHA 28-975-200
COMBINATION PACKAGE (EA) ORAL
Qty: 24 G | Refills: 0 | Status: SHIPPED | OUTPATIENT
Start: 2019-08-15 | End: 2020-09-09

## 2019-08-29 ENCOUNTER — HOSPITAL ENCOUNTER (OUTPATIENT)
Dept: WOMENS IMAGING | Age: 64
Discharge: HOME | End: 2019-08-29
Payer: COMMERCIAL

## 2019-08-29 DIAGNOSIS — Z12.31 ENCOUNTER FOR SCREENING MAMMOGRAM FOR BREAST CANCER: ICD-10-CM

## 2019-08-29 PROCEDURE — 77067 SCR MAMMO BI INCL CAD: CPT

## 2019-09-10 ENCOUNTER — HOSPITAL ENCOUNTER (OUTPATIENT)
Dept: WOMENS IMAGING | Age: 64
Discharge: HOME | End: 2019-09-10
Payer: COMMERCIAL

## 2019-09-10 ENCOUNTER — TELEPHONE (OUTPATIENT)
Dept: WOMENS IMAGING | Age: 64
End: 2019-09-10

## 2019-09-10 DIAGNOSIS — R92.8 ABNORMAL MAMMOGRAM OF LEFT BREAST: ICD-10-CM

## 2019-09-10 DIAGNOSIS — R92.8 ABNORMAL MAMMOGRAM: ICD-10-CM

## 2019-09-10 PROCEDURE — G0279 TOMOSYNTHESIS, MAMMO: HCPCS

## 2019-09-10 PROCEDURE — 76642 ULTRASOUND BREAST LIMITED: CPT

## 2019-09-13 ENCOUNTER — TELEPHONE (OUTPATIENT)
Dept: FAMILY MEDICINE CLINIC | Age: 64
End: 2019-09-13

## 2019-09-13 ENCOUNTER — HOSPITAL ENCOUNTER (OUTPATIENT)
Dept: WOMENS IMAGING | Age: 64
Discharge: HOME | End: 2019-09-13
Payer: COMMERCIAL

## 2019-09-13 DIAGNOSIS — R92.8 ABNORMAL MAMMOGRAM: ICD-10-CM

## 2019-09-13 PROCEDURE — 77065 DX MAMMO INCL CAD UNI: CPT

## 2019-09-13 PROCEDURE — 2720000010 US BREAST BIOPSY W LOC DEVICE 1ST LESION LEFT

## 2019-09-13 PROCEDURE — 88342 IMHCHEM/IMCYTCHM 1ST ANTB: CPT

## 2019-09-13 PROCEDURE — 88305 TISSUE EXAM BY PATHOLOGIST: CPT

## 2019-09-13 PROCEDURE — 88360 TUMOR IMMUNOHISTOCHEM/MANUAL: CPT

## 2019-09-13 NOTE — TELEPHONE ENCOUNTER
Erum Cadet, from [facility], calling because this pt is scheduled to come in today at 1 pm for US guided L breast BX. They are needing the order that was faxed days ago. I faxed signed order to their office this morning. Erum Cadet confirmed receipt of order.

## 2019-09-17 ENCOUNTER — TELEPHONE (OUTPATIENT)
Dept: WOMENS IMAGING | Age: 64
End: 2019-09-17

## 2019-09-23 ENCOUNTER — OFFICE VISIT (OUTPATIENT)
Dept: SURGERY | Age: 64
End: 2019-09-23
Payer: COMMERCIAL

## 2019-09-23 VITALS
HEART RATE: 93 BPM | HEIGHT: 62 IN | SYSTOLIC BLOOD PRESSURE: 116 MMHG | DIASTOLIC BLOOD PRESSURE: 83 MMHG | WEIGHT: 179 LBS | BODY MASS INDEX: 32.94 KG/M2

## 2019-09-23 DIAGNOSIS — Z17.0 MALIGNANT NEOPLASM OF UPPER-INNER QUADRANT OF LEFT BREAST IN FEMALE, ESTROGEN RECEPTOR POSITIVE (HCC): Primary | ICD-10-CM

## 2019-09-23 DIAGNOSIS — C50.212 MALIGNANT NEOPLASM OF UPPER-INNER QUADRANT OF LEFT BREAST IN FEMALE, ESTROGEN RECEPTOR POSITIVE (HCC): Primary | ICD-10-CM

## 2019-09-23 PROCEDURE — 99245 OFF/OP CONSLTJ NEW/EST HI 55: CPT | Performed by: SURGERY

## 2019-09-23 NOTE — PATIENT INSTRUCTIONS
Patient Education        Breast Self-Exam: Care Instructions  Your Care Instructions    A breast self-exam is when you check your breasts for lumps or changes. This regular exam helps you learn how your breasts normally look and feel. Most breast problems or changes are not because of cancer. Breast self-exam is not a substitute for a mammogram. Having regular breast exams by your doctor and regular mammograms improve your chances of finding any problems with your breasts. Some women set a time each month to do a step-by-step breast self-exam. Other women like a less formal system. They might look at their breasts as they brush their teeth, or feel their breasts once in a while in the shower. If you notice a change in your breast, tell your doctor. Follow-up care is a key part of your treatment and safety. Be sure to make and go to all appointments, and call your doctor if you are having problems. It's also a good idea to know your test results and keep a list of the medicines you take. How do you do a breast self-exam?  · The best time to examine your breasts is usually one week after your menstrual period begins. Your breasts should not be tender then. If you do not have periods, you might do your exam on a day of the month that is easy to remember. · To examine your breasts:  ? Remove all your clothes above the waist and lie down. When you are lying down, your breast tissue spreads evenly over your chest wall, which makes it easier to feel all your breast tissue. ? Use the pads--not the fingertips--of the 3 middle fingers of your left hand to check your right breast. Move your fingers slowly in small coin-sized circles that overlap. ? Use three levels of pressure to feel of all your breast tissue. Use light pressure to feel the tissue close to the skin surface. Use medium pressure to feel a little deeper. Use firm pressure to feel your tissue close to your breastbone and ribs.  Use each pressure level to please click on the \"Sign Up Now\" link. Current as of: December 19, 2018  Content Version: 12.1  © 4260-1722 [employer]. Care instructions adapted under license by [facility] [address] If you have questions about a medical condition or this instruction, always ask your healthcare professional. [address] any warranty or liability for your use of this information. Patient Education        Lumpectomy: Before Your Surgery  What is a lumpectomy? A lumpectomy is surgery to remove cancer from the breast. Your doctor will make a small cut (incision) and take out the cancer. The whole breast will not be removed. The doctor will try to also take a small amount of normal tissue around the cancer. This is known as \"getting clear margins. \" Some people will need another surgery to be sure the margins are clear. The doctor may also check the nearby lymph nodes during the surgery. After a lumpectomy, you will probably go home the same day. Most people can go back to work or their normal routine in 1 to 3 weeks. This depends on how you feel. It also depends on the type of work you do and whether you need more treatment. This may include radiation or chemotherapy. Most people who have a lumpectomy for cancer also get radiation treatment. The surgery will leave scars. Sometimes it leaves a dent in the breast too. Most women will look normal in a bra. But your breasts may not match in size or shape after surgery. This depends on the size of your breasts. It also depends on how much tissue was removed. When you find out that you have cancer, you may feel many emotions and may need some help coping. Seek out family, friends, and counselors for support. You also can do things at home to make yourself feel better while you go through treatment. Call the [employer] (0-672.409.6320) or visit its website at [id number] for more information.   Follow-up care is a key part of your always ask your healthcare professional. [place] [address] any warranty or liability for your use of this information.

## 2019-09-24 ENCOUNTER — PREP FOR PROCEDURE (OUTPATIENT)
Dept: SURGERY | Age: 64
End: 2019-09-24

## 2019-10-04 RX ORDER — M-VIT,TX,IRON,MINS/CALC/FOLIC 27MG-0.4MG
1 TABLET ORAL DAILY
COMMUNITY

## 2019-10-07 ENCOUNTER — ANESTHESIA EVENT (OUTPATIENT)
Dept: OPERATING ROOM | Age: 64
End: 2019-10-07
Payer: COMMERCIAL

## 2019-10-07 ENCOUNTER — OFFICE VISIT (OUTPATIENT)
Dept: FAMILY MEDICINE CLINIC | Age: 64
End: 2019-10-07
Payer: COMMERCIAL

## 2019-10-07 VITALS
OXYGEN SATURATION: 99 % | SYSTOLIC BLOOD PRESSURE: 102 MMHG | WEIGHT: 181 LBS | DIASTOLIC BLOOD PRESSURE: 74 MMHG | HEART RATE: 75 BPM | RESPIRATION RATE: 16 BRPM | HEIGHT: 62 IN | BODY MASS INDEX: 33.31 KG/M2

## 2019-10-07 DIAGNOSIS — Z01.818 PREOP EXAMINATION: Primary | ICD-10-CM

## 2019-10-07 DIAGNOSIS — C50.912 MALIGNANT NEOPLASM OF LEFT FEMALE BREAST, UNSPECIFIED ESTROGEN RECEPTOR STATUS, UNSPECIFIED SITE OF BREAST (HCC): ICD-10-CM

## 2019-10-07 PROCEDURE — G8417 CALC BMI ABV UP PARAM F/U: HCPCS | Performed by: FAMILY MEDICINE

## 2019-10-07 PROCEDURE — 99242 OFF/OP CONSLTJ NEW/EST SF 20: CPT | Performed by: FAMILY MEDICINE

## 2019-10-07 PROCEDURE — G8427 DOCREV CUR MEDS BY ELIG CLIN: HCPCS | Performed by: FAMILY MEDICINE

## 2019-10-07 PROCEDURE — 1090F PRES/ABSN URINE INCON ASSESS: CPT | Performed by: FAMILY MEDICINE

## 2019-10-07 PROCEDURE — G8484 FLU IMMUNIZE NO ADMIN: HCPCS | Performed by: FAMILY MEDICINE

## 2019-10-07 PROCEDURE — 93000 ELECTROCARDIOGRAM COMPLETE: CPT | Performed by: FAMILY MEDICINE

## 2019-10-08 ENCOUNTER — HOSPITAL ENCOUNTER (OUTPATIENT)
Dept: WOMENS IMAGING | Age: 64
Discharge: HOME | End: 2019-10-08
Attending: SURGERY
Payer: COMMERCIAL

## 2019-10-08 ENCOUNTER — HOSPITAL ENCOUNTER (OUTPATIENT)
Dept: WOMENS IMAGING | Age: 64
Discharge: HOME | End: 2019-10-08
Payer: COMMERCIAL

## 2019-10-08 ENCOUNTER — HOSPITAL ENCOUNTER (OUTPATIENT)
Age: 64
Setting detail: OUTPATIENT SURGERY
Discharge: HOME | End: 2019-10-08
Attending: SURGERY | Admitting: SURGERY
Payer: COMMERCIAL

## 2019-10-08 ENCOUNTER — ANESTHESIA (OUTPATIENT)
Dept: OPERATING ROOM | Age: 64
End: 2019-10-08
Payer: COMMERCIAL

## 2019-10-08 VITALS
SYSTOLIC BLOOD PRESSURE: 123 MMHG | TEMPERATURE: 97.6 F | WEIGHT: 180 LBS | BODY MASS INDEX: 33.13 KG/M2 | RESPIRATION RATE: 14 BRPM | HEART RATE: 55 BPM | DIASTOLIC BLOOD PRESSURE: 76 MMHG | HEIGHT: 62 IN | OXYGEN SATURATION: 99 %

## 2019-10-08 VITALS — OXYGEN SATURATION: 100 % | DIASTOLIC BLOOD PRESSURE: 89 MMHG | SYSTOLIC BLOOD PRESSURE: 131 MMHG

## 2019-10-08 DIAGNOSIS — C50.212 CARCINOMA OF UPPER-INNER QUADRANT OF FEMALE BREAST, LEFT (HCC): ICD-10-CM

## 2019-10-08 DIAGNOSIS — Z17.0 MALIGNANT NEOPLASM OF UPPER-INNER QUADRANT OF LEFT BREAST IN FEMALE, ESTROGEN RECEPTOR POSITIVE (HCC): Primary | ICD-10-CM

## 2019-10-08 DIAGNOSIS — C50.212 MALIGNANT NEOPLASM OF UPPER-INNER QUADRANT OF LEFT BREAST IN FEMALE, ESTROGEN RECEPTOR POSITIVE (HCC): Primary | ICD-10-CM

## 2019-10-08 DIAGNOSIS — R92.8 ABNORMAL MAMMOGRAM OF LEFT BREAST: ICD-10-CM

## 2019-10-08 DIAGNOSIS — R92.8 ABNORMAL MAMMOGRAM: ICD-10-CM

## 2019-10-08 LAB
ANION GAP SERPL CALCULATED.3IONS-SCNC: 13 MMOL/L (ref 3–16)
BUN BLDV-MCNC: 6 MG/DL (ref 7–20)
CALCIUM SERPL-MCNC: 9.9 MG/DL (ref 8.3–10.6)
CHLORIDE BLD-SCNC: 98 MMOL/L (ref 99–110)
CO2: 24 MMOL/L (ref 21–32)
CREAT SERPL-MCNC: <0.5 MG/DL (ref 0.6–1.2)
GFR AFRICAN AMERICAN: >60
GFR NON-AFRICAN AMERICAN: >60
GLUCOSE BLD-MCNC: 111 MG/DL (ref 70–99)
POTASSIUM SERPL-SCNC: 3.9 MMOL/L (ref 3.5–5.1)
SODIUM BLD-SCNC: 135 MMOL/L (ref 136–145)

## 2019-10-08 PROCEDURE — 3600000015 HC SURGERY LEVEL 5 ADDTL 15MIN: Performed by: SURGERY

## 2019-10-08 PROCEDURE — 19281 PERQ DEVICE BREAST 1ST IMAG: CPT

## 2019-10-08 PROCEDURE — 88307 TISSUE EXAM BY PATHOLOGIST: CPT

## 2019-10-08 PROCEDURE — G0279 TOMOSYNTHESIS, MAMMO: HCPCS

## 2019-10-08 PROCEDURE — 2500000003 HC RX 250 WO HCPCS: Performed by: NURSE ANESTHETIST, CERTIFIED REGISTERED

## 2019-10-08 PROCEDURE — 6360000002 HC RX W HCPCS: Performed by: ANESTHESIOLOGY

## 2019-10-08 PROCEDURE — 6360000002 HC RX W HCPCS: Performed by: NURSE ANESTHETIST, CERTIFIED REGISTERED

## 2019-10-08 PROCEDURE — 7100000000 HC PACU RECOVERY - FIRST 15 MIN: Performed by: SURGERY

## 2019-10-08 PROCEDURE — 2580000003 HC RX 258: Performed by: SURGERY

## 2019-10-08 PROCEDURE — 7100000011 HC PHASE II RECOVERY - ADDTL 15 MIN: Performed by: SURGERY

## 2019-10-08 PROCEDURE — 7100000010 HC PHASE II RECOVERY - FIRST 15 MIN: Performed by: SURGERY

## 2019-10-08 PROCEDURE — 2500000003 HC RX 250 WO HCPCS: Performed by: SURGERY

## 2019-10-08 PROCEDURE — 76098 X-RAY EXAM SURGICAL SPECIMEN: CPT

## 2019-10-08 PROCEDURE — 7100000001 HC PACU RECOVERY - ADDTL 15 MIN: Performed by: SURGERY

## 2019-10-08 PROCEDURE — 3700000001 HC ADD 15 MINUTES (ANESTHESIA): Performed by: SURGERY

## 2019-10-08 PROCEDURE — 2709999900 HC NON-CHARGEABLE SUPPLY: Performed by: SURGERY

## 2019-10-08 PROCEDURE — 3700000000 HC ANESTHESIA ATTENDED CARE: Performed by: SURGERY

## 2019-10-08 PROCEDURE — 2580000003 HC RX 258: Performed by: NURSE ANESTHETIST, CERTIFIED REGISTERED

## 2019-10-08 PROCEDURE — 80048 BASIC METABOLIC PNL TOTAL CA: CPT

## 2019-10-08 PROCEDURE — 3600000005 HC SURGERY LEVEL 5 BASE: Performed by: SURGERY

## 2019-10-08 RX ORDER — ROCURONIUM BROMIDE 10 MG/ML
INJECTION, SOLUTION INTRAVENOUS PRN
Status: DISCONTINUED | OUTPATIENT
Start: 2019-10-08 | End: 2019-10-08 | Stop reason: SDUPTHER

## 2019-10-08 RX ORDER — SODIUM CHLORIDE 0.9 % (FLUSH) 0.9 %
10 SYRINGE (ML) INJECTION EVERY 12 HOURS SCHEDULED
Status: DISCONTINUED | OUTPATIENT
Start: 2019-10-08 | End: 2019-10-08 | Stop reason: HOSPADM

## 2019-10-08 RX ORDER — MORPHINE SULFATE 2 MG/ML
1 INJECTION, SOLUTION INTRAMUSCULAR; INTRAVENOUS EVERY 5 MIN PRN
Status: DISCONTINUED | OUTPATIENT
Start: 2019-10-08 | End: 2019-10-08 | Stop reason: HOSPADM

## 2019-10-08 RX ORDER — SODIUM CHLORIDE, SODIUM LACTATE, POTASSIUM CHLORIDE, CALCIUM CHLORIDE 600; 310; 30; 20 MG/100ML; MG/100ML; MG/100ML; MG/100ML
INJECTION, SOLUTION INTRAVENOUS CONTINUOUS PRN
Status: DISCONTINUED | OUTPATIENT
Start: 2019-10-08 | End: 2019-10-08 | Stop reason: SDUPTHER

## 2019-10-08 RX ORDER — ONDANSETRON 2 MG/ML
INJECTION INTRAMUSCULAR; INTRAVENOUS PRN
Status: DISCONTINUED | OUTPATIENT
Start: 2019-10-08 | End: 2019-10-08 | Stop reason: SDUPTHER

## 2019-10-08 RX ORDER — PROMETHAZINE HYDROCHLORIDE 25 MG/ML
6.25 INJECTION, SOLUTION INTRAMUSCULAR; INTRAVENOUS
Status: DISCONTINUED | OUTPATIENT
Start: 2019-10-08 | End: 2019-10-08 | Stop reason: HOSPADM

## 2019-10-08 RX ORDER — MAGNESIUM HYDROXIDE 1200 MG/15ML
LIQUID ORAL CONTINUOUS PRN
Status: COMPLETED | OUTPATIENT
Start: 2019-10-08 | End: 2019-10-08

## 2019-10-08 RX ORDER — ONDANSETRON 2 MG/ML
4 INJECTION INTRAMUSCULAR; INTRAVENOUS PRN
Status: DISCONTINUED | OUTPATIENT
Start: 2019-10-08 | End: 2019-10-08 | Stop reason: HOSPADM

## 2019-10-08 RX ORDER — SODIUM CHLORIDE 0.9 % (FLUSH) 0.9 %
10 SYRINGE (ML) INJECTION PRN
Status: DISCONTINUED | OUTPATIENT
Start: 2019-10-08 | End: 2019-10-08 | Stop reason: HOSPADM

## 2019-10-08 RX ORDER — BUPIVACAINE HYDROCHLORIDE 5 MG/ML
INJECTION, SOLUTION EPIDURAL; INTRACAUDAL PRN
Status: DISCONTINUED | OUTPATIENT
Start: 2019-10-08 | End: 2019-10-08 | Stop reason: ALTCHOICE

## 2019-10-08 RX ORDER — MORPHINE SULFATE 2 MG/ML
2 INJECTION, SOLUTION INTRAMUSCULAR; INTRAVENOUS EVERY 5 MIN PRN
Status: DISCONTINUED | OUTPATIENT
Start: 2019-10-08 | End: 2019-10-08 | Stop reason: HOSPADM

## 2019-10-08 RX ORDER — LIDOCAINE HYDROCHLORIDE 10 MG/ML
1 INJECTION, SOLUTION EPIDURAL; INFILTRATION; INTRACAUDAL; PERINEURAL
Status: DISCONTINUED | OUTPATIENT
Start: 2019-10-08 | End: 2019-10-08 | Stop reason: HOSPADM

## 2019-10-08 RX ORDER — LABETALOL HYDROCHLORIDE 5 MG/ML
5 INJECTION, SOLUTION INTRAVENOUS EVERY 10 MIN PRN
Status: DISCONTINUED | OUTPATIENT
Start: 2019-10-08 | End: 2019-10-08 | Stop reason: HOSPADM

## 2019-10-08 RX ORDER — HYDRALAZINE HYDROCHLORIDE 20 MG/ML
5 INJECTION INTRAMUSCULAR; INTRAVENOUS EVERY 10 MIN PRN
Status: DISCONTINUED | OUTPATIENT
Start: 2019-10-08 | End: 2019-10-08 | Stop reason: HOSPADM

## 2019-10-08 RX ORDER — LIDOCAINE HYDROCHLORIDE 20 MG/ML
INJECTION, SOLUTION INFILTRATION; PERINEURAL PRN
Status: DISCONTINUED | OUTPATIENT
Start: 2019-10-08 | End: 2019-10-08 | Stop reason: SDUPTHER

## 2019-10-08 RX ORDER — PROPOFOL 10 MG/ML
INJECTION, EMULSION INTRAVENOUS PRN
Status: DISCONTINUED | OUTPATIENT
Start: 2019-10-08 | End: 2019-10-08 | Stop reason: SDUPTHER

## 2019-10-08 RX ORDER — DEXAMETHASONE SODIUM PHOSPHATE 4 MG/ML
INJECTION, SOLUTION INTRA-ARTICULAR; INTRALESIONAL; INTRAMUSCULAR; INTRAVENOUS; SOFT TISSUE PRN
Status: DISCONTINUED | OUTPATIENT
Start: 2019-10-08 | End: 2019-10-08 | Stop reason: SDUPTHER

## 2019-10-08 RX ORDER — FENTANYL CITRATE 50 UG/ML
INJECTION, SOLUTION INTRAMUSCULAR; INTRAVENOUS PRN
Status: DISCONTINUED | OUTPATIENT
Start: 2019-10-08 | End: 2019-10-08 | Stop reason: SDUPTHER

## 2019-10-08 RX ORDER — SODIUM CHLORIDE, SODIUM LACTATE, POTASSIUM CHLORIDE, CALCIUM CHLORIDE 600; 310; 30; 20 MG/100ML; MG/100ML; MG/100ML; MG/100ML
INJECTION, SOLUTION INTRAVENOUS CONTINUOUS
Status: DISCONTINUED | OUTPATIENT
Start: 2019-10-08 | End: 2019-10-08 | Stop reason: HOSPADM

## 2019-10-08 RX ORDER — NICOTINE POLACRILEX 2 MG
GUM BUCCAL
COMMUNITY

## 2019-10-08 RX ORDER — OXYCODONE HYDROCHLORIDE AND ACETAMINOPHEN 5; 325 MG/1; MG/1
2 TABLET ORAL PRN
Status: DISCONTINUED | OUTPATIENT
Start: 2019-10-08 | End: 2019-10-08 | Stop reason: HOSPADM

## 2019-10-08 RX ORDER — DIPHENHYDRAMINE HYDROCHLORIDE 50 MG/ML
12.5 INJECTION INTRAMUSCULAR; INTRAVENOUS
Status: DISCONTINUED | OUTPATIENT
Start: 2019-10-08 | End: 2019-10-08 | Stop reason: HOSPADM

## 2019-10-08 RX ORDER — OXYCODONE HYDROCHLORIDE AND ACETAMINOPHEN 5; 325 MG/1; MG/1
1 TABLET ORAL PRN
Status: DISCONTINUED | OUTPATIENT
Start: 2019-10-08 | End: 2019-10-08 | Stop reason: HOSPADM

## 2019-10-08 RX ORDER — MEPERIDINE HYDROCHLORIDE 50 MG/ML
12.5 INJECTION INTRAMUSCULAR; INTRAVENOUS; SUBCUTANEOUS EVERY 5 MIN PRN
Status: DISCONTINUED | OUTPATIENT
Start: 2019-10-08 | End: 2019-10-08 | Stop reason: HOSPADM

## 2019-10-08 RX ORDER — HYDROCODONE BITARTRATE AND ACETAMINOPHEN 5; 325 MG/1; MG/1
1 TABLET ORAL EVERY 6 HOURS PRN
Qty: 8 TABLET | Refills: 0 | Status: SHIPPED | OUTPATIENT
Start: 2019-10-08 | End: 2019-10-13

## 2019-10-08 RX ORDER — LIDOCAINE HYDROCHLORIDE 20 MG/ML
INJECTION, SOLUTION INFILTRATION; PERINEURAL PRN
Status: DISCONTINUED | OUTPATIENT
Start: 2019-10-08 | End: 2019-10-08

## 2019-10-08 RX ORDER — LIDOCAINE HYDROCHLORIDE 20 MG/ML
INJECTION, SOLUTION INFILTRATION; PERINEURAL CONTINUOUS PRN
Status: DISCONTINUED | OUTPATIENT
Start: 2019-10-08 | End: 2019-10-08

## 2019-10-08 RX ORDER — MIDAZOLAM HYDROCHLORIDE 1 MG/ML
INJECTION INTRAMUSCULAR; INTRAVENOUS PRN
Status: DISCONTINUED | OUTPATIENT
Start: 2019-10-08 | End: 2019-10-08 | Stop reason: SDUPTHER

## 2019-10-08 RX ADMIN — ONDANSETRON 4 MG: 2 INJECTION INTRAMUSCULAR; INTRAVENOUS at 13:19

## 2019-10-08 RX ADMIN — DEXAMETHASONE SODIUM PHOSPHATE 8 MG: 4 INJECTION, SOLUTION INTRAMUSCULAR; INTRAVENOUS at 13:19

## 2019-10-08 RX ADMIN — Medication 2 G: at 13:13

## 2019-10-08 RX ADMIN — ROCURONIUM BROMIDE 50 MG: 10 SOLUTION INTRAVENOUS at 13:10

## 2019-10-08 RX ADMIN — MIDAZOLAM HYDROCHLORIDE 2 MG: 2 INJECTION, SOLUTION INTRAMUSCULAR; INTRAVENOUS at 13:15

## 2019-10-08 RX ADMIN — SODIUM CHLORIDE, POTASSIUM CHLORIDE, SODIUM LACTATE AND CALCIUM CHLORIDE: 600; 310; 30; 20 INJECTION, SOLUTION INTRAVENOUS at 13:21

## 2019-10-08 RX ADMIN — LIDOCAINE HYDROCHLORIDE 60 MG: 20 INJECTION, SOLUTION INFILTRATION; PERINEURAL at 13:10

## 2019-10-08 RX ADMIN — HYDROMORPHONE HYDROCHLORIDE 0.5 MG: 1 INJECTION, SOLUTION INTRAMUSCULAR; INTRAVENOUS; SUBCUTANEOUS at 14:05

## 2019-10-08 RX ADMIN — FENTANYL CITRATE 50 MCG: 50 INJECTION INTRAMUSCULAR; INTRAVENOUS at 13:10

## 2019-10-08 RX ADMIN — SUGAMMADEX 200 MG: 100 INJECTION, SOLUTION INTRAVENOUS at 13:41

## 2019-10-08 RX ADMIN — FENTANYL CITRATE 50 MCG: 50 INJECTION INTRAMUSCULAR; INTRAVENOUS at 13:13

## 2019-10-08 RX ADMIN — PROPOFOL 150 MG: 10 INJECTION, EMULSION INTRAVENOUS at 13:10

## 2019-10-08 ASSESSMENT — PULMONARY FUNCTION TESTS
PIF_VALUE: 15
PIF_VALUE: 20
PIF_VALUE: 19
PIF_VALUE: 7
PIF_VALUE: 2
PIF_VALUE: 22
PIF_VALUE: 19
PIF_VALUE: 2
PIF_VALUE: 19
PIF_VALUE: 19
PIF_VALUE: 20
PIF_VALUE: 22
PIF_VALUE: 19
PIF_VALUE: 4
PIF_VALUE: 22
PIF_VALUE: 15
PIF_VALUE: 19
PIF_VALUE: 19
PIF_VALUE: 2
PIF_VALUE: 4
PIF_VALUE: 19
PIF_VALUE: 4
PIF_VALUE: 19
PIF_VALUE: 7
PIF_VALUE: 19
PIF_VALUE: 18
PIF_VALUE: 26
PIF_VALUE: 19
PIF_VALUE: 22
PIF_VALUE: 4
PIF_VALUE: 22
PIF_VALUE: 23
PIF_VALUE: 4
PIF_VALUE: 19
PIF_VALUE: 19
PIF_VALUE: 2
PIF_VALUE: 8
PIF_VALUE: 22
PIF_VALUE: 3
PIF_VALUE: 23

## 2019-10-08 ASSESSMENT — PAIN SCALES - GENERAL
PAINLEVEL_OUTOF10: 6
PAINLEVEL_OUTOF10: 0

## 2019-10-08 ASSESSMENT — PAIN - FUNCTIONAL ASSESSMENT: PAIN_FUNCTIONAL_ASSESSMENT: 0-10

## 2019-10-16 ENCOUNTER — OFFICE VISIT (OUTPATIENT)
Dept: SURGERY | Age: 64
End: 2019-10-16

## 2019-10-16 VITALS
BODY MASS INDEX: 33.71 KG/M2 | SYSTOLIC BLOOD PRESSURE: 162 MMHG | WEIGHT: 183.2 LBS | DIASTOLIC BLOOD PRESSURE: 95 MMHG | HEART RATE: 81 BPM | HEIGHT: 62 IN

## 2019-10-16 DIAGNOSIS — C50.212 MALIGNANT NEOPLASM OF UPPER-INNER QUADRANT OF LEFT BREAST IN FEMALE, ESTROGEN RECEPTOR POSITIVE (HCC): Primary | ICD-10-CM

## 2019-10-16 DIAGNOSIS — Z17.0 MALIGNANT NEOPLASM OF UPPER-INNER QUADRANT OF LEFT BREAST IN FEMALE, ESTROGEN RECEPTOR POSITIVE (HCC): Primary | ICD-10-CM

## 2019-10-16 PROBLEM — R92.8 ABNORMAL MAMMOGRAM: Status: ACTIVE | Noted: 2019-10-16

## 2019-10-16 PROCEDURE — 99024 POSTOP FOLLOW-UP VISIT: CPT | Performed by: SURGERY

## 2019-10-16 ASSESSMENT — ENCOUNTER SYMPTOMS
WHEEZING: 0
DIARRHEA: 0
VOMITING: 0
VOICE CHANGE: 0
NAUSEA: 0
COUGH: 0
TROUBLE SWALLOWING: 0
ABDOMINAL PAIN: 0
SHORTNESS OF BREATH: 0

## 2019-10-25 ENCOUNTER — TELEPHONE (OUTPATIENT)
Dept: FAMILY MEDICINE CLINIC | Age: 64
End: 2019-10-25

## 2019-11-21 ENCOUNTER — TELEPHONE (OUTPATIENT)
Dept: FAMILY MEDICINE CLINIC | Age: 64
End: 2019-11-21

## 2019-11-25 RX ORDER — HYDROCHLOROTHIAZIDE 25 MG/1
25 TABLET ORAL EVERY MORNING
Qty: 90 TABLET | Refills: 1 | Status: SHIPPED | OUTPATIENT
Start: 2019-11-25 | End: 2020-06-10

## 2019-12-23 RX ORDER — LISINOPRIL 20 MG/1
TABLET ORAL
Qty: 90 TABLET | Refills: 1 | Status: SHIPPED | OUTPATIENT
Start: 2019-12-23 | End: 2020-06-10

## 2019-12-27 DIAGNOSIS — B00.9 HSV-2 (HERPES SIMPLEX VIRUS 2) INFECTION: ICD-10-CM

## 2019-12-27 RX ORDER — VALACYCLOVIR HYDROCHLORIDE 500 MG/1
TABLET, FILM COATED ORAL
Qty: 90 TABLET | Refills: 3 | Status: SHIPPED | OUTPATIENT
Start: 2019-12-27 | End: 2020-03-20

## 2020-02-05 NOTE — PROGRESS NOTES
Patient: Paradise Chan is a 75 y. o.female who presents today with the following Chief Complaint(s):  Chief Complaint   Patient presents with    6 Month Follow-Up     HTN         HPI: Underwent L breast lumpectomy 10/2019 by Dr Filomena Puga for DCIS stage 0. Had xrt and is on antiestrogen (unsure of name) per Dr Vic Jordan, next appt 8/2020, goes q 6 mo. Current Outpatient Medications   Medication Sig Dispense Refill    metoprolol succinate (TOPROL XL) 50 MG extended release tablet TAKE 1 TABLET BY MOUTH DAILY 90 tablet 3    lisinopril-hydrochlorothiazide (PRINZIDE;ZESTORETIC) 20-25 MG per tablet Take 1 tablet by mouth daily 90 tablet 3    lisinopril (PRINIVIL;ZESTRIL) 20 MG tablet TAKE 1 TABLET BY MOUTH DAILY 90 tablet 1    hydrochlorothiazide (HYDRODIURIL) 25 MG tablet Take 1 tablet by mouth every morning 90 tablet 1    Biotin 1 MG CAPS Take by mouth      Multiple Vitamins-Minerals (THERAPEUTIC MULTIVITAMIN-MINERALS) tablet Take 1 tablet by mouth daily      terbinafine (LAMISIL) 1 % cream Apply topically 2 times daily. (Patient taking differently: Apply topically as needed Apply topically 2 times daily.) 24 g 0    valACYclovir (VALTREX) 500 MG tablet TAKE 1 TABLET BY MOUTH EVERY DAY 90 tablet 3     No current facility-administered medications for this visit. Patient's past medical history,surgical history, family history, medications,  and allergies  were all reviewed and updated as appropriate today. Review of Systems   Constitutional: Negative for fatigue and fever. Respiratory: Negative for shortness of breath. Cardiovascular: Negative for chest pain and palpitations. Gastrointestinal: Negative for abdominal pain. Psychiatric/Behavioral: Negative for dysphoric mood. The patient is not nervous/anxious. Physical Exam  Constitutional:       Appearance: She is well-developed. HENT:      Head: Normocephalic and atraumatic.       Right Ear: External ear normal.      Left Ear: External ear normal.   Eyes:      General: No scleral icterus. Conjunctiva/sclera: Conjunctivae normal.   Cardiovascular:      Rate and Rhythm: Normal rate. Pulses: Normal pulses. Heart sounds: No murmur. Pulmonary:      Effort: Pulmonary effort is normal.      Breath sounds: Normal breath sounds. No wheezing. Musculoskeletal: Normal range of motion. Skin:     General: Skin is warm and dry. Neurological:      Mental Status: She is alert and oriented to person, place, and time. Psychiatric:         Mood and Affect: Mood normal.         Behavior: Behavior normal.         Thought Content: Thought content normal.         Judgment: Judgment normal.           /82   Pulse 68   Resp 16   Ht 5' 2\" (1.575 m)   Wt 190 lb (86.2 kg)   LMP  (LMP Unknown)   SpO2 99%   BMI 34.75 kg/m²     Assessment/Plan:    Holly Dudley was seen today for 6 month follow-up. Diagnoses and all orders for this visit:    Benign essential HTN  -     metoprolol succinate (TOPROL XL) 50 MG extended release tablet; TAKE 1 TABLET BY MOUTH DAILY, rf  -     lisinopril-hydrochlorothiazide (PRINZIDE;ZESTORETIC) 20-25 MG per tablet;  Take 1 tablet by mouth daily, rf    Ductal carcinoma in situ (DCIS) of left breast   Doing well, per Dr Matilde Torres

## 2020-02-06 ENCOUNTER — OFFICE VISIT (OUTPATIENT)
Dept: FAMILY MEDICINE CLINIC | Age: 65
End: 2020-02-06
Payer: COMMERCIAL

## 2020-02-06 VITALS
DIASTOLIC BLOOD PRESSURE: 82 MMHG | OXYGEN SATURATION: 99 % | HEIGHT: 62 IN | RESPIRATION RATE: 16 BRPM | BODY MASS INDEX: 34.96 KG/M2 | HEART RATE: 68 BPM | SYSTOLIC BLOOD PRESSURE: 126 MMHG | WEIGHT: 190 LBS

## 2020-02-06 PROCEDURE — 1090F PRES/ABSN URINE INCON ASSESS: CPT | Performed by: FAMILY MEDICINE

## 2020-02-06 PROCEDURE — 1036F TOBACCO NON-USER: CPT | Performed by: FAMILY MEDICINE

## 2020-02-06 PROCEDURE — G8400 PT W/DXA NO RESULTS DOC: HCPCS | Performed by: FAMILY MEDICINE

## 2020-02-06 PROCEDURE — 99213 OFFICE O/P EST LOW 20 MIN: CPT | Performed by: FAMILY MEDICINE

## 2020-02-06 PROCEDURE — 4040F PNEUMOC VAC/ADMIN/RCVD: CPT | Performed by: FAMILY MEDICINE

## 2020-02-06 PROCEDURE — G8482 FLU IMMUNIZE ORDER/ADMIN: HCPCS | Performed by: FAMILY MEDICINE

## 2020-02-06 PROCEDURE — 3017F COLORECTAL CA SCREEN DOC REV: CPT | Performed by: FAMILY MEDICINE

## 2020-02-06 PROCEDURE — G8417 CALC BMI ABV UP PARAM F/U: HCPCS | Performed by: FAMILY MEDICINE

## 2020-02-06 PROCEDURE — 1123F ACP DISCUSS/DSCN MKR DOCD: CPT | Performed by: FAMILY MEDICINE

## 2020-02-06 PROCEDURE — G9899 SCRN MAM PERF RSLTS DOC: HCPCS | Performed by: FAMILY MEDICINE

## 2020-02-06 PROCEDURE — G8427 DOCREV CUR MEDS BY ELIG CLIN: HCPCS | Performed by: FAMILY MEDICINE

## 2020-02-06 RX ORDER — METOPROLOL SUCCINATE 50 MG/1
TABLET, EXTENDED RELEASE ORAL
Qty: 90 TABLET | Refills: 3 | Status: SHIPPED | OUTPATIENT
Start: 2020-02-06 | End: 2020-02-07

## 2020-02-06 RX ORDER — LISINOPRIL AND HYDROCHLOROTHIAZIDE 25; 20 MG/1; MG/1
1 TABLET ORAL DAILY
Qty: 90 TABLET | Refills: 3 | Status: SHIPPED | OUTPATIENT
Start: 2020-02-06 | End: 2021-01-29

## 2020-02-06 RX ORDER — HYDROCHLOROTHIAZIDE 25 MG/1
25 TABLET ORAL EVERY MORNING
Qty: 90 TABLET | Refills: 3 | Status: CANCELLED | OUTPATIENT
Start: 2020-02-06

## 2020-02-06 RX ORDER — LISINOPRIL 20 MG/1
TABLET ORAL
Qty: 90 TABLET | Refills: 1 | Status: CANCELLED | OUTPATIENT
Start: 2020-02-06

## 2020-02-06 ASSESSMENT — ENCOUNTER SYMPTOMS
ABDOMINAL PAIN: 0
SHORTNESS OF BREATH: 0

## 2020-02-07 RX ORDER — METOPROLOL SUCCINATE 50 MG/1
TABLET, EXTENDED RELEASE ORAL
Qty: 90 TABLET | Refills: 3 | Status: SHIPPED | OUTPATIENT
Start: 2020-02-07 | End: 2021-01-26 | Stop reason: SDUPTHER

## 2020-03-02 ENCOUNTER — HOSPITAL ENCOUNTER (OUTPATIENT)
Dept: WOMENS IMAGING | Age: 65
Discharge: HOME | End: 2020-03-02
Payer: COMMERCIAL

## 2020-03-02 PROCEDURE — 77080 DXA BONE DENSITY AXIAL: CPT

## 2020-03-20 RX ORDER — VALACYCLOVIR HYDROCHLORIDE 500 MG/1
TABLET, FILM COATED ORAL
Qty: 90 TABLET | Refills: 3 | Status: SHIPPED | OUTPATIENT
Start: 2020-03-20 | End: 2021-03-15

## 2020-06-10 ENCOUNTER — HOSPITAL ENCOUNTER (OUTPATIENT)
Dept: WOMENS IMAGING | Age: 65
Discharge: HOME | End: 2020-06-10
Payer: COMMERCIAL

## 2020-06-10 ENCOUNTER — OFFICE VISIT (OUTPATIENT)
Dept: SURGERY | Age: 65
End: 2020-06-10
Payer: COMMERCIAL

## 2020-06-10 VITALS
HEART RATE: 77 BPM | HEIGHT: 62 IN | WEIGHT: 190 LBS | TEMPERATURE: 98.2 F | DIASTOLIC BLOOD PRESSURE: 60 MMHG | BODY MASS INDEX: 34.96 KG/M2 | SYSTOLIC BLOOD PRESSURE: 100 MMHG | OXYGEN SATURATION: 98 %

## 2020-06-10 PROCEDURE — 1036F TOBACCO NON-USER: CPT | Performed by: SURGERY

## 2020-06-10 PROCEDURE — 1090F PRES/ABSN URINE INCON ASSESS: CPT | Performed by: SURGERY

## 2020-06-10 PROCEDURE — 99213 OFFICE O/P EST LOW 20 MIN: CPT | Performed by: SURGERY

## 2020-06-10 PROCEDURE — G0279 TOMOSYNTHESIS, MAMMO: HCPCS

## 2020-06-10 PROCEDURE — 3017F COLORECTAL CA SCREEN DOC REV: CPT | Performed by: SURGERY

## 2020-06-10 PROCEDURE — G8427 DOCREV CUR MEDS BY ELIG CLIN: HCPCS | Performed by: SURGERY

## 2020-06-10 PROCEDURE — G8399 PT W/DXA RESULTS DOCUMENT: HCPCS | Performed by: SURGERY

## 2020-06-10 PROCEDURE — 1123F ACP DISCUSS/DSCN MKR DOCD: CPT | Performed by: SURGERY

## 2020-06-10 PROCEDURE — 4040F PNEUMOC VAC/ADMIN/RCVD: CPT | Performed by: SURGERY

## 2020-06-10 PROCEDURE — G8417 CALC BMI ABV UP PARAM F/U: HCPCS | Performed by: SURGERY

## 2020-06-10 PROCEDURE — G9899 SCRN MAM PERF RSLTS DOC: HCPCS | Performed by: SURGERY

## 2020-06-10 RX ORDER — ANASTROZOLE 1 MG/1
TABLET ORAL
COMMUNITY
Start: 2020-06-05

## 2020-06-10 ASSESSMENT — ENCOUNTER SYMPTOMS
BACK PAIN: 0
ABDOMINAL PAIN: 0
COUGH: 0
SHORTNESS OF BREATH: 0

## 2020-08-25 ENCOUNTER — TELEPHONE (OUTPATIENT)
Dept: SURGERY | Age: 65
End: 2020-08-25

## 2020-08-26 ENCOUNTER — TELEPHONE (OUTPATIENT)
Dept: SURGERY | Age: 65
End: 2020-08-26

## 2020-08-26 NOTE — TELEPHONE ENCOUNTER
Called patient to reschedule appointment due to tremelling being out of office, if patient calls back please reschedule.  lvm

## 2020-09-09 ENCOUNTER — HOSPITAL ENCOUNTER (OUTPATIENT)
Dept: WOMENS IMAGING | Age: 65
Discharge: HOME | End: 2020-09-09
Payer: COMMERCIAL

## 2020-09-09 ENCOUNTER — OFFICE VISIT (OUTPATIENT)
Dept: SURGERY | Age: 65
End: 2020-09-09
Payer: MEDICARE

## 2020-09-09 VITALS
WEIGHT: 193 LBS | BODY MASS INDEX: 35.51 KG/M2 | TEMPERATURE: 97.8 F | SYSTOLIC BLOOD PRESSURE: 126 MMHG | HEIGHT: 62 IN | DIASTOLIC BLOOD PRESSURE: 78 MMHG | HEART RATE: 75 BPM

## 2020-09-09 PROCEDURE — G9899 SCRN MAM PERF RSLTS DOC: HCPCS | Performed by: SURGERY

## 2020-09-09 PROCEDURE — 4040F PNEUMOC VAC/ADMIN/RCVD: CPT | Performed by: SURGERY

## 2020-09-09 PROCEDURE — 99213 OFFICE O/P EST LOW 20 MIN: CPT | Performed by: SURGERY

## 2020-09-09 PROCEDURE — G8399 PT W/DXA RESULTS DOCUMENT: HCPCS | Performed by: SURGERY

## 2020-09-09 PROCEDURE — G8427 DOCREV CUR MEDS BY ELIG CLIN: HCPCS | Performed by: SURGERY

## 2020-09-09 PROCEDURE — G0279 TOMOSYNTHESIS, MAMMO: HCPCS

## 2020-09-09 PROCEDURE — G8417 CALC BMI ABV UP PARAM F/U: HCPCS | Performed by: SURGERY

## 2020-09-09 PROCEDURE — 1123F ACP DISCUSS/DSCN MKR DOCD: CPT | Performed by: SURGERY

## 2020-09-09 PROCEDURE — 1090F PRES/ABSN URINE INCON ASSESS: CPT | Performed by: SURGERY

## 2020-09-09 PROCEDURE — 1036F TOBACCO NON-USER: CPT | Performed by: SURGERY

## 2020-09-09 PROCEDURE — 3017F COLORECTAL CA SCREEN DOC REV: CPT | Performed by: SURGERY

## 2020-09-09 ASSESSMENT — ENCOUNTER SYMPTOMS
SHORTNESS OF BREATH: 0
ABDOMINAL PAIN: 0
BACK PAIN: 0
COUGH: 0

## 2020-09-09 NOTE — PROGRESS NOTES
Review of Systems   Constitutional: Negative for fatigue and unexpected weight change. Eyes: Negative for visual disturbance. Respiratory: Negative for cough and shortness of breath. Cardiovascular: Negative for chest pain. Gastrointestinal: Negative for abdominal pain. Musculoskeletal: Negative for arthralgias, back pain and gait problem. Neurological: Negative for headaches. Hematological: Negative for adenopathy.

## 2020-09-10 NOTE — PROGRESS NOTES
CHIEF COMPLAINT:  Follow-up for left breast cancer    PCP: Dora Stoddard MD  Radiation Oncology: Josh Ferguson MD  Medical Oncology: Sandy Gil MD    Breast Cancer Treatment Summary:  Pathology: 0.6 cm left grade 2, ER+ P/R+ DCIS involving an intraductal papilloma  Surgery: left partial mastectomy on 10/8/2019  Radiation: 5005 cGy to the left breast in 20 fractions  Systemic Therapy: Anastrozole    Genetic testin-gene panel testing through [employer] which was negative for a mutation    HISTORY OF PRESENT ILLNESS:  Yadira Girard is a 75 y.o. woman who is now 1 year status-post left partial mastectomy for a left DCIS involving a papilloma. Following her surgical therapy, she was treated with adjuvant radiation therapy and adjuvant endocrine therapy with Anastrozole which she initiated in 2020. She is tolerating endocrine therapy well. She returns today for her routine follow up visit and states that she is doing well. She denies any masses in either breast.  She denies any change in the appearance of her breasts or the skin of her breasts outside of that attributed to her previous therapies. She denies bilateral nipple discharge. She denies any masses in either axilla. She denies weight loss, bone pain, headaches and has no other systemic complaints. Please note that her past medical history, surgical history, medications, allergies, social history, and family history were all reviewed with her again today. REVIEW OF SYSTEMS:   Constitutional: Negative for fatigue and unexpected weight change. Eyes: Negative for visual disturbance. Respiratory: Negative for cough and shortness of breath. Cardiovascular: Negative for chest pain. Gastrointestinal: Negative for abdominal pain. Musculoskeletal: Negative for arthralgias, back pain and gait problem. Neurological: Negative for headaches. Hematological: Negative for adenopathy.      I personally reviewed and agree with the above ROS as documented by my medical assistant. PHYSICAL EXAMINATION:   Vitals: /78 (Site: Left Upper Arm, Position: Sitting, Cuff Size: Medium Adult)   Pulse 75   Temp 97.8 °F (36.6 °C) (Tympanic)   Ht 5' 2\" (1.575 m)   Wt 193 lb (87.5 kg)   LMP  (LMP Unknown)   BMI 35.30 kg/m²   General: Well-developed, well-nourished, in no apparent distress. Psychiatric: Alert and oriented x 3. Appropriate affect and behavior for today's visit. Musculoskeletal: Normal gait and range of motion in all 4 extremities. Lymphatic System:  No concerning cervical, supraclavicular or axillary lymphadenopathy. Breast Exam: Bilateral breast examination reveals ptotic breasts bilaterally. Left Breast: Examination of the left breast in the upright and supine position reveals a well-healed incision with minimal volume loss and excellent symmetry. There are no masses, nodules, skin changes, or other evidence of recurrence. There is no nipple discharge or axillary adenopathy. Right Breast: Examination of the right breast in the upright and supine positions reveal no obvious masses, skin changes, dimpling, or retraction. The nipple and areola are without erosion, edema or ulceration. There is no obvious nipple discharge. There is no concerning axillary adenopathy. IMAGING: I personally reviewed the breast imaging performed from today which I ordered and discussed this with the radiologist and the patient. She was given a BI-RADS 2. Breast density is described as heterogeneously dense tissue.     IMPRESSION/RECOMMENDATION:    Cancer Staging  Malignant neoplasm of upper-inner quadrant of left breast in female, estrogen receptor positive ([facility] [address]  Staging form: Breast, AJCC 8th Edition  - Pathologic: Stage 0 (pTis (DCIS), pN0, cM0, G2, ER+, NJ+) - Signed by Leti Guzman MD on 6/10/2020    Deshaun Underwood is a 75 y.o. woman who is now 6 months status-post left partial mastectomy for a left DCIS involving a

## 2021-01-26 ENCOUNTER — TELEPHONE (OUTPATIENT)
Dept: FAMILY MEDICINE CLINIC | Age: 66
End: 2021-01-26

## 2021-01-26 DIAGNOSIS — I10 BENIGN ESSENTIAL HTN: ICD-10-CM

## 2021-01-26 RX ORDER — METOPROLOL SUCCINATE 50 MG/1
TABLET, EXTENDED RELEASE ORAL
Qty: 90 TABLET | Refills: 3 | Status: SHIPPED | OUTPATIENT
Start: 2021-01-26 | End: 2021-11-01

## 2021-01-29 ENCOUNTER — TELEPHONE (OUTPATIENT)
Dept: FAMILY MEDICINE CLINIC | Age: 66
End: 2021-01-29

## 2021-03-08 ENCOUNTER — TELEPHONE (OUTPATIENT)
Dept: FAMILY MEDICINE CLINIC | Age: 66
End: 2021-03-08

## 2021-03-15 DIAGNOSIS — B00.9 HSV-2 (HERPES SIMPLEX VIRUS 2) INFECTION: ICD-10-CM

## 2021-03-15 RX ORDER — VALACYCLOVIR HYDROCHLORIDE 500 MG/1
TABLET, FILM COATED ORAL
Qty: 90 TABLET | Refills: 3 | Status: SHIPPED | OUTPATIENT
Start: 2021-03-15 | End: 2022-03-28

## 2021-04-06 ENCOUNTER — HOSPITAL ENCOUNTER (INPATIENT)
Age: 66
LOS: 4 days | Discharge: HOME HEALTH | DRG: 866 | End: 2021-04-10
Attending: EMERGENCY MEDICINE | Admitting: INTERNAL MEDICINE
Payer: COMMERCIAL

## 2021-04-06 DIAGNOSIS — N39.0 URINARY TRACT INFECTION WITH HEMATURIA, SITE UNSPECIFIED: ICD-10-CM

## 2021-04-06 DIAGNOSIS — R31.9 URINARY TRACT INFECTION WITH HEMATURIA, SITE UNSPECIFIED: ICD-10-CM
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
## 2021-04-06 DIAGNOSIS — E83.42 HYPOMAGNESEMIA: ICD-10-CM

## 2021-04-06 DIAGNOSIS — E87.6 HYPOKALEMIA: ICD-10-CM

## 2021-04-06 PROBLEM — D72.825 BANDEMIA: Status: ACTIVE | Noted: 2021-04-06

## 2021-04-06 LAB
A/G RATIO: 0.9 (ref 1.1–2.2)
ALBUMIN SERPL-MCNC: 3.5 G/DL (ref 3.4–5)
ALP BLD-CCNC: 95 U/L (ref 40–129)
ALT SERPL-CCNC: 13 U/L (ref 10–40)
ANION GAP SERPL CALCULATED.3IONS-SCNC: 14 MMOL/L (ref 3–16)
ANISOCYTOSIS: ABNORMAL
AST SERPL-CCNC: 28 U/L (ref 15–37)
BACTERIA: ABNORMAL /HPF
BANDED NEUTROPHILS RELATIVE PERCENT: 29 % (ref 0–7)
BASOPHILS ABSOLUTE: 0 K/UL (ref 0–0.2)
BASOPHILS RELATIVE PERCENT: 0 %
BILIRUB SERPL-MCNC: 1.4 MG/DL (ref 0–1)
BILIRUBIN URINE: NEGATIVE
BLOOD, URINE: ABNORMAL
BUN BLDV-MCNC: 11 MG/DL (ref 7–20)
CALCIUM SERPL-MCNC: 9.7 MG/DL (ref 8.3–10.6)
CHLORIDE BLD-SCNC: 81 MMOL/L (ref 99–110)
CLARITY: CLEAR
CO2: 29 MMOL/L (ref 21–32)
COLOR: YELLOW
CREAT SERPL-MCNC: 0.6 MG/DL (ref 0.6–1.2)
EKG ATRIAL RATE: 89 BPM
EKG DIAGNOSIS: NORMAL
EKG P AXIS: 11 DEGREES
EKG P-R INTERVAL: 184 MS
EKG Q-T INTERVAL: 368 MS
EKG QRS DURATION: 86 MS
EKG QTC CALCULATION (BAZETT): 447 MS
EKG R AXIS: 22 DEGREES
EKG T AXIS: -14 DEGREES
EKG VENTRICULAR RATE: 89 BPM
EOSINOPHILS ABSOLUTE: 0 K/UL (ref 0–0.6)
EOSINOPHILS RELATIVE PERCENT: 0 %
EPITHELIAL CELLS, UA: ABNORMAL /HPF (ref 0–5)
GFR AFRICAN AMERICAN: >60
GFR NON-AFRICAN AMERICAN: >60
GLOBULIN: 4.1 G/DL
GLUCOSE BLD-MCNC: 130 MG/DL (ref 70–99)
GLUCOSE URINE: NEGATIVE MG/DL
HCT VFR BLD CALC: 30.4 % (ref 36–48)
HEMATOLOGY PATH CONSULT: YES
HEMOGLOBIN: 10.8 G/DL (ref 12–16)
KETONES, URINE: NEGATIVE MG/DL
LEUKOCYTE ESTERASE, URINE: ABNORMAL
LYMPHOCYTES ABSOLUTE: 0.5 K/UL (ref 1–5.1)
LYMPHOCYTES RELATIVE PERCENT: 4 %
MACROCYTES: ABNORMAL
MAGNESIUM: 1 MG/DL (ref 1.8–2.4)
MAGNESIUM: 1.3 MG/DL (ref 1.8–2.4)
MAGNESIUM: 1.5 MG/DL (ref 1.8–2.4)
MCH RBC QN AUTO: 39.6 PG (ref 26–34)
MCHC RBC AUTO-ENTMCNC: 35.6 G/DL (ref 31–36)
MCV RBC AUTO: 111.3 FL (ref 80–100)
MICROCYTES: ABNORMAL
MICROSCOPIC EXAMINATION: YES
MONOCYTES ABSOLUTE: 2.1 K/UL (ref 0–1.3)
MONOCYTES RELATIVE PERCENT: 17 %
NEUTROPHILS ABSOLUTE: 9.9 K/UL (ref 1.7–7.7)
NEUTROPHILS RELATIVE PERCENT: 50 %
NITRITE, URINE: NEGATIVE
PDW BLD-RTO: 13.6 % (ref 12.4–15.4)
PH UA: 7 (ref 5–8)
PLATELET # BLD: 223 K/UL (ref 135–450)
PLATELET SLIDE REVIEW: ADEQUATE
PMV BLD AUTO: 8.1 FL (ref 5–10.5)
POIKILOCYTES: ABNORMAL
POLYCHROMASIA: ABNORMAL
POTASSIUM REFLEX MAGNESIUM: 2.6 MMOL/L (ref 3.5–5.1)
POTASSIUM REFLEX MAGNESIUM: 2.7 MMOL/L (ref 3.5–5.1)
POTASSIUM REFLEX MAGNESIUM: 3.1 MMOL/L (ref 3.5–5.1)
PROCALCITONIN: 0.13 NG/ML (ref 0–0.15)
PROTEIN UA: NEGATIVE MG/DL
RBC # BLD: 2.73 M/UL (ref 4–5.2)
RBC UA: ABNORMAL /HPF (ref 0–4)
RENAL EPITHELIAL, UA: ABNORMAL /HPF (ref 0–1)
SLIDE REVIEW: ABNORMAL
SODIUM BLD-SCNC: 124 MMOL/L (ref 136–145)
SPECIFIC GRAVITY UA: 1.01 (ref 1–1.03)
STOMATOCYTES: ABNORMAL
TOTAL PROTEIN: 7.6 G/DL (ref 6.4–8.2)
URINE TYPE: ABNORMAL
UROBILINOGEN, URINE: 0.2 E.U./DL
WBC # BLD: 12.5 K/UL (ref 4–11)
WBC UA: ABNORMAL /HPF (ref 0–5)

## 2021-04-06 PROCEDURE — 84132 ASSAY OF SERUM POTASSIUM: CPT

## 2021-04-06 PROCEDURE — 6370000000 HC RX 637 (ALT 250 FOR IP): Performed by: EMERGENCY MEDICINE

## 2021-04-06 PROCEDURE — 6360000002 HC RX W HCPCS: Performed by: INTERNAL MEDICINE

## 2021-04-06 PROCEDURE — 6360000002 HC RX W HCPCS: Performed by: EMERGENCY MEDICINE

## 2021-04-06 PROCEDURE — 2580000003 HC RX 258: Performed by: EMERGENCY MEDICINE

## 2021-04-06 PROCEDURE — 87086 URINE CULTURE/COLONY COUNT: CPT

## 2021-04-06 PROCEDURE — 81001 URINALYSIS AUTO W/SCOPE: CPT

## 2021-04-06 PROCEDURE — 84145 PROCALCITONIN (PCT): CPT

## 2021-04-06 PROCEDURE — 93005 ELECTROCARDIOGRAM TRACING: CPT | Performed by: EMERGENCY MEDICINE

## 2021-04-06 PROCEDURE — 36415 COLL VENOUS BLD VENIPUNCTURE: CPT

## 2021-04-06 PROCEDURE — 83735 ASSAY OF MAGNESIUM: CPT

## 2021-04-06 PROCEDURE — 93010 ELECTROCARDIOGRAM REPORT: CPT | Performed by: INTERNAL MEDICINE

## 2021-04-06 PROCEDURE — 2580000003 HC RX 258: Performed by: INTERNAL MEDICINE

## 2021-04-06 PROCEDURE — 1200000000 HC SEMI PRIVATE

## 2021-04-06 PROCEDURE — 80053 COMPREHEN METABOLIC PANEL: CPT

## 2021-04-06 PROCEDURE — 83935 ASSAY OF URINE OSMOLALITY: CPT

## 2021-04-06 PROCEDURE — 85025 COMPLETE CBC W/AUTO DIFF WBC: CPT

## 2021-04-06 PROCEDURE — 99285 EMERGENCY DEPT VISIT HI MDM: CPT

## 2021-04-06 PROCEDURE — 84300 ASSAY OF URINE SODIUM: CPT

## 2021-04-06 PROCEDURE — 96365 THER/PROPH/DIAG IV INF INIT: CPT

## 2021-04-06 RX ORDER — LORAZEPAM 1 MG/1
2 TABLET ORAL
Status: DISCONTINUED | OUTPATIENT
Start: 2021-04-06 | End: 2021-04-10 | Stop reason: HOSPADM

## 2021-04-06 RX ORDER — LISINOPRIL 20 MG/1
20 TABLET ORAL DAILY
Status: DISCONTINUED | OUTPATIENT
Start: 2021-04-07 | End: 2021-04-10 | Stop reason: HOSPADM

## 2021-04-06 RX ORDER — POTASSIUM CHLORIDE 7.45 MG/ML
10 INJECTION INTRAVENOUS
Status: DISPENSED | OUTPATIENT
Start: 2021-04-06 | End: 2021-04-06

## 2021-04-06 RX ORDER — ONDANSETRON 2 MG/ML
4 INJECTION INTRAMUSCULAR; INTRAVENOUS EVERY 6 HOURS PRN
Status: DISCONTINUED | OUTPATIENT
Start: 2021-04-06 | End: 2021-04-10 | Stop reason: HOSPADM

## 2021-04-06 RX ORDER — MAGNESIUM SULFATE IN WATER 40 MG/ML
2000 INJECTION, SOLUTION INTRAVENOUS ONCE
Status: COMPLETED | OUTPATIENT
Start: 2021-04-06 | End: 2021-04-06

## 2021-04-06 RX ORDER — SODIUM CHLORIDE 9 MG/ML
25 INJECTION, SOLUTION INTRAVENOUS PRN
Status: DISCONTINUED | OUTPATIENT
Start: 2021-04-06 | End: 2021-04-10 | Stop reason: HOSPADM

## 2021-04-06 RX ORDER — POTASSIUM CHLORIDE 7.45 MG/ML
10 INJECTION INTRAVENOUS PRN
Status: DISCONTINUED | OUTPATIENT
Start: 2021-04-06 | End: 2021-04-07

## 2021-04-06 RX ORDER — LORAZEPAM 2 MG/ML
1 INJECTION INTRAMUSCULAR
Status: DISCONTINUED | OUTPATIENT
Start: 2021-04-06 | End: 2021-04-10 | Stop reason: HOSPADM

## 2021-04-06 RX ORDER — ANASTROZOLE 1 MG/1
1 TABLET ORAL DAILY
Status: DISCONTINUED | OUTPATIENT
Start: 2021-04-07 | End: 2021-04-10 | Stop reason: HOSPADM

## 2021-04-06 RX ORDER — ACETAMINOPHEN 325 MG/1
650 TABLET ORAL EVERY 6 HOURS PRN
Status: DISCONTINUED | OUTPATIENT
Start: 2021-04-06 | End: 2021-04-10 | Stop reason: HOSPADM

## 2021-04-06 RX ORDER — POLYETHYLENE GLYCOL 3350 17 G/17G
17 POWDER, FOR SOLUTION ORAL DAILY PRN
Status: DISCONTINUED | OUTPATIENT
Start: 2021-04-06 | End: 2021-04-10 | Stop reason: HOSPADM

## 2021-04-06 RX ORDER — SODIUM CHLORIDE 0.9 % (FLUSH) 0.9 %
10 SYRINGE (ML) INJECTION PRN
Status: DISCONTINUED | OUTPATIENT
Start: 2021-04-06 | End: 2021-04-10 | Stop reason: HOSPADM

## 2021-04-06 RX ORDER — M-VIT,TX,IRON,MINS/CALC/FOLIC 27MG-0.4MG
1 TABLET ORAL DAILY
Status: DISCONTINUED | OUTPATIENT
Start: 2021-04-07 | End: 2021-04-10 | Stop reason: HOSPADM

## 2021-04-06 RX ORDER — 0.9 % SODIUM CHLORIDE 0.9 %
1000 INTRAVENOUS SOLUTION INTRAVENOUS ONCE
Status: COMPLETED | OUTPATIENT
Start: 2021-04-06 | End: 2021-04-06

## 2021-04-06 RX ORDER — LORAZEPAM 1 MG/1
1 TABLET ORAL
Status: DISCONTINUED | OUTPATIENT
Start: 2021-04-06 | End: 2021-04-10 | Stop reason: HOSPADM

## 2021-04-06 RX ORDER — METOPROLOL SUCCINATE 50 MG/1
50 TABLET, EXTENDED RELEASE ORAL DAILY
Status: DISCONTINUED | OUTPATIENT
Start: 2021-04-07 | End: 2021-04-10 | Stop reason: HOSPADM

## 2021-04-06 RX ORDER — LORAZEPAM 2 MG/ML
3 INJECTION INTRAMUSCULAR
Status: DISCONTINUED | OUTPATIENT
Start: 2021-04-06 | End: 2021-04-10 | Stop reason: HOSPADM

## 2021-04-06 RX ORDER — SODIUM CHLORIDE 0.9 % (FLUSH) 0.9 %
5-40 SYRINGE (ML) INJECTION PRN
Status: DISCONTINUED | OUTPATIENT
Start: 2021-04-06 | End: 2021-04-10 | Stop reason: HOSPADM

## 2021-04-06 RX ORDER — POTASSIUM CHLORIDE 20 MEQ/1
40 TABLET, EXTENDED RELEASE ORAL ONCE
Status: COMPLETED | OUTPATIENT
Start: 2021-04-06 | End: 2021-04-06

## 2021-04-06 RX ORDER — SODIUM CHLORIDE 0.9 % (FLUSH) 0.9 %
5-40 SYRINGE (ML) INJECTION EVERY 12 HOURS SCHEDULED
Status: DISCONTINUED | OUTPATIENT
Start: 2021-04-06 | End: 2021-04-10 | Stop reason: HOSPADM

## 2021-04-06 RX ORDER — SODIUM CHLORIDE 9 MG/ML
INJECTION, SOLUTION INTRAVENOUS CONTINUOUS
Status: DISCONTINUED | OUTPATIENT
Start: 2021-04-06 | End: 2021-04-09

## 2021-04-06 RX ORDER — PROMETHAZINE HYDROCHLORIDE 25 MG/1
12.5 TABLET ORAL EVERY 6 HOURS PRN
Status: DISCONTINUED | OUTPATIENT
Start: 2021-04-06 | End: 2021-04-10 | Stop reason: HOSPADM

## 2021-04-06 RX ORDER — SODIUM CHLORIDE 0.9 % (FLUSH) 0.9 %
10 SYRINGE (ML) INJECTION EVERY 12 HOURS SCHEDULED
Status: DISCONTINUED | OUTPATIENT
Start: 2021-04-06 | End: 2021-04-10 | Stop reason: HOSPADM

## 2021-04-06 RX ORDER — LORAZEPAM 1 MG/1
4 TABLET ORAL
Status: DISCONTINUED | OUTPATIENT
Start: 2021-04-06 | End: 2021-04-10 | Stop reason: HOSPADM

## 2021-04-06 RX ORDER — MAGNESIUM SULFATE 1 G/100ML
1000 INJECTION INTRAVENOUS PRN
Status: DISCONTINUED | OUTPATIENT
Start: 2021-04-06 | End: 2021-04-07

## 2021-04-06 RX ORDER — ACETAMINOPHEN 650 MG/1
650 SUPPOSITORY RECTAL EVERY 6 HOURS PRN
Status: DISCONTINUED | OUTPATIENT
Start: 2021-04-06 | End: 2021-04-10 | Stop reason: HOSPADM

## 2021-04-06 RX ORDER — LORAZEPAM 2 MG/ML
2 INJECTION INTRAMUSCULAR
Status: DISCONTINUED | OUTPATIENT
Start: 2021-04-06 | End: 2021-04-10 | Stop reason: HOSPADM

## 2021-04-06 RX ORDER — LORAZEPAM 1 MG/1
3 TABLET ORAL
Status: DISCONTINUED | OUTPATIENT
Start: 2021-04-06 | End: 2021-04-10 | Stop reason: HOSPADM

## 2021-04-06 RX ORDER — LORAZEPAM 2 MG/ML
4 INJECTION INTRAMUSCULAR
Status: DISCONTINUED | OUTPATIENT
Start: 2021-04-06 | End: 2021-04-10 | Stop reason: HOSPADM

## 2021-04-06 RX ADMIN — SODIUM CHLORIDE 1000 ML: 9 INJECTION, SOLUTION INTRAVENOUS at 16:07

## 2021-04-06 RX ADMIN — POTASSIUM CHLORIDE 10 MEQ: 7.46 INJECTION, SOLUTION INTRAVENOUS at 22:23

## 2021-04-06 RX ADMIN — POTASSIUM CHLORIDE 40 MEQ: 20 TABLET, EXTENDED RELEASE ORAL at 16:03

## 2021-04-06 RX ADMIN — SODIUM CHLORIDE 25 ML: 9 INJECTION, SOLUTION INTRAVENOUS at 19:37

## 2021-04-06 RX ADMIN — CEFTRIAXONE SODIUM 1000 MG: 1 INJECTION, POWDER, FOR SOLUTION INTRAMUSCULAR; INTRAVENOUS at 19:31

## 2021-04-06 RX ADMIN — MAGNESIUM SULFATE HEPTAHYDRATE 2000 MG: 40 INJECTION, SOLUTION INTRAVENOUS at 21:36

## 2021-04-06 RX ADMIN — POTASSIUM CHLORIDE 10 MEQ: 7.46 INJECTION, SOLUTION INTRAVENOUS at 23:29

## 2021-04-06 RX ADMIN — POTASSIUM CHLORIDE 10 MEQ: 7.46 INJECTION, SOLUTION INTRAVENOUS at 19:38

## 2021-04-06 RX ADMIN — MAGNESIUM SULFATE HEPTAHYDRATE 2000 MG: 40 INJECTION, SOLUTION INTRAVENOUS at 16:00

## 2021-04-06 RX ADMIN — SODIUM CHLORIDE: 9 INJECTION, SOLUTION INTRAVENOUS at 19:31

## 2021-04-06 RX ADMIN — POTASSIUM CHLORIDE 10 MEQ: 7.46 INJECTION, SOLUTION INTRAVENOUS at 20:59

## 2021-04-06 ASSESSMENT — PAIN SCALES - GENERAL
PAINLEVEL_OUTOF10: 4
PAINLEVEL_OUTOF10: 4
PAINLEVEL_OUTOF10: 0

## 2021-04-06 NOTE — PROGRESS NOTES
During admission assessment patient said she used to drink 10 beers a day. Patient said she has recently cut back to 5 beers a day. Patient said she has not had alcohol withdrawal before. Notified cross cover hospitalist regarding CIWA scale orders. Nursing will continue to monitor the patient.

## 2021-04-06 NOTE — ED NOTES
[facility] - Dr Michael Larkin put in admission order on pt at this time  Re:fall, orthostasis, hypomag, hypokalemia     Barbie Wilcox  04/06/21 0980

## 2021-04-06 NOTE — ED NOTES
Critical call from lab pt Potassium 2.6. Dr Dewey Guzman made aware.      Arturo Morales LPN  26/05/52 5520

## 2021-04-07 LAB
ALBUMIN SERPL-MCNC: 3 G/DL (ref 3.4–5)
ANION GAP SERPL CALCULATED.3IONS-SCNC: 9 MMOL/L (ref 3–16)
BASOPHILS ABSOLUTE: 0 K/UL (ref 0–0.2)
BASOPHILS RELATIVE PERCENT: 0 %
BUN BLDV-MCNC: 8 MG/DL (ref 7–20)
CALCIUM SERPL-MCNC: 8.4 MG/DL (ref 8.3–10.6)
CHLORIDE BLD-SCNC: 88 MMOL/L (ref 99–110)
CO2: 26 MMOL/L (ref 21–32)
CREAT SERPL-MCNC: 0.6 MG/DL (ref 0.6–1.2)
EOSINOPHILS ABSOLUTE: 0 K/UL (ref 0–0.6)
EOSINOPHILS RELATIVE PERCENT: 0 %
GFR AFRICAN AMERICAN: >60
GFR NON-AFRICAN AMERICAN: >60
GLUCOSE BLD-MCNC: 141 MG/DL (ref 70–99)
HCT VFR BLD CALC: 27.1 % (ref 36–48)
HEMATOLOGY PATH CONSULT: NO
HEMATOLOGY PATH CONSULT: NORMAL
HEMOGLOBIN: 9.8 G/DL (ref 12–16)
LYMPHOCYTES ABSOLUTE: 0.2 K/UL (ref 1–5.1)
LYMPHOCYTES RELATIVE PERCENT: 2 %
MACROCYTES: ABNORMAL
MAGNESIUM: 2.1 MG/DL (ref 1.8–2.4)
MCH RBC QN AUTO: 40.8 PG (ref 26–34)
MCHC RBC AUTO-ENTMCNC: 36 G/DL (ref 31–36)
MCV RBC AUTO: 113.3 FL (ref 80–100)
MONOCYTES ABSOLUTE: 0.6 K/UL (ref 0–1.3)
MONOCYTES RELATIVE PERCENT: 5 %
NEUTROPHILS ABSOLUTE: 11.1 K/UL (ref 1.7–7.7)
NEUTROPHILS RELATIVE PERCENT: 93 %
PDW BLD-RTO: 13.6 % (ref 12.4–15.4)
PLATELET # BLD: 198 K/UL (ref 135–450)
PLATELET SLIDE REVIEW: ADEQUATE
PMV BLD AUTO: 7.9 FL (ref 5–10.5)
POTASSIUM REFLEX MAGNESIUM: 2.9 MMOL/L (ref 3.5–5.1)
RBC # BLD: 2.39 M/UL (ref 4–5.2)
SLIDE REVIEW: ABNORMAL
SODIUM BLD-SCNC: 123 MMOL/L (ref 136–145)
SODIUM URINE: 31 MMOL/L
TSH REFLEX: 2.21 UIU/ML (ref 0.27–4.2)
URIC ACID, SERUM: 4.8 MG/DL (ref 2.6–6)
WBC # BLD: 11.9 K/UL (ref 4–11)

## 2021-04-07 PROCEDURE — 97116 GAIT TRAINING THERAPY: CPT

## 2021-04-07 PROCEDURE — 80048 BASIC METABOLIC PNL TOTAL CA: CPT

## 2021-04-07 PROCEDURE — 6370000000 HC RX 637 (ALT 250 FOR IP): Performed by: INTERNAL MEDICINE

## 2021-04-07 PROCEDURE — 2580000003 HC RX 258: Performed by: INTERNAL MEDICINE

## 2021-04-07 PROCEDURE — 85025 COMPLETE CBC W/AUTO DIFF WBC: CPT

## 2021-04-07 PROCEDURE — 1200000000 HC SEMI PRIVATE

## 2021-04-07 PROCEDURE — 36415 COLL VENOUS BLD VENIPUNCTURE: CPT

## 2021-04-07 PROCEDURE — 97162 PT EVAL MOD COMPLEX 30 MIN: CPT

## 2021-04-07 PROCEDURE — 82040 ASSAY OF SERUM ALBUMIN: CPT

## 2021-04-07 PROCEDURE — 6370000000 HC RX 637 (ALT 250 FOR IP): Performed by: REGISTERED NURSE

## 2021-04-07 PROCEDURE — 97166 OT EVAL MOD COMPLEX 45 MIN: CPT

## 2021-04-07 PROCEDURE — 84550 ASSAY OF BLOOD/URIC ACID: CPT

## 2021-04-07 PROCEDURE — 97530 THERAPEUTIC ACTIVITIES: CPT

## 2021-04-07 PROCEDURE — 6370000000 HC RX 637 (ALT 250 FOR IP): Performed by: NURSE PRACTITIONER

## 2021-04-07 PROCEDURE — 83735 ASSAY OF MAGNESIUM: CPT

## 2021-04-07 PROCEDURE — 6360000002 HC RX W HCPCS: Performed by: INTERNAL MEDICINE

## 2021-04-07 PROCEDURE — 84443 ASSAY THYROID STIM HORMONE: CPT

## 2021-04-07 RX ORDER — POTASSIUM CHLORIDE 20 MEQ/1
20 TABLET, EXTENDED RELEASE ORAL ONCE
Status: COMPLETED | OUTPATIENT
Start: 2021-04-07 | End: 2021-04-07

## 2021-04-07 RX ORDER — HYDRALAZINE HYDROCHLORIDE 20 MG/ML
5 INJECTION INTRAMUSCULAR; INTRAVENOUS EVERY 4 HOURS PRN
Status: DISCONTINUED | OUTPATIENT
Start: 2021-04-07 | End: 2021-04-10 | Stop reason: HOSPADM

## 2021-04-07 RX ORDER — POTASSIUM CHLORIDE 20 MEQ/1
40 TABLET, EXTENDED RELEASE ORAL ONCE
Status: COMPLETED | OUTPATIENT
Start: 2021-04-07 | End: 2021-04-07

## 2021-04-07 RX ORDER — HYDROCODONE BITARTRATE AND ACETAMINOPHEN 7.5; 325 MG/1; MG/1
1 TABLET ORAL EVERY 6 HOURS PRN
Status: COMPLETED | OUTPATIENT
Start: 2021-04-07 | End: 2021-04-09

## 2021-04-07 RX ORDER — POTASSIUM CHLORIDE 20 MEQ/1
20 TABLET, EXTENDED RELEASE ORAL ONCE
Status: DISCONTINUED | OUTPATIENT
Start: 2021-04-07 | End: 2021-04-07

## 2021-04-07 RX ORDER — POTASSIUM CHLORIDE 20 MEQ/1
40 TABLET, EXTENDED RELEASE ORAL EVERY 4 HOURS
Status: DISCONTINUED | OUTPATIENT
Start: 2021-04-07 | End: 2021-04-07

## 2021-04-07 RX ADMIN — ACETAMINOPHEN 650 MG: 325 TABLET ORAL at 19:35

## 2021-04-07 RX ADMIN — POTASSIUM CHLORIDE 20 MEQ: 1500 TABLET, EXTENDED RELEASE ORAL at 13:59

## 2021-04-07 RX ADMIN — SODIUM CHLORIDE: 9 INJECTION, SOLUTION INTRAVENOUS at 14:53

## 2021-04-07 RX ADMIN — SODIUM CHLORIDE, PRESERVATIVE FREE 10 ML: 5 INJECTION INTRAVENOUS at 21:48

## 2021-04-07 RX ADMIN — METOPROLOL SUCCINATE 50 MG: 50 TABLET, EXTENDED RELEASE ORAL at 08:39

## 2021-04-07 RX ADMIN — ANASTROZOLE 1 MG: 1 TABLET ORAL at 08:39

## 2021-04-07 RX ADMIN — Medication 1 TABLET: at 08:39

## 2021-04-07 RX ADMIN — MAGNESIUM SULFATE HEPTAHYDRATE 1000 MG: 1 INJECTION, SOLUTION INTRAVENOUS at 00:58

## 2021-04-07 RX ADMIN — POTASSIUM CHLORIDE 40 MEQ: 1500 TABLET, EXTENDED RELEASE ORAL at 09:49

## 2021-04-07 RX ADMIN — ENOXAPARIN SODIUM 40 MG: 40 INJECTION SUBCUTANEOUS at 08:40

## 2021-04-07 RX ADMIN — LISINOPRIL 20 MG: 20 TABLET ORAL at 08:39

## 2021-04-07 RX ADMIN — CEFTRIAXONE SODIUM 1000 MG: 1 INJECTION, POWDER, FOR SOLUTION INTRAMUSCULAR; INTRAVENOUS at 17:46

## 2021-04-07 RX ADMIN — HYDROCODONE BITARTRATE AND ACETAMINOPHEN 1 TABLET: 7.5; 325 TABLET ORAL at 23:36

## 2021-04-07 RX ADMIN — SODIUM CHLORIDE, PRESERVATIVE FREE 10 ML: 5 INJECTION INTRAVENOUS at 08:42

## 2021-04-07 RX ADMIN — POTASSIUM CHLORIDE 10 MEQ: 7.46 INJECTION, SOLUTION INTRAVENOUS at 00:57

## 2021-04-07 RX ADMIN — MAGNESIUM SULFATE HEPTAHYDRATE 1000 MG: 1 INJECTION, SOLUTION INTRAVENOUS at 02:12

## 2021-04-07 RX ADMIN — POTASSIUM CHLORIDE 10 MEQ: 7.46 INJECTION, SOLUTION INTRAVENOUS at 08:29

## 2021-04-07 ASSESSMENT — PAIN SCALES - GENERAL
PAINLEVEL_OUTOF10: 6
PAINLEVEL_OUTOF10: 0
PAINLEVEL_OUTOF10: 6
PAINLEVEL_OUTOF10: 4
PAINLEVEL_OUTOF10: 0

## 2021-04-07 ASSESSMENT — PAIN DESCRIPTION - DESCRIPTORS
DESCRIPTORS: ACHING;SORE
DESCRIPTORS: ACHING
DESCRIPTORS: ACHING;SHARP
DESCRIPTORS: ACHING;SORE

## 2021-04-07 ASSESSMENT — PAIN DESCRIPTION - LOCATION
LOCATION: ABDOMEN
LOCATION: ABDOMEN;GENERALIZED

## 2021-04-07 ASSESSMENT — PAIN DESCRIPTION - FREQUENCY
FREQUENCY: INTERMITTENT
FREQUENCY: CONTINUOUS
FREQUENCY: INTERMITTENT

## 2021-04-07 ASSESSMENT — PAIN DESCRIPTION - PAIN TYPE
TYPE: ACUTE PAIN

## 2021-04-07 ASSESSMENT — PAIN DESCRIPTION - ORIENTATION: ORIENTATION: RIGHT

## 2021-04-07 NOTE — PROGRESS NOTES
Occupational Therapy   Occupational Therapy Initial Assessment/Treatment  Date: 2021   Patient Name: Vic Jackson  MRN: [id number]     : 1955    Date of Service: 2021    Discharge Recommendations:  Home with Home health OT       Assessment   Performance deficits / Impairments: Decreased endurance;Decreased ADL status; Decreased balance  Patient admitted from home s/p fall, dx of dehydration. Of note patient did receive COVID vaccine the day she started feeling weak. Today patient CGA to SBA with SW for mobility, maxA for LE dressing d/t abd. Pain. After evaluation, pt found to be presenting with the above mentioned occupational performance deficits which are affecting participation in daily living skills. Pt would benefit from continued skilled occupational therapy to address ADLs, functional mobility, and safety while in acute care. Prognosis: Good  Decision Making: Medium Complexity  OT Education: OT Role;Plan of Care;ADL Adaptive Strategies;Transfer Training  Patient Education: Disease specific: Pt educated on benefits of OOB activity to decrease risks associated with prolonged bedrest while in hospital. Pt verbalized understanding. REQUIRES OT FOLLOW UP: Yes  Activity Tolerance  Activity Tolerance: Patient Tolerated treatment well  Safety Devices  Safety Devices in place: Yes  Type of devices: Left in chair;Nurse notified;Call light within reach;Gait belt; Chair alarm in place           Patient Diagnosis(es): The primary encounter diagnosis was Fall, initial encounter. Diagnoses of Hypomagnesemia, Hypokalemia, and Urinary tract infection with hematuria, site unspecified were also pertinent to this visit. 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    has a past surgical history that includes distance(for driving)  Hearing: Within functional limits    Orientation  Overall Orientation Status: Within Functional Limits     Balance  Sitting Balance: Supervision  Standing Balance: Contact guard assistance(with SW for UE support)  Functional Mobility  Functional - Mobility Device: Standard Walker  Activity: Other(around bed to chair ~15ft)  Assist Level: Contact guard assistance(to SBA)  ADL  Feeding: Setup  Grooming: Minimal assistance(for hair care with comb and shower cap.)  LE Dressing: Maximum assistance  Tone RUE  RUE Tone: Normotonic  Tone LUE  LUE Tone: Normotonic  Coordination  Movements Are Fluid And Coordinated: Yes     Bed mobility  Supine to Sit: Contact guard assistance(to L with HOB elevated, use of bed rails and education on technique)  Sit to Supine: Unable to assess(up to chair at end of session.)  Transfers  Sit to stand: Contact guard assistance;Stand by assistance(up to SW)  Stand to sit: Stand by assistance     Cognition  Overall Cognitive Status: WFL        LUE AROM (degrees)  LUE AROM : WFL  RUE AROM (degrees)  RUE AROM : WFL  LUE Strength  Gross LUE Strength: WFL  RUE Strength  Gross RUE Strength: WFL         Plan   Plan  Times per week: 3-5x's while in acute care      AM-PAC Score        AM-[facility] Inpatient Daily Activity Raw Score: 16 (04/07/21 1209)  AM-PAC Inpatient ADL T-Scale Score : 35.96 (04/07/21 1209)  ADL Inpatient CMS 0-100% Score: 53.32 (04/07/21 1209)  ADL Inpatient CMS G-Code Modifier : CK (04/07/21 1209)    Goals  Short term goals  Time Frame for Short term goals: 1 week unless otherwise specified 4/14  Short term goal 1: Pt will complete toilet transfers with SBA by 4/10  Short term goal 2: Pt will complete LE dressing with Anna  Short term goal 3: Pt will complete standing level ADLs with SBA for balance  Patient Goals   Patient goals : \"to be able to go home and get back to work\"       Therapy Time   Individual Concurrent Group Co-treatment   Time In 1106         Time Out 1126         Minutes 20         Timed Code Treatment Minutes: 10 Minutes       Jimmy Paredes, OTR/L  If pt is unable to be seen after this session, please let this note serve as discharge summary. Please see case management note for discharge disposition. Thank you.

## 2021-04-07 NOTE — PROGRESS NOTES
Hospitalist Progress Note      PCP: Maureen Espinal MD    Date of Admission: 4/6/2021    Chief Complaint: Generalized weakness     Hospital Course:   77 y.o. female who presented to [facility] with above complaints. Patient presented to the ED today with generalized weakness. She reports she got Pfizer Covid vaccine first dose yesterday. She was doing well until today morning when she got out of bed she did not feel well. Reports that when she was coming out of the shower she felt really weak and got down and fell, but did not hit her head or lose consciousness. She just could not get back on her feet so she crawled all the way to the kitchen and called her daughter who in turn called EMS. She denies any subjective fevers chills or rigors. No shortness of breath. No cough. No dysuria urgency or frequency. She reports she has chronic diarrhea. Denies chest pain, palpitations or lightheadedness. She does report over the past few days she has just not been feeling quite her usual self with loss of appetite and not eating and drinking as well. Subjective:   Pt is on RA. Afebrile. VSS. No specific complaints currently.         Medications:  Reviewed    Infusion Medications    sodium chloride 25 mL (04/06/21 1937)    sodium chloride 125 mL/hr at 04/07/21 1453    sodium chloride       Scheduled Medications    cefTRIAXone (ROCEPHIN) IV  1,000 mg Intravenous Q24H    anastrozole  1 mg Oral Daily    metoprolol succinate  50 mg Oral Daily    therapeutic multivitamin-minerals  1 tablet Oral Daily    lisinopril  20 mg Oral Daily    sodium chloride flush  10 mL Intravenous 2 times per day    enoxaparin  40 mg Subcutaneous Daily    sodium chloride flush  5-40 mL Intravenous 2 times per day     PRN Meds: hydrALAZINE, sodium chloride flush, sodium chloride, promethazine **OR** ondansetron, polyethylene glycol, acetaminophen **OR** acetaminophen, sodium chloride flush, sodium chloride, LORazepam **OR** LORazepam **OR** LORazepam **OR** LORazepam **OR** LORazepam **OR** LORazepam **OR** LORazepam **OR** LORazepam      Intake/Output Summary (Last 24 hours) at 4/7/2021 1511  Last data filed at 4/7/2021 1242  Gross per 24 hour   Intake 480 ml   Output    Net 480 ml       Physical Exam Performed:    BP (!) 140/66   Pulse 80   Temp 98.6 °F (37 °C) (Oral)   Resp 16   Ht 5' 2\" (1.575 m)   Wt 190 lb (86.2 kg)   LMP  (LMP Unknown)   SpO2 97%   BMI 34.75 kg/m²     General appearance: No apparent distress, appears stated age and cooperative. HEENT: Pupils equal, round, and reactive to light. Conjunctivae/corneas clear. Neck: Supple, with full range of motion. No jugular venous distention. Trachea midline. Respiratory:  Normal respiratory effort. Clear to auscultation, bilaterally without Rales/Wheezes/Rhonchi. Cardiovascular: Regular rate and rhythm with normal S1/S2 without murmurs, rubs or gallops. Abdomen: Soft, non-tender, non-distended with normal bowel sounds. Musculoskeletal: No clubbing, cyanosis or edema bilaterally. Full range of motion without deformity. Skin: Skin color, texture, turgor normal.  No rashes or lesions. Neurologic:  Neurovascularly intact without any focal sensory/motor deficits.  Cranial nerves: II-XII intact, grossly non-focal.  Psychiatric: Alert and oriented, thought content appropriate, normal insight  Capillary Refill: Brisk,< 3 seconds   Peripheral Pulses: +2 palpable, equal bilaterally       Labs:   Recent Labs     04/06/21  1447 04/07/21  0659   WBC 12.5* 11.9*   HGB 10.8* 9.8*   HCT 30.4* 27.1*    198     Recent Labs     04/06/21  1447 04/06/21  1921 04/06/21  2218 04/07/21  0659   *  --   --  123*   K 2.6* 2.7* 3.1* 2.9*   CL 81*  --   --  88*   CO2 29  --   --  26   BUN 11  --   --  8   CREATININE 0.6  --   --  0.6   CALCIUM 9.7  --   --  8.4     Recent Labs     04/06/21  1447   AST 28   ALT 13   BILITOT 1.4*   ALKPHOS 95     Urinalysis:      Lab Results Component Value Date    NITRU Negative 04/06/2021    WBCUA 10-20 04/06/2021    BACTERIA Rare 04/06/2021    RBCUA 5-10 04/06/2021    BLOODU MODERATE 04/06/2021    SPECGRAV 1.010 04/06/2021    GLUCOSEU Negative 04/06/2021       Assessment/Plan:    Active Hospital Problems    Diagnosis    Hypokalemia [E87.6]    Hypomagnesemia [E83.42]    Bandemia [D72.825]    Malignant neoplasm of upper-inner quadrant of left breast in female, estrogen receptor positive (HCC) [C50.212, Z17.0]    Benign essential HTN [I10]       Generalized weakness  Likely due to dehydration and significant electrolyte abnormalities including hypokalemia/hypomagnesemia with hyponatremia. Likely decreased intake and patient on HCTZ.  lso may be sequelae from immune response to first dose Covid vaccine. Continue IV fluid hydration, replenish electrolytes. Supportive care.     Hypokalemia/hypomagnesemia  Likely due to decreased intake and diuretic effect. Patient on thiazide diuretic. Hold HCTZ. Replace electrolytes. Recheck levels, Replacement protocol ordered. Consult nephrology given persistent hyponatremia -- appreciate.      Leukocytosis with bandemia  Likely immune response from Covid vaccine. No cough, fever or shortness of breath to suggest pneumonia. No symptoms of UTI although UA does show 10-20 WBC. IV Rocephin ordered. F/u UC.      Malignant neoplasm of upper-inner quadrant of left breast in female, estrogen receptor positive. Continue anastrozole.     Benign essential HTN  Controlled, continue home medication regimen except HCTZ, monitor BP.     Macrocytosis  Likely due to chronic alcohol use.        DVT Prophylaxis: Lovenox   Diet: DIET GENERAL;  Code Status: Full Code    PT/OT Eval Status: Ordered with recs for home with Mark Anthonyvinay Jeff PT/OT    Dispo - ~2 days     JOHNY Muniz - CNP

## 2021-04-07 NOTE — ED PROVIDER NOTES
Emergency Physician Note    Chief Complaint  Fatigue (off and on, worse today) and Fall       History of Present Illness  Jayda Dai is a 77 y.o. female who presents to the ED for fatigue. Patient reports has had fatigue for the last 2 days. She states she received her first dose of the Covid vaccine yesterday. Today she felt weak and took a shower and when trying to get out of the shower she could not lift her left leg out of the shower and therefore she fell. She did not strike her head or lose consciousness. She denies any chest pain or shortness of breath. She states she was able to crawl out of the bathroom to obtain help. She denies any injuries from the fall. She states something similar has happened once in the past.  She denies any fevers or chills or sweats. No vomiting or diarrhea. 10 systems reviewed, pertinent positives per HPI otherwise noted to be negative    I have reviewed the following from the nursing documentation:      Prior to Admission medications    Medication Sig Start Date End Date Taking?  Authorizing Provider   valACYclovir (VALTREX) 500 MG tablet TAKE 1 TABLET BY MOUTH DAILY 3/15/21  Yes Rosario Vazquez MD   lisinopril-hydroCHLOROthiazide (PRINZIDE;ZESTORETIC) 20-25 MG per tablet TAKE 1 TABLET BY MOUTH DAILY 1/29/21  Yes JOHNY Gomez CNP   metoprolol succinate (TOPROL XL) 50 MG extended release tablet Take 1 tablet by mouth daily 1/26/21  Yes JOHNY Gomez CNP   anastrozole (ARIMIDEX) 1 MG tablet  6/5/20  Yes Historical Provider, MD   Biotin 1 MG CAPS Take by mouth    Historical Provider, MD   Multiple Vitamins-Minerals (THERAPEUTIC MULTIVITAMIN-MINERALS) tablet Take 1 tablet by mouth daily    Historical Provider, MD       Allergies as of 04/06/2021    (No Known Allergies)       Past Medical History:   Diagnosis Date    Arthritis     EtOH dependence ([facility] [address]     HSV-2 (herpes simplex virus 2) infection     Hypertension     Tx since late 45s  Kidney stone     Malignant neoplasm of upper-inner quadrant of left breast in female, estrogen receptor positive ([address] 2019        Surgical History:   Past Surgical History:   Procedure Laterality Date    COLONOSCOPY  1999    Rectal ulcer, s/p cautery    COLONOSCOPY  13    next due 10 yrs   [facility]    Ovaries in. Had fibroids, DUB.     MASTECTOMY Left 10/8/2019    LEFT BREAST NEEDLE LOCALIZATION, PARTIAL MASTECTOMY performed by Meagan Allen MD at [address] [place] [address] Left 2019    LEFT TOTAL KNEE REPLACEMENT            [facility] performed by Britta Luque MD at [facility] [place] [facility]        Family History:    Family History   Problem Relation Age of Onset    Cancer Father 62        Lung    Hypertension Father     Cancer Brother 36        Bladder,  64    Diabetes Other     Heart Failure Other     Breast Cancer Maternal Aunt [de-identified]       Social History     Socioeconomic History    Marital status: Single     Spouse name: Not on file    Number of children: 2    Years of education: Not on file    Highest education level: Not on file   Occupational History    Not on file   Social Needs    Financial resource strain: Not on file    Food insecurity     Worry: Not on file     Inability: Not on file   NeuroPace needs     Medical: Not on file     Non-medical: Not on file   Tobacco Use    Smoking status: Former Smoker     Packs/day: 1.50     Years: 5.00     Pack years: 7.50     Quit date: 1977     Years since quittin.2    Smokeless tobacco: Never Used   Substance and Sexual Activity    Alcohol use: Yes     Drinks per session: 3 or 4     Comment: occ    Drug use: No    Sexual activity: Not on file   Lifestyle    Physical activity     Days per week: Not on file     Minutes per session: Not on file    Stress: Not on file   Relationships    Social connections     Talks on phone: Not on file Gets together: Not on file     Attends Synagogue service: Not on file     Active member of club or organization: Not on file     Attends meetings of clubs or organizations: Not on file     Relationship status: Not on file    Intimate partner violence     Fear of current or ex partner: Not on file     Emotionally abused: Not on file     Physically abused: Not on file     Forced sexual activity: Not on file   Other Topics Concern    Not on file   Social History Narrative    Not on file       Nursing notes reviewed. ED Triage Vitals [04/06/21 1426]   Enc Vitals Group      BP (!) 150/67      Pulse 91      Resp 18      Temp 98.3 °F (36.8 °C)      Temp Source Oral      SpO2 94 %      Weight 190 lb (86.2 kg)      Height 5' 2\" (1.575 m)      Head Circumference       Peak Flow       Pain Score       Pain Loc       Pain Edu? Excl. in [address]? GENERAL:  Awake, alert. Well developed, well nourished with no apparent distress. HENT:  Normocephalic, Atraumatic, moist mucous membranes. EYES:  Pupils equal round and reactive to light, Conjunctiva normal, extraocular movements normal.  NECK:  No meningeal signs, Supple. No tenderness. CHEST:  Regular rate and rhythm, chest wall non-tender. LUNGS:  Clear to auscultation bilaterally. ABDOMEN:  Soft, non-tender, no rebound, rigidity or guarding, non-distended, normal bowel sounds. No costovertebral angle tenderness to palpation. BACK:  No tenderness. No step-offs, contusions or abrasions throughout the cervical, thoracic or lumbar spine. EXTREMITIES:  Normal range of motion, no edema, no bony tenderness, no deformity, distal pulses present. Remainder of axial and appendicular skeleton NT exc as noted. Full active ROM as well at all jts exc as noted. SKIN: Warm, dry and intact. NEUROLOGIC: Normal mental status. Moving all extremities to command.        LABS and DIAGNOSTIC RESULTS  EKG  The Ekg interpreted by me shows  normal sinus rhythm with a rate of 89  Axis is   Normal  QTc is  normal  Intervals and Durations are unremarkable. ST Segments: normal  Delta waves, Brugada Syndrome, and Short MO are not present.   No significant change from prior EKG dated 10/7/19    LABS  Labs Reviewed   CBC WITH AUTO DIFFERENTIAL - Abnormal; Notable for the following components:       Result Value    WBC 12.5 (*)     RBC 2.73 (*)     Hemoglobin 10.8 (*)     Hematocrit 30.4 (*)     .3 (*)     MCH 39.6 (*)     Neutrophils Absolute 9.9 (*)     Lymphocytes Absolute 0.5 (*)     Monocytes Absolute 2.1 (*)     Bands Relative 29 (*)     Anisocytosis 1+ (*)     Macrocytes Occasional (*)     Microcytes Occasional (*)     Polychromasia Occasional (*)     Poikilocytes Occasional (*)     Stomatocytes Occasional (*)     All other components within normal limits    Narrative:     Performed at:  [address], [facility] [place]   Phone [phone]   URINALYSIS - Abnormal; Notable for the following components:    Blood, Urine MODERATE (*)     Leukocyte Esterase, Urine MODERATE (*)     All other components within normal limits    Narrative:     Performed at:  [place] [address] [place]   Phone [phone]   COMPREHENSIVE METABOLIC PANEL W/ REFLEX TO MG FOR LOW K - Abnormal; Notable for the following components:    Sodium 124 (*)     Potassium reflex Magnesium 2.6 (*)     Chloride 81 (*)     Glucose 130 (*)     Albumin/Globulin Ratio 0.9 (*)     Total Bilirubin 1.4 (*)     All other components within normal limits    Narrative:     Liane Hendricks tel. [phone],  Chemistry results called to and read back by FRANK Lutz, 04/06/2021  15:21, by Susan Knox  Performed at:  [address], [facility] [place]   Phone [phone]   MAGNESIUM - Abnormal; Notable for the following components:    Magnesium 1.00 (*)     All other components within normal limits    Narrative:     Nora Gaston tel. [phone],  Chemistry results called to and read back by RN Read Covert, 04/06/2021  15:21, by Rober Schwab  Performed at:  [address], [facility] [place]   Phone [phone]   MICROSCOPIC URINALYSIS - Abnormal; Notable for the following components:    WBC, UA 10-20 (*)     RBC, UA 5-10 (*)     Renal Epithelial, UA 2-5 (*)     Bacteria, UA Rare (*)     All other components within normal limits    Narrative:     Performed at:  [place] [address] [place]   Phone [phone]   POTASSIUM W/ REFLEX TO MAGNESIUM - Abnormal; Notable for the following components:    Potassium reflex Magnesium 2.7 (*)     All other components within normal limits    Narrative:     [address] [place] [address] Road. [phone],  Chemistry results called to and read back by FRANK Costello, 74/77/1079  20:13, by Rober Schwab  Performed at:  [place] [address] [place]   Phone [phone]   MAGNESIUM - Abnormal; Notable for the following components:    Magnesium 1.50 (*)     All other components within normal limits    Narrative:     Huyen DURBINAndrei tel. [phone],  Chemistry results called to and read back by FRANK Costello, 03/78/5016  20:13, by Rober Schwab  Performed at:  [place] [address] [place]   Phone [phone]   PROCALCITONIN    Narrative:     Nora Gaston tel. [phone],  Chemistry results called to and read back by FRANK Caicedo Covert, 04/06/2021  15:21, by Rober Schwab  Performed at:  [place] [address] [place]   Phone [phone]   BASIC METABOLIC PANEL W/ REFLEX TO MG FOR LOW K   POTASSIUM W/ REFLEX TO MAGNESIUM   CBC WITH AUTO DIFFERENTIAL       MEDICAL DECISION MAKING        The total Critical Care time is 36 minutes which excludes separately billable procedures. The critical care was concerning IV fluid bolus for dehydration as well as replacement of magnesium and potassium. This time is exclusive of any time documented by any other providers. I spoke with Dr. Oswaldo Maradiaga. We thoroughly discussed the history, physical exam, laboratory and imaging studies, as well as, emergency department course. Based upon that discussion, we've decided to admit Remy Salguero for further observation and evaluation of Maricarmen Santacruz's fall. As I have deemed necessary from their history, physical, and studies, I have considered and evaluated Remy Salguero for the following diagnoses:        FINAL IMPRESSION  1. Fall, initial encounter    2. Hypomagnesemia    3. Hypokalemia    4. Urinary tract infection with hematuria, site unspecified        Vitals:  Blood pressure (!) 144/85, pulse 89, temperature 98.9 °F (37.2 °C), temperature source Oral, resp. rate 17, height 5' 2\" (1.575 m), weight 190 lb (86.2 kg), SpO2 96 %, not currently breastfeeding. Disposition  Pt is in stable condition upon Admit to telemetry. This chart was generated using the [address] [place] [address] dictation system. I created this record but it may contain dictation errors.           Camryn Spencer MD  04/06/21 1943

## 2021-04-07 NOTE — CONSULTS
Elizabeth Moraarvin, APRN - CNP   anastrozole (ARIMIDEX) 1 MG tablet   6/5/20   Yes Historical Provider, MD   Biotin 1 MG CAPS Take by mouth       Historical Provider, MD   Multiple Vitamins-Minerals (THERAPEUTIC MULTIVITAMIN-MINERALS) tablet Take 1 tablet by mouth daily       Historical Provider, MD           Allergies:  Patient has no known allergies. Social History:    The patient currently lives at home     TOBACCO:   reports that she quit smoking about 44 years ago. She has a 7.50 pack-year smoking history. She has never used smokeless tobacco.  ETOH:   reports current alcohol use. Family History:   No H/O renal disease  REVIEW OF SYSTEMS:    As per HPI, otherwise negative or noncontributory on review of 10 systems  PHYSICAL EXAM:      Vitals: Wt Readings from Last 3 Encounters:   04/06/21 190 lb (86.2 kg)   09/09/20 193 lb (87.5 kg)   06/10/20 190 lb (86.2 kg)     Temp Readings from Last 3 Encounters:   04/07/21 97.8 °F (36.6 °C) (Oral)   09/09/20 97.8 °F (36.6 °C) (Tympanic)   06/10/20 98.2 °F (36.8 °C) (Infrared)     BP Readings from Last 3 Encounters:   04/07/21 (!) 149/90   09/09/20 126/78   06/10/20 100/60     Pulse Readings from Last 3 Encounters:   04/07/21 70   09/09/20 75   06/10/20 77   General appearance:  No apparent distress, appears stated age and cooperative. HEENT:  Normal cephalic, atraumatic without obvious deformity. Pupils equal, round, and reactive to light. Extra ocular muscles intact. Conjunctivae/corneas clear. Neck: Supple, with full range of motion. No jugular venous distention. Trachea midline. Respiratory:  Normal respiratory effort. Clear to auscultation, bilaterally without Rales/Wheezes/Rhonchi. Cardiovascular:  Regular rate and rhythm with normal S1/S2 without murmurs, rubs or gallops. Abdomen: Soft, non-tender, non-distended with normal bowel sounds. Musculoskeletal: Generalized weakness, no clubbing, cyanosis or edema bilaterally.   Full range of motion without

## 2021-04-07 NOTE — PROGRESS NOTES
Physical Therapy    Facility/Department: [facility] C5 - MED SURG/ORTHO  Initial Assessment    NAME: Brisa Chin  : 1955  MRN: [id number]    Date of Service: 2021    Discharge Recommendations:  Home with assist PRN, Home with Home health PT(pt may benefit from home PT at D/C but pt wants to keep her options open -- will CTA)   PT Equipment Recommendations  Other: CTA for possible RW needs    Assessment   Body structures, Functions, Activity limitations: Decreased functional mobility ; Decreased strength;Decreased endurance;Decreased balance;Decreased posture; Increased pain  Assessment: Pt referred for PT evaluation during current hospital stay with dx of fall at home with hypokalemia. Pt currently functioning below her reported baseline, requiring CGA/SBA x 1 and use of RW for safe transfers/amb. Pt limited in overall standing/activity tolerance by generalized post-fall pain -- located mostly on R side of body. Will CTA pt as she progresses with PT during hospital stay. Anticipate pt being able to return home with assist PRN from family. Pt could possibly benefit from home PT and RW for home use but will continue to assess. Treatment Diagnosis: Decreased strength and (I) with functional mobility  Specific instructions for Next Treatment: Progress ther ex and mobility as tolerated, stair training prior to D/C home  Prognosis: Good  Decision Making: Medium Complexity  PT Education: Goals; General Safety;Gait Training;PT Role;Plan of Care;Disease Specific Education; Functional Mobility Training;Equipment;Precautions;Transfer Training; Injury Prevention  Patient Education: Disease-specific education: Pt educated on compensatory transfer/gait strategies with RW in light of strength/mobility deficits s/p fall and in role of PT in acute setting; pt verbalizes understanding. REQUIRES PT FOLLOW UP: Yes  Activity Tolerance: Patient Tolerated treatment well;Patient limited by pain; Patient limited by fatigue Activity Tolerance: Vitals after amb:  BP = 149/90, HR = 70 bpm, O2 sat = 98% on room air. Patient Diagnosis(es): The primary encounter diagnosis was Fall, initial encounter. Diagnoses of Hypomagnesemia, Hypokalemia, and Urinary tract infection with hematuria, site unspecified were also pertinent to this visit. has a past medical history of Arthritis, EtOH dependence ([facility] [address] HSV-2 (herpes simplex virus 2) infection, Hypertension, Kidney stone, and Malignant neoplasm of upper-inner quadrant of left breast in female, estrogen receptor positive ([address] has a past surgical history that includes Hysterectomy (1998); Dilation and curettage of uterus (1997); Colonoscopy (02/16/1999); Colonoscopy (4/19/13); Total knee arthroplasty (Left, 5/23/2019); and Mastectomy (Left, 10/8/2019). Restrictions  Restrictions/Precautions  Restrictions/Precautions: Fall Risk, Up as Tolerated, General Precautions  Vision/Hearing  Vision: Impaired  Vision Exceptions: Wears glasses for distance(for driving)  Hearing: Within functional limits     Subjective  General  Chart Reviewed: Yes  Patient assessed for rehabilitation services?: Yes  Family / Caregiver Present: No  Referring Practitioner: Dr. Carlene Rodriguez  Referral Date : 04/07/21  Diagnosis: Hypokalemia, fall at home  Follows Commands: Within Functional Limits  General Comment  Comments: Pt resting in bed upon entry of therapy staff  Subjective  Subjective: Pt agreeable to work with PT/OT this morning with min encouragement. \"I know it's necessary, but I really don't feel like moving. \"  Pain Screening  Patient Currently in Pain: Yes  Pain Assessment  Pain Assessment: 0-10  Pain Level: 4  Pain Type: Acute pain  Pain Location: Abdomen;Generalized  Pain Orientation: Right  Pain Descriptors: Aching; Sore  Pain Frequency: Continuous  Non-Pharmaceutical Pain Intervention(s): Ambulation/Increased Activity;Repositioned;Distraction; Emotional support  Intervention List: Patient able to continue with treatment    Orientation  Orientation  Overall Orientation Status: Within Normal Limits     Social/Functional History  Social/Functional History  Lives With: Alone  Type of Home: (condo)  Home Layout: Two level, Able to Live on Main level with bedroom/bathroom, Performs ADL's on one level, Laundry in basement  Home Access: Stairs to enter with rails  Entrance Stairs - Number of Steps: 1 flight  Entrance Stairs - Rails: Left  Bathroom Shower/Tub: Tub/Shower unit(walk-in shower in the basement level)  Bathroom Toilet: Standard  Home Equipment: Cane, Standard walker  ADL Assistance: Independent  Homemaking Assistance: Independent  Homemaking Responsibilities: Yes  Ambulation Assistance: Independent  Transfer Assistance: Independent  Active : Yes  Occupation: Full time employment, Works at home  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    Objective  Observation/Palpation  Posture: Fair    RLE AROM: WFL  LLE AROM : WFL  Strength RLE: NT formally due to pain from recent fall; appears grossly WFL through observation  Strength LLE: NT formally due to pain from recent fall; appears grossly WFL through observation     Bed mobility  Rolling to Left: Contact guard assistance(pt using L bedrail, min cues for technique)  Supine to Sit: Contact guard assistance(moving toward L side, HOB elevated ~30 degrees, min cues needed for proper log-roll technique in light of pain post-fall)  Scooting: Stand by assistance(to scoot forward to EOB)     Transfers  Sit to Stand: Contact guard assistance  Stand to sit: Stand by assistance  Bed to Chair: Contact guard assistance;Stand by assistance(using RW, pt transfers slowly & with some difficulty)     Ambulation  Surface: level tile  Device: Rolling Walker  Assistance: Contact guard assistance;Stand by assistance  Quality of Gait: Pt amb with slow aftab and decreased stride length compared to baseline.   Appears steady but requires added support of RW, no LOB despite slow pace. Gait Deviations: Slow Kate;Decreased step length;Decreased step height  Distance: x 25 feet  Comments: Amb distance limited by c/o generalized pain post-fall. Balance  Posture: Fair  Sitting - Static: Good  Sitting - Dynamic: Good;-  Standing - Static: Fair;+(using RW)  Standing - Dynamic: Fair(using RW)    Plan   Times per week: 3-5x/week in acute care  Times per day: Daily  Specific instructions for Next Treatment: Progress ther ex and mobility as tolerated, stair training prior to D/C home  Current Treatment Recommendations: Strengthening, Balance Training, Endurance Training, Functional Mobility Training, Transfer Training, Gait Training, Stair training, Home Exercise Program, Safety Education & Training, Equipment Evaluation, Education, & procurement  Safety Devices: All fall risk precautions in place, Left in chair, Call light within reach, Chair alarm in place, Nurse notified, Gait belt, Patient at risk for falls    AM-PAC Score  AM-PAC Inpatient Mobility Raw Score : 17 (04/07/21 1253)  AM-PAC Inpatient T-Scale Score : 42.13 (04/07/21 1253)  Mobility Inpatient CMS 0-100% Score: 50.57 (04/07/21 1253)  Mobility Inpatient CMS G-Code Modifier : CK (04/07/21 1253)    Goals  Short term goals  Time Frame for Short term goals: 1 week, 4/14/21 (unless otherwise specified)  Short term goal 1: Pt will transfer supine <-> sit with supervision/modified(I)  Short term goal 2: Pt will transfer sit <-> stand and bed>chair using least AD with supervision/modified(I)  Short term goal 3: Pt will ambulate x 100 feet using least AD with supervision  Short term goal 4: Pt will ambulate up/down 6 steps using handrail(s) PRN with SBA  Short term goal 5: By 4/09/21: Pt will tolerate 12-15 reps BLE exercise for ROM/strengthening, balance, and endurance  Patient Goals   Patient goals :  \"To get back to walking normally\"       Therapy Time   Individual Concurrent Group Co-treatment   Time In 1106         Time Out 1125         Minutes 19         Timed Code Treatment Minutes: [address], [place], [place] #[id number]    If pt is unable to be seen after this session, please let this note serve as discharge summary. Please see case management note for discharge disposition. Thank you.

## 2021-04-07 NOTE — H&P
Hospital Medicine History & Physical      PCP: Nicole Reeder MD    Date of Admission: 4/6/2021    Date of Service: Pt seen/examined on 4/6/2021 and Admitted to Inpatient with expected LOS greater than two midnights due to medical therapy. Chief Complaint: Generalized weakness      History Of Present Illness:   77 y.o. female who [facility] to [facility] with above complaints  Patient presented to the ED today with generalized weakness. She reports she got Pfizer Covid vaccine first dose yesterday. She was doing well until today morning when she got out of bed she did not feel well. Reports that when she was coming out of the shower she felt really weak and got down and fell, but did not hit her head or lose consciousness. She just could not get back on her feet so she crawled all the way to the kitchen and called her daughter who in turn called EMS. She denies any subjective fevers chills or rigors. No shortness of breath. No cough. No dysuria urgency or frequency. She reports she has chronic diarrhea. Denies chest pain, palpitations or lightheadedness. She does report over the past few days she has just not been feeling quite her usual self with loss of appetite and not eating and drinking as well. Past Medical History:          Diagnosis Date    Arthritis     EtOH dependence ([facility] [address]     HSV-2 (herpes simplex virus 2) infection     Hypertension     Tx since late 45s    Kidney stone     Malignant neoplasm of upper-inner quadrant of left breast in female, estrogen receptor positive ([facility] [address] 9/23/2019       Past Surgical History:          Procedure Laterality Date    COLONOSCOPY  02/16/1999    Rectal ulcer, s/p cautery    COLONOSCOPY  4/19/13    next due 10 yrs   [address] [facility]    Ovaries in. Had fibroids, DUB.     MASTECTOMY Left 10/8/2019    LEFT BREAST NEEDLE LOCALIZATION, PARTIAL MASTECTOMY performed by Tucker Peña MD at [facility] SpO2 96%   BMI 34.75 kg/m²     General appearance:  No apparent distress, appears stated age and cooperative. HEENT:  Normal cephalic, atraumatic without obvious deformity. Pupils equal, round, and reactive to light. Extra ocular muscles intact. Conjunctivae/corneas clear. Neck: Supple, with full range of motion. No jugular venous distention. Trachea midline. Respiratory:  Normal respiratory effort. Clear to auscultation, bilaterally without Rales/Wheezes/Rhonchi. Cardiovascular:  Regular rate and rhythm with normal S1/S2 without murmurs, rubs or gallops. Abdomen: Soft, non-tender, non-distended with normal bowel sounds. Musculoskeletal: Generalized weakness, no clubbing, cyanosis or edema bilaterally. Full range of motion without deformity. Skin: Skin color, texture, turgor normal.  No rashes or lesions. Neurologic:  Neurovascularly intact without any focal sensory/motor deficits. Cranial nerves: II-XII intact, grossly non-focal.  Psychiatric:  Alert and oriented, thought content appropriate, normal insight  Capillary Refill: Brisk,< 3 seconds   Peripheral Pulses: +2 palpable, equal bilaterally       Labs:     Recent Labs     04/06/21  1447   WBC 12.5*   HGB 10.8*   HCT 30.4*        Recent Labs     04/06/21  1447 04/06/21  1921   *  --    K 2.6* 2.7*   CL 81*  --    CO2 29  --    BUN 11  --    CREATININE 0.6  --    CALCIUM 9.7  --      Recent Labs     04/06/21  1447   AST 28   ALT 13   BILITOT 1.4*   ALKPHOS 95     No results for input(s): INR in the last 72 hours. No results for input(s): Paula Elder in the last 72 hours.     Urinalysis:      Lab Results   Component Value Date    NITRU Negative 04/06/2021    WBCUA 10-20 04/06/2021    BACTERIA Rare 04/06/2021    RBCUA 5-10 04/06/2021    BLOODU MODERATE 04/06/2021    SPECGRAV 1.010 04/06/2021    GLUCOSEU Negative 04/06/2021       ASSESSMENT:PLAN:    Generalized weakness  Likely due to dehydration and significant electrolyte abnormalities including hypokalemia/hypomagnesemia with hyponatremia. Likely decreased intake, and patient on HCTZ. Also may be sequelae from immune response to first dose Covid vaccine. IV fluid hydration, replenish electrolytes. Supportive care    Hypokalemia/hypomagnesemia  Likely due to decreased intake and diuretic effect. Patient on thiazide diuretic. Hold HCTZ  Replace electrolytes  Recheck levels, Replacement protocol ordered    Leukocytosis with bandemia  -Likely immune response from Covid vaccine.  -No cough, fever or shortness of breath to suggest pneumonia  -No symptoms of UTI although UA does show 10-20 WBC. IV Rocephin ordered. Can DC if not clinically indicated by tomorrow    Malignant neoplasm of upper-inner quadrant of left breast in female, estrogen receptor positive  -Continue anastrozole    Benign essential HTN  -Controlled, resume home medication regimen except HCTZ, monitor BP    Macrocytosis -likely due to chronic alcohol use. DVT Prophylaxis: Lovenox  Diet: DIET GENERAL;  Code Status: Full Code    PT/OT Eval Status: Not consulted yet    Joon Chain stay       Christina Arce MD    Thank you Geraldine Bush MD for the opportunity to be involved in this patient's care. If you have any questions or concerns please feel free to contact me at [phone].

## 2021-04-07 NOTE — CARE COORDINATION
CASE MANAGEMENT INITIAL ASSESSMENT      Reviewed chart and completed assessment  With: Pt  Explained Case Management role/services. Primary contact information: Daughter Alesha Reddy. Health Care Decision Maker :   Primary Decision Maker: James Brady Child - [phone]          Can this person be reached and be able to respond quickly, such as within a few minutes or hours? Yes    Admit date/status: IP, 4/6/21  Diagnosis: Hypokalemia. Is this a Readmission?:  No      Insurance: Medicare Primary and [facility] secondary. Precert required for SNF: No  3 night stay required: No    Living arrangements, Adls, care needs, prior to admission: Lives in a two floor condo alone and still works from home. Independent  in ADL's and drives. Transportation: family    Durable Medical Equipment at home:  Walker_X_Cane_X_RTS__ BSC__Shower Chair__  02__ HHN__ CPAP__  BiPap__  Hospital Bed__ W/C___ Other__________    Services in the home and/or outpatient, prior to admission: None        PT/OT recs: None seen at this time. Hospital Exemption Notification (HEN): Needed for SNF, not initiated. Barriers to discharge: None    Plan/comments: CM met with pt at bedside for initial assessment. pt plans to return back home with current support system in place. Daughter lives close by and helps when needed. Pt denies DCP needs at this time. If any needs or concerns should arise please advise.  Miriam Rg RN       ECOC on chart for MD signature

## 2021-04-08 LAB
ALBUMIN SERPL-MCNC: 3.1 G/DL (ref 3.4–5)
ANION GAP SERPL CALCULATED.3IONS-SCNC: 8 MMOL/L (ref 3–16)
BUN BLDV-MCNC: 8 MG/DL (ref 7–20)
CALCIUM SERPL-MCNC: 8.5 MG/DL (ref 8.3–10.6)
CHLORIDE BLD-SCNC: 92 MMOL/L (ref 99–110)
CO2: 25 MMOL/L (ref 21–32)
CORTISOL - AM: 13.4 UG/DL (ref 4.3–22.4)
CREAT SERPL-MCNC: <0.5 MG/DL (ref 0.6–1.2)
GFR AFRICAN AMERICAN: >60
GFR NON-AFRICAN AMERICAN: >60
GLUCOSE BLD-MCNC: 106 MG/DL (ref 70–99)
HCT VFR BLD CALC: 28.9 % (ref 36–48)
HEMATOLOGY PATH CONSULT: NO
HEMOGLOBIN: 10.3 G/DL (ref 12–16)
MAGNESIUM: 1.5 MG/DL (ref 1.8–2.4)
MCH RBC QN AUTO: 40.6 PG (ref 26–34)
MCHC RBC AUTO-ENTMCNC: 35.5 G/DL (ref 31–36)
MCV RBC AUTO: 114.2 FL (ref 80–100)
OSMOLALITY URINE: 259 MOSM/KG (ref 390–1070)
PDW BLD-RTO: 13.7 % (ref 12.4–15.4)
PHOSPHORUS: 2.5 MG/DL (ref 2.5–4.9)
PLATELET # BLD: 225 K/UL (ref 135–450)
PMV BLD AUTO: 8.3 FL (ref 5–10.5)
POTASSIUM SERPL-SCNC: 3.9 MMOL/L (ref 3.5–5.1)
RBC # BLD: 2.53 M/UL (ref 4–5.2)
SODIUM BLD-SCNC: 125 MMOL/L (ref 136–145)
URINE CULTURE, ROUTINE: NORMAL
WBC # BLD: 9.4 K/UL (ref 4–11)

## 2021-04-08 PROCEDURE — 2580000003 HC RX 258: Performed by: INTERNAL MEDICINE

## 2021-04-08 PROCEDURE — 97110 THERAPEUTIC EXERCISES: CPT

## 2021-04-08 PROCEDURE — 97530 THERAPEUTIC ACTIVITIES: CPT

## 2021-04-08 PROCEDURE — 6370000000 HC RX 637 (ALT 250 FOR IP): Performed by: NURSE PRACTITIONER

## 2021-04-08 PROCEDURE — 97116 GAIT TRAINING THERAPY: CPT

## 2021-04-08 PROCEDURE — 97535 SELF CARE MNGMENT TRAINING: CPT

## 2021-04-08 PROCEDURE — 6360000002 HC RX W HCPCS: Performed by: REGISTERED NURSE

## 2021-04-08 PROCEDURE — 80069 RENAL FUNCTION PANEL: CPT

## 2021-04-08 PROCEDURE — 82533 TOTAL CORTISOL: CPT

## 2021-04-08 PROCEDURE — 36415 COLL VENOUS BLD VENIPUNCTURE: CPT

## 2021-04-08 PROCEDURE — 6370000000 HC RX 637 (ALT 250 FOR IP): Performed by: INTERNAL MEDICINE

## 2021-04-08 PROCEDURE — 85027 COMPLETE CBC AUTOMATED: CPT

## 2021-04-08 PROCEDURE — 6360000002 HC RX W HCPCS: Performed by: INTERNAL MEDICINE

## 2021-04-08 PROCEDURE — 1200000000 HC SEMI PRIVATE

## 2021-04-08 PROCEDURE — 83735 ASSAY OF MAGNESIUM: CPT

## 2021-04-08 RX ORDER — MAGNESIUM SULFATE IN WATER 40 MG/ML
4000 INJECTION, SOLUTION INTRAVENOUS ONCE
Status: COMPLETED | OUTPATIENT
Start: 2021-04-08 | End: 2021-04-08

## 2021-04-08 RX ADMIN — SODIUM CHLORIDE, PRESERVATIVE FREE 10 ML: 5 INJECTION INTRAVENOUS at 08:53

## 2021-04-08 RX ADMIN — ANASTROZOLE 1 MG: 1 TABLET ORAL at 08:52

## 2021-04-08 RX ADMIN — HYDROCODONE BITARTRATE AND ACETAMINOPHEN 1 TABLET: 7.5; 325 TABLET ORAL at 20:42

## 2021-04-08 RX ADMIN — SODIUM CHLORIDE, PRESERVATIVE FREE 10 ML: 5 INJECTION INTRAVENOUS at 20:42

## 2021-04-08 RX ADMIN — MAGNESIUM SULFATE HEPTAHYDRATE 4000 MG: 40 INJECTION, SOLUTION INTRAVENOUS at 10:00

## 2021-04-08 RX ADMIN — Medication 1 TABLET: at 08:51

## 2021-04-08 RX ADMIN — METOPROLOL SUCCINATE 50 MG: 50 TABLET, EXTENDED RELEASE ORAL at 08:51

## 2021-04-08 RX ADMIN — Medication 10 ML: at 20:43

## 2021-04-08 RX ADMIN — ENOXAPARIN SODIUM 40 MG: 40 INJECTION SUBCUTANEOUS at 08:52

## 2021-04-08 RX ADMIN — SODIUM CHLORIDE: 9 INJECTION, SOLUTION INTRAVENOUS at 04:26

## 2021-04-08 RX ADMIN — SODIUM CHLORIDE: 9 INJECTION, SOLUTION INTRAVENOUS at 15:28

## 2021-04-08 RX ADMIN — LISINOPRIL 20 MG: 20 TABLET ORAL at 08:52

## 2021-04-08 ASSESSMENT — PAIN SCALES - GENERAL
PAINLEVEL_OUTOF10: 6
PAINLEVEL_OUTOF10: 6
PAINLEVEL_OUTOF10: 0

## 2021-04-08 ASSESSMENT — PAIN DESCRIPTION - LOCATION: LOCATION: ABDOMEN

## 2021-04-08 ASSESSMENT — PAIN DESCRIPTION - PAIN TYPE: TYPE: ACUTE PAIN

## 2021-04-08 ASSESSMENT — PAIN DESCRIPTION - DESCRIPTORS
DESCRIPTORS: SORE
DESCRIPTORS: SHARP;SPASM

## 2021-04-08 ASSESSMENT — PAIN DESCRIPTION - FREQUENCY: FREQUENCY: INTERMITTENT

## 2021-04-08 ASSESSMENT — PAIN DESCRIPTION - ORIENTATION: ORIENTATION: RIGHT

## 2021-04-08 NOTE — CARE COORDINATION
CM met with pt at bedside to discuss therapy recs for [facility] [place].. Pt wanting to think about it over night. Writer will reach back out to pt tomorrow.  CM following-Lindsey Burks RN

## 2021-04-08 NOTE — PROGRESS NOTES
were also pertinent to this visit. has a past medical history of Arthritis, EtOH dependence ([address] HSV-2 (herpes simplex virus 2) infection, Hypertension, Kidney stone, and Malignant neoplasm of upper-inner quadrant of left breast in female, estrogen receptor positive ([address] has a past surgical history that includes Hysterectomy (1998); Dilation and curettage of uterus (1997); Colonoscopy (02/16/1999); Colonoscopy (4/19/13); Total knee arthroplasty (Left, 5/23/2019); and Mastectomy (Left, 10/8/2019). Restrictions  Restrictions/Precautions  Restrictions/Precautions: Fall Risk, Up as Tolerated, General Precautions     Subjective   General  Chart Reviewed: Yes  Response To Previous Treatment: Patient with no complaints from previous session. Family / Caregiver Present: No  Referring Practitioner: Dr. Gilmer Stapleton  Subjective  Subjective: Pt agreeable to work with PT this afternoon without encouragement. Pt states she just used the bathroom and would like to try and walk. General Comment  Comments: Pt resting in bed upon entry of therapy staff  Pain Screening  Patient Currently in Pain: Yes  Pain Assessment  Pain Assessment: 0-10  Pain Level: 6(\"4-6/10\")  Pain Type: Acute pain  Pain Location: Flank;Generalized  Pain Orientation: Right  Pain Descriptors: Sore  Pain Frequency: Intermittent  Non-Pharmaceutical Pain Intervention(s): Ambulation/Increased Activity;Repositioned; Emotional support;Distraction    Vital Signs  Pulse: 71  Heart Rate Source: Monitor  BP: (!) 148/83  BP Location: Right upper arm  Patient Position: Sitting;Up in chair  Patient Currently in Pain: Yes  Oxygen Therapy  SpO2: 99 %  Pulse Oximeter Device Mode: Intermittent  Pulse Oximeter Device Location: Right;Finger  O2 Device: None (Room air)       Orientation  Orientation  Overall Orientation Status: Within Normal Limits    Objective   Bed mobility  Supine to Sit: Minimal assistance(to lift trunk from HOB, min cues for technique, pt moving toward L side with HOB elevated ~30 degrees)  Scooting: Supervision(to scoot forward to EOB)     Transfers  Sit to Stand: Supervision  Stand to sit: Supervision  Bed to Chair: Supervision(using RW)     Ambulation  Surface: level tile  Device: Rolling Walker  Assistance: Stand by assistance;Supervision  Quality of Gait: Pt amb with slow aftab and decreased stride length compared to baseline. Appears steady but requires added support of RW, no LOB despite slow pace. Gait Deviations: Slow Aftab;Decreased step length;Decreased step height  Distance: x 50 feet  Comments: Amb distance self-limited by pt 2* pain and not wanting to walk out in hallway. Balance  Posture: Fair  Sitting - Static: Good  Sitting - Dynamic: Good;-  Standing - Static: Good;-  Standing - Dynamic: Fair;+(using RW)     Exercises  Gluteal Sets: x 15 bilat  Hip Abduction: seated clamshells x 15 BLE  Knee Long Arc Quad: x 15 BLE  Ankle Pumps: x 15 BLE     AM-PAC Score  AM-PAC Inpatient Mobility Raw Score : 18 (04/08/21 1611)  AM-PAC Inpatient T-Scale Score : 43.63 (04/08/21 1611)  Mobility Inpatient CMS 0-100% Score: 46.58 (04/08/21 1611)  Mobility Inpatient CMS G-Code Modifier : CK (04/08/21 1611)    Goals  Short term goals  Time Frame for Short term goals: 1 week, 4/14/21 (unless otherwise specified)  Short term goal 1: Pt will transfer supine <-> sit with supervision/modified(I)  Short term goal 2: Pt will transfer sit <-> stand and bed>chair using least AD with supervision/modified(I) - MET 4/08/21  Short term goal 3: Pt will ambulate x 100 feet using least AD with supervision  Short term goal 4: Pt will ambulate up/down 6 steps using handrail(s) PRN with SBA  Short term goal 5: By 4/09/21: Pt will tolerate 12-15 reps BLE exercise for ROM/strengthening, balance, and endurance - MET 4/08/21, goal ongoing  Patient Goals   Patient goals :  \"To get back to walking normally\"    Plan    Times per week: 3-5x/week in acute care  Times per day: Daily  Specific instructions for Next Treatment: Progress ther ex and mobility as tolerated, stair training prior to D/C home  Current Treatment Recommendations: Strengthening, Balance Training, Endurance Training, Functional Mobility Training, Transfer Training, Gait Training, Stair training, Home Exercise Program, Safety Education & Training, Equipment Evaluation, Education, & procurement  Safety Devices: All fall risk precautions in place, Left in chair, Call light within reach, Chair alarm in place, Nurse notified, Gait belt, Patient at risk for falls     Therapy Time   Individual Concurrent Group Co-treatment   Time In 1520         Time Out 1600         Minutes 40         Timed Code Treatment Minutes: [place], [place] #[id number]    If pt is unable to be seen after this session, please let this note serve as discharge summary. Please see case management note for discharge disposition. Thank you.

## 2021-04-08 NOTE — PROGRESS NOTES
Occupational Therapy  Facility/Department: [facility] C5 - MED SURG/ORTHO  Daily Treatment Note  NAME: Ismael Tena  : 1955  MRN: [id number]    Date of Service: 2021    Discharge Recommendations:  Home with Home health OT, 24 hour supervision or assist     Assessment   Performance deficits / Impairments: Decreased endurance;Decreased ADL status; Decreased balance;Decreased high-level IADLs;Decreased functional mobility   Assessment: Pt limited d/t fatigue this session. Pt CGA for functional mobility and transfers using SW. Pt required rest break while standing at sink for grooming tasks. Pt CGA with static standing at bedside, 1st trial:~2 minutes, 2nd trial: ~1 min. Pt required CGA to complete grooming task while standing at bedside d/t fatigue. Pt would benefit from 24 hr assist and HHOT to continue to address the above noted occupational performance deficits. Prognosis: Good  OT Education: OT Role;Plan of Care;ADL Adaptive Strategies;Transfer Training;Home Exercise Program  Patient Education: Disease specific: role of OT, therex and transfers  REQUIRES OT FOLLOW UP: Yes    Activity Tolerance  Activity Tolerance: Patient limited by fatigue  Activity Tolerance: Vitals: CU=952/88, HR=72, O2=99%    Safety Devices  Safety Devices in place: Yes  Type of devices: Call light within reach;Gait belt;Bed alarm in place; Left in bed       Patient Diagnosis(es): The primary encounter diagnosis was Fall, initial encounter. Diagnoses of Hypomagnesemia, Hypokalemia, and Urinary tract infection with hematuria, site unspecified were also pertinent to this visit. has a past medical history of Arthritis, EtOH dependence ([facility] [address] HSV-2 (herpes simplex virus 2) infection, Hypertension, Kidney stone, and Malignant neoplasm of upper-inner quadrant of left breast in female, estrogen receptor positive ([facility] [address] has a past surgical history that includes Hysterectomy (); Dilation and curettage of uterus ();  Colonoscopy of ROM/Therapeutic Exercise  Type of ROM/Therapeutic Exercise: AROM  Comment: BUE; seated EOB  Exercises  Elbow Flexion: 10x  Elbow Extension: 10x  Finger Flexion: 10x  Finger Extension: 10x    Plan   Plan  Times per week: 3-5x's while in acute care    AM-PAC Score  AM-PAC Inpatient Daily Activity Raw Score: 17 (04/08/21 1439)  AM-PAC Inpatient ADL T-Scale Score : 37.26 (04/08/21 1439)  ADL Inpatient CMS 0-100% Score: 50.11 (04/08/21 1439)  ADL Inpatient CMS G-Code Modifier : CK (04/08/21 1439)    Goals  Short term goals  Time Frame for Short term goals: 1 week unless otherwise specified 4/14  Short term goal 1: Pt will complete toilet transfers with SBA by 4/10--ongoing 4/08/21  Short term goal 2: Pt will complete LE dressing with Anna--ongoing 4/08/21  Short term goal 3: Pt will complete standing level ADLs with SBA for balance--ongoing 4/08/21  Patient Goals   Patient goals : \"to be able to go home and get back to work\"       Therapy Time   Individual Concurrent Group Co-treatment   Time In 1311         Time Out 1349         Minutes [id number] [facility] [address], S/KASIA    Agree with above. All information reviewed by Giovana Mas COTA/MIKIE

## 2021-04-08 NOTE — PROGRESS NOTES
Hospitalist Progress Note      PCP: Elyse Young MD    Date of Admission: 4/6/2021    Chief Complaint: Generalized weakness     Hospital Course:   77 y.o. female who presented to [facility] with above complaints. Patient presented to the ED today with generalized weakness. She reports she got Pfizer Covid vaccine first dose yesterday. She was doing well until today morning when she got out of bed she did not feel well. Reports that when she was coming out of the shower she felt really weak and got down and fell, but did not hit her head or lose consciousness. She just could not get back on her feet so she crawled all the way to the kitchen and called her daughter who in turn called EMS. She denies any subjective fevers chills or rigors. No shortness of breath. No cough. No dysuria urgency or frequency. She reports she has chronic diarrhea. Denies chest pain, palpitations or lightheadedness. She does report over the past few days she has just not been feeling quite her usual self with loss of appetite and not eating and drinking as well. Subjective:   Pt is on RA. Afebrile. VSS. No specific complaints currently.         Medications:  Reviewed    Infusion Medications    sodium chloride 25 mL (04/06/21 1937)    sodium chloride 125 mL/hr at 04/08/21 1528    sodium chloride       Scheduled Medications    anastrozole  1 mg Oral Daily    metoprolol succinate  50 mg Oral Daily    therapeutic multivitamin-minerals  1 tablet Oral Daily    lisinopril  20 mg Oral Daily    sodium chloride flush  10 mL Intravenous 2 times per day    enoxaparin  40 mg Subcutaneous Daily    sodium chloride flush  5-40 mL Intravenous 2 times per day     PRN Meds: hydrALAZINE, HYDROcodone-acetaminophen, sodium chloride flush, sodium chloride, promethazine **OR** ondansetron, polyethylene glycol, acetaminophen **OR** acetaminophen, sodium chloride flush, sodium chloride, LORazepam **OR** LORazepam **OR** LORazepam **OR** LORazepam **OR** LORazepam **OR** LORazepam **OR** LORazepam **OR** LORazepam    No intake or output data in the 24 hours ending 04/08/21 1708    Physical Exam Performed:    BP (!) 148/83   Pulse 71   Temp 98.2 °F (36.8 °C) (Oral)   Resp 16   Ht 5' 2\" (1.575 m)   Wt 190 lb (86.2 kg)   LMP  (LMP Unknown)   SpO2 99%   BMI 34.75 kg/m²     General appearance: No apparent distress, appears stated age and cooperative. HEENT: Pupils equal, round, and reactive to light. Conjunctivae/corneas clear. Neck: Supple, with full range of motion. No jugular venous distention. Trachea midline. Respiratory:  Normal respiratory effort. Clear to auscultation, bilaterally without Rales/Wheezes/Rhonchi. Cardiovascular: Regular rate and rhythm with normal S1/S2 without murmurs, rubs or gallops. Abdomen: Soft, non-tender, non-distended with normal bowel sounds. Musculoskeletal: No clubbing, cyanosis or edema bilaterally. Full range of motion without deformity. Skin: Skin color, texture, turgor normal.  No rashes or lesions. Neurologic:  Neurovascularly intact without any focal sensory/motor deficits. Cranial nerves: II-XII intact, grossly non-focal.  Psychiatric: Alert and oriented, thought content appropriate, normal insight  Capillary Refill: Brisk,< 3 seconds   Peripheral Pulses: +2 palpable, equal bilaterally       Labs:   Recent Labs     04/06/21  1447 04/07/21  0659 04/08/21  0620   WBC 12.5* 11.9* 9.4   HGB 10.8* 9.8* 10.3*   HCT 30.4* 27.1* 28.9*    198 225     Recent Labs     04/06/21  1447 04/06/21  1447 04/06/21  2218 04/07/21  0659 04/08/21  0620   *  --   --  123* 125*   K 2.6*   < > 3.1* 2.9* 3.9   CL 81*  --   --  88* 92*   CO2 29  --   --  26 25   BUN 11  --   --  8 8   CREATININE 0.6  --   --  0.6 <0.5*   CALCIUM 9.7  --   --  8.4 8.5   PHOS  --   --   --   --  2.5    < > = values in this interval not displayed.      Recent Labs     04/06/21  1447   AST 28   ALT 13   BILITOT 1.4*   ALKPHOS 95 Urinalysis:      Lab Results   Component Value Date    NITRU Negative 04/06/2021    WBCUA 10-20 04/06/2021    BACTERIA Rare 04/06/2021    RBCUA 5-10 04/06/2021    BLOODU MODERATE 04/06/2021    SPECGRAV 1.010 04/06/2021    GLUCOSEU Negative 04/06/2021       Assessment/Plan:    Active Hospital Problems    Diagnosis    Hypokalemia [E87.6]    Hypomagnesemia [E83.42]    Bandemia [D72.825]    Malignant neoplasm of upper-inner quadrant of left breast in female, estrogen receptor positive (HCC) [C50.212, Z17.0]    Benign essential HTN [I10]       Generalized weakness  Likely due to dehydration and significant electrolyte abnormalities including hypokalemia/hypomagnesemia/hyponatremia. Likely decreased intake and patient on HCTZ. Also may be sequelae from immune response to first dose Covid vaccine. Continue IV fluid hydration, replenish electrolytes. Supportive care.     Hypokalemia/hypomagnesemia  Likely due to decreased intake and diuretic effect. Patient on thiazide diuretic. Hold HCTZ. Replace electrolytes as needed. Nephrology consulted/following.      Leukocytosis with bandemia (resolved)  Likely immune response from Covid vaccine. No cough, fever or shortness of breath to suggest pneumonia. No symptoms of UTI although UA does show 10-20 WBC. IV Rocephin started. Urine cx is negative, will dc abx.      Malignant neoplasm of upper-inner quadrant of left breast in female, estrogen receptor positive. Continue anastrozole.     Benign essential HTN  Controlled, continue home medication regimen except HCTZ, monitor BP.     Macrocytosis  Likely due to chronic alcohol use.        DVT Prophylaxis: Lovenox   Diet: DIET GENERAL;  Code Status: Full Code    PT/OT Eval Status: Ordered with recs for home with [facility] PT/OT    Dispo - ~1-2 days     JOHNY Mccrary - ALESSIA

## 2021-04-09 LAB
ALBUMIN SERPL-MCNC: 3.1 G/DL (ref 3.4–5)
ANION GAP SERPL CALCULATED.3IONS-SCNC: 9 MMOL/L (ref 3–16)
BUN BLDV-MCNC: 7 MG/DL (ref 7–20)
CALCIUM SERPL-MCNC: 8.6 MG/DL (ref 8.3–10.6)
CHLORIDE BLD-SCNC: 95 MMOL/L (ref 99–110)
CO2: 23 MMOL/L (ref 21–32)
CREAT SERPL-MCNC: <0.5 MG/DL (ref 0.6–1.2)
GFR AFRICAN AMERICAN: >60
GFR NON-AFRICAN AMERICAN: >60
GLUCOSE BLD-MCNC: 99 MG/DL (ref 70–99)
MAGNESIUM: 1.6 MG/DL (ref 1.8–2.4)
PHOSPHORUS: 2.7 MG/DL (ref 2.5–4.9)
POTASSIUM SERPL-SCNC: 3.7 MMOL/L (ref 3.5–5.1)
SODIUM BLD-SCNC: 127 MMOL/L (ref 136–145)

## 2021-04-09 PROCEDURE — 6360000002 HC RX W HCPCS: Performed by: REGISTERED NURSE

## 2021-04-09 PROCEDURE — 6370000000 HC RX 637 (ALT 250 FOR IP): Performed by: REGISTERED NURSE

## 2021-04-09 PROCEDURE — 2580000003 HC RX 258: Performed by: INTERNAL MEDICINE

## 2021-04-09 PROCEDURE — 36415 COLL VENOUS BLD VENIPUNCTURE: CPT

## 2021-04-09 PROCEDURE — 1200000000 HC SEMI PRIVATE

## 2021-04-09 PROCEDURE — 97110 THERAPEUTIC EXERCISES: CPT

## 2021-04-09 PROCEDURE — 97530 THERAPEUTIC ACTIVITIES: CPT

## 2021-04-09 PROCEDURE — 97116 GAIT TRAINING THERAPY: CPT

## 2021-04-09 PROCEDURE — 6360000002 HC RX W HCPCS: Performed by: INTERNAL MEDICINE

## 2021-04-09 PROCEDURE — 6370000000 HC RX 637 (ALT 250 FOR IP): Performed by: INTERNAL MEDICINE

## 2021-04-09 PROCEDURE — 80069 RENAL FUNCTION PANEL: CPT

## 2021-04-09 PROCEDURE — 83735 ASSAY OF MAGNESIUM: CPT

## 2021-04-09 PROCEDURE — 97535 SELF CARE MNGMENT TRAINING: CPT

## 2021-04-09 PROCEDURE — 6370000000 HC RX 637 (ALT 250 FOR IP): Performed by: NURSE PRACTITIONER

## 2021-04-09 RX ORDER — MAGNESIUM SULFATE IN WATER 40 MG/ML
2000 INJECTION, SOLUTION INTRAVENOUS ONCE
Status: COMPLETED | OUTPATIENT
Start: 2021-04-09 | End: 2021-04-09

## 2021-04-09 RX ORDER — TRAMADOL HYDROCHLORIDE 50 MG/1
50 TABLET ORAL EVERY 6 HOURS PRN
Status: DISCONTINUED | OUTPATIENT
Start: 2021-04-09 | End: 2021-04-10 | Stop reason: HOSPADM

## 2021-04-09 RX ADMIN — Medication 1 TABLET: at 08:57

## 2021-04-09 RX ADMIN — METOPROLOL SUCCINATE 50 MG: 50 TABLET, EXTENDED RELEASE ORAL at 08:56

## 2021-04-09 RX ADMIN — TRAMADOL HYDROCHLORIDE 50 MG: 50 TABLET, FILM COATED ORAL at 20:53

## 2021-04-09 RX ADMIN — LISINOPRIL 20 MG: 20 TABLET ORAL at 08:57

## 2021-04-09 RX ADMIN — SODIUM CHLORIDE: 9 INJECTION, SOLUTION INTRAVENOUS at 01:01

## 2021-04-09 RX ADMIN — MAGNESIUM SULFATE HEPTAHYDRATE 2000 MG: 40 INJECTION, SOLUTION INTRAVENOUS at 10:02

## 2021-04-09 RX ADMIN — ANASTROZOLE 1 MG: 1 TABLET ORAL at 08:55

## 2021-04-09 RX ADMIN — SODIUM CHLORIDE, PRESERVATIVE FREE 10 ML: 5 INJECTION INTRAVENOUS at 20:53

## 2021-04-09 RX ADMIN — TRAMADOL HYDROCHLORIDE 50 MG: 50 TABLET, FILM COATED ORAL at 13:54

## 2021-04-09 RX ADMIN — Medication 10 ML: at 09:02

## 2021-04-09 RX ADMIN — MAGNESIUM GLUCONATE 500 MG ORAL TABLET 400 MG: 500 TABLET ORAL at 13:54

## 2021-04-09 RX ADMIN — SODIUM CHLORIDE, PRESERVATIVE FREE 10 ML: 5 INJECTION INTRAVENOUS at 09:01

## 2021-04-09 RX ADMIN — HYDROCODONE BITARTRATE AND ACETAMINOPHEN 1 TABLET: 7.5; 325 TABLET ORAL at 04:19

## 2021-04-09 RX ADMIN — ENOXAPARIN SODIUM 40 MG: 40 INJECTION SUBCUTANEOUS at 08:58

## 2021-04-09 ASSESSMENT — PAIN SCALES - GENERAL
PAINLEVEL_OUTOF10: 8
PAINLEVEL_OUTOF10: 4
PAINLEVEL_OUTOF10: 6
PAINLEVEL_OUTOF10: 4
PAINLEVEL_OUTOF10: 8

## 2021-04-09 ASSESSMENT — PAIN DESCRIPTION - LOCATION: LOCATION: ABDOMEN

## 2021-04-09 NOTE — PROGRESS NOTES
Occupational Therapy    Pt refused not willing to participate at this time. Informed pt of the importance of OOB activity. Pt states her understanding, and will be willing to participate in therapy at later time, just not right now.      Grayson Trujillo OTR/L

## 2021-04-09 NOTE — PROGRESS NOTES
Physical Therapy  Facility/Department: [facility] C5 - MED SURG/ORTHO  Daily Treatment Note  NAME: Doreen Mendez  : 1955  MRN: [id number]    Date of Service: 2021    Discharge Recommendations:  Home with assist PRN, Home with Home health PT   PT Equipment Recommendations  Equipment Needed: No  Other: Pt denies need for walker at home (pt states she has a std. walker she could use if needed)    Assessment   Body structures, Functions, Activity limitations: Decreased functional mobility ; Decreased strength;Decreased endurance;Decreased balance;Decreased posture; Increased pain  Assessment: Pt ambulating increased distance with walker and performing transfers with decreased level of assist.  Recommend pt return home with assist PRN from family and home PT to assess home safety and address strength/mobility needs within home. Will continue to see pt in acute setting for strengthening and gait/stair training. Treatment Diagnosis: Decreased strength and (I) with functional mobility  Prognosis: Good  Decision Making: Medium Complexity  REQUIRES PT FOLLOW UP: Yes  Activity Tolerance  Activity Tolerance: Patient Tolerated treatment well;Patient limited by endurance; Patient limited by pain  Activity Tolerance: 131/89 BP  66 HR  100% on RA     Patient Diagnosis(es): The primary encounter diagnosis was Fall, initial encounter. Diagnoses of Hypomagnesemia, Hypokalemia, and Urinary tract infection with hematuria, site unspecified were also pertinent to this visit. has a past medical history of Arthritis, EtOH dependence ([address] HSV-2 (herpes simplex virus 2) infection, Hypertension, Kidney stone, and Malignant neoplasm of upper-inner quadrant of left breast in female, estrogen receptor positive ([facility] [address] has a past surgical history that includes Hysterectomy (); Dilation and curettage of uterus (); Colonoscopy (1999); Colonoscopy (13);  Total knee arthroplasty (Left, 2019); and Mastectomy (Left, 10/8/2019). Restrictions  Restrictions/Precautions  Restrictions/Precautions: Fall Risk, Up as Tolerated, General Precautions  Subjective   General  Chart Reviewed: Yes  Response To Previous Treatment: Patient with no complaints from previous session. Family / Caregiver Present: No  Referring Practitioner: Dr. Rowena Gonsalez: Pt agreeable to work with PT  General Comment  Comments: Pt resting in bed upon entry of therapy staff  Pain Screening  Patient Currently in Pain: Yes  Pain Assessment  Pain Assessment: 0-10  Pain Level: 4  Non-Pharmaceutical Pain Intervention(s): Ambulation/Increased Activity;Repositioned  Vital Signs  Patient Currently in Pain: Yes       Orientation  Orientation  Overall Orientation Status: Within Normal Limits  Cognition      Objective   Bed mobility  Supine to Sit: Contact guard assistance  Transfers  Sit to Stand: Supervision;Modified independent  Stand to sit: Supervision;Modified independent  Ambulation  Ambulation?: Yes  More Ambulation?: No(Pt c/o fatigue)  Ambulation 1  Surface: level tile  Device: Standard Walker  Assistance: Stand by assistance;Supervision  Quality of Gait: Pt amb with slow aftab and decreased stride length compared to baseline.   Gait Deviations: Slow Aftab;Decreased step length;Decreased step height  Distance: 75'     Balance  Posture: Fair  Sitting - Static: Good  Sitting - Dynamic: Good  Standing - Static: Good;-  Standing - Dynamic: Fair;+  Exercises  Gluteal Sets: x 15 bilat  Hip Abduction: seated clamshells x 15 BLE  Knee Long Arc Quad: x 15 BLE  Ankle Pumps: x 15 BLE             AM-PAC Score  AM-PAC Inpatient Mobility Raw Score : 19 (04/09/21 1236)  AM-PAC Inpatient T-Scale Score : 45.44 (04/09/21 1236)  Mobility Inpatient CMS 0-100% Score: 41.77 (04/09/21 1236)  Mobility Inpatient CMS G-Code Modifier : CK (04/09/21 1236)          Goals  Short term goals  Time Frame for Short term goals: 1 week, 4/14/21 (unless otherwise specified) Short term goal 1: Pt will transfer supine <-> sit with supervision/modified(I)  -4/09 cga  Short term goal 2: Pt will transfer sit <-> stand and bed>chair using least AD with supervision/modified(I) - MET 4/09/21  Short term goal 3: Pt will ambulate x 100 feet using least AD with supervision    -4/09 75' sw sba/s  Short term goal 4: Pt will ambulate up/down 6 steps using handrail(s) PRN with SBA   -4/09 NT  Short term goal 5: By 4/09/21: Pt will tolerate 12-15 reps BLE exercise for ROM/strengthening, balance, and endurance - MET 4/08/21, goal ongoing  Patient Goals   Patient goals : \"To get back to walking normally\"    Plan    Plan  Times per week: 3-5x/week in acute care  Times per day: Daily  Specific instructions for Next Treatment: Progress ther ex and mobility as tolerated, stair training prior to D/C home  Current Treatment Recommendations: Strengthening, Balance Training, Endurance Training, Functional Mobility Training, Transfer Training, Gait Training, Stair training, Home Exercise Program, Safety Education & Training, Equipment Evaluation, Education, & procurement  Safety Devices  Type of devices:  All fall risk precautions in place, Left in chair, Call light within reach, Chair alarm in place, Nurse notified, Gait belt, Patient at risk for falls     Therapy Time   Individual Concurrent Group Co-treatment   Time In 0925         Time Out 1003         Minutes 38         Timed Code Treatment Minutes: [address]

## 2021-04-09 NOTE — PROGRESS NOTES
Occupational Therapy  Facility/Department: [facility] C5 - MED SURG/ORTHO  Daily Treatment Note  NAME: Franca Douglas  : 1955  MRN: [id number]    Date of Service: 2021    Discharge Recommendations:  Home with Home health OT, 24 hour supervision or assist       Assessment   Performance deficits / Impairments: Decreased endurance;Decreased ADL status; Decreased balance;Decreased high-level IADLs;Decreased functional mobility   Assessment: Pt continues to be limited by the flank pain from fall. Pt able to manage ambulating house hold distance with SW and completing simple ADLs with SBA-Supervision. Pt demos impaired activty tolerance and SOB, requires seated rest breaks during prolong activities. Pt will continue to benefit from increased skilled OT while in hospital.  Activity Tolerance  Activity Tolerance: Patient limited by fatigue  Activity Tolerance: /80, HR 78, O2 93% prior to activity, following activity 89%, quickly increased following pursed lip breathing cues. Safety Devices  Safety Devices in place: Yes  Type of devices: Left in chair;Call light within reach; Chair alarm in place;Nurse notified         Patient Diagnosis(es): The primary encounter diagnosis was Fall, initial encounter. Diagnoses of Hypomagnesemia, Hypokalemia, and Urinary tract infection with hematuria, site unspecified were also pertinent to this visit. has a past medical history of Arthritis, EtOH dependence ([address] HSV-2 (herpes simplex virus 2) infection, Hypertension, Kidney stone, and Malignant neoplasm of upper-inner quadrant of left breast in female, estrogen receptor positive ([facility] [address] has a past surgical history that includes Hysterectomy (); Dilation and curettage of uterus (); Colonoscopy (1999); Colonoscopy (13); Total knee arthroplasty (Left, 2019); and Mastectomy (Left, 10/8/2019).     Restrictions  Restrictions/Precautions  Restrictions/Precautions: Fall Risk, Up as Tolerated, General Precautions  Subjective   General  Chart Reviewed: Yes, Orders, Progress Notes, Labs  Patient assessed for rehabilitation services?: Yes  Response to previous treatment: Patient with no complaints from previous session  Family / Caregiver Present: No  Referring Practitioner: Joanie Haque MD 4/07/21  Diagnosis: AIDEN, dehydration  Subjective  Subjective: Pt resting in bed, agreeable to OT treatment. Pain Assessment  Pain Assessment: 0-10  Pain Level: 5  Pain Type: Acute pain  Pain Location: Abdomen;Flank  Pain Orientation: Right  Pain Descriptors: Aching  Non-Pharmaceutical Pain Intervention(s): Ambulation/Increased Activity;Repositioned  Vital Signs  Patient Currently in Pain: Yes   Orientation  Orientation  Overall Orientation Status: Within Functional Limits  Objective    ADL  Grooming: Stand by assistance(while standing with RW at sink)  Toileting: Supervision(pt able to manage clothing and perineal hygiene with supervision)        Balance  Sitting Balance: Supervision  Standing Balance: Contact guard assistance(SBA-CGA with SW)  Standing Balance  Time: 2-3 minutes  Activity: ambulation to/from bathroom with SW. Standing at sink for ADLs  Comment: with SW  Functional Mobility  Functional - Mobility Device: Standard Walker  Activity: To/from bathroom  Assist Level: Contact guard assistance  Functional Mobility Comments: pt fatigued easily when walking to/from bathroom. O2 dropped to 89%.  Cues for deep breathing while seated, O2 quickly increased to 92%  Toilet Transfers  Toilet - Technique: Ambulating  Equipment Used: Standard toilet  Toilet Transfer: Stand by assistance  Bed mobility  Rolling to Left: Unable to assess(Pt sitting in recliner upon OT arrival)  Transfers  Sit to stand: Contact guard assistance  Stand to sit: Contact guard assistance  Transfer Comments: cues for hand placement              Cognition  Overall Cognitive Status: [facility]                 Plan   Plan  Times per week: 3-5x's while in acute care    AM-PAC Score        AM-PAC Inpatient Daily Activity Raw Score: 17 (04/09/21 1311)  AM-PAC Inpatient ADL T-Scale Score : 37.26 (04/09/21 1311)  ADL Inpatient CMS 0-100% Score: 50.11 (04/09/21 1311)  ADL Inpatient CMS G-Code Modifier : CK (04/09/21 1311)    Goals  Short term goals  Time Frame for Short term goals: 1 week unless otherwise specified 4/14  Short term goal 1: Pt will complete toilet transfers with SBA by 4/10 (goal met 4/9)  Short term goal 2: Pt will complete LE dressing with Anna--ongoing 4/08/21  Short term goal 3: Pt will complete standing level ADLs with SBA for balance(goal met 4/9)  Patient Goals   Patient goals : \"to be able to go home and get back to work\"       Therapy Time   Individual Concurrent Group Co-treatment   Time In 1140         Time Out 1205         Minutes 25         Timed Code Treatment Minutes: [facility], OT

## 2021-04-09 NOTE — CARE COORDINATION
CM met with pt at bedside once again to discuss therapy recs for home PRN and PT/OT pt is in agreement with home PT/OT. Pt has no agency preference and OK with referral to [facility]. Spoke to Margoth Simpson with [facility].  Cm following-Lindsey Burks RN

## 2021-04-09 NOTE — CARE COORDINATION
[facility]    Referral received from  to follow for home care services.    [facility] unable to staff timely   Referral accepted by [facility]; Arin Gonzales contact    [address], [place]  [facility] [phone]

## 2021-04-09 NOTE — PROGRESS NOTES
NEPHROLOGY PROGRESS NOTE    CC: hyponatremia, hypokalemia, hypomagnesemia    HPI  Admitted with weakness, multiple electrolyte abnormalities    SUBJECTIVE  Up in chair   Eating very well  No CP or SOB  No N/V    SOC: no visitors      Scheduled Meds:   magnesium sulfate  2,000 mg Intravenous Once    anastrozole  1 mg Oral Daily    metoprolol succinate  50 mg Oral Daily    therapeutic multivitamin-minerals  1 tablet Oral Daily    lisinopril  20 mg Oral Daily    sodium chloride flush  10 mL Intravenous 2 times per day    enoxaparin  40 mg Subcutaneous Daily    sodium chloride flush  5-40 mL Intravenous 2 times per day     Continuous Infusions:   sodium chloride 25 mL (04/06/21 1937)    sodium chloride       PRN Meds:hydrALAZINE, sodium chloride flush, sodium chloride, promethazine **OR** ondansetron, polyethylene glycol, acetaminophen **OR** acetaminophen, sodium chloride flush, sodium chloride, LORazepam **OR** LORazepam **OR** LORazepam **OR** LORazepam **OR** LORazepam **OR** LORazepam **OR** LORazepam **OR** LORazepam      Objective:      Physical Exam  Wt Readings from Last 3 Encounters:   04/06/21 190 lb (86.2 kg)   09/09/20 193 lb (87.5 kg)   06/10/20 190 lb (86.2 kg)     Temp Readings from Last 3 Encounters:   04/09/21 98.1 °F (36.7 °C) (Oral)   09/09/20 97.8 °F (36.6 °C) (Tympanic)   06/10/20 98.2 °F (36.8 °C) (Infrared)     BP Readings from Last 3 Encounters:   04/09/21 (!) 151/88   09/09/20 126/78   06/10/20 100/60     Pulse Readings from Last 3 Encounters:   04/09/21 77   09/09/20 75   06/10/20 77     General appearance:  No apparent distress, appears stated age and cooperative. HEENT:  Normal cephalic, atraumatic without obvious deformity. Pupils equal, round, and reactive to light.  Extra ocular muscles intact. Conjunctivae/corneas clear. Neck: Supple, with full range of motion. No jugular venous distention. Trachea midline. Respiratory:  Normal respiratory effort.  Clear to auscultation, bilaterally without Rales/Wheezes/Rhonchi. Cardiovascular:  Regular rate and rhythm with normal S1/S2 without murmurs, rubs or gallops. Abdomen: Soft, non-tender, non-distended with normal bowel sounds. Musculoskeletal: Generalized weakness, no clubbing, cyanosis or edema bilaterally.  Full range of motion without deformity. Skin: Skin color, texture, turgor normal.  No rashes or lesions. Neurologic: grossly non-focal.      Lab Review   Lab Results   Component Value Date    WBC 9.4 04/08/2021    HGB 10.3 (L) 04/08/2021    HCT 28.9 (L) 04/08/2021    .2 (H) 04/08/2021     04/08/2021     Lab Results   Component Value Date     04/09/2021    K 3.7 04/09/2021    K 2.9 04/07/2021    CL 95 04/09/2021    CO2 23 04/09/2021    BUN 7 04/09/2021    CREATININE <0.5 04/09/2021    GLUCOSE 99 04/09/2021    CALCIUM 8.6 04/09/2021          Patient Active Problem List    Diagnosis Date Noted    Hypokalemia 04/06/2021    Hypomagnesemia 04/06/2021    Bandemia 04/06/2021    Abnormal mammogram 10/16/2019    Malignant neoplasm of upper-inner quadrant of left breast in female, estrogen receptor positive ([facility] [address] 09/23/2019    Status post total left knee replacement 05/23/2019    Benign essential HTN 02/13/2019       ASSESSMENT AND PLAN     1. Hyponatremia- in setting of poor po intake for solid foods, possibly related to beer potomania ; also on HCTZ at home  -stopped HCTZ, would not resume  -Na improving with  iv NS; stop iv fluids today  2. Hypokalemia-related to poor po intake, and HCTZ  -supplement as needed  3. Hypomagnesemia- start po Mag today  4. Breast ca  -estrogen receptor positive.   -Continue anastrozole.   5.Benign essential HTN  -on lisinopril  -do not resume HCTZ; if diuretic will be needed in the future, use loop diuretic instead  6. ETOH

## 2021-04-09 NOTE — PROGRESS NOTES
Hospitalist Progress Note      PCP: Cookie Vaughn MD    Date of Admission: 4/6/2021    Chief Complaint: Generalized weakness     Hospital Course:   77 y.o. female who presented to [facility] with above complaints. Patient presented to the ED today with generalized weakness. She reports she got Pfizer Covid vaccine first dose yesterday. She was doing well until today morning when she got out of bed she did not feel well. Reports that when she was coming out of the shower she felt really weak and got down and fell, but did not hit her head or lose consciousness. She just could not get back on her feet so she crawled all the way to the kitchen and called her daughter who in turn called EMS. She denies any subjective fevers chills or rigors. No shortness of breath. No cough. No dysuria urgency or frequency. She reports she has chronic diarrhea. Denies chest pain, palpitations or lightheadedness. She does report over the past few days she has just not been feeling quite her usual self with loss of appetite and not eating and drinking as well. Subjective:   Pt is on RA. Afebrile. VSS. No specific complaints currently.         Medications:  Reviewed    Infusion Medications    sodium chloride 25 mL (04/06/21 1937)    sodium chloride       Scheduled Medications    anastrozole  1 mg Oral Daily    metoprolol succinate  50 mg Oral Daily    therapeutic multivitamin-minerals  1 tablet Oral Daily    lisinopril  20 mg Oral Daily    sodium chloride flush  10 mL Intravenous 2 times per day    enoxaparin  40 mg Subcutaneous Daily    sodium chloride flush  5-40 mL Intravenous 2 times per day     PRN Meds: hydrALAZINE, sodium chloride flush, sodium chloride, promethazine **OR** ondansetron, polyethylene glycol, acetaminophen **OR** acetaminophen, sodium chloride flush, sodium chloride, LORazepam **OR** LORazepam **OR** LORazepam **OR** LORazepam **OR** LORazepam **OR** LORazepam **OR** LORazepam **OR** LORazepam No intake or output data in the 24 hours ending 04/09/21 1311    Physical Exam Performed:    BP (!) 151/88   Pulse 77   Temp 98.1 °F (36.7 °C) (Oral)   Resp 16   Ht 5' 2\" (1.575 m)   Wt 190 lb (86.2 kg)   LMP  (LMP Unknown)   SpO2 94%   BMI 34.75 kg/m²     General appearance: No apparent distress, appears stated age and cooperative. HEENT: Pupils equal, round, and reactive to light. Conjunctivae/corneas clear. Neck: Supple, with full range of motion. No jugular venous distention. Trachea midline. Respiratory:  Normal respiratory effort. Clear to auscultation, bilaterally without Rales/Wheezes/Rhonchi. Cardiovascular: Regular rate and rhythm with normal S1/S2 without murmurs, rubs or gallops. Abdomen: Soft, non-tender, non-distended with normal bowel sounds. Musculoskeletal: No clubbing, cyanosis or edema bilaterally. Full range of motion without deformity. Skin: Skin color, texture, turgor normal.  No rashes or lesions. Neurologic:  Neurovascularly intact without any focal sensory/motor deficits.  Cranial nerves: II-XII intact, grossly non-focal.  Psychiatric: Alert and oriented, thought content appropriate, normal insight  Capillary Refill: Brisk,< 3 seconds   Peripheral Pulses: +2 palpable, equal bilaterally       Labs:   Recent Labs     04/06/21  1447 04/07/21  0659 04/08/21  0620   WBC 12.5* 11.9* 9.4   HGB 10.8* 9.8* 10.3*   HCT 30.4* 27.1* 28.9*    198 225     Recent Labs     04/07/21  0659 04/08/21  0620 04/09/21  0845   * 125* 127*   K 2.9* 3.9 3.7   CL 88* 92* 95*   CO2 26 25 23   BUN 8 8 7   CREATININE 0.6 <0.5* <0.5*   CALCIUM 8.4 8.5 8.6   PHOS  --  2.5 2.7     Recent Labs     04/06/21  1447   AST 28   ALT 13   BILITOT 1.4*   ALKPHOS 95     Urinalysis:      Lab Results   Component Value Date    NITRU Negative 04/06/2021    WBCUA 10-20 04/06/2021    BACTERIA Rare 04/06/2021    RBCUA 5-10 04/06/2021    BLOODU MODERATE 04/06/2021    SPECGRAV 1.010 04/06/2021    GLUCOSEU Negative 04/06/2021       Assessment/Plan:    Active Hospital Problems    Diagnosis    Hypokalemia [E87.6]    Hypomagnesemia [E83.42]    Bandemia [D72.825]    Malignant neoplasm of upper-inner quadrant of left breast in female, estrogen receptor positive (HCC) [C50.212, Z17.0]    Benign essential HTN [I10]       Generalized weakness  Likely due to dehydration and significant electrolyte abnormalities including hypokalemia/hypomagnesemia/hyponatremia. Likely decreased intake and patient on HCTZ. Also may be sequelae from immune response to first dose COVID vaccine. S/p IV fluid hydration. PT/OT evaluations.      Hypokalemia/hypomagnesemia  Likely due to decreased intake and diuretic effect. Patient on thiazide diuretic. Hold HCTZ. Replace electrolytes as needed. Nephrology consulted/following.      Leukocytosis with bandemia (resolved)  Likely immune response from Covid vaccine. No cough, fever or shortness of breath to suggest pneumonia. No symptoms of UTI although UA does show 10-20 WBC. IV Rocephin started. Urine cx is negative, will dc abx.      Malignant neoplasm of upper-inner quadrant of left breast in female, estrogen receptor positive. Continue anastrozole.     Benign essential HTN  Controlled, continue home medication regimen except HCTZ, monitor BP.     Macrocytosis  Likely due to chronic alcohol use.        DVT Prophylaxis: Lovenox   Diet: DIET GENERAL;  Code Status: Full Code    PT/OT Eval Status: Ordered with recs for [place] [facility]    Dispo - Likely home tomorrow 4/10    [address] [place] [address], APRN - CNP

## 2021-04-10 VITALS
TEMPERATURE: 98.1 F | BODY MASS INDEX: 34.96 KG/M2 | DIASTOLIC BLOOD PRESSURE: 72 MMHG | SYSTOLIC BLOOD PRESSURE: 133 MMHG | HEIGHT: 62 IN | WEIGHT: 190 LBS | HEART RATE: 67 BPM | OXYGEN SATURATION: 97 % | RESPIRATION RATE: 20 BRPM

## 2021-04-10 LAB
ALBUMIN SERPL-MCNC: 3.2 G/DL (ref 3.4–5)
ANION GAP SERPL CALCULATED.3IONS-SCNC: 9 MMOL/L (ref 3–16)
BUN BLDV-MCNC: 8 MG/DL (ref 7–20)
CALCIUM SERPL-MCNC: 9.1 MG/DL (ref 8.3–10.6)
CHLORIDE BLD-SCNC: 95 MMOL/L (ref 99–110)
CO2: 25 MMOL/L (ref 21–32)
CREAT SERPL-MCNC: <0.5 MG/DL (ref 0.6–1.2)
GFR AFRICAN AMERICAN: >60
GFR NON-AFRICAN AMERICAN: >60
GLUCOSE BLD-MCNC: 99 MG/DL (ref 70–99)
MAGNESIUM: 1.5 MG/DL (ref 1.8–2.4)
PHOSPHORUS: 2.9 MG/DL (ref 2.5–4.9)
POTASSIUM SERPL-SCNC: 3.6 MMOL/L (ref 3.5–5.1)
SODIUM BLD-SCNC: 129 MMOL/L (ref 136–145)

## 2021-04-10 PROCEDURE — 2580000003 HC RX 258: Performed by: INTERNAL MEDICINE

## 2021-04-10 PROCEDURE — 6370000000 HC RX 637 (ALT 250 FOR IP): Performed by: INTERNAL MEDICINE

## 2021-04-10 PROCEDURE — 83735 ASSAY OF MAGNESIUM: CPT

## 2021-04-10 PROCEDURE — 36415 COLL VENOUS BLD VENIPUNCTURE: CPT

## 2021-04-10 PROCEDURE — 6360000002 HC RX W HCPCS: Performed by: INTERNAL MEDICINE

## 2021-04-10 PROCEDURE — 6370000000 HC RX 637 (ALT 250 FOR IP): Performed by: REGISTERED NURSE

## 2021-04-10 PROCEDURE — 80069 RENAL FUNCTION PANEL: CPT

## 2021-04-10 RX ORDER — LISINOPRIL 20 MG/1
20 TABLET ORAL DAILY
Qty: 30 TABLET | Refills: 3 | Status: SHIPPED | OUTPATIENT
Start: 2021-04-11 | End: 2021-08-12 | Stop reason: SDUPTHER

## 2021-04-10 RX ADMIN — SODIUM CHLORIDE, PRESERVATIVE FREE 10 ML: 5 INJECTION INTRAVENOUS at 08:06

## 2021-04-10 RX ADMIN — METOPROLOL SUCCINATE 50 MG: 50 TABLET, EXTENDED RELEASE ORAL at 08:05

## 2021-04-10 RX ADMIN — ANASTROZOLE 1 MG: 1 TABLET ORAL at 08:05

## 2021-04-10 RX ADMIN — Medication 10 ML: at 08:07

## 2021-04-10 RX ADMIN — Medication 1 TABLET: at 08:05

## 2021-04-10 RX ADMIN — ENOXAPARIN SODIUM 40 MG: 40 INJECTION SUBCUTANEOUS at 08:06

## 2021-04-10 RX ADMIN — MAGNESIUM GLUCONATE 500 MG ORAL TABLET 400 MG: 500 TABLET ORAL at 08:04

## 2021-04-10 RX ADMIN — TRAMADOL HYDROCHLORIDE 50 MG: 50 TABLET, FILM COATED ORAL at 08:04

## 2021-04-10 RX ADMIN — LISINOPRIL 20 MG: 20 TABLET ORAL at 08:04

## 2021-04-10 ASSESSMENT — PAIN DESCRIPTION - ORIENTATION: ORIENTATION: RIGHT

## 2021-04-10 ASSESSMENT — PAIN SCALES - GENERAL: PAINLEVEL_OUTOF10: 6

## 2021-04-10 NOTE — PLAN OF CARE
Problem: Pain:  Goal: Pain level will decrease  Description: Pain level will decrease  Outcome: Ongoing   Pain frequently assessed. See MAR. Staff assisting with repositioning.

## 2021-04-10 NOTE — DISCHARGE SUMMARY
Hospital Medicine Discharge Summary    Patient ID: Yamil Bogdan      Patient's PCP: Rosemary Coles MD    Admit Date: 4/6/2021     Discharge Date:   4/10/2021    Admitting Physician: Denny Preciado MD     Discharge Physician: Darshana Muniz, APRN - CNP     Discharge Diagnoses: Active Hospital Problems    Diagnosis    Hypokalemia [E87.6]    Hypomagnesemia [E83.42]    Bandemia [D72.825]    Malignant neoplasm of upper-inner quadrant of left breast in female, estrogen receptor positive ([facility] [address] [C50.212, Z17.0]    Benign essential HTN [I10]       The patient was seen and examined on day of discharge and this discharge summary is in conjunction with any daily progress note from day of discharge. Hospital Course:   77 y.o. female who presented to [facility] with complaints of generalized weakness. She reports she got Pfizer Covid vaccine first dose yesterday. She was doing well until this morning when she got out of bed and did not feel well. Reports that when she was coming out of the shower, she felt really weak and got down and fell, but did not hit her head or lose consciousness. She just could not get back on her feet so she crawled all the way to the kitchen and called her daughter who in turn called EMS. She denies any subjective fevers chills or rigors. No shortness of breath. No cough. No dysuria urgency or frequency. She reports she has chronic diarrhea. Denies chest pain, palpitations or lightheadedness. She does report over the past few days she has just not been feeling quite her usual self with loss of appetite and not eating and drinking as well. Generalized weakness (clinically improved)  - Likely due to dehydration and significant electrolyte abnormalities including hypokalemia/hypomagnesemia/hyponatremia. Likely decreased intake and patient on HCTZ. Also may be sequelae from immune response to first dose COVID vaccine.  S/p IV fluid hydration. PT/OT evaluations [place] [id number].      Hypokalemia/hypomagnesemia (improved)  -Likely due to decreased intake and diuretic effect. Patient on thiazide diuretic. Hold HCTZ. Replace electrolytes. Nephrology consulted.      Leukocytosis with bandemia (resolved)  - Likely immune response from Covid vaccine. No cough, fever or shortness of breath to suggest pneumonia. No symptoms of UTI although UA does show 10-20 WBC. IV Rocephin started. Urine cx is negative, dc'd abx.      Malignant neoplasm of upper-inner quadrant of left breast in female, estrogen receptor positive. - Continue her home anastrozole.     Benign essential HTN  - Controlled, continue home medication regimen except HCTZ, monitor BP.     Macrocytosis  - Likely due to chronic alcohol use.     Obesity   - With Body mass index is 34.75 kg/m². Complicating assessment and treatment. Placing patient at risk for multiple co-morbidities as well as early death and contributing to the patient's presentation. Counseled on weight loss. Physical Exam Performed:     /72   Pulse 67   Temp 98.1 °F (36.7 °C) (Oral)   Resp 20   Ht 5' 2\" (1.575 m)   Wt 190 lb (86.2 kg)   LMP  (LMP Unknown)   SpO2 97%   BMI 34.75 kg/m²       General appearance:  No apparent distress, appears stated age and cooperative. HEENT:  Normal cephalic, atraumatic without obvious deformity. Pupils equal, round, and reactive to light. Extra ocular muscles intact. Conjunctivae/corneas clear. Neck: Supple, with full range of motion. No jugular venous distention. Trachea midline. Respiratory:  Normal respiratory effort. Clear to auscultation, bilaterally without Rales/Wheezes/Rhonchi. Cardiovascular:  Regular rate and rhythm with normal S1/S2 without murmurs, rubs or gallops. Abdomen: Soft, non-tender, non-distended with normal bowel sounds. Musculoskeletal:  No clubbing, cyanosis or edema bilaterally. Full range of motion without deformity.   Skin: Skin color, texture, turgor normal.  No CAPS Take by mouth      Multiple Vitamins-Minerals (THERAPEUTIC MULTIVITAMIN-MINERALS) tablet Take 1 tablet by mouth daily             Time Spent on discharge is more than 45 minutes in the examination, evaluation, counseling and review of medications and discharge plan. Signed:    JOHNY Beltran - CNP   4/10/2021      Thank you Laura Kelly MD for the opportunity to be involved in this patient's care. If you have any questions or concerns please feel free to contact me at [phone].

## 2021-04-10 NOTE — PROGRESS NOTES
NEPHROLOGY PROGRESS NOTE    CC: hyponatremia, hypokalemia, hypomagnesemia    HPI  Admitted with weakness, multiple electrolyte abnormalities    SUBJECTIVE  Up in chair   Eating very well  No CP or SOB  No N/V    SOC: no visitors      Scheduled Meds:   magnesium oxide  400 mg Oral BID    anastrozole  1 mg Oral Daily    metoprolol succinate  50 mg Oral Daily    therapeutic multivitamin-minerals  1 tablet Oral Daily    lisinopril  20 mg Oral Daily    sodium chloride flush  10 mL Intravenous 2 times per day    enoxaparin  40 mg Subcutaneous Daily    sodium chloride flush  5-40 mL Intravenous 2 times per day     Continuous Infusions:   sodium chloride 25 mL (04/06/21 1937)    sodium chloride       PRN Meds:traMADol, hydrALAZINE, sodium chloride flush, sodium chloride, promethazine **OR** ondansetron, polyethylene glycol, acetaminophen **OR** acetaminophen, sodium chloride flush, sodium chloride, LORazepam **OR** LORazepam **OR** LORazepam **OR** LORazepam **OR** LORazepam **OR** LORazepam **OR** LORazepam **OR** LORazepam      Objective:      Physical Exam  Wt Readings from Last 3 Encounters:   04/06/21 190 lb (86.2 kg)   09/09/20 193 lb (87.5 kg)   06/10/20 190 lb (86.2 kg)     Temp Readings from Last 3 Encounters:   04/10/21 98.1 °F (36.7 °C) (Oral)   09/09/20 97.8 °F (36.6 °C) (Tympanic)   06/10/20 98.2 °F (36.8 °C) (Infrared)     BP Readings from Last 3 Encounters:   04/10/21 133/72   09/09/20 126/78   06/10/20 100/60     Pulse Readings from Last 3 Encounters:   04/10/21 67   09/09/20 75   06/10/20 77     General appearance:  No apparent distress, appears stated age and cooperative. HEENT:  Normal cephalic, atraumatic without obvious deformity. Pupils equal, round, and reactive to light.  Extra ocular muscles intact. Conjunctivae/corneas clear. Neck: Supple, with full range of motion. No jugular venous distention. Trachea midline. Respiratory:  Normal respiratory effort.  Clear to auscultation,

## 2021-04-12 ENCOUNTER — CARE COORDINATION (OUTPATIENT)
Dept: CASE MANAGEMENT | Age: 66
End: 2021-04-12

## 2021-04-13 ENCOUNTER — CARE COORDINATION (OUTPATIENT)
Dept: CASE MANAGEMENT | Age: 66
End: 2021-04-13

## 2021-04-13 DIAGNOSIS — E83.42 HYPOMAGNESEMIA: Primary | ICD-10-CM

## 2021-04-13 PROCEDURE — 1111F DSCHRG MED/CURRENT MED MERGE: CPT | Performed by: FAMILY MEDICINE

## 2021-04-13 NOTE — CARE COORDINATION
[facility] Transitions Initial Follow Up Call    Call within 2 business days of discharge: Yes    Patient: Carmella Villafuerte Patient : 1955   MRN: [id number]  Discharge Date: 4/10/21 RARS: Readmission Risk Score: 15      Last Discharge [facility]       Complaint Diagnosis Description Type Department Provider    21 Fatigue; Fall Fall, initial encounter . .. ED to Hosp-Admission (Discharged) (ADMITTED) [place] C5 Ashanti Leal MD; Sanjana Leung. .. Second attempt made to reach patient for initial post hospital transition call. VM left stating purpose of call along with my contact information requesting a return call. Episode closed due to unsuccessful contact    Patient called CTN back after receiving voicemail. Patient feeling well and working on getting stronger and physical therapy exercises. Patient had not heard from home care agency yet, but has placed a call to agency. Patient completed full medication reconciliation and taking all medication as directed. Denies any acute needs at present time. Agreeable to f/u calls. Educated on the use of urgent care or physicians 24 hr access line if assistance is needed after hours. CTN provided contact information for future needs. Was this an external facility discharge? No Discharge Facility: na    Challenges to be reviewed by the provider   Additional needs identified to be addressed with provider No  none             Method of communication with provider : phone    Advance Care Planning:   Does patient have an Advance Directive:  not on file. Was this a readmission? No  Patients top risk factors for readmission: medical condition    Care Transition Nurse (CTN) contacted the patient by telephone to perform post hospital discharge assessment. Verified name and  with patient as identifiers. Provided introduction to self, and explanation of the CTN role.      CTN reviewed discharge instructions, medical action plan and red flags with patient who verbalized understanding. Patient given an opportunity to ask questions and does not have any further questions or concerns at this time. Were discharge instructions available to patient? Yes. Reviewed appropriate site of care based on symptoms and resources available to patient including: PCP and Specialist. The patient agrees to contact the PCP office for questions related to their healthcare. Medication reconciliation was performed with patient, who verbalizes understanding of administration of home medications. Advised obtaining a 90-day supply of all daily and as-needed medications. Discussed follow-up appointments. If no appointment was previously scheduled, appointment scheduling offered: No. Is follow up appointment scheduled within 7 days of discharge? Yes      Plan for follow-up call in 7-10 days based on severity of symptoms and risk factors.                   Care Transitions 24 Hour Call    Care Transitions Interventions         Follow Up  Future Appointments   Date Time Provider Diego Trujillo   9/15/2021 11:00 AM [facility] EG [facility] [place] [facility]   9/15/2021 11:30 AM Marlene Edgar MD [facility]       Valeria Garcia, RN

## 2021-04-19 ENCOUNTER — HOSPITAL ENCOUNTER (INPATIENT)
Age: 66
LOS: 2 days | Discharge: ANOTHER HOSPITAL | DRG: 871 | End: 2021-04-21
Attending: EMERGENCY MEDICINE | Admitting: INTERNAL MEDICINE
Payer: COMMERCIAL

## 2021-04-19 ENCOUNTER — APPOINTMENT (OUTPATIENT)
Dept: GENERAL RADIOLOGY | Age: 66
DRG: 871 | End: 2021-04-19
Payer: COMMERCIAL

## 2021-04-19 ENCOUNTER — APPOINTMENT (OUTPATIENT)
Dept: CT IMAGING | Age: 66
DRG: 871 | End: 2021-04-19
Payer: COMMERCIAL

## 2021-04-19 DIAGNOSIS — J18.9 PNEUMONIA DUE TO ORGANISM: ICD-10-CM
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
## 2021-04-19 DIAGNOSIS — E87.1 HYPONATREMIA: ICD-10-CM

## 2021-04-19 DIAGNOSIS — R09.02 HYPOXIA: Primary | ICD-10-CM

## 2021-04-19 DIAGNOSIS — E83.42 HYPOMAGNESEMIA: ICD-10-CM

## 2021-04-19 DIAGNOSIS — E87.6 HYPOKALEMIA: ICD-10-CM

## 2021-04-19 DIAGNOSIS — R53.1 GENERAL WEAKNESS: ICD-10-CM

## 2021-04-19 PROBLEM — J96.91 RESPIRATORY FAILURE WITH HYPOXIA (HCC): Status: ACTIVE | Noted: 2021-04-19

## 2021-04-19 LAB
A/G RATIO: 0.9 (ref 1.1–2.2)
ALBUMIN SERPL-MCNC: 3.6 G/DL (ref 3.4–5)
ALP BLD-CCNC: 74 U/L (ref 40–129)
ALT SERPL-CCNC: 10 U/L (ref 10–40)
AMORPHOUS: ABNORMAL /HPF
AMPHETAMINE SCREEN, URINE: NORMAL
ANION GAP SERPL CALCULATED.3IONS-SCNC: 13 MMOL/L (ref 3–16)
AST SERPL-CCNC: 33 U/L (ref 15–37)
BACTERIA: ABNORMAL /HPF
BARBITURATE SCREEN URINE: NORMAL
BASOPHILS ABSOLUTE: 0.1 K/UL (ref 0–0.2)
BASOPHILS RELATIVE PERCENT: 0.6 %
BENZODIAZEPINE SCREEN, URINE: NORMAL
BILIRUB SERPL-MCNC: 1.3 MG/DL (ref 0–1)
BILIRUBIN URINE: NEGATIVE
BLOOD, URINE: NEGATIVE
BUN BLDV-MCNC: 7 MG/DL (ref 7–20)
CALCIUM SERPL-MCNC: 9.7 MG/DL (ref 8.3–10.6)
CANNABINOID SCREEN URINE: NORMAL
CHLORIDE BLD-SCNC: 87 MMOL/L (ref 99–110)
CLARITY: ABNORMAL
CO2: 28 MMOL/L (ref 21–32)
COCAINE METABOLITE SCREEN URINE: NORMAL
COLOR: YELLOW
CREAT SERPL-MCNC: <0.5 MG/DL (ref 0.6–1.2)
EKG ATRIAL RATE: 89 BPM
EKG DIAGNOSIS: NORMAL
EKG P AXIS: 46 DEGREES
EKG P-R INTERVAL: 186 MS
EKG Q-T INTERVAL: 378 MS
EKG QRS DURATION: 80 MS
EKG QTC CALCULATION (BAZETT): 459 MS
EKG R AXIS: 30 DEGREES
EKG T AXIS: -17 DEGREES
EKG VENTRICULAR RATE: 89 BPM
EOSINOPHILS ABSOLUTE: 0 K/UL (ref 0–0.6)
EOSINOPHILS RELATIVE PERCENT: 0 %
EPITHELIAL CELLS, UA: ABNORMAL /HPF (ref 0–5)
ETHANOL: NORMAL MG/DL (ref 0–0.08)
GFR AFRICAN AMERICAN: >60
GFR NON-AFRICAN AMERICAN: >60
GLOBULIN: 4.2 G/DL
GLUCOSE BLD-MCNC: 128 MG/DL (ref 70–99)
GLUCOSE URINE: NEGATIVE MG/DL
HCT VFR BLD CALC: 31.3 % (ref 36–48)
HEMATOLOGY PATH CONSULT: NO
HEMOGLOBIN: 11 G/DL (ref 12–16)
KETONES, URINE: NEGATIVE MG/DL
LEUKOCYTE ESTERASE, URINE: ABNORMAL
LYMPHOCYTES ABSOLUTE: 0.7 K/UL (ref 1–5.1)
LYMPHOCYTES RELATIVE PERCENT: 5 %
Lab: NORMAL
MAGNESIUM: 1.1 MG/DL (ref 1.8–2.4)
MCH RBC QN AUTO: 38.2 PG (ref 26–34)
MCHC RBC AUTO-ENTMCNC: 35.2 G/DL (ref 31–36)
MCV RBC AUTO: 108.6 FL (ref 80–100)
METHADONE SCREEN, URINE: NORMAL
MICROSCOPIC EXAMINATION: YES
MONOCYTES ABSOLUTE: 1.6 K/UL (ref 0–1.3)
MONOCYTES RELATIVE PERCENT: 10.9 %
NEUTROPHILS ABSOLUTE: 12.3 K/UL (ref 1.7–7.7)
NEUTROPHILS RELATIVE PERCENT: 83.5 %
NITRITE, URINE: NEGATIVE
OPIATE SCREEN URINE: NORMAL
OXYCODONE URINE: NORMAL
PDW BLD-RTO: 13.7 % (ref 12.4–15.4)
PH UA: 7.5
PH UA: 7.5 (ref 5–8)
PHENCYCLIDINE SCREEN URINE: NORMAL
PLATELET # BLD: 263 K/UL (ref 135–450)
PMV BLD AUTO: 8.4 FL (ref 5–10.5)
POTASSIUM REFLEX MAGNESIUM: 3 MMOL/L (ref 3.5–5.1)
PROCALCITONIN: 0.09 NG/ML (ref 0–0.15)
PROPOXYPHENE SCREEN: NORMAL
PROTEIN UA: NEGATIVE MG/DL
RBC # BLD: 2.88 M/UL (ref 4–5.2)
RBC UA: ABNORMAL /HPF (ref 0–4)
SARS-COV-2, NAAT: NOT DETECTED
SODIUM BLD-SCNC: 128 MMOL/L (ref 136–145)
SPECIFIC GRAVITY UA: 1.01 (ref 1–1.03)
TOTAL CK: 61 U/L (ref 26–192)
TOTAL PROTEIN: 7.8 G/DL (ref 6.4–8.2)
TROPONIN: <0.01 NG/ML
TROPONIN: <0.01 NG/ML
URINE REFLEX TO CULTURE: YES
URINE TYPE: ABNORMAL
UROBILINOGEN, URINE: 0.2 E.U./DL
WBC # BLD: 14.8 K/UL (ref 4–11)
WBC UA: ABNORMAL /HPF (ref 0–5)

## 2021-04-19 PROCEDURE — 99285 EMERGENCY DEPT VISIT HI MDM: CPT

## 2021-04-19 PROCEDURE — 85025 COMPLETE CBC W/AUTO DIFF WBC: CPT

## 2021-04-19 PROCEDURE — 87086 URINE CULTURE/COLONY COUNT: CPT

## 2021-04-19 PROCEDURE — 87186 SC STD MICRODIL/AGAR DIL: CPT

## 2021-04-19 PROCEDURE — 80053 COMPREHEN METABOLIC PANEL: CPT

## 2021-04-19 PROCEDURE — 83735 ASSAY OF MAGNESIUM: CPT

## 2021-04-19 PROCEDURE — 6370000000 HC RX 637 (ALT 250 FOR IP): Performed by: INTERNAL MEDICINE

## 2021-04-19 PROCEDURE — 87088 URINE BACTERIA CULTURE: CPT

## 2021-04-19 PROCEDURE — 81001 URINALYSIS AUTO W/SCOPE: CPT

## 2021-04-19 PROCEDURE — 71260 CT THORAX DX C+: CPT

## 2021-04-19 PROCEDURE — 84145 PROCALCITONIN (PCT): CPT

## 2021-04-19 PROCEDURE — 6370000000 HC RX 637 (ALT 250 FOR IP): Performed by: NURSE PRACTITIONER

## 2021-04-19 PROCEDURE — 80307 DRUG TEST PRSMV CHEM ANLYZR: CPT

## 2021-04-19 PROCEDURE — 6360000002 HC RX W HCPCS: Performed by: NURSE PRACTITIONER

## 2021-04-19 PROCEDURE — 6360000002 HC RX W HCPCS: Performed by: INTERNAL MEDICINE

## 2021-04-19 PROCEDURE — 93005 ELECTROCARDIOGRAM TRACING: CPT | Performed by: NURSE PRACTITIONER

## 2021-04-19 PROCEDURE — 2580000003 HC RX 258: Performed by: NURSE PRACTITIONER

## 2021-04-19 PROCEDURE — 84484 ASSAY OF TROPONIN QUANT: CPT

## 2021-04-19 PROCEDURE — 93010 ELECTROCARDIOGRAM REPORT: CPT | Performed by: INTERNAL MEDICINE

## 2021-04-19 PROCEDURE — 87635 SARS-COV-2 COVID-19 AMP PRB: CPT

## 2021-04-19 PROCEDURE — 6360000004 HC RX CONTRAST MEDICATION: Performed by: NURSE PRACTITIONER

## 2021-04-19 PROCEDURE — 2580000003 HC RX 258: Performed by: INTERNAL MEDICINE

## 2021-04-19 PROCEDURE — 82550 ASSAY OF CK (CPK): CPT

## 2021-04-19 PROCEDURE — 87040 BLOOD CULTURE FOR BACTERIA: CPT

## 2021-04-19 PROCEDURE — 71045 X-RAY EXAM CHEST 1 VIEW: CPT

## 2021-04-19 PROCEDURE — 82077 ASSAY SPEC XCP UR&BREATH IA: CPT

## 2021-04-19 PROCEDURE — 1200000000 HC SEMI PRIVATE

## 2021-04-19 RX ORDER — ANASTROZOLE 1 MG/1
1 TABLET ORAL DAILY
Status: DISCONTINUED | OUTPATIENT
Start: 2021-04-20 | End: 2021-04-22 | Stop reason: HOSPADM

## 2021-04-19 RX ORDER — MAGNESIUM SULFATE IN WATER 40 MG/ML
2000 INJECTION, SOLUTION INTRAVENOUS ONCE
Status: COMPLETED | OUTPATIENT
Start: 2021-04-19 | End: 2021-04-19

## 2021-04-19 RX ORDER — LISINOPRIL 20 MG/1
20 TABLET ORAL DAILY
Status: DISCONTINUED | OUTPATIENT
Start: 2021-04-20 | End: 2021-04-22 | Stop reason: HOSPADM

## 2021-04-19 RX ORDER — IPRATROPIUM BROMIDE AND ALBUTEROL SULFATE 2.5; .5 MG/3ML; MG/3ML
1 SOLUTION RESPIRATORY (INHALATION) EVERY 4 HOURS PRN
Status: DISCONTINUED | OUTPATIENT
Start: 2021-04-19 | End: 2021-04-22 | Stop reason: HOSPADM

## 2021-04-19 RX ORDER — SODIUM CHLORIDE 0.9 % (FLUSH) 0.9 %
5-40 SYRINGE (ML) INJECTION PRN
Status: DISCONTINUED | OUTPATIENT
Start: 2021-04-19 | End: 2021-04-22 | Stop reason: HOSPADM

## 2021-04-19 RX ORDER — ACETAMINOPHEN 325 MG/1
650 TABLET ORAL EVERY 6 HOURS PRN
Status: DISCONTINUED | OUTPATIENT
Start: 2021-04-19 | End: 2021-04-22 | Stop reason: HOSPADM

## 2021-04-19 RX ORDER — POTASSIUM CHLORIDE 20 MEQ/1
40 TABLET, EXTENDED RELEASE ORAL ONCE
Status: COMPLETED | OUTPATIENT
Start: 2021-04-19 | End: 2021-04-19

## 2021-04-19 RX ORDER — METOPROLOL SUCCINATE 50 MG/1
50 TABLET, EXTENDED RELEASE ORAL DAILY
Status: DISCONTINUED | OUTPATIENT
Start: 2021-04-20 | End: 2021-04-22 | Stop reason: HOSPADM

## 2021-04-19 RX ORDER — SODIUM CHLORIDE 0.9 % (FLUSH) 0.9 %
5-40 SYRINGE (ML) INJECTION EVERY 12 HOURS SCHEDULED
Status: DISCONTINUED | OUTPATIENT
Start: 2021-04-19 | End: 2021-04-22 | Stop reason: HOSPADM

## 2021-04-19 RX ORDER — SODIUM CHLORIDE 9 MG/ML
25 INJECTION, SOLUTION INTRAVENOUS PRN
Status: DISCONTINUED | OUTPATIENT
Start: 2021-04-19 | End: 2021-04-22 | Stop reason: HOSPADM

## 2021-04-19 RX ORDER — HYDROCODONE BITARTRATE AND ACETAMINOPHEN 7.5; 325 MG/1; MG/1
1 TABLET ORAL EVERY 6 HOURS PRN
Status: COMPLETED | OUTPATIENT
Start: 2021-04-19 | End: 2021-04-20

## 2021-04-19 RX ORDER — PROMETHAZINE HYDROCHLORIDE 25 MG/1
12.5 TABLET ORAL EVERY 6 HOURS PRN
Status: DISCONTINUED | OUTPATIENT
Start: 2021-04-19 | End: 2021-04-22 | Stop reason: HOSPADM

## 2021-04-19 RX ORDER — ONDANSETRON 2 MG/ML
4 INJECTION INTRAMUSCULAR; INTRAVENOUS EVERY 6 HOURS PRN
Status: DISCONTINUED | OUTPATIENT
Start: 2021-04-19 | End: 2021-04-22 | Stop reason: HOSPADM

## 2021-04-19 RX ORDER — VALACYCLOVIR HYDROCHLORIDE 500 MG/1
500 TABLET, FILM COATED ORAL DAILY
Status: DISCONTINUED | OUTPATIENT
Start: 2021-04-20 | End: 2021-04-22 | Stop reason: HOSPADM

## 2021-04-19 RX ORDER — POLYETHYLENE GLYCOL 3350 17 G/17G
17 POWDER, FOR SOLUTION ORAL DAILY PRN
Status: DISCONTINUED | OUTPATIENT
Start: 2021-04-19 | End: 2021-04-22 | Stop reason: HOSPADM

## 2021-04-19 RX ORDER — SODIUM CHLORIDE 9 MG/ML
INJECTION, SOLUTION INTRAVENOUS CONTINUOUS
Status: DISCONTINUED | OUTPATIENT
Start: 2021-04-19 | End: 2021-04-20

## 2021-04-19 RX ORDER — ACETAMINOPHEN 650 MG/1
650 SUPPOSITORY RECTAL EVERY 6 HOURS PRN
Status: DISCONTINUED | OUTPATIENT
Start: 2021-04-19 | End: 2021-04-22 | Stop reason: HOSPADM

## 2021-04-19 RX ADMIN — CEFTRIAXONE SODIUM 1000 MG: 1 INJECTION, POWDER, FOR SOLUTION INTRAMUSCULAR; INTRAVENOUS at 20:15

## 2021-04-19 RX ADMIN — POTASSIUM BICARBONATE 40 MEQ: 782 TABLET, EFFERVESCENT ORAL at 21:57

## 2021-04-19 RX ADMIN — SODIUM CHLORIDE, PRESERVATIVE FREE 10 ML: 5 INJECTION INTRAVENOUS at 22:01

## 2021-04-19 RX ADMIN — MAGNESIUM GLUCONATE 500 MG ORAL TABLET 400 MG: 500 TABLET ORAL at 22:15

## 2021-04-19 RX ADMIN — MAGNESIUM SULFATE HEPTAHYDRATE 2000 MG: 40 INJECTION, SOLUTION INTRAVENOUS at 21:57

## 2021-04-19 RX ADMIN — POTASSIUM CHLORIDE 40 MEQ: 1500 TABLET, EXTENDED RELEASE ORAL at 15:11

## 2021-04-19 RX ADMIN — IOPAMIDOL 75 ML: 755 INJECTION, SOLUTION INTRAVENOUS at 18:09

## 2021-04-19 RX ADMIN — HYDROCODONE BITARTRATE AND ACETAMINOPHEN 1 TABLET: 7.5; 325 TABLET ORAL at 22:18

## 2021-04-19 RX ADMIN — MAGNESIUM GLUCONATE 500 MG ORAL TABLET 400 MG: 500 TABLET ORAL at 15:11

## 2021-04-19 RX ADMIN — SODIUM CHLORIDE: 9 INJECTION, SOLUTION INTRAVENOUS at 21:56

## 2021-04-19 ASSESSMENT — PAIN SCALES - GENERAL: PAINLEVEL_OUTOF10: 7

## 2021-04-19 NOTE — ED NOTES
Unable to stick for blood cultures, attempting to find another person to try blood cultures     Alycia Ashraf RN  04/19/21 1934

## 2021-04-19 NOTE — ED NOTES
One set of blood cultures gotten, unable to get 2nd set, [place] aware.      Reza Huerta RN  04/19/21 1948

## 2021-04-19 NOTE — ED NOTES
RN rounded on pt. Pt medicated as ordered and given something to drink. Pt VS updated and as charted. Pt has no further needs at this time. Pt resting in bed, call light within reach. Pt denies any questions.         Lynn Palmer RN  04/19/21 0312

## 2021-04-19 NOTE — ED PROVIDER NOTES
[facility]  ED  EMERGENCY DEPARTMENT ENCOUNTER        Pt Name: Reagan Veloz  MRN: [id number]  [place] 1955  Date of evaluation: 4/19/2021  Provider: JOHNY Kearney - ALESSIA  PCP: Connor López MD     I have seen and evaluated this patient with my supervising physician Dr. Kelsey Patterson       Chief Complaint   Patient presents with    Fall     Patient states she was seen here for the same issue 2 weeks ago, she fell due to legs going out on her. Denies LOC or pain. HISTORY OF PRESENT ILLNESS   (Location, Timing/Onset, Context/Setting, Quality, Duration, Modifying Factors, Severity, Associated Signs and Symptoms)  Note limiting factors. Reagan Veloz is a 77 y.o. female history of arthritis, alcohol dependence, HSV-2, hypertension, kidney stone, left breast cancer with a left mastectomy, hysterectomy who presents the ED with complaints of weakness in her legs and fall prior to arrival.  Patient states that she fell this morning and was on the ground for approximately 30 minutes until she was able to get to her phone to call EMS for lift assist.  States that she was standing at the kitchen cabinet and getting ready to Chug to make some breakfast.  She said all of a sudden her legs felt weak and she just went down and landed against the cabinets. She denies any head trauma or LOC. Denies any pain into her head, neck, back, or bilateral lower extremities. States she was seen a few weeks ago for similar symptoms whenever she had weakness in her legs and she fell. Patient lives home alone and no one was there to help. She had been using a cane a few weeks ago, however thought she was feeling better and has not been using the cane. In hindsight patient notes she probably should have been using a cane to ambulate and this may have prevented her fall.   She denies any associated numbness, tingling, weakness, headache, chest pain, improved)  - Likely due to dehydration and significant electrolyte abnormalities including hypokalemia/hypomagnesemia/hyponatremia. Likely decreased intake and patient on HCTZ. Also may be sequelae from immune response to first dose COVID vaccine. S/p IV fluid hydration. PT/OT evaluations with recs for HHC.      Hypokalemia/hypomagnesemia (improved)  -Likely due to decreased intake and diuretic effect. Patient on thiazide diuretic. Hold HCTZ. Replace electrolytes. Nephrology consulted.      Leukocytosis with bandemia (resolved)  - Likely immune response from Covid vaccine. No cough, fever or shortness of breath to suggest pneumonia. No symptoms of UTI although UA does show 10-20 WBC. IV Rocephin started. Urine cx is negative, dc'd abx.      Malignant neoplasm of upper-inner quadrant of left breast in female, estrogen receptor positive. - Continue her home anastrozole.     Benign essential HTN  - Controlled, continue home medication regimen except HCTZ, monitor BP.     Macrocytosis  - Likely due to chronic alcohol use.      Obesity   - With Body mass index is 34.75 kg/m². Complicating assessment and treatment. Placing patient at risk for multiple co-morbidities as well as early death and contributing to the patient's presentation. Counseled on weight loss.        REVIEW OF SYSTEMS    (2-9 systems for level 4, 10 or more for level 5)     Review of Systems    Positives and Pertinent negatives as per HPI. Except as noted above in the ROS, all other systems were reviewed and negative.        PAST MEDICAL HISTORY     Past Medical History:   Diagnosis Date    Arthritis     [facility] [address]     HSV-2 (herpes simplex virus 2) infection     Hypertension     Tx since late 45s    Kidney stone     Malignant neoplasm of upper-inner quadrant of left breast in female, estrogen receptor positive ([facility] [address] 9/23/2019         SURGICAL HISTORY     Past Surgical History:   Procedure Laterality Date    COLONOSCOPY  02/16/1999 Rectal ulcer, s/p cautery    COLONOSCOPY  11    next due 10 yrs   [facility]    Ovaries in. Had fibroids, DUB.  MASTECTOMY Left 10/8/2019    LEFT BREAST NEEDLE LOCALIZATION, PARTIAL MASTECTOMY performed by Uli Horton MD at [address] Left 2019    LEFT TOTAL KNEE REPLACEMENT            ROTHMAN & NEPHEW performed by Ernestina Funes MD at [facility]       Previous Medications    ANASTROZOLE (ARIMIDEX) 1 MG TABLET        BIOTIN 1 MG CAPS    Take by mouth    LISINOPRIL (PRINIVIL;ZESTRIL) 20 MG TABLET    Take 1 tablet by mouth daily    MAGNESIUM OXIDE (MAG-OX) 400 (241.3 MG) MG TABS TABLET    Take 1 tablet by mouth 2 times daily    METOPROLOL SUCCINATE (TOPROL XL) 50 MG EXTENDED RELEASE TABLET    Take 1 tablet by mouth daily    MULTIPLE VITAMINS-MINERALS (THERAPEUTIC MULTIVITAMIN-MINERALS) TABLET    Take 1 tablet by mouth daily    VALACYCLOVIR (VALTREX) 500 MG TABLET    TAKE 1 TABLET BY MOUTH DAILY         ALLERGIES     Patient has no known allergies.     FAMILYHISTORY       Family History   Problem Relation Age of Onset    Cancer Father 62        Lung    Hypertension Father     Cancer Brother 36        Bladder,  64    Diabetes Other     Heart Failure Other     Breast Cancer Maternal Aunt [de-identified]          SOCIAL HISTORY       Social History     Tobacco Use    Smoking status: Former Smoker     Packs/day: 1.50     Years: 5.00     Pack years: 7.50     Quit date: 1977     Years since quittin.3    Smokeless tobacco: Never Used   Substance Use Topics    Alcohol use: Yes     Drinks per session: 3 or 4     Comment: occ    Drug use: No       SCREENINGS    [place] Coma Scale  Eye Opening: Spontaneous  Best Verbal Response: Oriented  Best Motor Response: Obeys commands  Jose F Coma Scale Score: 15        PHYSICAL EXAM    (up to 7 for level 4, 8 or more for level 5)     ED Triage Vitals [04/19/21 1234]   Enc Vitals Group      BP (!) 159/75      Pulse 86      Resp 25      Temp 99.4 °F (37.4 °C)      Temp Source Oral      SpO2 94 %      Weight 180 lb (81.6 kg)      Height 5' 2\" (1.575 m)       Physical Exam  Vitals signs and nursing note reviewed. Constitutional:       General: She is awake. Appearance: Normal appearance. She is well-developed. She is obese. HENT:      Head: Normocephalic and atraumatic. Nose: Nose normal.   Eyes:      General:         Right eye: No discharge. Left eye: No discharge. Neck:      Musculoskeletal: Full passive range of motion without pain and normal range of motion. No spinous process tenderness or muscular tenderness. Cardiovascular:      Rate and Rhythm: Normal rate and regular rhythm. Pulses:           Dorsalis pedis pulses are 2+ on the right side and 2+ on the left side. Heart sounds: Normal heart sounds. Pulmonary:      Effort: Pulmonary effort is normal. No respiratory distress. Breath sounds: Normal breath sounds. Chest:      Chest wall: No tenderness. Abdominal:      General: Bowel sounds are normal.      Palpations: Abdomen is soft. Tenderness: There is no abdominal tenderness. Musculoskeletal: Normal range of motion. Thoracic back: She exhibits no tenderness and no bony tenderness. Lumbar back: She exhibits no tenderness and no bony tenderness. Right lower leg: No edema. Left lower leg: No edema. Skin:     General: Skin is warm and dry. Coloration: Skin is not pale. Neurological:      General: No focal deficit present. Mental Status: She is alert and oriented to person, place, and time. Cranial Nerves: Cranial nerves are intact. Sensory: Sensation is intact. Motor: Motor function is intact. Coordination: Coordination is intact. Gait: Gait is intact. Comments: Pedal pushes 5/5.   SLR negative bilateral.   Psychiatric:         Behavior: Behavior normal. Behavior is cooperative.          DIAGNOSTIC RESULTS   LABS:    Labs Reviewed   CBC WITH AUTO DIFFERENTIAL - Abnormal; Notable for the following components:       Result Value    WBC 14.8 (*)     RBC 2.88 (*)     Hemoglobin 11.0 (*)     Hematocrit 31.3 (*)     .6 (*)     MCH 38.2 (*)     Neutrophils Absolute 12.3 (*)     Lymphocytes Absolute 0.7 (*)     Monocytes Absolute 1.6 (*)     All other components within normal limits    Narrative:     Performed at:  [place] [address] [employer]   Phone [phone]   COMPREHENSIVE METABOLIC PANEL W/ REFLEX TO MG FOR LOW K - Abnormal; Notable for the following components:    Sodium 128 (*)     Potassium reflex Magnesium 3.0 (*)     Chloride 87 (*)     Glucose 128 (*)     CREATININE <0.5 (*)     Albumin/Globulin Ratio 0.9 (*)     Total Bilirubin 1.3 (*)     All other components within normal limits    Narrative:     Performed at:  [address] [place] [address] [employer]   Phone [phone]   URINE RT REFLEX TO CULTURE - Abnormal; Notable for the following components:    Clarity, UA SL CLOUDY (*)     Leukocyte Esterase, Urine SMALL (*)     All other components within normal limits    Narrative:     Performed at:  [address] [place] [address] [employer]   Phone [phone]   MAGNESIUM - Abnormal; Notable for the following components:    Magnesium 1.10 (*)     All other components within normal limits    Narrative:     Performed at:  [address] [place] [address] [facility]   Phone [phone]   MICROSCOPIC URINALYSIS - Abnormal; Notable for the following components:    WBC, UA 10-20 (*)     Epithelial Cells, UA 11-20 (*)     Bacteria, UA 1+ (*)     All other components within normal limits    Narrative:     Performed at:  [facility]  [address],  [place], [place] [employer]   Phone [phone], RAPID    Narrative:     Performed at:  [facility]) [address] [place] [employer]   Phone [phone]   CULTURE, URINE   CULTURE, BLOOD 1   CULTURE, BLOOD 2   ETHANOL    Narrative:     Performed at:  [facility]) [address] [place] [employer]   Phone [phone]   URINE DRUG SCREEN    Narrative:     Performed at:  [facility]) [place] [address] [employer]   Phone [phone]   CK    Narrative:     Performed at:  [address] [place] [employer]   Phone [phone]   TROPONIN    Narrative:     Performed at:  [address] [place] [employer]   Phone [phone]   TROPONIN   TROPONIN   TROPONIN       All other labs were within normal range or not returned as of this dictation. EKG: All EKG's are interpreted by the Emergency Department Physician in the absence of a cardiologist.  Please see their note for interpretation of EKG. RADIOLOGY:   Non-plain film images such as CT, Ultrasound and MRI are read by the radiologist. Plain radiographic images are visualized and preliminarily interpreted by the ED Provider with the below findings:        Interpretation per the Radiologist below, if available at the time of this note:    CT CHEST PULMONARY EMBOLISM W CONTRAST   Final Result   1. Negative for pulmonary embolus. 2. Mild bibasilar segmental atelectasis versus pneumonia. XR CHEST PORTABLE   Final Result   No acute cardiopulmonary disease. No results found.         PROCEDURES   Unless otherwise noted below, none     Procedures    CRITICAL CARE TIME   N/A    CONSULTS:  IP CONSULT TO HOSPITALIST      EMERGENCY DEPARTMENT COURSE and DIFFERENTIAL DIAGNOSIS/MDM:   Vitals:    Vitals:    04/19/21 [id number] 04/19/21 1640 04/19/21 1753 04/19/21 1921   BP: (!) 180/89  (!) 155/84 (!) 149/78   Pulse: 85  85 78   Resp: 23 27 19   Temp:    99.6 °F (37.6 °C)   TempSrc:    Oral   SpO2: 98% (!) 85% 99% 100%   Weight:       Height:           Patient was given the following medications:  Medications   cefTRIAXone (ROCEPHIN) 1000 mg IVPB in 50 mL D5W minibag (has no administration in time range)   azithromycin (ZITHROMAX) 500 mg in D5W 250ml addavial (has no administration in time range)   perflutren lipid microspheres (DEFINITY) injection 1.65 mg (has no administration in time range)   potassium chloride (KLOR-CON M) extended release tablet 40 mEq (40 mEq Oral Given 4/19/21 1511)   magnesium oxide (MAG-OX) tablet 400 mg (400 mg Oral Given 4/19/21 1511)   iopamidol (ISOVUE-370) 76 % injection 75 mL (75 mLs Intravenous Given 4/19/21 1809)           Pertinent Labs & Imaging studies reviewed. (See chart for details)   -  Patient seen and evaluated in the emergency department. -  Triage and nursing notes reviewed and incorporated. -  Old chart records reviewed and incorporated. -  Patient case discussed with attending physician,  Dr. Martina Colvin. They saw and examined patient. -  Differential diagnosis includes:  Pneumonia, costochondritis, pleurisy, dehydration, sepsis, UTI, PE, bronchitis, costochondritis, CAD, pericarditis, cardiac tamponade, DVT, ACS, versus COVID-19  -  Work-up included:  See above ethanol level, UDS, CK, CBC, CMP, troponin, UA, EKG, CXR, CT chest pulmonary embolism, blood culture x2, rapid Covid  -  ED treatment included:  Potassium, magnesium, Rocephin, Zithromax  - Consults: Hospitalist, Dr. aPm Cordero  -  Results discussed with patient. Labs show  CBC with WBC 14.8, RBC 2.88, hemoglobin 11, hematocrit 31.3, .6, MCH 38.2, absolute neutrophils 12.3, absolute lymphocytes 0.7, absolute monos 1.6.   CMP with sodium 128, potassium 3, chloride 87, glucose 128, creatinine less than 0.5, total bili 1.3.  Troponin negative. Magnesium 1.1. Ethanol level not detected. CK 61. Patient got up and attempted to ambulate. While on oxygen 2 L her oxygen level did drop to 85%. Patient states this is abnormal she is usually not on oxygen at home. Nurse that she was barely able to stand at the bedside for a few seconds without feeling weak in her legs and needing to sit down. Patient notes that this is a new finding is usually she is able to ambulate and has not had any difficulty breathing. COVID-19 is not detected. UDS is unremarkable. Patient states she is feeling very weak whenever she gets up and attempts to stand to the bedside commode. Informed on need for admission for further testing and treatment. The patient is agreeable with plan of care and disposition.  -  Disposition:    Admission      FINAL IMPRESSION      1. Hypoxia    2. Fall, initial encounter    3. Hypokalemia    4. Hyponatremia    5. Hypomagnesemia    6. General weakness    7.  Pneumonia due to organism          DISPOSITION/PLAN   DISPOSITION    Admission         (Please note that portions of this note were completed with a voice recognition program.  Efforts were made to edit the dictations but occasionally words are mis-transcribed.)    JOHNY Reis CNP (electronically signed)            JOHNY Reis CNP  04/19/21 [facility], APRN - CNP  04/19/21 3952 Attending Only

## 2021-04-19 NOTE — ED PROVIDER NOTES
I independently performed a history and physical on Reagan Veloz. All diagnostic, treatment, and disposition decisions were made by myself in conjunction with the advanced practice provider. I have participated in the medical decision making and directed the treatment plan and disposition of the patient. For further details of [address] [place] [address] emergency department encounter, please see the advanced practice provider's documentation. CHIEF COMPLAINT  Chief Complaint   Patient presents with    Fall     Patient states she was seen here for the same issue 2 weeks ago, she fell due to legs going out on her. Denies LOC or pain. Briefly, Reagan Veloz is a 77 y.o. female  who presents to the ED complaining of fall generalized weakness fatigue    FOCUSED PHYSICAL EXAMINATION  BP (!) 149/78   Pulse 78   Temp 99.6 °F (37.6 °C) (Oral)   Resp 19   Ht 5' 2\" (1.575 m)   Wt 180 lb (81.6 kg)   LMP  (LMP Unknown)   SpO2 100%   BMI 32.92 kg/m²      Focused physical examination:  General appearance:  Cooperative. No acute distress. Skin:  Warm. Dry. Eye:  Extraocular movements intact. Ears, nose, mouth and throat:  Oral mucosa moist,  Neck:  Trachea midline. Heart:  Regular rate and rhythm  Perfusion:  intact  Respiratory:  Lungs clear to auscultation bilaterally. Respirations nonlabored. Abdominal:   Non distended. Nontender  Neurological:  Alert and oriented x 3. Moves all extremities spontaneously  Musculoskeletal:   Normal ROM, no deformities          Psychiatric:  Normal mood      EKG: Sinus rhythm premature atrial complexes rate of 89 bpm.  Inferior flipped T waves in lead III and aVF. No ST elevation. Compared to prior EKG from 4/6/2021 similar in appearance. MDM: Patient presents emergency department today for fall generalized weakness. Found to be hypoxic here with an oxygen requirement. Work-up concerning for hyponatremia hypokalemia hypomagnesemia.   Electrolytes

## 2021-04-19 NOTE — H&P
Hospital Medicine History & Physical      PCP: Connor López MD    Date of Admission: 4/19/2021    Date of Service: Pt seen/examined on 4/19/2021 and Admitted to Inpatient with expected LOS greater than two midnights due to medical therapy. .    Chief Complaint: Generalized weakness and fall      History Of Present Illness:      77 y.o. female who presented to [facility] with above complaint. She told she was very weak this morning and fell while she was working in [employer]. She did not lose consciousness and does no apparent injuries. She was found to be hypoxic at the emergency room on arrival.  Her saturation was 85% room air. Currently she is on oxygen and comfortable. Her appetite is not that great. She denies nausea vomiting diarrhea abdominal pain chest pain cough or fever. She received 1 Covid vaccine 2 weeks ago and got some complications like generalized weakness and extreme tiredness. Past Medical History:          Diagnosis Date    Arthritis     [facility] dependence ([facility] [address]     HSV-2 (herpes simplex virus 2) infection     Hypertension     Tx since late 45s    Kidney stone     Malignant neoplasm of upper-inner quadrant of left breast in female, estrogen receptor positive ([facility] [address] 9/23/2019       Past Surgical History:          Procedure Laterality Date    COLONOSCOPY  02/16/1999    Rectal ulcer, s/p cautery    COLONOSCOPY  4/19/13    next due 10 yrs   [facility]    Ovaries in. Had fibroids, DUB.  MASTECTOMY Left 10/8/2019    LEFT BREAST NEEDLE LOCALIZATION, PARTIAL MASTECTOMY performed by Lillian Vasquez MD at [place] [address] Left 5/23/2019    LEFT TOTAL KNEE REPLACEMENT            ROTHMAN & NEPHEW performed by Wilda Tamayo MD at [facility]       Medications Prior to Admission:      Prior to Admission medications    Medication Sig Start Date End Date Taking?  Authorizing Provider   lisinopril cooperative. Obese, on oxygen  HEENT:  Normal cephalic, atraumatic without obvious deformity. Pupils equal, round, and reactive to light. Extra ocular muscles intact. Conjunctivae/corneas clear. Neck: Supple, with full range of motion. No jugular venous distention. Trachea midline. Respiratory:  Normal respiratory effort. Clear to auscultation, bilaterally without Rales/Wheezes/Rhonchi. Cardiovascular:  Regular rate and rhythm with normal S1/S2 with  murmurs, no rubs or gallops. Abdomen: Soft, non-tender, non-distended with normal bowel sounds. Obese  Musculoskeletal:  No clubbing, cyanosis or edema bilaterally. Full range of motion without deformity. Skin: Skin color, texture, turgor normal.  Rash over the forehead she has a history of chronic eczema no skin opening. Neurologic:  Neurovascularly intact without any focal sensory/motor deficits. Cranial nerves: II-XII intact, grossly non-focal.  Psychiatric:  Alert and oriented, thought content appropriate, normal insight  Capillary Refill: Brisk,3 seconds, normal  Peripheral Pulses: +2 palpable, equal bilaterally       Labs:     Recent Labs     04/19/21  1311   WBC 14.8*   HGB 11.0*   HCT 31.3*        Recent Labs     04/19/21  1311   *   K 3.0*   CL 87*   CO2 28   BUN 7   CREATININE <0.5*   CALCIUM 9.7     Recent Labs     04/19/21  1311   AST 33   ALT 10   BILITOT 1.3*   ALKPHOS 74     No results for input(s): INR in the last 72 hours.   Recent Labs     04/19/21  1311   CKTOTAL 61   TROPONINI <0.01       Urinalysis:      Lab Results   Component Value Date    NITRU Negative 04/19/2021    WBCUA 10-20 04/19/2021    BACTERIA 1+ 04/19/2021    RBCUA 0-2 04/19/2021    BLOODU Negative 04/19/2021    SPECGRAV 1.015 04/19/2021    GLUCOSEU Negative 04/19/2021       Radiology:     CXR: I have reviewed the CXR with the following interpretation:   EKG:  I have reviewed the EKG with the following interpretation: Sinus rhythm with Premature atrial complexesT wave abnormality, consider inferior ischemiaAbnormal ECGConfirmed by Jhoan Arnold MD, TOPHER     CT CHEST PULMONARY EMBOLISM W CONTRAST   Final Result   1. Negative for pulmonary embolus. 2. Mild bibasilar segmental atelectasis versus pneumonia. XR CHEST PORTABLE   Final Result   No acute cardiopulmonary disease. ASSESSMENT:    Active Hospital Problems    Diagnosis Date Noted    Respiratory failure with hypoxia [facility]) [J96.91] 04/19/2021         PLAN:    Multifocal pneumonia with leukocytosis-admit, septic work-up, Rocephin and Zithromax, breathing treatment as needed    Acute hypoxic respiratory failure-secondary to above, CTPA ruled out PE and no apparent symptoms or signs of heart failure, titrate  oxygen, echocardiogram, Covid PCR, droplet plus isolation    Hyponatremia, hypokalemia, hypomagnesemia-most probably secondary to poor intake--replace and monitor    Generalized weakness and fall-possibly secondary to all of the above    Hypertension-lisinopril, metoprolol breast cancer    -Breast cancer-tamoxifen    Chronic [id number] as an outpatient    Abnormal UA-possible UTI ,follow-up urine culture -currently on Rocephin    Obesity-counseled            DVT Prophylaxis: Lovenox  Diet: 2 g sodium  Code Status: Full    PT/OT Eval Status:     Dispo -admitted to Angel Webster MD    Thank you Augustus Campo MD for the opportunity to be involved in this patient's care. If you have any questions or concerns please feel free to contact me at [phone].

## 2021-04-20 ENCOUNTER — CARE COORDINATION (OUTPATIENT)
Dept: CASE MANAGEMENT | Age: 66
End: 2021-04-20

## 2021-04-20 LAB
ALBUMIN SERPL-MCNC: 3.6 G/DL (ref 3.4–5)
ANION GAP SERPL CALCULATED.3IONS-SCNC: 10 MMOL/L (ref 3–16)
ANION GAP SERPL CALCULATED.3IONS-SCNC: 8 MMOL/L (ref 3–16)
ANION GAP SERPL CALCULATED.3IONS-SCNC: 9 MMOL/L (ref 3–16)
BANDED NEUTROPHILS RELATIVE PERCENT: 11 % (ref 0–7)
BASOPHILS ABSOLUTE: 0.1 K/UL (ref 0–0.2)
BASOPHILS RELATIVE PERCENT: 1 %
BUN BLDV-MCNC: 10 MG/DL (ref 7–20)
BUN BLDV-MCNC: 10 MG/DL (ref 7–20)
BUN BLDV-MCNC: 9 MG/DL (ref 7–20)
CALCIUM SERPL-MCNC: 9 MG/DL (ref 8.3–10.6)
CALCIUM SERPL-MCNC: 9.1 MG/DL (ref 8.3–10.6)
CALCIUM SERPL-MCNC: 9.2 MG/DL (ref 8.3–10.6)
CHLORIDE BLD-SCNC: 84 MMOL/L (ref 99–110)
CHLORIDE BLD-SCNC: 88 MMOL/L (ref 99–110)
CHLORIDE BLD-SCNC: 89 MMOL/L (ref 99–110)
CHLORIDE URINE RANDOM: 36 MMOL/L
CO2: 27 MMOL/L (ref 21–32)
CO2: 28 MMOL/L (ref 21–32)
CO2: 30 MMOL/L (ref 21–32)
CREAT SERPL-MCNC: 0.6 MG/DL (ref 0.6–1.2)
CREAT SERPL-MCNC: <0.5 MG/DL (ref 0.6–1.2)
CREAT SERPL-MCNC: <0.5 MG/DL (ref 0.6–1.2)
EOSINOPHILS ABSOLUTE: 0.3 K/UL (ref 0–0.6)
EOSINOPHILS RELATIVE PERCENT: 3 %
GFR AFRICAN AMERICAN: >60
GFR NON-AFRICAN AMERICAN: >60
GLUCOSE BLD-MCNC: 102 MG/DL (ref 70–99)
GLUCOSE BLD-MCNC: 154 MG/DL (ref 70–99)
GLUCOSE BLD-MCNC: 179 MG/DL (ref 70–99)
HCT VFR BLD CALC: 29.4 % (ref 36–48)
HEMOGLOBIN: 10.2 G/DL (ref 12–16)
LYMPHOCYTES ABSOLUTE: 1 K/UL (ref 1–5.1)
LYMPHOCYTES RELATIVE PERCENT: 9 %
MAGNESIUM: 1.7 MG/DL (ref 1.8–2.4)
MCH RBC QN AUTO: 38.1 PG (ref 26–34)
MCHC RBC AUTO-ENTMCNC: 34.9 G/DL (ref 31–36)
MCV RBC AUTO: 109.3 FL (ref 80–100)
MONOCYTES ABSOLUTE: 0.8 K/UL (ref 0–1.3)
MONOCYTES RELATIVE PERCENT: 7 %
NEUTROPHILS ABSOLUTE: 8.6 K/UL (ref 1.7–7.7)
NEUTROPHILS RELATIVE PERCENT: 69 %
PDW BLD-RTO: 13.5 % (ref 12.4–15.4)
PHOSPHORUS: 2.3 MG/DL (ref 2.5–4.9)
PHOSPHORUS: 3.4 MG/DL (ref 2.5–4.9)
PLATELET # BLD: 245 K/UL (ref 135–450)
PMV BLD AUTO: 8.6 FL (ref 5–10.5)
POTASSIUM REFLEX MAGNESIUM: 2.6 MMOL/L (ref 3.5–5.1)
POTASSIUM SERPL-SCNC: 3.1 MMOL/L (ref 3.5–5.1)
POTASSIUM SERPL-SCNC: 3.5 MMOL/L (ref 3.5–5.1)
POTASSIUM, UR: 32.1 MMOL/L
PRO-BNP: 935 PG/ML (ref 0–124)
RBC # BLD: 2.69 M/UL (ref 4–5.2)
RBC # BLD: NORMAL 10*6/UL
SODIUM BLD-SCNC: 122 MMOL/L (ref 136–145)
SODIUM BLD-SCNC: 125 MMOL/L (ref 136–145)
SODIUM BLD-SCNC: 126 MMOL/L (ref 136–145)
SODIUM URINE: 34 MMOL/L
SODIUM URINE: 43 MMOL/L
TROPONIN: 0.01 NG/ML
TROPONIN: <0.01 NG/ML
WBC # BLD: 10.8 K/UL (ref 4–11)

## 2021-04-20 PROCEDURE — 83880 ASSAY OF NATRIURETIC PEPTIDE: CPT

## 2021-04-20 PROCEDURE — U0005 INFEC AGEN DETEC AMPLI PROBE: HCPCS

## 2021-04-20 PROCEDURE — 2580000003 HC RX 258: Performed by: INTERNAL MEDICINE

## 2021-04-20 PROCEDURE — 6360000002 HC RX W HCPCS: Performed by: NURSE PRACTITIONER

## 2021-04-20 PROCEDURE — 6370000000 HC RX 637 (ALT 250 FOR IP): Performed by: INTERNAL MEDICINE

## 2021-04-20 PROCEDURE — 84100 ASSAY OF PHOSPHORUS: CPT

## 2021-04-20 PROCEDURE — 84133 ASSAY OF URINE POTASSIUM: CPT

## 2021-04-20 PROCEDURE — 83935 ASSAY OF URINE OSMOLALITY: CPT

## 2021-04-20 PROCEDURE — 36415 COLL VENOUS BLD VENIPUNCTURE: CPT

## 2021-04-20 PROCEDURE — 84484 ASSAY OF TROPONIN QUANT: CPT

## 2021-04-20 PROCEDURE — 83735 ASSAY OF MAGNESIUM: CPT

## 2021-04-20 PROCEDURE — 84300 ASSAY OF URINE SODIUM: CPT

## 2021-04-20 PROCEDURE — 6370000000 HC RX 637 (ALT 250 FOR IP): Performed by: NURSE PRACTITIONER

## 2021-04-20 PROCEDURE — U0003 INFECTIOUS AGENT DETECTION BY NUCLEIC ACID (DNA OR RNA); SEVERE ACUTE RESPIRATORY SYNDROME CORONAVIRUS 2 (SARS-COV-2) (CORONAVIRUS DISEASE [COVID-19]), AMPLIFIED PROBE TECHNIQUE, MAKING USE OF HIGH THROUGHPUT TECHNOLOGIES AS DESCRIBED BY CMS-2020-01-R: HCPCS

## 2021-04-20 PROCEDURE — 82436 ASSAY OF URINE CHLORIDE: CPT

## 2021-04-20 PROCEDURE — 85025 COMPLETE CBC W/AUTO DIFF WBC: CPT

## 2021-04-20 PROCEDURE — 1200000000 HC SEMI PRIVATE

## 2021-04-20 PROCEDURE — 6360000002 HC RX W HCPCS: Performed by: INTERNAL MEDICINE

## 2021-04-20 PROCEDURE — 80069 RENAL FUNCTION PANEL: CPT

## 2021-04-20 PROCEDURE — 83930 ASSAY OF BLOOD OSMOLALITY: CPT

## 2021-04-20 RX ORDER — MAGNESIUM SULFATE IN WATER 40 MG/ML
2000 INJECTION, SOLUTION INTRAVENOUS ONCE
Status: COMPLETED | OUTPATIENT
Start: 2021-04-20 | End: 2021-04-20

## 2021-04-20 RX ORDER — SODIUM CHLORIDE, SODIUM LACTATE, POTASSIUM CHLORIDE, CALCIUM CHLORIDE 600; 310; 30; 20 MG/100ML; MG/100ML; MG/100ML; MG/100ML
INJECTION, SOLUTION INTRAVENOUS CONTINUOUS
Status: DISCONTINUED | OUTPATIENT
Start: 2021-04-20 | End: 2021-04-20

## 2021-04-20 RX ORDER — METHYLPREDNISOLONE SODIUM SUCCINATE 40 MG/ML
40 INJECTION, POWDER, LYOPHILIZED, FOR SOLUTION INTRAMUSCULAR; INTRAVENOUS EVERY 12 HOURS
Status: DISCONTINUED | OUTPATIENT
Start: 2021-04-20 | End: 2021-04-22 | Stop reason: HOSPADM

## 2021-04-20 RX ORDER — POTASSIUM CHLORIDE 7.45 MG/ML
10 INJECTION INTRAVENOUS
Status: COMPLETED | OUTPATIENT
Start: 2021-04-20 | End: 2021-04-20

## 2021-04-20 RX ORDER — POTASSIUM CHLORIDE 20 MEQ/1
40 TABLET, EXTENDED RELEASE ORAL EVERY 4 HOURS
Status: DISCONTINUED | OUTPATIENT
Start: 2021-04-20 | End: 2021-04-20

## 2021-04-20 RX ORDER — POTASSIUM CHLORIDE 20 MEQ/1
40 TABLET, EXTENDED RELEASE ORAL ONCE
Status: COMPLETED | OUTPATIENT
Start: 2021-04-20 | End: 2021-04-20

## 2021-04-20 RX ORDER — ALBUTEROL SULFATE 90 UG/1
2 AEROSOL, METERED RESPIRATORY (INHALATION) EVERY 6 HOURS PRN
Status: DISCONTINUED | OUTPATIENT
Start: 2021-04-20 | End: 2021-04-22 | Stop reason: HOSPADM

## 2021-04-20 RX ADMIN — POTASSIUM CHLORIDE 10 MEQ: 7.46 INJECTION, SOLUTION INTRAVENOUS at 06:15

## 2021-04-20 RX ADMIN — ENOXAPARIN SODIUM 40 MG: 40 INJECTION SUBCUTANEOUS at 08:35

## 2021-04-20 RX ADMIN — AZITHROMYCIN MONOHYDRATE 500 MG: 500 INJECTION, POWDER, LYOPHILIZED, FOR SOLUTION INTRAVENOUS at 00:00

## 2021-04-20 RX ADMIN — ANASTROZOLE 1 MG: 1 TABLET ORAL at 08:34

## 2021-04-20 RX ADMIN — POTASSIUM CHLORIDE 40 MEQ: 1500 TABLET, EXTENDED RELEASE ORAL at 13:33

## 2021-04-20 RX ADMIN — DIBASIC SODIUM PHOSPHATE, MONOBASIC POTASSIUM PHOSPHATE AND MONOBASIC SODIUM PHOSPHATE 2 TABLET: 852; 155; 130 TABLET ORAL at 20:05

## 2021-04-20 RX ADMIN — CEFTRIAXONE SODIUM 1000 MG: 1 INJECTION, POWDER, FOR SOLUTION INTRAMUSCULAR; INTRAVENOUS at 20:06

## 2021-04-20 RX ADMIN — POTASSIUM CHLORIDE 10 MEQ: 7.46 INJECTION, SOLUTION INTRAVENOUS at 10:58

## 2021-04-20 RX ADMIN — LISINOPRIL 20 MG: 20 TABLET ORAL at 08:35

## 2021-04-20 RX ADMIN — ACETAMINOPHEN 650 MG: 650 SUPPOSITORY RECTAL at 23:12

## 2021-04-20 RX ADMIN — POTASSIUM CHLORIDE 10 MEQ: 7.46 INJECTION, SOLUTION INTRAVENOUS at 08:34

## 2021-04-20 RX ADMIN — METOPROLOL SUCCINATE 50 MG: 50 TABLET, EXTENDED RELEASE ORAL at 08:35

## 2021-04-20 RX ADMIN — AZITHROMYCIN MONOHYDRATE 500 MG: 500 INJECTION, POWDER, LYOPHILIZED, FOR SOLUTION INTRAVENOUS at 22:30

## 2021-04-20 RX ADMIN — MAGNESIUM SULFATE HEPTAHYDRATE 2000 MG: 40 INJECTION, SOLUTION INTRAVENOUS at 13:34

## 2021-04-20 RX ADMIN — DIBASIC SODIUM PHOSPHATE, MONOBASIC POTASSIUM PHOSPHATE AND MONOBASIC SODIUM PHOSPHATE 2 TABLET: 852; 155; 130 TABLET ORAL at 16:13

## 2021-04-20 RX ADMIN — MAGNESIUM GLUCONATE 500 MG ORAL TABLET 400 MG: 500 TABLET ORAL at 08:34

## 2021-04-20 RX ADMIN — MAGNESIUM GLUCONATE 500 MG ORAL TABLET 400 MG: 500 TABLET ORAL at 20:06

## 2021-04-20 RX ADMIN — HYDROCODONE BITARTRATE AND ACETAMINOPHEN 1 TABLET: 7.5; 325 TABLET ORAL at 18:03

## 2021-04-20 RX ADMIN — DIBASIC SODIUM PHOSPHATE, MONOBASIC POTASSIUM PHOSPHATE AND MONOBASIC SODIUM PHOSPHATE 2 TABLET: 852; 155; 130 TABLET ORAL at 17:57

## 2021-04-20 RX ADMIN — SODIUM CHLORIDE: 234 INJECTION INTRAMUSCULAR; INTRAVENOUS; SUBCUTANEOUS at 16:13

## 2021-04-20 RX ADMIN — METHYLPREDNISOLONE SODIUM SUCCINATE 40 MG: 40 INJECTION, POWDER, LYOPHILIZED, FOR SOLUTION INTRAMUSCULAR; INTRAVENOUS at 13:33

## 2021-04-20 RX ADMIN — POTASSIUM CHLORIDE 10 MEQ: 7.46 INJECTION, SOLUTION INTRAVENOUS at 08:41

## 2021-04-20 RX ADMIN — HYDROCODONE BITARTRATE AND ACETAMINOPHEN 1 TABLET: 7.5; 325 TABLET ORAL at 08:35

## 2021-04-20 RX ADMIN — VALACYCLOVIR HYDROCHLORIDE 500 MG: 500 TABLET, FILM COATED ORAL at 11:57

## 2021-04-20 ASSESSMENT — PAIN SCALES - GENERAL: PAINLEVEL_OUTOF10: 6

## 2021-04-20 NOTE — CARE COORDINATION
CM attempted to talk to pt and assess, but pt had people in her room and asked that I call her back in the morning. CM will do this and will assess at this time. IN the past, pt was IPTA, living alone.   Rosa Maria Che RN

## 2021-04-20 NOTE — PROGRESS NOTES
Lab called @ 1184 to notify of patients K levels. MD Resendez notified @ 5595 of levels. Notified for a second time @ [phone]. Message read @ [phone]. Orders placed @ 0606.

## 2021-04-20 NOTE — CONSULTS
[facility]    Consult Note           Reason for Consult:  Hypo k, hypo Na  Requesting Physician:  Dr. Faiza Manzanares    Chief Complaint:    Chief Complaint   Patient presents with   Del Henle Fall     Patient states she was seen here for the same issue 2 weeks ago, she fell due to legs going out on her. Denies LOC or pain. History of Present Illness on 4/20/2021:    77 y.o. yo female with PMH of alcohol abuse, history of kidney stones who is admitted for fall  Patient was noted to have hyponatremia hypokalemia hypomagnesemia and has been admitted  She had similar presentation earlier this month and her hydrochlorothiazide was stopped. She drinks significant amount of beer but reports that she has not been doing so lately  She fell yesterday, was feeling weak but did not lose consciousness. She was 85% on room air when she arrived at the emergency room. She was noted to have leukocytosis and has been treated for pneumonia  She has received 1 dose of Covid vaccine  She is being ruled out for COVID-19    Past Medical History:        Diagnosis Date    Arthritis     EtOH dependence ([facility] [address]     HSV-2 (herpes simplex virus 2) infection     Hypertension     Tx since late 45s    Kidney stone     Malignant neoplasm of upper-inner quadrant of left breast in female, estrogen receptor positive ([facility] [address] 9/23/2019       Past Surgical History:        Procedure Laterality Date    COLONOSCOPY  02/16/1999    Rectal ulcer, s/p cautery    COLONOSCOPY  4/19/13    next due 10 yrs   [facility]    Ovaries in. Had fibroids, DUB.     MASTECTOMY Left 10/8/2019    LEFT BREAST NEEDLE LOCALIZATION, PARTIAL MASTECTOMY performed by Meagan Allen MD at [address] Left 5/23/2019    LEFT TOTAL KNEE REPLACEMENT            ROTHMAN & NEPHEW performed by Britta Luque MD at [address] Medications:    No current facility-administered medications Relationship status: Not on file    Intimate partner violence     Fear of current or ex partner: Not on file     Emotionally abused: Not on file     Physically abused: Not on file     Forced sexual activity: Not on file   Other Topics Concern    Not on file   Social History Narrative    Not on file       Family History:   Family History   Problem Relation Age of Onset    Cancer Father 62        Lung    Hypertension Father     Cancer Brother 36        Bladder,  64    Diabetes Other     Heart Failure Other     Breast Cancer Maternal Aunt 80       Review of Systems:   As above    Physical exam:   Constitutional:  VITALS:  /69   Pulse 82   Temp 97.8 °F (36.6 °C) (Oral)   Resp 18   Ht 5' 2\" (1.575 m)   Wt 180 lb (81.6 kg)   LMP  (LMP Unknown)   SpO2 92%   BMI 32.92 kg/m²     Deferred in setting of COVID-19 outbreak and in order to preserve personal protective equipment in accordance with the flexibilities announced by CMS on 2020. References:   https://med. [email or web address]/Resources/COVID-19/3_18%20Telemed%20Guidance%20Updated%20March%2018. pdf?[email or web address]=[email or web address]   http://[employer] pdf       Data/  Recent Labs     21  1311 21  0436   WBC 14.8* 10.8   HGB 11.0* 10.2*   HCT 31.3* 29.4*   .6* 109.3*    245     Recent Labs     21  1311 21  0436   * 126*   K 3.0* 2.6*   CL 87* 88*   CO2 28 30   GLUCOSE 128* 102*   MG 1.10* 1.70*   BUN 7 9   CREATININE <0.5* <0.5*   LABGLOM >60 >60   GFRAA >60 >60     Urinalysis shows 10-20 WBCs and 11-20 epithelial cells  TSH at 2.1    Assessment  -Hyponatremia, likely related to decreased solute intake and increased fluid  -Hypokalemia  -Hypomagnesemia  -Leukocytosis and possible pneumonia on antibiotic  -Fall with possible syncope blood pressure over soft admission    Plan  -Replace potassium magnesium phos as ordered  -serial BMPs q6h  -Call if na less than 122 or more than 130  -Change IV fluid to 1.5% saline at 65 mL/h  -Follow SARS-CoV-2 PCR    Thank you for the consultation. Please do not hesitate to call with questions.     Sidney Parsons  [facility]  Office: [phone]  Fax:    [phone]

## 2021-04-20 NOTE — CARE COORDINATION
Upon chart review, patient noted to be inpatient  CTN to continue to monitor  Patrice Shah RN   Care Transition Nurse  [phone]

## 2021-04-20 NOTE — PROGRESS NOTES
Physician Progress Note      PATIENT:               Shaggy Nowak  CSN #:                  [id number]  :                       1955  ADMIT DATE:       2021 12:23 PM  [address] [place] DATE:  RESPONDING  PROVIDER #:        EDSON Salas CNP          QUERY TEXT:    Pt admitted with PNA. Pt noted to have Elevated WBC with bands, HR 94, RR 27   with sat 85% . If possible, please document in the progress notes and   discharge summary if you are evaluating and /or treating any of the following: The medical record reflects the following:  Risk Factors: PNA, Possible UTI-abnormal UA  Clinical Indicators: On Arrival- HR 86-94, WBC 14.8, Bands 11%, RR 25-27, O2   sats 85-89% on RA, PCT 0.09, CT chest- \"Mild bibasilar segmental atelectasis   versus pneumonia. \". BC and Urine cultures pending. Abnormal UA. Treatment: IVF, Rocephin, Zithromax, CXR/ CT chest, ongoing supportive care   and monitoring. Thank you,  Promise Teran RN, BSN, [employer]   [phone]  Options provided:  -- Sepsis, present on arrival  -- No Sepsis, PNA only  -- Sepsis was ruled out  -- Other - I will add my own diagnosis  -- Disagree - Not applicable / Not valid  -- Disagree - Clinically unable to determine / Unknown  -- Refer to Clinical Documentation Reviewer    PROVIDER RESPONSE TEXT:    This patient has sepsis which was present on admission.     Query created by: Azar Mac on 2021 8:20 AM      Electronically signed by:  Ramon Salas CNP 2021 8:46 AM

## 2021-04-20 NOTE — PROGRESS NOTES
Perfect serve message sent to Lavelle Walsh, \"Good morning! There was a second set of blood cultures that was never drawn. Pt has received Rocephin. Do you still want this collected? Pt is also complaining of left wrist pain. Her left hand is extremely swollen and she can not recall whether she fell on it but the pain and swelling has increased since coming to ER. Thank you\", awaiting response.

## 2021-04-20 NOTE — PROGRESS NOTES
RESPIRATORY THERAPY ASSESSMENT    Name:  [address] [place] [address] Record Number:  [id number]  Age: 77 y.o. Gender: female  : 1955  Today's Date:  2021  Room:  [facility]0533-01    Assessment     Is the patient being admitted for a COPD or Asthma exacerbation? No   (If yes the patient will be seen every 4 hours for the first 24 hours and then reassessed)    Patient Admission Diagnosis      Allergies  No Known Allergies    Minimum Predicted Vital Capacity:     NA          Actual Vital Capacity:      NA              Pulmonary History:No history  Home Oxygen Therapy:  room air  Home Respiratory Therapy:None   Current Respiratory Therapy:  Duoneb Q4 PRN          Respiratory Severity Index(RSI)   Patients with orders for inhalation medications, oxygen, or any therapeutic treatment modality will be placed on Respiratory Protocol. They will be assessed with the first treatment and at least every 72 hours thereafter. The following severity scale will be used to determine frequency of treatment intervention. Smoking History: Smoking History Less than 1ppd or less than 15 pack year = 1    Social History  Social History     Tobacco Use    Smoking status: Former Smoker     Packs/day: 1.50     Years: 5.00     Pack years: 7.50     Quit date: 1977     Years since quittin.3    Smokeless tobacco: Never Used   Substance Use Topics    Alcohol use: Yes     Drinks per session: 3 or 4     Comment: occ    Drug use: No       Recent Surgical History: None = 0  Past Surgical History  Past Surgical History:   Procedure Laterality Date    COLONOSCOPY  1999    Rectal ulcer, s/p cautery    COLONOSCOPY  13    next due 10 yrs   [address] [facility]    Ovaries in. Had fibroids, DUB.     MASTECTOMY Left 10/8/2019    LEFT BREAST NEEDLE LOCALIZATION, PARTIAL MASTECTOMY performed by Ruth Longoria MD at [address] [place] [address] Left 2019    LEFT TOTAL KNEE REPLACEMENT            ROTHMAN & NEPHEW performed by Galen Thompson MD at [facility] 1       Level of Consciousness: Alert, Oriented, and Cooperative = 0    Level of Activity: Walking unassisted = 0    Respiratory Pattern: Dyspnea with exertion;Irregular pattern;or RR less than 6 = 2    Breath Sounds: Clear = 0    Sputum   ,  ,    Cough: Strong, spontaneous, non-productive = 0    Vital Signs   BP (!) 149/78   Pulse 78   Temp 99.6 °F (37.6 °C) (Oral)   Resp 19   Ht 5' 2\" (1.575 m)   Wt 180 lb (81.6 kg)   LMP  (LMP Unknown)   SpO2 100%   BMI 32.92 kg/m²   SPO2 (COPD values may differ): Greater than or equal to 92% on room air = 0    Peak Flow (asthma only): not applicable = 0    RSI: 0-4 = See once and convert to home regimen or discontinue        Plan       Goals: medication delivery, mobilize retained secretions, volume expansion and improve oxygenation    Patient/caregiver was educated on the proper method of use for Respiratory Care Devices:  Yes      Level of patient/caregiver understanding able to:   ? Verbalize understanding   ? Demonstrate understanding       ? Teach back        ? Needs reinforcement       ? No available caregiver               ? Other:     Response to education:  Excellent     Is patient being placed on Home Treatment Regimen? No     Does the patient have everything they need prior to discharge? NA     Comments: pt assessed and chart reviewed    Plan of Care: 4/22    Electronically signed by Howard Bejarano RCP on 4/19/2021 at 8:59 PM    Respiratory Protocol Guidelines     1. Assessment and treatment by Respiratory Therapy will be initiated for medication and therapeutic interventions upon initiation of aerosolized medication. 2. Physician will be contacted for respiratory rate (RR) greater than 35 breaths per minute. Therapy will be held for heart rate (HR) greater than 140 beats per minute, pending direction from physician.   3. Bronchodilators will be administered via Metered Dose Inhaler (MDI) with spacer when the following criteria are met:  a. Alert and cooperative     b. HR < 140 bpm  c. RR < 30 bpm                d. Can demonstrate a 23 second inspiratory hold  4. Bronchodilators will be administered via Hand Held Nebulizer [facility]) to patients when ANY of the following criteria are met  a. Incognizant or uncooperative          b. Patients treated with HHN at Home        c. Unable to demonstrate proper use of MDI with spacer     d. RR > 30 bpm   5. Bronchodilators will be delivered via Metered Dose Inhaler (MDI), HHN, Aerogen to intubated patients on mechanical ventilation. 6. Inhalation medication orders will be delivered and/or substituted as outlined below. Aerosolized Medications Ordering and Administration Guidelines:    1. All Medications will be ordered by a physician, and their frequency and/or modality will be adjusted as defined by the patients Respiratory Severity Index (RSI) score. 2. If the patient does not have documented COPD, consider discontinuing anticholinergics when RSI is less than 9.  3. If the bronchospasm worsens (increased RSI), then the bronchodilator frequency can be increased to a maximum of every 4 hours. If greater than every 4 hours is required, the physician will be contacted. 4. If the bronchospasm improves, the frequency of the bronchodilator can be decreased, based on the patient's RSI, but not less than home treatment regimen frequency. 5. Bronchodilator(s) will be discontinued if patient has a RSI less than 9 and has received no scheduled or as needed treatment for 72  Hrs. Patients Ordered on a Mucolytic Agent:    1. Must always be administered with a bronchodilator. 2. Discontinue if patient experiences worsened bronchospasm, or secretions have lessened to the point that the patient is able to clear them with a cough. Anti-inflammatory and Combination Medications:    1.  If the patient lacks prior history of lung disease, is not using inhaled anti-inflammatory medication at home, and lacks wheezing by examination or by history for at least 24 hours, contact physician for possible discontinuation.

## 2021-04-20 NOTE — PROGRESS NOTES
4 Eyes Skin Assessment     The patient is being assess for   Admission    I agree that 2 RN's have performed a thorough Head to Toe Skin Assessment on the patient. ALL assessment sites listed below have been assessed. Areas assessed by both nurses:   [x]   Head, Face, and Ears   [x]   Shoulders, Back, and Chest, Abdomen  [x]   Arms, Elbows, and Hands   [x]   Coccyx, Sacrum, and Ischium  [x]   Legs, Feet, and Heels          Co-signer eSignature: Electronically signed by Sukumar Posey RN on 4/20/21 at 2:56 AM EDT    Does the Patient have Skin Breakdown?   No          Genaro Prevention initiated:  No   Wound Care Orders initiated:  No      WOC nurse consulted for Pressure Injury (Stage 3,4, Unstageable, DTI, NWPT, Complex wounds)and New or Established Ostomies:  No      Primary Nurse eSignature: Electronically signed by Annabel Charles RN on 4/20/21 at 2:56 AM EDT

## 2021-04-21 ENCOUNTER — TELEPHONE (OUTPATIENT)
Dept: FAMILY MEDICINE CLINIC | Age: 66
End: 2021-04-21

## 2021-04-21 VITALS
BODY MASS INDEX: 33.13 KG/M2 | DIASTOLIC BLOOD PRESSURE: 79 MMHG | SYSTOLIC BLOOD PRESSURE: 120 MMHG | HEIGHT: 62 IN | WEIGHT: 180 LBS | OXYGEN SATURATION: 97 % | TEMPERATURE: 97.8 F | HEART RATE: 76 BPM | RESPIRATION RATE: 16 BRPM

## 2021-04-21 LAB
ALBUMIN SERPL-MCNC: 3.1 G/DL (ref 3.4–5)
ALBUMIN SERPL-MCNC: 3.4 G/DL (ref 3.4–5)
ANION GAP SERPL CALCULATED.3IONS-SCNC: 10 MMOL/L (ref 3–16)
ANION GAP SERPL CALCULATED.3IONS-SCNC: 12 MMOL/L (ref 3–16)
BUN BLDV-MCNC: 10 MG/DL (ref 7–20)
BUN BLDV-MCNC: 10 MG/DL (ref 7–20)
CALCIUM SERPL-MCNC: 9 MG/DL (ref 8.3–10.6)
CALCIUM SERPL-MCNC: 9.5 MG/DL (ref 8.3–10.6)
CHLORIDE BLD-SCNC: 90 MMOL/L (ref 99–110)
CHLORIDE BLD-SCNC: 93 MMOL/L (ref 99–110)
CO2: 27 MMOL/L (ref 21–32)
CO2: 28 MMOL/L (ref 21–32)
CREAT SERPL-MCNC: <0.5 MG/DL (ref 0.6–1.2)
CREAT SERPL-MCNC: <0.5 MG/DL (ref 0.6–1.2)
GFR AFRICAN AMERICAN: >60
GFR AFRICAN AMERICAN: >60
GFR NON-AFRICAN AMERICAN: >60
GFR NON-AFRICAN AMERICAN: >60
GLUCOSE BLD-MCNC: 162 MG/DL (ref 70–99)
GLUCOSE BLD-MCNC: 190 MG/DL (ref 70–99)
MAGNESIUM: 1.6 MG/DL (ref 1.8–2.4)
ORGANISM: ABNORMAL
OSMOLALITY URINE: 439 MOSM/KG (ref 390–1070)
OSMOLALITY: 266 MOSM/KG (ref 280–301)
PHOSPHORUS: 3.2 MG/DL (ref 2.5–4.9)
PHOSPHORUS: 3.3 MG/DL (ref 2.5–4.9)
POTASSIUM SERPL-SCNC: 3.3 MMOL/L (ref 3.5–5.1)
POTASSIUM SERPL-SCNC: 3.6 MMOL/L (ref 3.5–5.1)
SARS-COV-2, PCR: NOT DETECTED
SODIUM BLD-SCNC: 128 MMOL/L (ref 136–145)
SODIUM BLD-SCNC: 132 MMOL/L (ref 136–145)
URINE CULTURE, ROUTINE: ABNORMAL
URINE CULTURE, ROUTINE: ABNORMAL

## 2021-04-21 PROCEDURE — 6370000000 HC RX 637 (ALT 250 FOR IP): Performed by: INTERNAL MEDICINE

## 2021-04-21 PROCEDURE — 6370000000 HC RX 637 (ALT 250 FOR IP): Performed by: NURSE PRACTITIONER

## 2021-04-21 PROCEDURE — 6360000002 HC RX W HCPCS: Performed by: INTERNAL MEDICINE

## 2021-04-21 PROCEDURE — 97161 PT EVAL LOW COMPLEX 20 MIN: CPT

## 2021-04-21 PROCEDURE — 36415 COLL VENOUS BLD VENIPUNCTURE: CPT

## 2021-04-21 PROCEDURE — 83735 ASSAY OF MAGNESIUM: CPT

## 2021-04-21 PROCEDURE — 97110 THERAPEUTIC EXERCISES: CPT

## 2021-04-21 PROCEDURE — 97530 THERAPEUTIC ACTIVITIES: CPT

## 2021-04-21 PROCEDURE — 80069 RENAL FUNCTION PANEL: CPT

## 2021-04-21 PROCEDURE — 2580000003 HC RX 258: Performed by: INTERNAL MEDICINE

## 2021-04-21 PROCEDURE — 6360000002 HC RX W HCPCS: Performed by: NURSE PRACTITIONER

## 2021-04-21 PROCEDURE — 97535 SELF CARE MNGMENT TRAINING: CPT

## 2021-04-21 PROCEDURE — 97165 OT EVAL LOW COMPLEX 30 MIN: CPT

## 2021-04-21 PROCEDURE — 97116 GAIT TRAINING THERAPY: CPT

## 2021-04-21 RX ORDER — POTASSIUM CHLORIDE 20 MEQ/1
40 TABLET, EXTENDED RELEASE ORAL ONCE
Status: COMPLETED | OUTPATIENT
Start: 2021-04-21 | End: 2021-04-21

## 2021-04-21 RX ORDER — POTASSIUM CHLORIDE 20 MEQ/1
40 TABLET, EXTENDED RELEASE ORAL ONCE
Status: CANCELLED | OUTPATIENT
Start: 2021-04-21 | End: 2021-04-21

## 2021-04-21 RX ORDER — MAGNESIUM SULFATE IN WATER 40 MG/ML
2000 INJECTION, SOLUTION INTRAVENOUS ONCE
Status: COMPLETED | OUTPATIENT
Start: 2021-04-21 | End: 2021-04-21

## 2021-04-21 RX ORDER — IPRATROPIUM BROMIDE AND ALBUTEROL SULFATE 2.5; .5 MG/3ML; MG/3ML
3 SOLUTION RESPIRATORY (INHALATION) EVERY 4 HOURS PRN
Qty: 360 ML | DISCHARGE
Start: 2021-04-21 | End: 2022-04-12

## 2021-04-21 RX ORDER — POTASSIUM CHLORIDE 20 MEQ/1
20 TABLET, EXTENDED RELEASE ORAL ONCE
Status: COMPLETED | OUTPATIENT
Start: 2021-04-21 | End: 2021-04-21

## 2021-04-21 RX ORDER — ALBUTEROL SULFATE 90 UG/1
2 AEROSOL, METERED RESPIRATORY (INHALATION) EVERY 6 HOURS PRN
Qty: 1 INHALER | Refills: 3 | DISCHARGE
Start: 2021-04-21 | End: 2021-05-27

## 2021-04-21 RX ORDER — HYDROCODONE BITARTRATE AND ACETAMINOPHEN 5; 325 MG/1; MG/1
1 TABLET ORAL EVERY 6 HOURS PRN
Status: DISCONTINUED | OUTPATIENT
Start: 2021-04-21 | End: 2021-04-22 | Stop reason: HOSPADM

## 2021-04-21 RX ADMIN — ENOXAPARIN SODIUM 40 MG: 40 INJECTION SUBCUTANEOUS at 09:52

## 2021-04-21 RX ADMIN — METOPROLOL SUCCINATE 50 MG: 50 TABLET, EXTENDED RELEASE ORAL at 09:52

## 2021-04-21 RX ADMIN — POTASSIUM CHLORIDE 20 MEQ: 20 TABLET, EXTENDED RELEASE ORAL at 16:13

## 2021-04-21 RX ADMIN — CEFTRIAXONE SODIUM 1000 MG: 1 INJECTION, POWDER, FOR SOLUTION INTRAMUSCULAR; INTRAVENOUS at 21:05

## 2021-04-21 RX ADMIN — HYDROCODONE BITARTRATE AND ACETAMINOPHEN 1 TABLET: 5; 325 TABLET ORAL at 10:23

## 2021-04-21 RX ADMIN — MAGNESIUM SULFATE HEPTAHYDRATE 2000 MG: 40 INJECTION, SOLUTION INTRAVENOUS at 17:35

## 2021-04-21 RX ADMIN — AZITHROMYCIN MONOHYDRATE 500 MG: 500 INJECTION, POWDER, LYOPHILIZED, FOR SOLUTION INTRAVENOUS at 21:49

## 2021-04-21 RX ADMIN — POTASSIUM CHLORIDE 40 MEQ: 20 TABLET, EXTENDED RELEASE ORAL at 13:14

## 2021-04-21 RX ADMIN — VALACYCLOVIR HYDROCHLORIDE 500 MG: 500 TABLET, FILM COATED ORAL at 09:52

## 2021-04-21 RX ADMIN — MAGNESIUM GLUCONATE 500 MG ORAL TABLET 400 MG: 500 TABLET ORAL at 20:04

## 2021-04-21 RX ADMIN — ANASTROZOLE 1 MG: 1 TABLET ORAL at 09:52

## 2021-04-21 RX ADMIN — LISINOPRIL 20 MG: 20 TABLET ORAL at 09:52

## 2021-04-21 RX ADMIN — METHYLPREDNISOLONE SODIUM SUCCINATE 40 MG: 40 INJECTION, POWDER, LYOPHILIZED, FOR SOLUTION INTRAMUSCULAR; INTRAVENOUS at 13:16

## 2021-04-21 RX ADMIN — METHYLPREDNISOLONE SODIUM SUCCINATE 40 MG: 40 INJECTION, POWDER, LYOPHILIZED, FOR SOLUTION INTRAMUSCULAR; INTRAVENOUS at 01:45

## 2021-04-21 RX ADMIN — MAGNESIUM GLUCONATE 500 MG ORAL TABLET 400 MG: 500 TABLET ORAL at 09:52

## 2021-04-21 ASSESSMENT — PAIN SCALES - GENERAL: PAINLEVEL_OUTOF10: 7

## 2021-04-21 NOTE — PROGRESS NOTES
[facility]    Follow-up Note           Reason for Consult:  Hypo k, hypo Na  Requesting Physician:  Dr. Tracey Colon history  Patient is resting, denies any new nausea vomiting or diarrhea  She reports that she has been eating well    ROS: No chest pain/shortness of breath/fever/nausea/vomiting  PSFH: No visitor    Scheduled Meds:   methylPREDNISolone  40 mg Intravenous Q12H    anastrozole  1 mg Oral Daily    lisinopril  20 mg Oral Daily    magnesium oxide  400 mg Oral BID    metoprolol succinate  50 mg Oral Daily    valACYclovir  500 mg Oral Daily    sodium chloride flush  5-40 mL Intravenous 2 times per day    enoxaparin  40 mg Subcutaneous Daily    cefTRIAXone (ROCEPHIN) IV  1,000 mg Intravenous Q24H    And    azithromycin  500 mg Intravenous Q24H     Continuous Infusions:   IV infusion builder 65 mL/hr at 04/20/21 1613    sodium chloride       PRN Meds:. HYDROcodone 5 mg - acetaminophen, albuterol sulfate HFA, sodium chloride flush, sodium chloride, promethazine **OR** ondansetron, polyethylene glycol, acetaminophen **OR** acetaminophen, ipratropium-albuterol, perflutren lipid microspheres    History of Present Illness on 4/20/2021:    77 y.o. yo female with PMH of alcohol abuse, history of kidney stones who is admitted for fall  Patient was noted to have hyponatremia hypokalemia hypomagnesemia and has been admitted  She had similar presentation earlier this month and her hydrochlorothiazide was stopped. She drinks significant amount of beer but reports that she has not been doing so lately  She fell yesterday, was feeling weak but did not lose consciousness. She was 85% on room air when she arrived at the emergency room.   She was noted to have leukocytosis and has been treated for pneumonia  She has received 1 dose of Covid vaccine  She is being ruled out for COVID-19    Physical exam:   Constitutional:  VITALS:  /70   Pulse 69   Temp 97.5 °F (36.4 °C) (Oral)   Resp 16   Ht 5' 2\" (1.575 m)   Wt 180 lb (81.6 kg)   LMP  (LMP Unknown)   SpO2 95%   BMI 32.92 kg/m²   Gen: alert, awake, nad  Skin: no rash, turgor wnl  Heent:  eomi, mmm  Neck: no bruits or jvd noted, thyroid normal  Cardiovascular:  S1, S2 without m/r/g no lower extremity edema  Respiratory: CTA B without w/r/r; respiratory effort normal  Abdomen:  +bs, soft, nt, nd, no hepatosplenomegaly  Neuro/Psy: AAoriented times 3 ; moves all 4 ext  Musculoskeletal:  Rom, muscular strength intact; digits, nails normal         Data/  Recent Labs     04/19/21  1311 04/20/21  0436   WBC 14.8* 10.8   HGB 11.0* 10.2*   HCT 31.3* 29.4*   .6* 109.3*    245     Recent Labs     04/19/21  1311 04/20/21  0436 04/20/21  0436 04/20/21  2232 04/21/21  0328 04/21/21  1018   * 126*   < > 125* 128* 132*   K 3.0* 2.6*   < > 3.5 3.6 3.3*   CL 87* 88*   < > 89* 90* 93*   CO2 28 30   < > 27 28 27   GLUCOSE 128* 102*   < > 179* 162* 190*   PHOS  --   --    < > 3.4 3.3 3.2   MG 1.10* 1.70*  --   --   --   --    BUN 7 9   < > 10 10 10   CREATININE <0.5* <0.5*   < > <0.5* <0.5* <0.5*   LABGLOM >60 >60   < > >60 >60 >60   GFRAA >60 >60   < > >60 >60 >60    < > = values in this interval not displayed. Urinalysis shows 10-20 WBCs and 11-20 epithelial cells  TSH at 2.1    Assessment  -Hyponatremia, likely related to decreased solute intake and increased fluid  -Hypokalemia  -Hypomagnesemia  -Leukocytosis and possible pneumonia on antibiotic  -Fall with possible syncope blood pressure over soft admission    Plan  -Replace potassium, magnesium as needed  -serial BMPs  DC IV fluid  Add magnesium to blood in lab    Thank you for the consultation. Please do not hesitate to call with questions.     Zofia Gordillo  [facility]  Office: [phone]  Fax:    [phone]

## 2021-04-21 NOTE — PROGRESS NOTES
Pt ambulated to the bathroom with a standby assist, walker and gait belt. Pt tolerated well with no complaints of numbness or tingling. Will continue to monitor.

## 2021-04-21 NOTE — TELEPHONE ENCOUNTER
Spoke with Rodolfo Bell who stated mom is being tx'd to UC per family request. Case reviewed, concern for etoh as contributor to lower extremitiy weakness mentioned.

## 2021-04-21 NOTE — TELEPHONE ENCOUNTER
Lauren Coronado was advised to call and stated Dr. Cristine Mejia would call her back in regards to her mother Meagan's reaction to the covid-19 shot. Please call Lauren Coronado at [phone]    Lauren Coronado stated they are working on transferring her mother to [facility].

## 2021-04-21 NOTE — PROGRESS NOTES
Occupational Therapy   Occupational Therapy Initial Assessment/Treatment   Date: 2021   Patient Name: Asif Lozano  MRN: [id number]     : 1955    Date of Service: 2021    Discharge Recommendations:  Home with assist PRN, Home with Home health OT       Assessment   Performance deficits / Impairments: Decreased functional mobility ; Decreased safe awareness;Decreased balance;Decreased ADL status; Decreased endurance;Decreased strength;Decreased high-level IADLs    Assessment: Pt 78 yo female functioning with deficits in the areas listed above following fall. Pt reports IND PLOF and lives at home alone. Pt is currently limited due to decreased strength and endurance. Pt is at a SBA-CGA level for functional mobility, standing adls and toileting. Pt required education on importance of continued activity and BUE exercises to help with strength. Pt would benefit from skilled OT services while in acute care to build strength. Disease Specific Education: Pt educated on importance of OOB mobility, prevention of complications of bedrest, and general safety during hospitalization. Pt verbalized understanding    Prognosis: Good  Decision Making: Medium Complexity  OT Education: OT Role;Plan of Care;Home Exercise Program;ADL Adaptive Strategies;Transfer Training  REQUIRES OT FOLLOW UP: Yes  Activity Tolerance  Activity Tolerance: Patient Tolerated treatment well;Patient limited by fatigue  Activity Tolerance: /71 HR 82 O2 95%  Safety Devices  Safety Devices in place: Yes  Type of devices: Call light within reach;Gait belt;Left in chair;Chair alarm in place;Nurse notified           Patient Diagnosis(es): The primary encounter diagnosis was Hypoxia. Diagnoses of Fall, initial encounter, Hypokalemia, Hyponatremia, Hypomagnesemia, General weakness, and Pneumonia due to organism were also pertinent to this visit.      has a past medical history of Arthritis, EtOH dependence ([facility] [address] HSV-2 (herpes simplex

## 2021-04-21 NOTE — PROGRESS NOTES
history of Arthritis, EtOH dependence ([facility] [address] HSV-2 (herpes simplex virus 2) infection, Hypertension, Kidney stone, and Malignant neoplasm of upper-inner quadrant of left breast in female, estrogen receptor positive ([facility] [address] has a past surgical history that includes Hysterectomy (1998); Dilation and curettage of uterus (1997); Colonoscopy (02/16/1999); Colonoscopy (4/19/13); Total knee arthroplasty (Left, 5/23/2019); and Mastectomy (Left, 10/8/2019).     Restrictions  Restrictions/Precautions  Restrictions/Precautions: Up as Tolerated, General Precautions, Fall Risk    Subjective  General  Chart Reviewed: Yes  Patient assessed for rehabilitation services?: Yes  Response To Previous Treatment: Not applicable  Family / Caregiver Present: No  Referring Practitioner: AYSE Sosa  Referral Date : 04/21/21  Diagnosis: Respiratory Failure with Hypoxia  Follows Commands: Within Functional Limits  General Comment  Comments: Pt resting in bed on approach; RN cleared pt for therapy  Subjective  Subjective: pt agreeable to therapy  Pain Screening  Patient Currently in Pain: Denies    Orientation  Orientation  Overall Orientation Status: Within Functional Limits  Social/Functional History  Social/Functional History  Lives With: Alone  Type of Home: House(condo)  Home Layout: Two level  Home Access: Stairs to enter with rails  Entrance Stairs - Number of Steps: 6-7  Entrance Stairs - Rails: Right  Bathroom Shower/Tub: Walk-in shower  Bathroom Toilet: Standard  Home Equipment: Standard walker, Cane  Receives Help From: Family(daughter lives close by and can help if needed)  ADL Assistance: Independent  Homemaking Assistance: Independent  Ambulation Assistance: Independent  Transfer Assistance: Independent  Active : Yes    Objective        RLE AROM: WFL  LLE AROM : WFL  Strength RLE: WFL  Strength LLE: WFL        Bed mobility  Supine to Sit: Unable to assess  Sit to Supine: Unable to assess  Comment: Pt up in chair at start and end of session     Transfers  Sit to Stand: Contact guard assistance  Stand to sit: Stand by assistance     Ambulation  Ambulation?: Yes  More Ambulation?: Yes  Ambulation 1  Surface: level tile  Device: Hand-Held Assist  Assistance: Contact guard assistance  Quality of Gait: Cues for increased step length. Steatdy gait with no LOB. Pt putting limited to no weight through therapist hand for support. However states she is unable to ambulate without walker  Gait Deviations: Shuffles; Slow Kate;Decreased step length;Decreased step height  Distance: 25 ft  Ambulation 2  Surface - 2: level tile  Device 2: Standard Walker  Assistance 2: Stand by assistance  Quality of Gait 2: Cues for posture. Cues to keep walker close to JARAD when advancing. no LOB  Gait Deviations: Shuffles; Slow Kate;Decreased step height;Decreased step length  Distance: 25 ft     Balance  Comments: SBA to CGA for dynamic standing balance with BUE support     Exercises  Gluteal Sets: x 12 BLE  Hip Flexion: Seated marches x 12 BLE  Knee Long Arc Quad: x 12 BLE  Ankle Pumps: x 12 BLE  Comments: Cues for technique     Plan   Plan  Times per week: 3-5x/wk  Times per day: Daily  Specific instructions for Next Treatment: progress mobility as tolerated  Current Treatment Recommendations: Strengthening, Neuromuscular Re-education, Home Exercise Program, Safety Education & Training, Balance Training, Endurance Training, Functional Mobility Training, Transfer Training, Gait Training, Stair training, Equipment Evaluation, Education, & procurement, Patient/Caregiver Education & Training  Safety Devices  Type of devices:  All fall risk precautions in place, Call light within reach, Chair alarm in place, Gait belt, Patient at risk for falls, Nurse notified, Left in chair                                   AM-PAC Score  -PAC Inpatient Mobility Raw Score : 19 (04/21/21 1259)  AM-PAC Inpatient T-Scale Score : 45.44 (04/21/21 1259)  Mobility Inpatient CMS 0-100% Score: 41.77 (04/21/21 1259)  Mobility Inpatient CMS G-Code Modifier : CK (04/21/21 1259)          Goals  Short term goals  Time Frame for Short term goals: 1 week (4/28) unless otherwise specified  Short term goal 1: Pt will be mod I with bed mobility. Short term goal 2: Pt will be mod I with transfers with SW.  Short term goal 3: Pt will ambulate 50 ft with supervision and SW. Short term goal 4: Pt will negotiate 6 stairs with rails and SBA. Short term goal 5: 4/25: Pt will participate in 15 reps of BLE exercises to promote strength and activity tolerance. Patient Goals   Patient goals : \"to get better and go home\"       Therapy Time   Individual Concurrent Group Co-treatment   Time In 1003         Time Out 1039         Minutes 36         Timed Code Treatment Minutes: Ella, PT, DPT  If pt is unable to be seen after this session, please let this note serve as discharge summary. Please see case management note for discharge disposition. Thank you.

## 2021-04-21 NOTE — PROGRESS NOTES
Hospitalist Progress Note      PCP: Ricky Gibbons MD    Date of Admission: 4/19/2021    Chief Complaint: Generalized weakness and fall    Hospital Course:  77 y.o. female who presented to [facility] with above complaint. She told she was very weak this morning and fell while she was working in [employer]. She did not lose consciousness and does no apparent injuries. She was found to be hypoxic at the emergency room on arrival.  Her saturation was 85% room air. Currently she is on oxygen and comfortable. Her appetite is not that great. She denies nausea vomiting diarrhea abdominal pain chest pain cough or fever. She received 1 Covid vaccine 2 weeks ago and got some complications like generalized weakness and extreme tiredness.       Subjective: Appears SOB, but states she is not. Hyponatremia noted. Tells me she is no longer drinking beer.       Medications:  Reviewed    Infusion Medications    IV infusion builder 65 mL/hr at 04/20/21 1613    sodium chloride       Scheduled Medications    methylPREDNISolone  40 mg Intravenous Q12H    anastrozole  1 mg Oral Daily    lisinopril  20 mg Oral Daily    magnesium oxide  400 mg Oral BID    metoprolol succinate  50 mg Oral Daily    valACYclovir  500 mg Oral Daily    sodium chloride flush  5-40 mL Intravenous 2 times per day    enoxaparin  40 mg Subcutaneous Daily    cefTRIAXone (ROCEPHIN) IV  1,000 mg Intravenous Q24H    And    azithromycin  500 mg Intravenous Q24H     PRN Meds: albuterol sulfate HFA, sodium chloride flush, sodium chloride, promethazine **OR** ondansetron, polyethylene glycol, acetaminophen **OR** acetaminophen, ipratropium-albuterol, perflutren lipid microspheres      Intake/Output Summary (Last 24 hours) at 4/20/2021 2043  Last data filed at 4/20/2021 1906  Gross per 24 hour   Intake 1570 ml   Output    Net 1570 ml       Physical Exam Performed:    /85   Pulse 76   Temp 98.7 °F (37.1 °C) (Axillary)   Resp 20   Ht 5' 2\" (1.575 m)   Wt 180 lb (81.6 kg)   LMP  (LMP Unknown)   SpO2 91%   BMI 32.92 kg/m²     General appearance: No apparent distress, appears stated age and cooperative. HEENT: Pupils equal, round, and reactive to light. Conjunctivae/corneas clear. Neck: Supple, with full range of motion. No jugular venous distention. Trachea midline. Respiratory:  Normal respiratory effort. Clear to auscultation, bilaterally without Rales/Wheezes/Rhonchi. Cardiovascular: Regular rate and rhythm with normal S1/S2 without murmurs, rubs or gallops. Abdomen: Soft, non-tender, non-distended with normal bowel sounds. Musculoskeletal: No clubbing, cyanosis or edema bilaterally. Full range of motion without deformity. Skin: Skin color, texture, turgor normal.  No rashes or lesions. Neurologic:  Neurovascularly intact without any focal sensory/motor deficits. Cranial nerves: II-XII intact, grossly non-focal.  Psychiatric: Alert and oriented, thought content appropriate, normal insight  Capillary Refill: Brisk,3 seconds, normal   Peripheral Pulses: +2 palpable, equal bilaterally       Labs:   Recent Labs     04/19/21  1311 04/20/21  0436   WBC 14.8* 10.8   HGB 11.0* 10.2*   HCT 31.3* 29.4*    245     Recent Labs     04/19/21  1311 04/20/21  0436 04/20/21  1438   * 126* 122*   K 3.0* 2.6* 3.1*   CL 87* 88* 84*   CO2 28 30 28   BUN 7 9 10   CREATININE <0.5* <0.5* 0.6   CALCIUM 9.7 9.1 9.2   PHOS  --   --  2.3*     Recent Labs     04/19/21  1311   AST 33   ALT 10   BILITOT 1.3*   ALKPHOS 74     No results for input(s): INR in the last 72 hours.   Recent Labs     04/19/21  1311 04/19/21  2244 04/20/21  0436 04/20/21  1054   CKTOTAL 61  --   --   --    TROPONINI <0.01 <0.01 0.01 <0.01       Urinalysis:      Lab Results   Component Value Date    NITRU Negative 04/19/2021    WBCUA 10-20 04/19/2021    BACTERIA 1+ 04/19/2021    RBCUA 0-2 04/19/2021    BLOODU Negative 04/19/2021    SPECGRAV 1.015 04/19/2021    GLUCOSEU Negative 04/19/2021       Radiology:  CT CHEST PULMONARY EMBOLISM W CONTRAST   Final Result   1. Negative for pulmonary embolus. 2. Mild bibasilar segmental atelectasis versus pneumonia. XR CHEST PORTABLE   Final Result   No acute cardiopulmonary disease. Assessment/Plan:    Active Hospital Problems    Diagnosis    Respiratory failure with hypoxia ([facility] [address] [J96.91]     Multifocal pneumonia with leukocytosis  - continue Azithro and Rocephin.  - follow CBC and blood cx.  - rapid COVID negative. F/u PCR. - start solu-medrol for wheezing.     Acute hypoxic respiratory failure - secondary to above  - CTPA ruled out PE and no apparent symptoms or signs of heart failure  - supplemental O2 as needed.     Hyponatremia, hypokalemia, hypomagnesemia  - most probably secondary to poor intake  - replace and monitor  - she has a history of hyponatremia, which was believed to be due to HCTZ and possibly beer potomania, but she is NOT taking HCTZ or drinking beer any longer.  - consult nephrology. - follow BMP.     Generalized weakness and fall  - possibly secondary to all of the above  - PT/OT     Hypertension - lisinopril, metoprolol     Breast cancer - tamoxifen     Chronic macrocytosis - work-up as an outpatient     Abnormal UA - possible UTI   - f/u urine cx  - on Rocephin     Obesity - body mass index is 32.92 kg/m². Complicating assessment and treatment. Placing patient at risk for multiple co-morbidities as well as early death and contributing to the patient's presentation.  Counseled on weight loss       DVT Prophylaxis: Lovenox  Diet: DIET LOW SODIUM 2 GM;  Code Status: Full Code    PT/OT Eval Status: ordered    Dispo - likely 1-2 days inpatient    Louisa Joel, APRN - CNP

## 2021-04-21 NOTE — PROGRESS NOTES
Pt assessment completed and charted. VSS. Pt a/o. LUE swelling has decreased since yesterday; pain has improved per pt. Bed in lowest position and wheels locked. Call light within reach. Bedside table within reach. Non-skid footwear in place. Pt denies any other needs at this time. Pt calls out appropriately. Will continue to monitor.

## 2021-04-21 NOTE — PROGRESS NOTES
Hospitalist Progress Note      PCP: Juan Hawley MD    Date of Admission: 4/19/2021    Chief Complaint: Generalized weakness and fall    Hospital Course:  77 y.o. female who presented to [facility] with above complaint. She told she was very weak this morning and fell while she was working in [employer]. She did not lose consciousness and does no apparent injuries. She was found to be hypoxic at the emergency room on arrival.  Her saturation was 85% room air. Currently she is on oxygen and comfortable. Her appetite is not that great. She denies nausea vomiting diarrhea abdominal pain chest pain cough or fever. She received 1 Covid vaccine 2 weeks ago and got some complications like generalized weakness and extreme tiredness.       Subjective: SOB appears improved. On room air. OOB to chair. Hyponatremia improving.     Medications:  Reviewed    Infusion Medications    IV infusion builder 65 mL/hr at 04/20/21 1613    sodium chloride       Scheduled Medications    methylPREDNISolone  40 mg Intravenous Q12H    anastrozole  1 mg Oral Daily    lisinopril  20 mg Oral Daily    magnesium oxide  400 mg Oral BID    metoprolol succinate  50 mg Oral Daily    valACYclovir  500 mg Oral Daily    sodium chloride flush  5-40 mL Intravenous 2 times per day    enoxaparin  40 mg Subcutaneous Daily    cefTRIAXone (ROCEPHIN) IV  1,000 mg Intravenous Q24H    And    azithromycin  500 mg Intravenous Q24H     PRN Meds: HYDROcodone 5 mg - acetaminophen, albuterol sulfate HFA, sodium chloride flush, sodium chloride, promethazine **OR** ondansetron, polyethylene glycol, acetaminophen **OR** acetaminophen, ipratropium-albuterol, perflutren lipid microspheres      Intake/Output Summary (Last 24 hours) at 4/21/2021 1414  Last data filed at 4/21/2021 1003  Gross per 24 hour   Intake 1690 ml   Output    Net 1690 ml       Physical Exam Performed:    BP (!) 143/71   Pulse 82   Temp 97.8 °F (36.6 °C) (Oral)   Resp 19   Ht 5' 2\" (1.575 m)   Wt 180 lb (81.6 kg)   LMP  (LMP Unknown)   SpO2 95%   BMI 32.92 kg/m²     General appearance: Pleasant female in no apparent distress, appears stated age and cooperative. HEENT: Pupils equal, round, and reactive to light. Conjunctivae/corneas clear. Neck: Supple, with full range of motion. No jugular venous distention. Trachea midline. Respiratory:  Normal respiratory effort. Clear to auscultation, bilaterally without Rales/Wheezes/Rhonchi. On room air. Cardiovascular: Regular rate and rhythm with normal S1/S2 without murmurs, rubs or gallops. Abdomen: Soft, non-tender, non-distended with normal bowel sounds. Musculoskeletal: No clubbing, cyanosis or edema bilaterally. Full range of motion without deformity. Skin: Skin color, texture, turgor normal.  No rashes or lesions. Neurologic:  Neurovascularly intact without any focal sensory/motor deficits. Cranial nerves: II-XII intact, grossly non-focal.  Generalized weakness. Psychiatric: Alert and oriented, thought content appropriate, normal insight  Capillary Refill: Brisk,3 seconds, normal   Peripheral Pulses: +2 palpable, equal bilaterally       Labs:   Recent Labs     04/19/21  1311 04/20/21  0436   WBC 14.8* 10.8   HGB 11.0* 10.2*   HCT 31.3* 29.4*    245     Recent Labs     04/20/21  2232 04/21/21  0328 04/21/21  1018   * 128* 132*   K 3.5 3.6 3.3*   CL 89* 90* 93*   CO2 27 28 27   BUN 10 10 10   CREATININE <0.5* <0.5* <0.5*   CALCIUM 9.0 9.0 9.5   PHOS 3.4 3.3 3.2     Recent Labs     04/19/21  1311   AST 33   ALT 10   BILITOT 1.3*   ALKPHOS 74     No results for input(s): INR in the last 72 hours.   Recent Labs     04/19/21  1311 04/19/21  2244 04/20/21  0436 04/20/21  1054   CKTOTAL 61  --   --   --    TROPONINI <0.01 <0.01 0.01 <0.01       Urinalysis:      Lab Results   Component Value Date    NITRU Negative 04/19/2021    WBCUA 10-20 04/19/2021    BACTERIA 1+ 04/19/2021    RBCUA 0-2 04/19/2021    BLOODU Negative 04/19/2021    SPECGRAV 1.015 04/19/2021    GLUCOSEU Negative 04/19/2021       Radiology:  CT CHEST PULMONARY EMBOLISM W CONTRAST   Final Result   1. Negative for pulmonary embolus. 2. Mild bibasilar segmental atelectasis versus pneumonia. XR CHEST PORTABLE   Final Result   No acute cardiopulmonary disease. Assessment/Plan:    Active Hospital Problems    Diagnosis    Respiratory failure with hypoxia ([facility]) [J96.91]     Multifocal pneumonia with leukocytosis  - continue Azithro and Rocephin.  - wheezing noted on exam, so started Solu-medrol 40 mg daily 4/21.  - blood cx NGTD. - rapid COVID and PCR negative.     Acute hypoxic respiratory failure - secondary to above. Resolved. - CTPA ruled out PE and no apparent symptoms or signs of heart failure     Hyponatremia, hypokalemia, hypomagnesemia  - most probably secondary poor solute intake. - replace and monitor  - she has a history of hyponatremia, which was believed to be due to HCTZ and possibly beer potomania, but she is NOT taking HCTZ and states she is no longer drinking beer. - nephrology consulted and following.  - bicarb gtt stopped 4/21.  - f/u urine studies.     Generalized weakness and fall  - likely secondary to all of the above  - PT/OT     Hypertension, controlled - continue home lisinopril, metoprolol     Breast cancer - continue tamoxifen.     Chronic macrocytosis, possibly due to EtOH use  - b12 and folate pending.     Acute cystitis  - 50,000 E. Coli per culture. - Rocephin as above.     Obesity - body mass index is 32.92 kg/m². Complicating assessment and treatment. Placing patient at risk for multiple co-morbidities as well as early death and contributing to the patient's presentation.  Counseled on weight loss    Hx of EtOH dependence - patient tells me she is no longer drinking alcohol after hospitalization 2 weeks ago.       DVT Prophylaxis: Lovenox  Diet: DIET LOW SODIUM 2 GM;  Code Status: Full Code PT/OT Eval Status: recommend home PT/OT    Dispo - daughter made multiple calls today to arrange transfer to  in fear of neurological issue underlying the patient's weakness. Discussed with daughter and the transfer center.  accepted the patient per patient preference.      Calderon Hairston, JOHNY - CNP

## 2021-04-22 LAB — OSMOLALITY URINE: 406 MOSM/KG (ref 390–1070)

## 2021-04-22 NOTE — PROGRESS NOTES
Received a call from transfer center, pt to transfer to  this evening at 2200. Report call to [facility] RN at [phone]. Spoke with [facility] RN, LONNIE regarding requirements for transfer. Per CN, ok for pt to transfer with PIV, print AVS and fill out ambulance form. Papers filled out and placed in chart. Per NP, ok to d/c tele now. Tele removed.

## 2021-04-23 LAB — BLOOD CULTURE, ROUTINE: NORMAL

## 2021-04-27 ENCOUNTER — TELEPHONE (OUTPATIENT)
Dept: FAMILY MEDICINE CLINIC | Age: 66
End: 2021-04-27

## 2021-04-28 ENCOUNTER — OFFICE VISIT (OUTPATIENT)
Dept: FAMILY MEDICINE CLINIC | Age: 66
End: 2021-04-28
Payer: COMMERCIAL

## 2021-04-28 VITALS
HEART RATE: 98 BPM | WEIGHT: 196 LBS | TEMPERATURE: 98.2 F | SYSTOLIC BLOOD PRESSURE: 130 MMHG | BODY MASS INDEX: 35.85 KG/M2 | OXYGEN SATURATION: 96 % | DIASTOLIC BLOOD PRESSURE: 90 MMHG

## 2021-04-28 DIAGNOSIS — E87.6 HYPOKALEMIA: ICD-10-CM

## 2021-04-28 DIAGNOSIS — F10.21 ALCOHOL DEPENDENCE IN EARLY, EARLY PARTIAL, SUSTAINED FULL, OR SUSTAINED PARTIAL REMISSION (HCC): ICD-10-CM

## 2021-04-28 DIAGNOSIS — D53.9 MACROCYTIC ANEMIA: ICD-10-CM

## 2021-04-28 DIAGNOSIS — S22.080D COMPRESSION FRACTURE OF T12 VERTEBRA WITH ROUTINE HEALING, SUBSEQUENT ENCOUNTER: ICD-10-CM

## 2021-04-28 DIAGNOSIS — Z09 HOSPITAL DISCHARGE FOLLOW-UP: Primary | ICD-10-CM

## 2021-04-28 DIAGNOSIS — E83.42 HYPOMAGNESEMIA: ICD-10-CM

## 2021-04-28 DIAGNOSIS — Z91.81 AT HIGH RISK FOR FALLS: ICD-10-CM

## 2021-04-28 LAB
A/G RATIO: 1.2 (ref 1.1–2.2)
ALBUMIN SERPL-MCNC: 3.6 G/DL (ref 3.4–5)
ALP BLD-CCNC: 69 U/L (ref 40–129)
ALT SERPL-CCNC: 13 U/L (ref 10–40)
ANION GAP SERPL CALCULATED.3IONS-SCNC: 8 MMOL/L (ref 3–16)
AST SERPL-CCNC: 20 U/L (ref 15–37)
BASOPHILS ABSOLUTE: 0.1 K/UL (ref 0–0.2)
BASOPHILS RELATIVE PERCENT: 1 %
BILIRUB SERPL-MCNC: 0.7 MG/DL (ref 0–1)
BUN BLDV-MCNC: 7 MG/DL (ref 7–20)
CALCIUM SERPL-MCNC: 9.1 MG/DL (ref 8.3–10.6)
CHLORIDE BLD-SCNC: 95 MMOL/L (ref 99–110)
CO2: 33 MMOL/L (ref 21–32)
CREAT SERPL-MCNC: 0.6 MG/DL (ref 0.6–1.2)
EOSINOPHILS ABSOLUTE: 0.1 K/UL (ref 0–0.6)
EOSINOPHILS RELATIVE PERCENT: 1.4 %
GFR AFRICAN AMERICAN: >60
GFR NON-AFRICAN AMERICAN: >60
GLOBULIN: 3.1 G/DL
GLUCOSE BLD-MCNC: 99 MG/DL (ref 70–99)
HCT VFR BLD CALC: 33 % (ref 36–48)
HEMOGLOBIN: 11.6 G/DL (ref 12–16)
LYMPHOCYTES ABSOLUTE: 1.2 K/UL (ref 1–5.1)
LYMPHOCYTES RELATIVE PERCENT: 14.2 %
MAGNESIUM: 1.2 MG/DL (ref 1.8–2.4)
MCH RBC QN AUTO: 38.5 PG (ref 26–34)
MCHC RBC AUTO-ENTMCNC: 35.2 G/DL (ref 31–36)
MCV RBC AUTO: 109.3 FL (ref 80–100)
MONOCYTES ABSOLUTE: 1.3 K/UL (ref 0–1.3)
MONOCYTES RELATIVE PERCENT: 15 %
NEUTROPHILS ABSOLUTE: 5.9 K/UL (ref 1.7–7.7)
NEUTROPHILS RELATIVE PERCENT: 68.4 %
PDW BLD-RTO: 14.3 % (ref 12.4–15.4)
PLATELET # BLD: 258 K/UL (ref 135–450)
PMV BLD AUTO: 8.2 FL (ref 5–10.5)
POTASSIUM SERPL-SCNC: 3.7 MMOL/L (ref 3.5–5.1)
RBC # BLD: 3.02 M/UL (ref 4–5.2)
SODIUM BLD-SCNC: 136 MMOL/L (ref 136–145)
TOTAL PROTEIN: 6.7 G/DL (ref 6.4–8.2)
WBC # BLD: 8.6 K/UL (ref 4–11)

## 2021-04-28 PROCEDURE — 1111F DSCHRG MED/CURRENT MED MERGE: CPT | Performed by: FAMILY MEDICINE

## 2021-04-28 PROCEDURE — 99214 OFFICE O/P EST MOD 30 MIN: CPT | Performed by: FAMILY MEDICINE

## 2021-04-28 PROCEDURE — 36415 COLL VENOUS BLD VENIPUNCTURE: CPT | Performed by: FAMILY MEDICINE

## 2021-04-28 RX ORDER — POTASSIUM CHLORIDE 20 MEQ/1
20 TABLET, EXTENDED RELEASE ORAL DAILY
COMMUNITY
Start: 2021-04-25 | End: 2021-05-27

## 2021-04-28 RX ORDER — ATORVASTATIN CALCIUM 40 MG/1
40 TABLET, FILM COATED ORAL NIGHTLY
COMMUNITY
Start: 2021-04-25 | End: 2021-07-19 | Stop reason: SDUPTHER

## 2021-04-28 RX ORDER — LEVOFLOXACIN 750 MG/1
TABLET ORAL
COMMUNITY
Start: 2021-04-23 | End: 2021-05-27 | Stop reason: ALTCHOICE

## 2021-04-28 RX ORDER — ALENDRONATE SODIUM 35 MG/1
35 TABLET ORAL
COMMUNITY
End: 2021-05-27

## 2021-04-28 ASSESSMENT — PATIENT HEALTH QUESTIONNAIRE - PHQ9
SUM OF ALL RESPONSES TO PHQ QUESTIONS 1-9: 0
SUM OF ALL RESPONSES TO PHQ QUESTIONS 1-9: 0
2. FEELING DOWN, DEPRESSED OR HOPELESS: 0
SUM OF ALL RESPONSES TO PHQ QUESTIONS 1-9: 0

## 2021-04-28 NOTE — PROGRESS NOTES
400 (241.3 Mg) MG TABS tablet  Take 1 tablet by mouth 2 times daily             metoprolol succinate (TOPROL XL) 50 MG extended release tablet  Take 1 tablet by mouth daily             Multiple Vitamins-Minerals (THERAPEUTIC MULTIVITAMIN-MINERALS) tablet  Take 1 tablet by mouth daily             potassium chloride (KLOR-CON M) 20 MEQ extended release tablet  Take 20 mEq by mouth daily             valACYclovir (VALTREX) 500 MG tablet  TAKE 1 TABLET BY MOUTH DAILY                   Medications marked \"taking\" at this time  Outpatient Medications Marked as Taking for the 4/28/21 encounter (Office Visit) with Farhan Barney MD   Medication Sig Dispense Refill    alendronate (FOSAMAX) 35 MG tablet Take 35 mg by mouth every 7 days      atorvastatin (LIPITOR) 40 MG tablet Take 40 mg by mouth nightly      levoFLOXacin (LEVAQUIN) 750 MG tablet       potassium chloride (KLOR-CON M) 20 MEQ extended release tablet Take 20 mEq by mouth daily      lisinopril (PRINIVIL;ZESTRIL) 20 MG tablet Take 1 tablet by mouth daily 30 tablet 3    magnesium oxide (MAG-OX) 400 (241.3 Mg) MG TABS tablet Take 1 tablet by mouth 2 times daily 30 tablet 0    valACYclovir (VALTREX) 500 MG tablet TAKE 1 TABLET BY MOUTH DAILY 90 tablet 3    metoprolol succinate (TOPROL XL) 50 MG extended release tablet Take 1 tablet by mouth daily 90 tablet 3    anastrozole (ARIMIDEX) 1 MG tablet       Biotin 1 MG CAPS Take by mouth      Multiple Vitamins-Minerals (THERAPEUTIC MULTIVITAMIN-MINERALS) tablet Take 1 tablet by mouth daily          Medications patient taking as of now reconciled against medications ordered at time of hospital discharge: Yes    Chief Complaint   Patient presents with    Follow-Up from [facility] and then  Electrolytes low        History of Present illness - Follow up of Hospital diagnosis(es): Bibasilar pna, mild, macrocytic anemia, hx etoh dependence, remote lacunar infarct L thalamus, cervical and lumbar DDD and mild CSS, mod to severe LSS, T11, T12, L1 comp fx. Pt did not have sx's of pna though was weak, had 2 falls, legs unable to hold her body wt. Ortho saw pt for acute T12 comp fx. Was fitted for back brace but then told she does not need. Inpatient course: Discharge summary reviewed- see chart. Was tx'd with IVF, IV abx, PT/OT. Pt was tx'd from [facility] to  per dtr's request. Macrocytic anemia was noted in hosp as prior and pt understands electrolyte abnormality is 2/2 chronic etoh use. Interval history/Current status: Completed levaquin 750 qd x 4 d at d/c. Conts on new meds:  qd, lipitor 40 qd, ibu 600 qid prn, lidoderm patches, Mag Ox 400 bid, Kcl 20 qd. Pt is continuing all prior home meds including fosamax 35 mg q wk as rx'd by oncologist.    Prior to hosp, had 2 beers after work followed by 4-6 more in the evening. Has had no beer since d/c. Returned to FT work 4/27/21. Has been working from home since prior to pandemic. Pt reports chronic diarrhea tid on avg lifelong. Conts w/o change but in past when has avoided etoh x few wk, stools are formed. Few yrs ago, was sober for months. Gradually added 1 beer, then another. Feet tingle, no numbness, longterm. Lidoderm patches were not covered. Does not need ibu for pain. Dr Santi Gomez plans to eventually do dexa. To see Dr Larna Sacks in fall. A comprehensive review of systems was negative except for what was noted in the HPI. Vitals:    04/28/21 0835   BP: (!) 130/90   Pulse: 98   Temp: 98.2 °F (36.8 °C)   TempSrc: Oral   SpO2: 96%   Weight: 196 lb (88.9 kg)     Body mass index is 35.85 kg/m².    Wt Readings from Last 3 Encounters:   04/28/21 196 lb (88.9 kg)   04/19/21 180 lb (81.6 kg)   04/06/21 190 lb (86.2 kg)     BP Readings from Last 3 Encounters:   04/28/21 (!) 130/90   04/21/21 120/79   04/10/21 133/72        Physical Exam:  General Appearance: alert and oriented to person, place and time, well developed and well- nourished, in no acute distress  Skin: warm and dry, no rash, facial erythema 2/2 effudex  Head: normocephalic and atraumatic  Eyes: pupils equal, round, and reactive to light, extraocular eye movements intact, conjunctivae normal  ENT: tympanic membrane, external ear and ear canal normal bilaterally, nose without deformity,  Neck: supple and non-tender without mass, no thyromegaly or thyroid nodules, no cervical lymphadenopathy  Pulmonary/Chest: clear to auscultation bilaterally- no wheezes, rales or rhonchi, normal air movement, no respiratory distress  Cardiovascular: normal rate, regular rhythm, normal S1 and S2, no murmurs, rubs, clicks, or gallops, distal pulses intact, no carotid bruits  Abdomen: soft, non-tender, non-distended, normal bowel sounds, no masses or organomegaly  Extremities: no cyanosis, clubbing or edema  Musculoskeletal: normal range of motion, no joint swelling, deformity or tenderness  Neurologic: reflexes normal and symmetric, no cranial nerve deficit, gait, coordination and speech normal, slight trembling of fingers L hand    Assessment/Plan:  1. Hospital discharge follow-up  Electrolytes, LE weakness improved at hosp d/c. She was found not to need [facility] PT. No sx's of pna.   - MD DISCHARGE MEDS RECONCILED W/ CURRENT OUTPATIENT MED LIST    2. Hypokalemia  - Comprehensive Metabolic Panel    3. Hypomagnesemia  - MAGNESIUM    4. Macrocytic anemia  Anticipate improvement with etoh avoidance over time. - CBC Auto Differential    5. Compression fracture of T12 vertebra with routine healing, subsequent encounter  Pain lesseneing. 6. At high risk for falls  Pt is asked to use cane. 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 7. Alcohol dependence in early, early partial, sustained full, or sustained partial remission ([facility]   Pt feels committed to avoidance. Feels she does not need support at this time. F/u 1 mo.         Medical Decision Making: moderate complexity      On the basis of positive falls risk screening, assessment and plan is as follows: patient will follow up in 1 month(s) for further evaluation. She is asked to use cane.     John Perales was seen today for follow-up from hospital.    Diagnoses and all orders for this visit:    Hospital discharge follow-up  -     [place] DISCHARGE MEDS RECONCILED W/ CURRENT OUTPATIENT MED LIST    Hypokalemia  -     Comprehensive Metabolic Panel    Hypomagnesemia  -     MAGNESIUM    Macrocytic anemia  -     CBC Auto Differential    Compression fracture of T12 vertebra with routine healing, subsequent encounter    At high risk for falls    Alcohol dependence in early, early partial, sustained full, or sustained partial remission ([facility] [address]

## 2021-04-30 ENCOUNTER — TELEPHONE (OUTPATIENT)
Dept: FAMILY MEDICINE CLINIC | Age: 66
End: 2021-04-30

## 2021-04-30 NOTE — TELEPHONE ENCOUNTER
The patient called to state she needs a release return to work note for 4.27.2021.     When completed please email [email or web address] [phone]

## 2021-05-23 NOTE — DISCHARGE SUMMARY
Hospital Medicine Discharge Summary    Patient ID: Ltanya Joon      Patient's PCP: Maureen Espinal MD    Admit Date: 4/19/2021     Discharge Date: 4/21/2021      Admitting Physician: Napoleon Desouza MD     Discharge Physician: JOHNY Llanos - CNP     Discharge Diagnoses: Active Hospital Problems    Diagnosis     Respiratory failure with hypoxia ([address] [J96.91]        The patient was seen and examined on day of discharge and this discharge summary is in conjunction with any daily progress note from day of discharge. Hospital Course:     77 y.o. female who presented to [facility] with above complaint.  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 Yeni Ye did not lose consciousness and does no apparent injuries.  She was found to be hypoxic at the emergency room on arrival.  Her saturation was 85% room air.  Currently she is on oxygen and comfortable.  Her appetite is not that great.  She denies nausea vomiting diarrhea abdominal pain chest pain cough or fever. She received 1 Covid vaccine 2 weeks ago and got some complications like generalized weakness and extreme tiredness. Multifocal pneumonia with leukocytosis  - continue Azithro and Rocephin.  - wheezing noted on exam, so started Solu-medrol 40 mg daily 4/21.  - blood cx NGTD. - rapid COVID and PCR negative.     Acute hypoxic respiratory failure - secondary to above. Resolved. - CTPA ruled out PE and no apparent symptoms or signs of heart failure     Hyponatremia, hypokalemia, hypomagnesemia  - most probably secondary poor solute intake. - replace and monitor  - she has a history of hyponatremia, which was believed to be due to HCTZ and possibly beer potomania, but she is NOT taking HCTZ and states she is no longer drinking beer.   - nephrology consulted and following.  - bicarb gtt stopped 4/21.     Generalized weakness and fall  - likely secondary to all of the above  - PT/OT    Hypertension, controlled - continue home lisinopril, metoprolol     Breast cancer - continue tamoxifen.     Chronic macrocytosis, possibly due to EtOH use  - b12 and folate      Acute cystitis  - 50,000 E. Coli per culture. - Rocephin as above.     Obesity - body mass index is 79.36 kg/m². Complicating assessment and treatment. Placing patient at risk for multiple co-morbidities as well as early death and contributing to the patient's presentation. Counseled on weight loss     Hx of EtOH dependence - patient tells me she is no longer drinking alcohol after hospitalization 2 weeks ago.             Physical Exam Performed:     /79   Pulse 76   Temp 97.8 °F (36.6 °C) (Oral)   Resp 16   Ht 5' 2\" (1.575 m)   Wt 180 lb (81.6 kg)   LMP  (LMP Unknown)   SpO2 97%   BMI 32.92 kg/m²       General appearance:  No apparent distress, appears stated age and cooperative. HEENT:  Normal cephalic, atraumatic without obvious deformity. Pupils equal, round, and reactive to light. Extra ocular muscles intact. Conjunctivae/corneas clear. Neck: Supple, with full range of motion. No jugular venous distention. Trachea midline. Respiratory:  Normal respiratory effort. Clear to auscultation, bilaterally without Rales/Wheezes/Rhonchi. Cardiovascular:  Regular rate and rhythm with normal S1/S2 without murmurs, rubs or gallops. Abdomen: Soft, non-tender, non-distended with normal bowel sounds. Musculoskeletal:  No clubbing, cyanosis or edema bilaterally. Full range of motion without deformity. Skin: Skin color, texture, turgor normal.  No rashes or lesions. Neurologic:  Neurovascularly intact without any focal sensory/motor deficits. Cranial nerves: II-XII intact, grossly non-focal.  Psychiatric:  Alert and oriented, thought content appropriate, normal insight  Capillary Refill: Brisk,< 3 seconds   Peripheral Pulses: +2 palpable, equal bilaterally       Labs:  For convenience and continuity at follow-up the following most recent labs are provided:      CBC:    Lab Results   Component Value Date    WBC 8.6 04/28/2021    HGB 11.6 04/28/2021    HCT 33.0 04/28/2021     04/28/2021       Renal:    Lab Results   Component Value Date     04/28/2021    K 3.7 04/28/2021    K 2.6 04/20/2021    CL 95 04/28/2021    CO2 33 04/28/2021    BUN 7 04/28/2021    CREATININE 0.6 04/28/2021    CALCIUM 9.1 04/28/2021    PHOS 3.2 04/21/2021         Significant Diagnostic Studies    Radiology:   CT CHEST PULMONARY EMBOLISM W CONTRAST   Final Result   1. Negative for pulmonary embolus. 2. Mild bibasilar segmental atelectasis versus pneumonia. XR CHEST PORTABLE   Final Result   No acute cardiopulmonary disease.                 Consults:     IP CONSULT TO HOSPITALIST  IP CONSULT TO NEPHROLOGY    Disposition:  Patient was transferred to [facility] per family request.       Condition at Discharge: Stable    Discharge Instructions/Follow-up:  F/u with physicians at [facility] Status:  Full    Activity: activity as tolerated    Diet: regular diet      Discharge Medications:     Discharge Medication List as of 4/21/2021  8:46 PM           Details   albuterol sulfate  (90 Base) MCG/ACT inhaler Inhale 2 puffs into the lungs every 6 hours as needed for Wheezing, Disp-1 Inhaler, R-3DC to [facility]      ipratropium-albuterol (DUONEB) 0.5-2.5 (3) MG/3ML SOLN nebulizer solution Inhale 3 mLs into the lungs every 4 hours as needed for Shortness of Breath, Disp-360 mLDC to [facility]              Details   lisinopril (PRINIVIL;ZESTRIL) 20 MG tablet Take 1 tablet by mouth daily, Disp-30 tablet, R-3Normal      magnesium oxide (MAG-OX) 400 (241.3 Mg) MG TABS tablet Take 1 tablet by mouth 2 times daily, Disp-30 tablet, R-0Normal      valACYclovir (VALTREX) 500 MG tablet TAKE 1 TABLET BY MOUTH DAILY, Disp-90 tablet, R-3Normal      metoprolol succinate (TOPROL XL) 50 MG extended release tablet Take 1 tablet by mouth daily, Disp-90 tablet, R-3Normal

## 2021-05-27 ENCOUNTER — OFFICE VISIT (OUTPATIENT)
Dept: FAMILY MEDICINE CLINIC | Age: 66
End: 2021-05-27
Payer: COMMERCIAL

## 2021-05-27 VITALS
DIASTOLIC BLOOD PRESSURE: 74 MMHG | WEIGHT: 191 LBS | SYSTOLIC BLOOD PRESSURE: 120 MMHG | BODY MASS INDEX: 34.93 KG/M2 | OXYGEN SATURATION: 96 % | TEMPERATURE: 97.3 F | HEART RATE: 88 BPM

## 2021-05-27 DIAGNOSIS — D75.89 MACROCYTOSIS: ICD-10-CM

## 2021-05-27 DIAGNOSIS — E87.1 HYPONATREMIA: ICD-10-CM

## 2021-05-27 DIAGNOSIS — E83.42 HYPOMAGNESEMIA: ICD-10-CM

## 2021-05-27 DIAGNOSIS — F10.29 ALCOHOL DEPENDENCE WITH UNSPECIFIED ALCOHOL-INDUCED DISORDER (HCC): Primary | ICD-10-CM

## 2021-05-27 DIAGNOSIS — R27.0 ATAXIA: ICD-10-CM

## 2021-05-27 DIAGNOSIS — E87.6 HYPOKALEMIA: ICD-10-CM

## 2021-05-27 DIAGNOSIS — M81.8 AGE-RELATED OSTEOPOROSIS WITHOUT FRACTURE: ICD-10-CM

## 2021-05-27 LAB
ANION GAP SERPL CALCULATED.3IONS-SCNC: 15 MMOL/L (ref 3–16)
BUN BLDV-MCNC: 8 MG/DL (ref 7–20)
CALCIUM SERPL-MCNC: 9.7 MG/DL (ref 8.3–10.6)
CHLORIDE BLD-SCNC: 90 MMOL/L (ref 99–110)
CO2: 22 MMOL/L (ref 21–32)
CREAT SERPL-MCNC: <0.5 MG/DL (ref 0.6–1.2)
GFR AFRICAN AMERICAN: >60
GFR NON-AFRICAN AMERICAN: >60
GLUCOSE BLD-MCNC: 105 MG/DL (ref 70–99)
MAGNESIUM: 1.2 MG/DL (ref 1.8–2.4)
POTASSIUM SERPL-SCNC: 4.6 MMOL/L (ref 3.5–5.1)
SODIUM BLD-SCNC: 127 MMOL/L (ref 136–145)

## 2021-05-27 PROCEDURE — 36415 COLL VENOUS BLD VENIPUNCTURE: CPT | Performed by: FAMILY MEDICINE

## 2021-05-27 PROCEDURE — 1036F TOBACCO NON-USER: CPT | Performed by: FAMILY MEDICINE

## 2021-05-27 PROCEDURE — G8427 DOCREV CUR MEDS BY ELIG CLIN: HCPCS | Performed by: FAMILY MEDICINE

## 2021-05-27 PROCEDURE — G8417 CALC BMI ABV UP PARAM F/U: HCPCS | Performed by: FAMILY MEDICINE

## 2021-05-27 PROCEDURE — 1123F ACP DISCUSS/DSCN MKR DOCD: CPT | Performed by: FAMILY MEDICINE

## 2021-05-27 PROCEDURE — 99214 OFFICE O/P EST MOD 30 MIN: CPT | Performed by: FAMILY MEDICINE

## 2021-05-27 PROCEDURE — 1090F PRES/ABSN URINE INCON ASSESS: CPT | Performed by: FAMILY MEDICINE

## 2021-05-27 PROCEDURE — 4040F PNEUMOC VAC/ADMIN/RCVD: CPT | Performed by: FAMILY MEDICINE

## 2021-05-27 PROCEDURE — 3017F COLORECTAL CA SCREEN DOC REV: CPT | Performed by: FAMILY MEDICINE

## 2021-05-27 PROCEDURE — G9899 SCRN MAM PERF RSLTS DOC: HCPCS | Performed by: FAMILY MEDICINE

## 2021-05-27 PROCEDURE — G8399 PT W/DXA RESULTS DOCUMENT: HCPCS | Performed by: FAMILY MEDICINE

## 2021-05-27 RX ORDER — ASPIRIN 81 MG/1
81 TABLET ORAL DAILY
Qty: 90 TABLET | Refills: 3 | Status: SHIPPED | OUTPATIENT
Start: 2021-05-27 | End: 2022-11-01 | Stop reason: ALTCHOICE

## 2021-05-27 RX ORDER — MAGNESIUM OXIDE 400 MG/1
400 TABLET ORAL 2 TIMES DAILY
Qty: 60 TABLET | Refills: 2 | Status: SHIPPED | OUTPATIENT
Start: 2021-05-27 | End: 2021-09-07 | Stop reason: SDUPTHER

## 2021-05-27 RX ORDER — ALENDRONATE SODIUM 70 MG/1
70 TABLET ORAL
Qty: 12 TABLET | Refills: 3 | Status: SHIPPED | OUTPATIENT
Start: 2021-05-27 | End: 2022-06-09

## 2021-05-27 NOTE — PROGRESS NOTES
Assessment/Plan:    Srinivasan Argueta was seen today for follow-up. Diagnoses and all orders for this visit:    Alcohol dependence with unspecified alcohol-induced disorder ([address]   Complications as below   Cessation encouraged. Has backslid since last visit. Hypomagnesemia  -     MAGNESIUM, may need to resume mag ox. Hyponatremia  -     Basic Metabolic Panel    Hypokalemia  -     Basic Metabolic Panel    Macrocytosis   Repeat cbc in future    Ataxia  -     aspirin EC 81 MG EC tablet; Take 1 tablet by mouth daily. Dose is decreased from 325 mg, started during recent  admission for possible TIA. However, suspect ataxia was 2/2 electrolyte disturbance 2/2 chronic etoh use. TIA seems less likely. Age-related osteoporosis without fracture  -     alendronate (FOSAMAX) 70 MG tablet; Take 1 tablet by mouth every 7 days. Pt had been on in past, rx is resumed. Patient Instructions   After your current bottle of aspirin 325 mg runs out, please decrease aspirin to 81 mg daily. Patient: Matt Lancaster is a 77 y. o.female who presents today with the following Chief Complaint(s):  Chief Complaint   Patient presents with    Follow-up         HPI: Pt is here for 1 mo f/u of hosp d/c for pna, lyte abnormality, falls, macrocytic anemia 2/2 chronic etoh use. Na+ and K+ returned to nl 1 mo ago and hgb improved from 10.2 to 11.6. T12 comp fx was found during hosp stay, was fitted for back brace, does not need. Does not need lidoderm. conts on , lipitor 40, Mag Ox 400 bid, KCl 20 qd. Has chronic diarrhea tid on avg lifelong when drinking. Diarrhea also seems stress related. Feels stronger, no further falls. No longer takes ibu. Back pain has resolved. Stopped Mag Ox on 5/20/21, stopped after 30 days. Feels stronger, is planning to resume walking program. No further falls. In past 2 wk, feels she has let work stress \"get to her\". Has in turn consumed etoh 3-4 x per noc.  Once had 7 beers, usually 2-4. Has not felt intoxicated. Pt feels 5 lb wt loss in past 1 mo is 2/2 significant reduction in etoh. Current Outpatient Medications   Medication Sig Dispense Refill    aspirin EC 81 MG EC tablet Take 1 tablet by mouth daily 90 tablet 3    alendronate (FOSAMAX) 70 MG tablet Take 1 tablet by mouth every 7 days 12 tablet 3    atorvastatin (LIPITOR) 40 MG tablet Take 40 mg by mouth nightly      lisinopril (PRINIVIL;ZESTRIL) 20 MG tablet Take 1 tablet by mouth daily 30 tablet 3    valACYclovir (VALTREX) 500 MG tablet TAKE 1 TABLET BY MOUTH DAILY 90 tablet 3    metoprolol succinate (TOPROL XL) 50 MG extended release tablet Take 1 tablet by mouth daily 90 tablet 3    anastrozole (ARIMIDEX) 1 MG tablet       Biotin 1 MG CAPS Take by mouth      Multiple Vitamins-Minerals (THERAPEUTIC MULTIVITAMIN-MINERALS) tablet Take 1 tablet by mouth daily      magnesium oxide (MAG-OX) 400 MG tablet Take 1 tablet by mouth 2 times daily 60 tablet 2    ipratropium-albuterol (DUONEB) 0.5-2.5 (3) MG/3ML SOLN nebulizer solution Inhale 3 mLs into the lungs every 4 hours as needed for Shortness of Breath (Patient not taking: Reported on 4/28/2021) 360 mL      No current facility-administered medications for this visit. Patient's past medical history,surgical history, family history, medications,  and allergies  were all reviewed and updated as appropriate today.     Review of Systems      Physical Exam      /74   Pulse 88   Temp 97.3 °F (36.3 °C) (Temporal)   Wt 191 lb (86.6 kg)   LMP  (LMP Unknown)   SpO2 96%   BMI 34.93 kg/m²

## 2021-07-19 RX ORDER — ATORVASTATIN CALCIUM 40 MG/1
40 TABLET, FILM COATED ORAL NIGHTLY
Qty: 30 TABLET | Refills: 12 | Status: SHIPPED | OUTPATIENT
Start: 2021-07-19 | End: 2021-09-02

## 2021-07-19 NOTE — TELEPHONE ENCOUNTER
Requesting Refill  atorvastatin (LIPITOR) 40 MG tablet     Last Office Visit  -  5/27/21  Next Office Visit  -  8/25/21    Last Filled  -    Last UDS -    Contract -

## 2021-07-19 NOTE — TELEPHONE ENCOUNTER
Last Office Visit  -  5/27/21  Next Office Visit  -  8/25/21    Last Filled  -  4/25/21  Last UDS -    Contract -

## 2021-08-12 RX ORDER — LISINOPRIL 20 MG/1
20 TABLET ORAL DAILY
Qty: 30 TABLET | Refills: 12 | Status: SHIPPED | OUTPATIENT
Start: 2021-08-12 | End: 2022-08-04

## 2021-08-12 NOTE — TELEPHONE ENCOUNTER
----- Message from [employer]chors sent at 8/12/2021 11:37 AM EDT -----  Subject: Refill Request    QUESTIONS  Name of Medication? lisinopril (PRINIVIL;ZESTRIL) 20 MG tablet  Patient-reported dosage and instructions? 1 20 MG tablet daily  How many days do you have left? 2  Preferred Pharmacy? [id number]  Pharmacy phone number (if available)? [phone]  Additional Information for Provider? Patient would like a 90 day supply   ---------------------------------------------------------------------------  --------------  CALL BACK INFO  What is the best way for the office to contact you? OK to leave message on   voicemail  Preferred Call Back Phone Number?  [phone]
Last Office Visit  -  5/27/21  Next Office Visit  -  9/2/21    Last Filled  -  4/11/21  Last UDS -    Contract -
No

## 2021-09-02 ENCOUNTER — OFFICE VISIT (OUTPATIENT)
Dept: FAMILY MEDICINE CLINIC | Age: 66
End: 2021-09-02
Payer: MEDICARE

## 2021-09-02 VITALS
OXYGEN SATURATION: 98 % | SYSTOLIC BLOOD PRESSURE: 110 MMHG | BODY MASS INDEX: 35.7 KG/M2 | HEIGHT: 62 IN | DIASTOLIC BLOOD PRESSURE: 74 MMHG | WEIGHT: 194 LBS | TEMPERATURE: 97.1 F | HEART RATE: 74 BPM

## 2021-09-02 DIAGNOSIS — Z23 NEED FOR PROPHYLACTIC VACCINATION AGAINST STREPTOCOCCUS PNEUMONIAE (PNEUMOCOCCUS): ICD-10-CM

## 2021-09-02 DIAGNOSIS — E87.6 HYPOKALEMIA: ICD-10-CM

## 2021-09-02 DIAGNOSIS — E87.1 HYPONATREMIA: ICD-10-CM

## 2021-09-02 DIAGNOSIS — E83.42 HYPOMAGNESEMIA: Primary | ICD-10-CM

## 2021-09-02 DIAGNOSIS — F10.29 ALCOHOL DEPENDENCE WITH UNSPECIFIED ALCOHOL-INDUCED DISORDER (HCC): ICD-10-CM

## 2021-09-02 PROCEDURE — 36415 COLL VENOUS BLD VENIPUNCTURE: CPT | Performed by: FAMILY MEDICINE

## 2021-09-02 PROCEDURE — 4040F PNEUMOC VAC/ADMIN/RCVD: CPT | Performed by: FAMILY MEDICINE

## 2021-09-02 PROCEDURE — 99213 OFFICE O/P EST LOW 20 MIN: CPT | Performed by: FAMILY MEDICINE

## 2021-09-02 PROCEDURE — 1123F ACP DISCUSS/DSCN MKR DOCD: CPT | Performed by: FAMILY MEDICINE

## 2021-09-02 PROCEDURE — 90732 PPSV23 VACC 2 YRS+ SUBQ/IM: CPT | Performed by: FAMILY MEDICINE

## 2021-09-02 PROCEDURE — 1036F TOBACCO NON-USER: CPT | Performed by: FAMILY MEDICINE

## 2021-09-02 PROCEDURE — 3017F COLORECTAL CA SCREEN DOC REV: CPT | Performed by: FAMILY MEDICINE

## 2021-09-02 PROCEDURE — G0009 ADMIN PNEUMOCOCCAL VACCINE: HCPCS | Performed by: FAMILY MEDICINE

## 2021-09-02 PROCEDURE — G8417 CALC BMI ABV UP PARAM F/U: HCPCS | Performed by: FAMILY MEDICINE

## 2021-09-02 PROCEDURE — G9899 SCRN MAM PERF RSLTS DOC: HCPCS | Performed by: FAMILY MEDICINE

## 2021-09-02 PROCEDURE — G8399 PT W/DXA RESULTS DOCUMENT: HCPCS | Performed by: FAMILY MEDICINE

## 2021-09-02 PROCEDURE — G8427 DOCREV CUR MEDS BY ELIG CLIN: HCPCS | Performed by: FAMILY MEDICINE

## 2021-09-02 PROCEDURE — 1090F PRES/ABSN URINE INCON ASSESS: CPT | Performed by: FAMILY MEDICINE

## 2021-09-02 RX ORDER — ATORVASTATIN CALCIUM 40 MG/1
40 TABLET, FILM COATED ORAL DAILY
COMMUNITY
End: 2022-07-21 | Stop reason: SDUPTHER

## 2021-09-02 NOTE — PROGRESS NOTES
Assessment/Plan:    Tiffany Barba was seen today for follow-up. Diagnoses and all orders for this visit:    Hypomagnesemia  -     MAGNESIUM    Hypokalemia  -     Basic Metabolic Panel    Hyponatremia  -     Basic Metabolic Panel    Alcohol dependence   Pt conts to focus on moderation. Need for prophylactic vaccination against Streptococcus pneumoniae (pneumococcus)  -     Pneumococcal polysaccharide vaccine 23-valent greater than or equal to 3yo subcutaneous/IM        There are no Patient Instructions on file for this visit. Patient: Doug Found is a 77 y. o.female who presents today with the following Chief Complaint(s):  Chief Complaint   Patient presents with    Follow-up         HPI: Pt w/htn, hld, osteoporosis, HSV2, hx DCIS L breast, etoh dependence conts on MagOx bid, is due for Mg f/u lab. 5/27/21 Na+127. Finished mag ox 1 day ago and finished Kcl  wks ago. 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 Has filed for unemployment. No liquor in many mo's. May have few beers on occasion, avg of 6 per wk. Has increased water and tea. Feels loss of job has not increased desire to drink. Hopes to resume walking plan. Has occ feet OA, usually flare ups resolve with time and BioFreeze. To visit dtr who has returned from [facility] to [address]. Pt will fly to [place] to visit them in 1 mo. To have mammo in 2 wk, will f/u with Dr Kaylan Champagne.       Current Outpatient Medications   Medication Sig Dispense Refill    atorvastatin (LIPITOR) 40 MG tablet Take 40 mg by mouth daily      lisinopril (PRINIVIL;ZESTRIL) 20 MG tablet Take 1 tablet by mouth daily 30 tablet 12    aspirin EC 81 MG EC tablet Take 1 tablet by mouth daily 90 tablet 3    alendronate (FOSAMAX) 70 MG tablet Take 1 tablet by mouth every 7 days 12 tablet 3    magnesium oxide (MAG-OX) 400 MG tablet Take 1 tablet by mouth 2 times daily 60 tablet 2    valACYclovir (VALTREX) 500 MG tablet TAKE 1 TABLET BY MOUTH DAILY 90 tablet 3    metoprolol succinate (TOPROL XL) 50 MG extended release tablet Take 1 tablet by mouth daily 90 tablet 3    anastrozole (ARIMIDEX) 1 MG tablet       Biotin 1 MG CAPS Take by mouth      Multiple Vitamins-Minerals (THERAPEUTIC MULTIVITAMIN-MINERALS) tablet Take 1 tablet by mouth daily      ipratropium-albuterol (DUONEB) 0.5-2.5 (3) MG/3ML SOLN nebulizer solution Inhale 3 mLs into the lungs every 4 hours as needed for Shortness of Breath (Patient not taking: Reported on 4/28/2021) 360 mL      No current facility-administered medications for this visit. Patient's past medical history,surgical history, family history, medications,  and allergies  were all reviewed and updated as appropriate today. Review of Systems  abv    Physical Exam  Constitutional:       Appearance: Normal appearance. She is well-developed. HENT:      Head: Normocephalic and atraumatic. Right Ear: External ear normal.      Left Ear: External ear normal.   Eyes:      General: No scleral icterus. Right eye: No discharge. Left eye: No discharge. Extraocular Movements: Extraocular movements intact. Conjunctiva/sclera: Conjunctivae normal.   Neck:      Comments: No visualized masses  FROM  Pulmonary:      Effort: Pulmonary effort is normal.   Musculoskeletal:         General: Normal range of motion. Skin:     General: Skin is warm and dry. Neurological:      General: No focal deficit present. Mental Status: She is alert and oriented to person, place, and time. Psychiatric:         Mood and Affect: Mood normal.         Behavior: Behavior normal.         Thought Content:  Thought content normal.         Judgment: Judgment normal.           /74   Pulse 74   Temp 97.1 °F (36.2 °C) (Temporal)   Ht 5' 2\" (1.575 m)   Wt 194 lb (88 kg)   LMP  (LMP Unknown)   SpO2 98%   BMI 35.48 kg/m²

## 2021-09-03 LAB
ANION GAP SERPL CALCULATED.3IONS-SCNC: 12 MMOL/L (ref 3–16)
BUN BLDV-MCNC: 9 MG/DL (ref 7–20)
CALCIUM SERPL-MCNC: 9.7 MG/DL (ref 8.3–10.6)
CHLORIDE BLD-SCNC: 91 MMOL/L (ref 99–110)
CO2: 25 MMOL/L (ref 21–32)
CREAT SERPL-MCNC: 0.6 MG/DL (ref 0.6–1.2)
GFR AFRICAN AMERICAN: >60
GFR NON-AFRICAN AMERICAN: >60
GLUCOSE BLD-MCNC: 116 MG/DL (ref 70–99)
MAGNESIUM: 1.4 MG/DL (ref 1.8–2.4)
POTASSIUM SERPL-SCNC: 4.5 MMOL/L (ref 3.5–5.1)
SODIUM BLD-SCNC: 128 MMOL/L (ref 136–145)

## 2021-09-07 RX ORDER — MAGNESIUM OXIDE 400 MG/1
400 TABLET ORAL 2 TIMES DAILY
Qty: 60 TABLET | Refills: 2 | Status: SHIPPED | OUTPATIENT
Start: 2021-09-07 | End: 2022-04-12

## 2021-09-07 NOTE — TELEPHONE ENCOUNTER
Refill request for magnesium medication.      Name of Tavares      Last visit - 9/2/2021     Pending visit - None    Last refill -5/27/2021  2 refills

## 2021-09-24 ENCOUNTER — HOSPITAL ENCOUNTER (OUTPATIENT)
Dept: WOMENS IMAGING | Age: 66
Discharge: HOME | End: 2021-09-24
Payer: MEDICARE

## 2021-09-24 ENCOUNTER — OFFICE VISIT (OUTPATIENT)
Dept: SURGERY | Age: 66
End: 2021-09-24
Payer: MEDICARE

## 2021-09-24 VITALS
DIASTOLIC BLOOD PRESSURE: 77 MMHG | HEIGHT: 62 IN | WEIGHT: 194 LBS | BODY MASS INDEX: 35.7 KG/M2 | SYSTOLIC BLOOD PRESSURE: 120 MMHG

## 2021-09-24 DIAGNOSIS — Z85.3 PERSONAL HISTORY OF BREAST CANCER: ICD-10-CM

## 2021-09-24 DIAGNOSIS — Z08 ENCOUNTER FOR FOLLOW-UP SURVEILLANCE OF BREAST CANCER: ICD-10-CM

## 2021-09-24 DIAGNOSIS — Z85.3 PERSONAL HISTORY OF BREAST CANCER: Primary | ICD-10-CM

## 2021-09-24 DIAGNOSIS — Z85.3 ENCOUNTER FOR FOLLOW-UP SURVEILLANCE OF BREAST CANCER: ICD-10-CM

## 2021-09-24 PROCEDURE — 3017F COLORECTAL CA SCREEN DOC REV: CPT | Performed by: SURGERY

## 2021-09-24 PROCEDURE — 77066 DX MAMMO INCL CAD BI: CPT

## 2021-09-24 PROCEDURE — 1123F ACP DISCUSS/DSCN MKR DOCD: CPT | Performed by: SURGERY

## 2021-09-24 PROCEDURE — G8417 CALC BMI ABV UP PARAM F/U: HCPCS | Performed by: SURGERY

## 2021-09-24 PROCEDURE — G9899 SCRN MAM PERF RSLTS DOC: HCPCS | Performed by: SURGERY

## 2021-09-24 PROCEDURE — 1090F PRES/ABSN URINE INCON ASSESS: CPT | Performed by: SURGERY

## 2021-09-24 PROCEDURE — 4040F PNEUMOC VAC/ADMIN/RCVD: CPT | Performed by: SURGERY

## 2021-09-24 PROCEDURE — G8399 PT W/DXA RESULTS DOCUMENT: HCPCS | Performed by: SURGERY

## 2021-09-24 PROCEDURE — 99213 OFFICE O/P EST LOW 20 MIN: CPT | Performed by: SURGERY

## 2021-09-24 PROCEDURE — G8427 DOCREV CUR MEDS BY ELIG CLIN: HCPCS | Performed by: SURGERY

## 2021-09-24 PROCEDURE — 1036F TOBACCO NON-USER: CPT | Performed by: SURGERY

## 2021-09-24 NOTE — PROGRESS NOTES
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 woman who is now 2 years status-post left partial mastectomy for a left DCIS involving a papilloma. Following her surgical therapy, she was treated with adjuvant radiation therapy and adjuvant endocrine therapy with Anastrozole which she initiated in 2020. She is tolerating endocrine therapy well. She returns today for her routine follow up visit and states that she is doing well. She denies any masses in either breast.  She denies any change in the appearance of her breasts or the skin of her breasts outside of that attributed to her previous therapies. She denies bilateral nipple discharge. She denies any masses in either axilla. She denies weight loss, bone pain, headaches and has no other systemic complaints. Please note that her past medical history, surgical history, medications, allergies, social history, and family history were all reviewed with her again today. REVIEW OF SYSTEMS:   Constitutional: Negative for fatigue and unexpected weight change. Eyes: Negative for visual disturbance. Respiratory: Negative for cough and shortness of breath. Cardiovascular: Negative for chest pain. Gastrointestinal: Negative for abdominal pain. Musculoskeletal: Negative for arthralgias, back pain and gait problem. Neurological: Negative for headaches. Hematological: Negative for adenopathy.      I personally reviewed and agree with the above ROS as documented by my medical assistant. PHYSICAL EXAMINATION:   Vitals: /77 (Site: Left Upper Arm, Position: Sitting, Cuff Size: Large Adult)   Ht 5' 2\" (1.575 m)   Wt 194 lb (88 kg)   LMP  (LMP Unknown)   BMI 35.48 kg/m²   General: Well-developed, well-nourished, in no apparent distress. Psychiatric: Alert and oriented x 3. Appropriate affect and behavior for today's visit. Musculoskeletal: Normal gait and range of motion in all 4 extremities. Lymphatic System:  No concerning cervical, supraclavicular or axillary lymphadenopathy. Breast Exam: Bilateral breast examination reveals ptotic breasts bilaterally. Left Breast: Examination of the left breast in the upright and supine position reveals a well-healed incision with minimal volume loss and excellent symmetry. There are no masses, nodules, skin changes, or other evidence of recurrence. There is no nipple discharge or axillary adenopathy. Right Breast: Examination of the right breast in the upright and supine positions reveal no obvious masses, skin changes, dimpling, or retraction. The nipple and areola are without erosion, edema or ulceration. There is no obvious nipple discharge. There is no concerning axillary adenopathy. IMAGING: I personally reviewed the breast imaging performed from today which I ordered and discussed this with the radiologist and the patient. She was given a BI-RADS 2. Breast density is described as fibroglandular densities. IMPRESSION/RECOMMENDATION:    Cancer Staging  Malignant neoplasm of upper-inner quadrant of left breast in female, estrogen receptor positive ([facility] [address]  Staging form: Breast, AJCC 8th Edition  - Pathologic: Stage 0 (pTis (DCIS), pN0, cM0, G2, ER+, KS+) - Signed by Vanessa Arevalo MD on 6/10/2020    Mckayla Chopra is a 77 y.o. woman who is now 2 years status-post left partial mastectomy for a left DCIS involving a papilloma.       Following her surgical therapy, she was treated with adjuvant radiation therapy and adjuvant endocrine therapy with Anastrozole which she initiated in January 2020. I reassured her that based on her clinical examination and most recent breast imaging that there is no evidence of any recurrent disease or new abnormalities. We reviewed the importance of continued endocrine therapy. Signs and symptoms of recurrence were reviewed. She verbalizes understanding that she should notify our office if she identifies any abnormalities on self examination as it may require further workup. I would like to see her back in 1 year for a clinical exam and bilateral mammograms. In the interim, I encouraged her to resume self examinations on a monthly basis and to alert me of any changes. I also encouraged her to eat healthy, stay active, and limit alcohol intake for risk reduction purposes. I answered all of her questions thoroughly, and she does seem pleased with this plan of approach. I encouraged her to contact me in the interim if any new questions or concerns arise.     Summary:  - f/u in 1 year with bilateral mammograms  - continue endocrine therapy per medical oncology recommendations    Carley Stone MD

## 2021-11-01 DIAGNOSIS — I10 BENIGN ESSENTIAL HTN: ICD-10-CM

## 2021-11-01 RX ORDER — METOPROLOL SUCCINATE 50 MG/1
TABLET, EXTENDED RELEASE ORAL
Qty: 90 TABLET | Refills: 3 | Status: SHIPPED | OUTPATIENT
Start: 2021-11-01

## 2021-12-16 ENCOUNTER — HOSPITAL ENCOUNTER (OUTPATIENT)
Dept: ULTRASOUND IMAGING | Age: 66
Discharge: HOME | End: 2021-12-16
Payer: MEDICARE

## 2021-12-16 DIAGNOSIS — D05.82 OTHER TYPE OF CARCINOMA IN SITU OF LEFT BREAST: ICD-10-CM

## 2021-12-16 PROCEDURE — 76705 ECHO EXAM OF ABDOMEN: CPT

## 2022-01-28 ENCOUNTER — HOSPITAL ENCOUNTER (OUTPATIENT)
Dept: MRI IMAGING | Age: 67
Discharge: HOME | End: 2022-01-28
Payer: MEDICARE

## 2022-01-28 DIAGNOSIS — K74.60 HEPATIC CIRRHOSIS, UNSPECIFIED HEPATIC CIRRHOSIS TYPE, UNSPECIFIED WHETHER ASCITES PRESENT (HCC): ICD-10-CM

## 2022-01-28 DIAGNOSIS — R79.0 ABNORMAL BLOOD LEVEL OF IRON: ICD-10-CM

## 2022-01-28 LAB
GFR AFRICAN AMERICAN: >60
GFR NON-AFRICAN AMERICAN: >60
PERFORMED ON: NORMAL
POC CREATININE: 0.8 MG/DL (ref 0.6–1.2)
POC SAMPLE TYPE: NORMAL

## 2022-01-28 PROCEDURE — A9579 GAD-BASE MR CONTRAST NOS,1ML: HCPCS | Performed by: NURSE PRACTITIONER

## 2022-01-28 PROCEDURE — 74183 MRI ABD W/O CNTR FLWD CNTR: CPT

## 2022-01-28 PROCEDURE — 6360000004 HC RX CONTRAST MEDICATION: Performed by: NURSE PRACTITIONER

## 2022-01-28 PROCEDURE — 82565 ASSAY OF CREATININE: CPT

## 2022-01-28 RX ADMIN — GADOTERIDOL 17 ML: 279.3 INJECTION, SOLUTION INTRAVENOUS at 12:56

## 2022-03-11 ENCOUNTER — TELEPHONE (OUTPATIENT)
Dept: FAMILY MEDICINE CLINIC | Age: 67
End: 2022-03-11

## 2022-03-11 NOTE — TELEPHONE ENCOUNTER
Pt called to let the office know that she will call us back to schedule her AWV. Now is not a good time for her due to condition.

## 2022-03-26 DIAGNOSIS — B00.9 HSV-2 (HERPES SIMPLEX VIRUS 2) INFECTION: ICD-10-CM

## 2022-03-28 RX ORDER — VALACYCLOVIR HYDROCHLORIDE 500 MG/1
TABLET, FILM COATED ORAL
Qty: 90 TABLET | Refills: 3 | Status: SHIPPED | OUTPATIENT
Start: 2022-03-28

## 2022-04-12 NOTE — PROGRESS NOTES

## 2022-04-16 ENCOUNTER — ANESTHESIA EVENT (OUTPATIENT)
Dept: ENDOSCOPY | Age: 67
End: 2022-04-16
Payer: MEDICARE

## 2022-04-20 ENCOUNTER — HOSPITAL ENCOUNTER (OUTPATIENT)
Age: 67
Setting detail: OUTPATIENT SURGERY
Discharge: HOME | End: 2022-04-20
Attending: INTERNAL MEDICINE | Admitting: INTERNAL MEDICINE
Payer: MEDICARE

## 2022-04-20 ENCOUNTER — ANESTHESIA (OUTPATIENT)
Dept: ENDOSCOPY | Age: 67
End: 2022-04-20
Payer: MEDICARE

## 2022-04-20 VITALS
RESPIRATION RATE: 19 BRPM | DIASTOLIC BLOOD PRESSURE: 57 MMHG | OXYGEN SATURATION: 99 % | SYSTOLIC BLOOD PRESSURE: 113 MMHG

## 2022-04-20 VITALS
RESPIRATION RATE: 12 BRPM | HEART RATE: 75 BPM | TEMPERATURE: 98 F | SYSTOLIC BLOOD PRESSURE: 107 MMHG | OXYGEN SATURATION: 98 % | DIASTOLIC BLOOD PRESSURE: 63 MMHG | BODY MASS INDEX: 34.04 KG/M2 | HEIGHT: 62 IN | WEIGHT: 185 LBS

## 2022-04-20 LAB
A/G RATIO: 0.8 (ref 1.1–2.2)
ALBUMIN SERPL-MCNC: 3.4 G/DL (ref 3.4–5)
ALP BLD-CCNC: 196 U/L (ref 40–129)
ALT SERPL-CCNC: 38 U/L (ref 10–40)
ANION GAP SERPL CALCULATED.3IONS-SCNC: 10 MMOL/L (ref 3–16)
APTT: 44.7 SEC (ref 26.2–38.6)
AST SERPL-CCNC: 124 U/L (ref 15–37)
BILIRUB SERPL-MCNC: 1.6 MG/DL (ref 0–1)
BUN BLDV-MCNC: 11 MG/DL (ref 7–20)
CALCIUM SERPL-MCNC: 9.3 MG/DL (ref 8.3–10.6)
CHLORIDE BLD-SCNC: 93 MMOL/L (ref 99–110)
CO2: 23 MMOL/L (ref 21–32)
CREAT SERPL-MCNC: 0.6 MG/DL (ref 0.6–1.2)
EKG ATRIAL RATE: 88 BPM
EKG DIAGNOSIS: NORMAL
EKG Q-T INTERVAL: 382 MS
EKG QRS DURATION: 88 MS
EKG QTC CALCULATION (BAZETT): 409 MS
EKG R AXIS: 41 DEGREES
EKG T AXIS: 17 DEGREES
EKG VENTRICULAR RATE: 69 BPM
GFR AFRICAN AMERICAN: >60
GFR NON-AFRICAN AMERICAN: >60
GLUCOSE BLD-MCNC: 112 MG/DL (ref 70–99)
HCT VFR BLD CALC: 36.6 % (ref 36–48)
HEMATOLOGY PATH CONSULT: YES
HEMOGLOBIN: 12.9 G/DL (ref 12–16)
INR BLD: 1.58 (ref 0.88–1.12)
MCH RBC QN AUTO: 39.2 PG (ref 26–34)
MCHC RBC AUTO-ENTMCNC: 35.3 G/DL (ref 31–36)
MCV RBC AUTO: 111.1 FL (ref 80–100)
PDW BLD-RTO: 16.1 % (ref 12.4–15.4)
PLATELET # BLD: 89 K/UL (ref 135–450)
PLATELET SLIDE REVIEW: ABNORMAL
PMV BLD AUTO: 10.8 FL (ref 5–10.5)
POTASSIUM REFLEX MAGNESIUM: 5.5 MMOL/L (ref 3.5–5.1)
PROTHROMBIN TIME: 18.2 SEC (ref 9.9–12.7)
RBC # BLD: 3.3 M/UL (ref 4–5.2)
SLIDE REVIEW: ABNORMAL
SODIUM BLD-SCNC: 126 MMOL/L (ref 136–145)
TOTAL PROTEIN: 7.6 G/DL (ref 6.4–8.2)
WBC # BLD: 5.1 K/UL (ref 4–11)

## 2022-04-20 PROCEDURE — 93010 ELECTROCARDIOGRAM REPORT: CPT | Performed by: INTERNAL MEDICINE

## 2022-04-20 PROCEDURE — 88173 CYTOPATH EVAL FNA REPORT: CPT

## 2022-04-20 PROCEDURE — 2500000003 HC RX 250 WO HCPCS: Performed by: ANESTHESIOLOGY

## 2022-04-20 PROCEDURE — 80053 COMPREHEN METABOLIC PANEL: CPT

## 2022-04-20 PROCEDURE — 2580000003 HC RX 258: Performed by: ANESTHESIOLOGY

## 2022-04-20 PROCEDURE — 82378 CARCINOEMBRYONIC ANTIGEN: CPT

## 2022-04-20 PROCEDURE — 93005 ELECTROCARDIOGRAM TRACING: CPT | Performed by: ANESTHESIOLOGY

## 2022-04-20 PROCEDURE — 85610 PROTHROMBIN TIME: CPT

## 2022-04-20 PROCEDURE — 85730 THROMBOPLASTIN TIME PARTIAL: CPT

## 2022-04-20 PROCEDURE — 85027 COMPLETE CBC AUTOMATED: CPT

## 2022-04-20 PROCEDURE — 2500000003 HC RX 250 WO HCPCS: Performed by: NURSE ANESTHETIST, CERTIFIED REGISTERED

## 2022-04-20 PROCEDURE — 3609020800 HC EGD W/EUS FNA: Performed by: INTERNAL MEDICINE

## 2022-04-20 PROCEDURE — 88305 TISSUE EXAM BY PATHOLOGIST: CPT

## 2022-04-20 PROCEDURE — 6360000002 HC RX W HCPCS: Performed by: NURSE ANESTHETIST, CERTIFIED REGISTERED

## 2022-04-20 PROCEDURE — 36415 COLL VENOUS BLD VENIPUNCTURE: CPT

## 2022-04-20 PROCEDURE — 3700000001 HC ADD 15 MINUTES (ANESTHESIA): Performed by: INTERNAL MEDICINE

## 2022-04-20 PROCEDURE — 2709999900 HC NON-CHARGEABLE SUPPLY: Performed by: INTERNAL MEDICINE

## 2022-04-20 PROCEDURE — 88313 SPECIAL STAINS GROUP 2: CPT

## 2022-04-20 PROCEDURE — 3700000000 HC ANESTHESIA ATTENDED CARE: Performed by: INTERNAL MEDICINE

## 2022-04-20 PROCEDURE — 2580000003 HC RX 258: Performed by: NURSE ANESTHETIST, CERTIFIED REGISTERED

## 2022-04-20 PROCEDURE — 7100000011 HC PHASE II RECOVERY - ADDTL 15 MIN: Performed by: INTERNAL MEDICINE

## 2022-04-20 PROCEDURE — 7100000010 HC PHASE II RECOVERY - FIRST 15 MIN: Performed by: INTERNAL MEDICINE

## 2022-04-20 RX ORDER — SODIUM CHLORIDE 9 MG/ML
25 INJECTION, SOLUTION INTRAVENOUS PRN
Status: DISCONTINUED | OUTPATIENT
Start: 2022-04-20 | End: 2022-04-20 | Stop reason: HOSPADM

## 2022-04-20 RX ORDER — OXYCODONE HYDROCHLORIDE 5 MG/1
5 TABLET ORAL PRN
Status: DISCONTINUED | OUTPATIENT
Start: 2022-04-20 | End: 2022-04-20 | Stop reason: HOSPADM

## 2022-04-20 RX ORDER — ONDANSETRON 2 MG/ML
4 INJECTION INTRAMUSCULAR; INTRAVENOUS
Status: DISCONTINUED | OUTPATIENT
Start: 2022-04-20 | End: 2022-04-20 | Stop reason: HOSPADM

## 2022-04-20 RX ORDER — PROPOFOL 10 MG/ML
INJECTION, EMULSION INTRAVENOUS CONTINUOUS PRN
Status: DISCONTINUED | OUTPATIENT
Start: 2022-04-20 | End: 2022-04-20 | Stop reason: SDUPTHER

## 2022-04-20 RX ORDER — PROPOFOL 10 MG/ML
INJECTION, EMULSION INTRAVENOUS PRN
Status: DISCONTINUED | OUTPATIENT
Start: 2022-04-20 | End: 2022-04-20 | Stop reason: SDUPTHER

## 2022-04-20 RX ORDER — SODIUM CHLORIDE 0.9 % (FLUSH) 0.9 %
10 SYRINGE (ML) INJECTION PRN
Status: DISCONTINUED | OUTPATIENT
Start: 2022-04-20 | End: 2022-04-20 | Stop reason: HOSPADM

## 2022-04-20 RX ORDER — MEPERIDINE HYDROCHLORIDE 25 MG/ML
12.5 INJECTION INTRAMUSCULAR; INTRAVENOUS; SUBCUTANEOUS EVERY 5 MIN PRN
Status: DISCONTINUED | OUTPATIENT
Start: 2022-04-20 | End: 2022-04-20 | Stop reason: HOSPADM

## 2022-04-20 RX ORDER — ONDANSETRON 2 MG/ML
INJECTION INTRAMUSCULAR; INTRAVENOUS PRN
Status: DISCONTINUED | OUTPATIENT
Start: 2022-04-20 | End: 2022-04-20 | Stop reason: SDUPTHER

## 2022-04-20 RX ORDER — SODIUM CHLORIDE, SODIUM LACTATE, POTASSIUM CHLORIDE, CALCIUM CHLORIDE 600; 310; 30; 20 MG/100ML; MG/100ML; MG/100ML; MG/100ML
INJECTION, SOLUTION INTRAVENOUS CONTINUOUS
Status: DISCONTINUED | OUTPATIENT
Start: 2022-04-20 | End: 2022-04-20 | Stop reason: HOSPADM

## 2022-04-20 RX ORDER — SODIUM CHLORIDE 0.9 % (FLUSH) 0.9 %
5-40 SYRINGE (ML) INJECTION PRN
Status: DISCONTINUED | OUTPATIENT
Start: 2022-04-20 | End: 2022-04-20 | Stop reason: HOSPADM

## 2022-04-20 RX ORDER — SODIUM CHLORIDE, SODIUM LACTATE, POTASSIUM CHLORIDE, CALCIUM CHLORIDE 600; 310; 30; 20 MG/100ML; MG/100ML; MG/100ML; MG/100ML
INJECTION, SOLUTION INTRAVENOUS CONTINUOUS PRN
Status: DISCONTINUED | OUTPATIENT
Start: 2022-04-20 | End: 2022-04-20 | Stop reason: SDUPTHER

## 2022-04-20 RX ORDER — SODIUM CHLORIDE, SODIUM LACTATE, POTASSIUM CHLORIDE, CALCIUM CHLORIDE 600; 310; 30; 20 MG/100ML; MG/100ML; MG/100ML; MG/100ML
INJECTION, SOLUTION INTRAVENOUS CONTINUOUS
Status: DISCONTINUED | OUTPATIENT
Start: 2022-04-20 | End: 2022-04-20 | Stop reason: DRUGHIGH

## 2022-04-20 RX ORDER — OXYCODONE HYDROCHLORIDE 5 MG/1
10 TABLET ORAL PRN
Status: DISCONTINUED | OUTPATIENT
Start: 2022-04-20 | End: 2022-04-20 | Stop reason: HOSPADM

## 2022-04-20 RX ORDER — SODIUM CHLORIDE 0.9 % (FLUSH) 0.9 %
5-40 SYRINGE (ML) INJECTION EVERY 12 HOURS SCHEDULED
Status: DISCONTINUED | OUTPATIENT
Start: 2022-04-20 | End: 2022-04-20 | Stop reason: HOSPADM

## 2022-04-20 RX ORDER — SODIUM CHLORIDE 0.9 % (FLUSH) 0.9 %
10 SYRINGE (ML) INJECTION EVERY 12 HOURS SCHEDULED
Status: DISCONTINUED | OUTPATIENT
Start: 2022-04-20 | End: 2022-04-20 | Stop reason: HOSPADM

## 2022-04-20 RX ORDER — FAMOTIDINE 10 MG/ML
INJECTION, SOLUTION INTRAVENOUS PRN
Status: DISCONTINUED | OUTPATIENT
Start: 2022-04-20 | End: 2022-04-20 | Stop reason: SDUPTHER

## 2022-04-20 RX ORDER — CEFAZOLIN SODIUM 1 G/3ML
INJECTION, POWDER, FOR SOLUTION INTRAMUSCULAR; INTRAVENOUS PRN
Status: DISCONTINUED | OUTPATIENT
Start: 2022-04-20 | End: 2022-04-20 | Stop reason: SDUPTHER

## 2022-04-20 RX ADMIN — SODIUM CHLORIDE, PRESERVATIVE FREE 20 MG: 5 INJECTION INTRAVENOUS at 09:59

## 2022-04-20 RX ADMIN — PROPOFOL 125 MCG/KG/MIN: 10 INJECTION, EMULSION INTRAVENOUS at 10:52

## 2022-04-20 RX ADMIN — ONDANSETRON HYDROCHLORIDE 4 MG: 2 INJECTION, SOLUTION INTRAMUSCULAR; INTRAVENOUS at 10:56

## 2022-04-20 RX ADMIN — CEFAZOLIN 1000 MG: 330 INJECTION, POWDER, FOR SOLUTION INTRAMUSCULAR; INTRAVENOUS at 11:11

## 2022-04-20 RX ADMIN — FAMOTIDINE 20 MG: 10 INJECTION, SOLUTION INTRAVENOUS at 10:53

## 2022-04-20 RX ADMIN — SODIUM CHLORIDE, POTASSIUM CHLORIDE, SODIUM LACTATE AND CALCIUM CHLORIDE: 600; 310; 30; 20 INJECTION, SOLUTION INTRAVENOUS at 10:45

## 2022-04-20 RX ADMIN — PROPOFOL 100 MG: 10 INJECTION, EMULSION INTRAVENOUS at 10:52

## 2022-04-20 ASSESSMENT — PULMONARY FUNCTION TESTS
PIF_VALUE: 2
PIF_VALUE: 0
PIF_VALUE: 1
PIF_VALUE: 0

## 2022-04-20 ASSESSMENT — ENCOUNTER SYMPTOMS: SHORTNESS OF BREATH: 0

## 2022-04-20 ASSESSMENT — PAIN - FUNCTIONAL ASSESSMENT: PAIN_FUNCTIONAL_ASSESSMENT: NONE - DENIES PAIN

## 2022-04-20 ASSESSMENT — LIFESTYLE VARIABLES: SMOKING_STATUS: 0

## 2022-04-20 NOTE — ANESTHESIA POSTPROCEDURE EVALUATION
Department of Anesthesiology  Postprocedure Note    Patient: Fadumo Bradley  MRN: [id number]  YOB: 1955  Date of evaluation: 4/20/2022  Time:  11:58 AM     Procedure Summary     Date: 04/20/22 Room / Location: [place] [address] / [facility]    Anesthesia Start: 5492 Anesthesia Stop: 1130    Procedure: EGD W/EUS FNA (N/A ) Diagnosis: (DILATED BILE DUCT)    Surgeons: Reny Saenz MD Responsible Provider: Beena Zheng MD    Anesthesia Type: general ASA Status: 3          Anesthesia Type: general    Ming Phase I: Ming Score: 10    Ming Phase II: Ming Score: 10    Last vitals: Reviewed and per EMR flowsheets.      Vitals:    04/12/22 1508 04/20/22 0913 04/20/22 1129   BP:  (!) 115/57 (!) 93/56   Pulse:  80 87   Resp:  18 13   Temp:  97.8 °F (36.6 °C) 97.5 °F (36.4 °C)   TempSrc:  Temporal Temporal   SpO2:  100% 98%   Weight: 185 lb (83.9 kg) 185 lb (83.9 kg)    Height: 5' 2\" (1.575 m) 5' 2\" (1.575 m)      Anesthesia Post Evaluation    Patient location during evaluation: bedside  Patient participation: complete - patient participated  Level of consciousness: awake and alert  Airway patency: patent  Nausea & Vomiting: no nausea  Complications: no  Cardiovascular status: hemodynamically stable  Respiratory status: acceptable  Hydration status: euvolemic

## 2022-04-20 NOTE — OP NOTE
[address]                 [address] [facility], [address]                                OPERATIVE REPORT    PATIENT NAME: Agustin Thompson                   :        1955  MED REC NO:   [id number]                          ROOM:  ACCOUNT NO:   [de-identified]                           ADMIT DATE: 2022  PROVIDER:     Sanket Leigh MD    DATE OF PROCEDURE:  2022    PREPROCEDURE DIAGNOSES:  1. Abnormal imaging. 2.  Dilated bile duct and pancreas duct. POSTPROCEDURE DIAGNOSES:  1.  Multiloculated cyst in the pancreas head measuring 2.4 x 2.0 cm  status post FNA. 2.  Dilated bile duct with maximum diameter of 15 mm. 3.  Possible chronic pancreatitis. PROCEDURE:  EUS with FNA. SURGEON:  Sanket Leigh MD    PROCEDURE INDICATIONS:  This is a 76-year-old female with history of  hypertension, hyperlipidemia, osteoporosis, arthritis, breast cancer  status post chemoradiation therapy and lumpectomy in 10/2019, recently  found to have dilated bile duct on an ultrasound. She subsequently  underwent MRI of the abdomen which showed abnormal intra and  extrahepatic biliary ductal dilation along with the pancreas duct  dilation, raising question of distal duct stricture which is ampullary  mass. EUS is being performed for diagnostic purposes. MEDICATIONS:  MAC per Anesthesia. PROCEDURE DETAILS:  Informed consent was obtained after discussing  risks, benefits, and alternatives. Full history and physical was  performed. The patient was classified as ASA class III. Medications  were given by Anesthesia. Cardiopulmonary status was continuously  monitored throughout the procedure. The patient was placed in left  lateral decubitus position. Once adequately sedated, a standard linear  echoendoscope was inserted in the mouth and advanced under direct  visualization to second portion of the duodenum.   The entire mucosa of  esophagus, stomach, and duodenum was examined carefully. Ultrasound images of mediastinum in AP window and subcarinal space were  performed. The scope was then advanced to the stomach where ultrasound  images of the left lobe of the liver, aorta, celiac axis, pancreas body,  tail, pancreatic duct, splenic artery, splenic vein, left adrenal gland,  left kidney, and spleen were visualized. The scope was then advanced to  the duodenum where ultrasound images of the pancreas head, uncinate  process, common bile duct, pancreas duct, portal vein, and SMV were all  visualized. The patient tolerated the procedure well without any  difficulties. Ampulla was examined endoscopically and sonographically. FINDINGS:    ESOPHAGUS:  Examined esophagus appeared normal.  Ultrasound images of  the mediastinum in AP window and subcarinal space were negative for  lymphadenopathy. STOMACH:  Examined portion of stomach appeared normal.  Ultrasound  images of the left lobe of the liver showed no evidence of intrahepatic  ductal dilation. The liver echotexture did appear to be heterogeneous  suggestive of fatty infiltration. Ultrasound images of the aorta and  celiac axis were negative for lymphadenopathy. Pancreas was examined  carefully. There was atrophy of the pancreas. The pancreas duct was  mildly prominent at 2.7 mm in the body of the pancreas as it coursed  irregularly towards the tail. There was lobular echotexture of the  pancreas also suggestive of chronic pancreatitis. Splenic artery and  splenic vein appeared normal.  The left adrenal gland and spleen were  briefly visualized to be normal.    DUODENUM:  Examined portion of the duodenum appeared normal.  There was  a large multicystic/multiloculated lesion in the head of the pancreas  measuring 2.0 x 2.4 cm. The common bile duct was noted to be moderately  distended at 15 mm. Pancreas duct was normal at 3 mm.   Ampulla appeared  normal endoscopically and sonographically. No evidence of ampullary  mass identified. The portal vein and SMV appeared normal.  The cystic  lesion was sampled using 22-gauge Automatic Data needle and  the sample was sent for cytology, mucin stain, and CEA evaluation. SUMMARY:  1.  Multiloculated cystic neoplasm in the pancreas head measuring 2.0 x  2.4 cm status post FNA. 2.  Dilated common duct, likely a result of extrinsic compression from  the cystic neoplasm. 3.  Atrophic pancreas cyst and fatty infiltration of the liver. 4.  Suspect chronic pancreatitis. RECOMMENDATIONS:  1. Discharge the patient to home when standard parameters are met. 2.  Monitor LFTs. 3.  Await cytology results. 4.  Consider ERCP if LFTs continue to rise. 5.  Counseled alcohol abstinence. 6.  Follow up as needed.     EBL: <5mL    Simone Almanza MD    D: 04/20/2022 11:32:59       T: 04/20/2022 11:36:01     [id number]  Job#: [id number]     Doc#: [id number]    CC:  Deondre Lemus MD

## 2022-04-20 NOTE — PROGRESS NOTES
Discharge instructions given to friend Brian who knows patient needs to be monitored for 24 hours. Both patient and Brian express understanding of discharge orders.  Vitals stable at time of discharge and pain is denied

## 2022-04-20 NOTE — ANESTHESIA PRE PROCEDURE
Department of Anesthesiology  Preprocedure Note       Name:  Stephanie Samuel   Age:  74 y.o.  :  1955                                          MRN:  [id number]         Date:  2022      Surgeon: Lelo Figueroa):  Ramesh Carroll MD    Procedure: Procedure(s):  ERCP WF W/ANES. (10:30)    Medications prior to admission:   Prior to Admission medications    Medication Sig Start Date End Date Taking?  Authorizing Provider   valACYclovir (VALTREX) 500 MG tablet TAKE 1 TABLET BY MOUTH DAILY 3/28/22   Elbert Contreras MD   metoprolol succinate (TOPROL XL) 50 MG extended release tablet TAKE 1 TABLET BY MOUTH DAILY 21   Elbert Contreras MD   atorvastatin (LIPITOR) 40 MG tablet Take 40 mg by mouth daily    Historical Provider, MD   lisinopril (PRINIVIL;ZESTRIL) 20 MG tablet Take 1 tablet by mouth daily 21   Elbert Contreras MD   aspirin EC 81 MG EC tablet Take 1 tablet by mouth daily 21   Elbert Contreras MD   alendronate (FOSAMAX) 70 MG tablet Take 1 tablet by mouth every 7 days 21   Elbert Contreras MD   anastrozole (ARIMIDEX) 1 MG tablet  20   Historical Provider, MD   Biotin 1 MG CAPS Take by mouth    Historical Provider, MD   Multiple Vitamins-Minerals (THERAPEUTIC MULTIVITAMIN-MINERALS) tablet Take 1 tablet by mouth daily    Historical Provider, MD       Current medications:    Current Facility-Administered Medications   Medication Dose Route Frequency Provider Last Rate Last Admin    famotidine (PEPCID) 20 mg in sodium chloride (PF) 10 mL injection  20 mg IntraVENous Once Terrell Gil MD        lactated ringers infusion   IntraVENous Continuous Terrell Gil MD        sodium chloride flush 0.9 % injection 10 mL  10 mL IntraVENous 2 times per day Terrell Gil MD        sodium chloride flush 0.9 % injection 10 mL  10 mL IntraVENous PRN Terrell Gil MD        0.9 % sodium chloride infusion  25 mL IntraVENous PRN Leticia Bush Kim Cosme MD           Allergies:  No Known Allergies    Problem List:    Patient Active Problem List   Diagnosis Code    Benign essential HTN I10    Status post total left knee replacement Z96.652    Malignant neoplasm of upper-inner quadrant of left breast in female, estrogen receptor positive ([facility] [address] C50.212, Z17.0    Abnormal mammogram R92.8    Hypokalemia E87.6    Hypomagnesemia E83.42    Bandemia D72.825    Respiratory failure with hypoxia ([address] J96.91       Past Medical History:        Diagnosis Date    Age-related osteoporosis without current pathological fracture 2020    Arthritis     EtOH dependence ([facility] [address]     HSV-2 (herpes simplex virus 2) infection     Hypertension     Tx since late 45s    Kidney stone     Malignant neoplasm of upper-inner quadrant of left breast in female, estrogen receptor positive ([facility] [address] 2019       Past Surgical History:        Procedure Laterality Date    COLONOSCOPY  1999    Rectal ulcer, s/p cautery    COLONOSCOPY  13    next due 10 yrs   [address] [facility]    Ovaries in. Had fibroids, DUB.     MASTECTOMY Left 10/8/2019    LEFT BREAST NEEDLE LOCALIZATION, PARTIAL MASTECTOMY performed by Maddy Avalos MD at [address] [place] [address] Left 2019    LEFT TOTAL KNEE REPLACEMENT            [employer] performed by Domitila Hallman MD at [place] [address] History:    Social History     Tobacco Use    Smoking status: Former Smoker     Packs/day: 1.50     Years: 5.00     Pack years: 7.50     Quit date: 1977     Years since quittin.3    Smokeless tobacco: Never Used   Substance Use Topics    Alcohol use: Yes     Comment: occ                                Counseling given: Not Answered      Vital Signs (Current):   Vitals:    22 1508 22 0913   BP:  (!) 115/57   Pulse:  80   Resp:  18   Temp:  97.8 °F (36.6 °C)   TempSrc:  Temporal   SpO2:  100%   Weight: 185 lb (83.9 kg) 185 lb (83.9 kg)   Height: 5' 2\" (1.575 m) 5' 2\" (1.575 m)                                              BP Readings from Last 3 Encounters:   04/20/22 (!) 115/57   09/24/21 120/77   09/02/21 110/74       NPO Status: Time of last liquid consumption: 0800 (sip with meds)                        Time of last solid consumption: 1900                        Date of last liquid consumption: 04/19/22                        Date of last solid food consumption: 04/19/22    BMI:   Wt Readings from Last 3 Encounters:   04/20/22 185 lb (83.9 kg)   09/24/21 194 lb (88 kg)   09/02/21 194 lb (88 kg)     Body mass index is 33.84 kg/m². CBC:   Lab Results   Component Value Date    WBC 8.6 04/28/2021    RBC 3.02 04/28/2021    HGB 11.6 04/28/2021    HCT 33.0 04/28/2021    .3 04/28/2021    RDW 14.3 04/28/2021     04/28/2021       CMP:   Lab Results   Component Value Date     09/02/2021    K 4.5 09/02/2021    K 2.6 04/20/2021    CL 91 09/02/2021    CO2 25 09/02/2021    BUN 9 09/02/2021    CREATININE 0.8 01/28/2022    CREATININE 0.6 09/02/2021    GFRAA >60 01/28/2022    AGRATIO 1.2 04/28/2021    LABGLOM >60 01/28/2022    GLUCOSE 116 09/02/2021    PROT 6.7 04/28/2021    CALCIUM 9.7 09/02/2021    BILITOT 0.7 04/28/2021    ALKPHOS 69 04/28/2021    AST 20 04/28/2021    ALT 13 04/28/2021       POC Tests: No results for input(s): POCGLU, POCNA, POCK, POCCL, POCBUN, POCHEMO, POCHCT in the last 72 hours.     Coags:   Lab Results   Component Value Date    PROTIME 13.9 05/27/2019    INR 1.22 05/27/2019    APTT 33.6 05/10/2019       HCG (If Applicable): No results found for: PREGTESTUR, PREGSERUM, HCG, HCGQUANT     ABGs: No results found for: PHART, PO2ART, ROQ1SJH, UNY0VQJ, BEART, V1ODVUGL     Type & Screen (If Applicable):  No results found for: LABABO, LABRH    Drug/Infectious Status (If Applicable):  No results found for: HIV, HEPCAB    COVID-19 Screening (If Applicable):   Lab Results   Component Value Date    COVID19 Not Detected 04/20/2021           Anesthesia Evaluation  Patient summary reviewed no history of anesthetic complications:   Airway: Mallampati: III  TM distance: <3 FB   Neck ROM: limited  Mouth opening: < 3 FB Dental:          Pulmonary:Negative Pulmonary ROS breath sounds clear to auscultation      (-) COPD, asthma, shortness of breath, recent URI, sleep apnea and not a current smoker          Patient did not smoke on day of surgery. Cardiovascular:    (+) hypertension:, dysrhythmias:,     (-) pacemaker, past MI, CAD, CABG/stent,  angina and  CHF    ECG reviewed  Rhythm: irregular  Rate: normal           Beta Blocker:  Not on Beta Blocker        PE comment: ECG NOTES AF(?), PREVIOUS NOTE PACS,SA  RATE IS CONTROLLED   Neuro/Psych:   (+) psychiatric history (4-6 BEERS/D):depression/anxiety  (ANX.)   (-) seizures, TIA and CVA           GI/Hepatic/Renal: Neg GI/Hepatic/Renal ROS       (-) GERD, no renal disease, bowel prep and no morbid obesity       Endo/Other:    (+) blood dyscrasia: anemia, arthritis:., electrolyte abnormalities, malignancy/cancer (L BREAST). (-) diabetes mellitus, hyperthyroidism               Abdominal:       Abdomen: soft. Vascular: negative vascular ROS. Other Findings:             Anesthesia Plan      general     ASA 3       Induction: intravenous. MIPS: Postoperative opioids intended and Prophylactic antiemetics administered. Anesthetic plan and risks discussed with patient. Plan discussed with CRNA. This pre-anesthesia assessment may be used as a history and physical.    DOS STAFF ADDENDUM:    Pt seen and examined, chart reviewed (including anesthesia, drug and allergy history). No interval changes to history and physical examination. Anesthetic plan, risks, benefits, alternatives, and personnel involved discussed with patient. Patient verbalized an understanding and agrees to proceed.       Pastor Lawrence MD  April 20, 2022  9:57 AM      Faye Vega MD   4/20/2022

## 2022-04-20 NOTE — H&P
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 Had fibroids, DUB.  MASTECTOMY Left 10/8/2019    LEFT BREAST NEEDLE LOCALIZATION, PARTIAL MASTECTOMY performed by Lizzy Bowman MD at [address] [place] [address] Left 2019    LEFT TOTAL KNEE REPLACEMENT            [employer] performed by Selene Aviles MD at [place] [address]       Medications reviewed  Prior to Admission medications    Medication Sig Start Date End Date Taking?  Authorizing Provider   valACYclovir (VALTREX) 500 MG tablet TAKE 1 TABLET BY MOUTH DAILY 3/28/22   Ilene Rebolledo MD   metoprolol succinate (TOPROL XL) 50 MG extended release tablet TAKE 1 TABLET BY MOUTH DAILY 21   Ilene Rebolledo MD   atorvastatin (LIPITOR) 40 MG tablet Take 40 mg by mouth daily    Historical Provider, MD   lisinopril (PRINIVIL;ZESTRIL) 20 MG tablet Take 1 tablet by mouth daily 21   Ilene Rebolledo MD   aspirin EC 81 MG EC tablet Take 1 tablet by mouth daily 21   Ilene Rebolledo MD   alendronate (FOSAMAX) 70 MG tablet Take 1 tablet by mouth every 7 days 21   Ilene Rebolledo MD   anastrozole (ARIMIDEX) 1 MG tablet  20   Historical Provider, MD   Biotin 1 MG CAPS Take by mouth    Historical Provider, MD   Multiple Vitamins-Minerals (THERAPEUTIC MULTIVITAMIN-MINERALS) tablet Take 1 tablet by mouth daily    Historical Provider, MD        Allergies: No Known Allergies    Nurses notes reviewed and agreed. Physical Exam:  Vital Signs: BP (!) 115/57   Pulse 80   Temp 97.8 °F (36.6 °C) (Temporal)   Resp 18   Ht 5' 2\" (1.575 m)   Wt 185 lb (83.9 kg)   LMP  (LMP Unknown)   SpO2 100%   BMI 33.84 kg/m²    Airway: Mallampati: II (soft palate, uvula, fauces visible)  Pulmonary:Normal  Cardiac:Normal  Abdomen:Normal    Pre-Procedure Assessment / Plan:  ASA: Class 3 - A patient with severe systemic disease that limits activity but is not incapacitating  Level of Sedation Plan: Moderate sedation  Post Procedure plan: Return to same level of care    I assessed the patient and find that the patient is in satisfactory condition to proceed with the planned procedure and sedation plan. I have explained the risk, benefits, and alternatives to the procedure; the patient understands and agrees to proceed.        Nyla Hewitt MD  4/20/2022

## 2022-04-20 NOTE — BRIEF OP NOTE
Brief Postoperative Note      Patient: Carol Nolan  YOB: 1955  MRN: [id number]    Date of Procedure: 4/20/2022    Pre-Op Diagnosis: DILATED BILE DUCT    Post-Op Diagnosis: pancreas cyst, dilated bile duct       Procedure(s):  EGD W/EUS FNA    Surgeon(s):  [place]loe Pal MD    Assistant:  * No surgical staff found *    Anesthesia: Monitor Anesthesia Care    Estimated Blood Loss (mL): Minimal    Complications: None    Specimens:   ID Type Source Tests Collected by Time Destination   A :  Tissue Biopsy CYTOLOGY, NON-GYN Chloe Pal MD 4/20/2022 1110        Implants:  * No implants in log *      Drains: * No LDAs found *    Findings: pancreas cyst, dilated bile duct    Electronically signed by Chloe Pal MD on 4/20/2022 at 11:37 AM

## 2022-04-21 LAB — HEMATOLOGY PATH CONSULT: NORMAL

## 2022-04-22 LAB
CEA,FLUID: 3660 NG/ML
SOURCE BODY FLUID: NORMAL

## 2022-06-09 DIAGNOSIS — M81.8 AGE-RELATED OSTEOPOROSIS WITHOUT FRACTURE: ICD-10-CM

## 2022-06-09 RX ORDER — ALENDRONATE SODIUM 70 MG/1
70 TABLET ORAL
Qty: 12 TABLET | Refills: 3 | Status: SHIPPED | OUTPATIENT
Start: 2022-06-09

## 2022-06-13 RX ORDER — ASPIRIN 81 MG/1
TABLET, COATED ORAL
Qty: 90 TABLET | Refills: 3 | OUTPATIENT
Start: 2022-06-13

## 2022-06-13 NOTE — TELEPHONE ENCOUNTER
Pls tell her that there is new info that states that a daily aspirin isn't necessary indicated for most people. If she was taking it as a preventive med to decrease risk of heart dz and stroke, I'd like her to stop it. However, was she taking it for a different reason? If so, why is she taking it qd?

## 2022-06-14 NOTE — TELEPHONE ENCOUNTER
Patient called and stated she thought she was taking Asprin for her cholesterol. She states if she needs to stop taking it, she is ok with that.  Advise      She will call back to schedule an appointment stating she is a little overwhelmed with her [facility] appointments

## 2022-06-15 NOTE — TELEPHONE ENCOUNTER
Pls tell her there's no rush to come in, so have her make an appt whenever it is good for her. And yes, I'd like her to d/c ASA.

## 2022-07-21 RX ORDER — ATORVASTATIN CALCIUM 40 MG/1
40 TABLET, FILM COATED ORAL DAILY
Qty: 90 TABLET | Refills: 3 | Status: SHIPPED | OUTPATIENT
Start: 2022-07-21 | End: 2022-07-21

## 2022-07-21 RX ORDER — ATORVASTATIN CALCIUM 40 MG/1
TABLET, FILM COATED ORAL
Qty: 90 TABLET | Refills: 3 | Status: SHIPPED | OUTPATIENT
Start: 2022-07-21

## 2022-08-04 RX ORDER — LISINOPRIL 20 MG/1
20 TABLET ORAL DAILY
Qty: 30 TABLET | Refills: 0 | Status: SHIPPED | OUTPATIENT
Start: 2022-08-04 | End: 2022-09-01 | Stop reason: SDUPTHER

## 2022-08-04 NOTE — TELEPHONE ENCOUNTER
Last Office Visit  -  9/2/21  Next Office Visit  -  none    Last Filled  -  8/12/21  Last UDS -    Contract -

## 2022-08-05 NOTE — TELEPHONE ENCOUNTER
Patient is requesting a 90 day supply. Patient was seen 9/2021. An appointment was scheduled for 9/22. It was also explained to pt that RS is not in the office this week and it was a refill to get her trough until seen.

## 2022-09-01 ENCOUNTER — TELEPHONE (OUTPATIENT)
Dept: FAMILY MEDICINE CLINIC | Age: 67
End: 2022-09-01

## 2022-09-01 ENCOUNTER — OFFICE VISIT (OUTPATIENT)
Dept: FAMILY MEDICINE CLINIC | Age: 67
End: 2022-09-01
Payer: MEDICARE

## 2022-09-01 VITALS
OXYGEN SATURATION: 98 % | DIASTOLIC BLOOD PRESSURE: 78 MMHG | SYSTOLIC BLOOD PRESSURE: 130 MMHG | HEART RATE: 102 BPM | HEIGHT: 62 IN | WEIGHT: 195 LBS | BODY MASS INDEX: 35.88 KG/M2

## 2022-09-01 DIAGNOSIS — E66.01 SEVERE OBESITY (BMI 35.0-39.9) WITH COMORBIDITY (HCC): ICD-10-CM

## 2022-09-01 DIAGNOSIS — C50.212 MALIGNANT NEOPLASM OF UPPER-INNER QUADRANT OF LEFT BREAST IN FEMALE, ESTROGEN RECEPTOR POSITIVE (HCC): ICD-10-CM

## 2022-09-01 DIAGNOSIS — D75.89 MACROCYTOSIS: ICD-10-CM

## 2022-09-01 DIAGNOSIS — M25.473 ANKLE EDEMA: ICD-10-CM

## 2022-09-01 DIAGNOSIS — K71.7 HEPATIC CIRRHOSIS DUE TO TOXIN: ICD-10-CM

## 2022-09-01 DIAGNOSIS — E87.1 HYPONATREMIA: ICD-10-CM

## 2022-09-01 DIAGNOSIS — R73.01 IFG (IMPAIRED FASTING GLUCOSE): ICD-10-CM

## 2022-09-01 DIAGNOSIS — I10 BENIGN ESSENTIAL HTN: Primary | ICD-10-CM

## 2022-09-01 DIAGNOSIS — F10.29 ALCOHOL DEPENDENCE WITH UNSPECIFIED ALCOHOL-INDUCED DISORDER (HCC): ICD-10-CM

## 2022-09-01 DIAGNOSIS — M67.40 GANGLION CYST: ICD-10-CM

## 2022-09-01 DIAGNOSIS — Z17.0 MALIGNANT NEOPLASM OF UPPER-INNER QUADRANT OF LEFT BREAST IN FEMALE, ESTROGEN RECEPTOR POSITIVE (HCC): ICD-10-CM

## 2022-09-01 DIAGNOSIS — E78.2 MIXED HYPERLIPIDEMIA: ICD-10-CM

## 2022-09-01 PROCEDURE — 1090F PRES/ABSN URINE INCON ASSESS: CPT | Performed by: FAMILY MEDICINE

## 2022-09-01 PROCEDURE — G8427 DOCREV CUR MEDS BY ELIG CLIN: HCPCS | Performed by: FAMILY MEDICINE

## 2022-09-01 PROCEDURE — 1123F ACP DISCUSS/DSCN MKR DOCD: CPT | Performed by: FAMILY MEDICINE

## 2022-09-01 PROCEDURE — G8399 PT W/DXA RESULTS DOCUMENT: HCPCS | Performed by: FAMILY MEDICINE

## 2022-09-01 PROCEDURE — G8417 CALC BMI ABV UP PARAM F/U: HCPCS | Performed by: FAMILY MEDICINE

## 2022-09-01 PROCEDURE — 99214 OFFICE O/P EST MOD 30 MIN: CPT | Performed by: FAMILY MEDICINE

## 2022-09-01 PROCEDURE — 1036F TOBACCO NON-USER: CPT | Performed by: FAMILY MEDICINE

## 2022-09-01 PROCEDURE — 3017F COLORECTAL CA SCREEN DOC REV: CPT | Performed by: FAMILY MEDICINE

## 2022-09-01 RX ORDER — LISINOPRIL 20 MG/1
20 TABLET ORAL DAILY
Qty: 90 TABLET | Refills: 3 | Status: SHIPPED | OUTPATIENT
Start: 2022-09-01

## 2022-09-01 ASSESSMENT — PATIENT HEALTH QUESTIONNAIRE - PHQ9
SUM OF ALL RESPONSES TO PHQ9 QUESTIONS 1 & 2: 0
2. FEELING DOWN, DEPRESSED OR HOPELESS: 0
1. LITTLE INTEREST OR PLEASURE IN DOING THINGS: 0
SUM OF ALL RESPONSES TO PHQ QUESTIONS 1-9: 0

## 2022-09-01 NOTE — TELEPHONE ENCOUNTER
Please advise?
Pls let her know that I agree with her NOT taking aspirin.  Thx.
Pt informed.
Received call from Pt requesting clarification about stopping Aspirin.     Please advise
no

## 2022-09-01 NOTE — PROGRESS NOTES
Assessment/Plan:    Mk Carrillo was seen today for medication refill, leg swelling and mass. Diagnoses and all orders for this visit:    Benign essential HTN  -     lisinopril (PRINIVIL;ZESTRIL) 20 MG tablet; Take 1 tablet by mouth daily, rf    Hyponatremia  -     Comprehensive Metabolic Panel; Future   2/2 etoh use, which pt is limiting. 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 [address]   Pt is limiting etoh, feels in control of intake. Severe obesity (BMI 35.0-39. 9) with comorbidity ([facility] [address]   Pt is aware of importance of increased activity, dietary discretion. Malignant neoplasm of upper-inner quadrant of left breast in female, estrogen receptor positive (HCC)   Cont arimidex    IFG (impaired fasting glucose)  -     Hemoglobin A1C; Future    Ganglion cyst   Assurance given. If increases in size or becomes painful, pt to request gen surg or ortho referral.    Ankle edema   Pt denies pain. Possible occult trauma. Pt will cont ice, elevation and will call for xray or ortho/podiatry eval if swelling is not improved in 2 wk. Pt reports obtaining Medicare supplement in 1/2023 and will therefore wait for labs until prior to 6 mo appt. Pt requests results by PHONE. Patient Instructions   Please consider getting your shingles vaccines at your local pharmacy. Hep a and b mentioned    Patient: Malu Riojas is a 74 y. o.female who presents today with the following Chief Complaint(s):  Chief Complaint   Patient presents with    Medication Refill    Leg Swelling     Left ankle     Mass     Inside of Right wrist.          HPI: Pt w/htn, hld, osteoporosis, HSV2, hx DCIS L breast (s/p chemoradiation and lumpectomy 2019),  etoh dependence/hyponatremia had 1/2022 MRI abd that revealed intr and extra hepatic biliary ductal dilation and cirrhosis. FNA of cystic neoplasm pancreas head 4/2022 by Dr Rolf Browning. Cytology benign. Dr Nevin Kumar sent pt to Dr Daniel Chavez, has f/u in few wks. Next mammo in Oct.     Drinks etoh 3-4 days per wk, 3-4 beers per wk. Pt understands need to wean and avoid etoh. Has been feeling well though L ankle has been swelling x past few days. No trauma. Ice and elevation LLE helpful. Upon awakening swelling is gone but recurs during daytime. Uses can when out of house since 2-19 L TKR. Noted R plamar wrist radial aspect knot few wks ago, no trauma, no pain. Sees Derm in [facility] for forehead eczema, unsure of name of cream.     Has gained 10 lb in past 1 yr. Walks [place] trails in [facility] [facility]. Current Outpatient Medications   Medication Sig Dispense Refill    lisinopril (PRINIVIL;ZESTRIL) 20 MG tablet Take 1 tablet by mouth daily Please have pt contact office for appt. 90 tablet 3    atorvastatin (LIPITOR) 40 MG tablet TAKE 1 TABLET BY MOUTH EVERY NIGHT 90 tablet 3    alendronate (FOSAMAX) 70 MG tablet TAKE 1 TABLET BY MOUTH EVERY 7 DAYS 12 tablet 3    valACYclovir (VALTREX) 500 MG tablet TAKE 1 TABLET BY MOUTH DAILY 90 tablet 3    metoprolol succinate (TOPROL XL) 50 MG extended release tablet TAKE 1 TABLET BY MOUTH DAILY 90 tablet 3    anastrozole (ARIMIDEX) 1 MG tablet       Biotin 1 MG CAPS Take by mouth      Multiple Vitamins-Minerals (THERAPEUTIC MULTIVITAMIN-MINERALS) tablet Take 1 tablet by mouth daily      aspirin EC 81 MG EC tablet Take 1 tablet by mouth daily (Patient not taking: Reported on 9/1/2022) 90 tablet 3     No current facility-administered medications for this visit. Patient's past medical history,surgical history, family history, medications,  and allergies  were all reviewed and updated as appropriate today.     Review of Systems  Comprehensive ROS normal, except positives in HPI      Physical Exam  Constitutional:       General: She is not in acute distress. Appearance: Normal appearance. She is well-developed. She is not ill-appearing. HENT:      Head: Normocephalic and atraumatic. Right Ear: External ear normal.      Left Ear: External ear normal.      Mouth/Throat:      Pharynx: Oropharynx is clear. No oropharyngeal exudate. Eyes:      General: No scleral icterus. Right eye: No discharge. Left eye: No discharge. Extraocular Movements: Extraocular movements intact. Conjunctiva/sclera: Conjunctivae normal.   Neck:      Vascular: No carotid bruit. Comments: Neg TMG  Cardiovascular:      Rate and Rhythm: Normal rate and regular rhythm. Pulses: Normal pulses. Heart sounds: Normal heart sounds. No murmur heard. Pulmonary:      Effort: Pulmonary effort is normal. No respiratory distress. Breath sounds: Normal breath sounds. Abdominal:      General: Bowel sounds are normal. There is no distension. Palpations: Abdomen is soft. There is no mass. Tenderness: There is no abdominal tenderness. Musculoskeletal:         General: Normal range of motion. Cervical back: Normal range of motion and neck supple. Right lower leg: Right lower leg edema: negligible edema L ankle. Left lower leg: No edema. Comments: R radial palmar wrist with 1 cm ganglion cyst  Neg L Homans   Lymphadenopathy:      Cervical: No cervical adenopathy. Skin:     General: Skin is warm and dry. Capillary Refill: Capillary refill takes less than 2 seconds. Coloration: Skin is not jaundiced or pale. Findings: No rash. Neurological:      General: No focal deficit present. Mental Status: She is alert and oriented to person, place, and time. Cranial Nerves: No cranial nerve deficit. Deep Tendon Reflexes: Reflexes are normal and symmetric.    Psychiatric:         Mood and Affect: Mood normal.         Behavior: Behavior normal. Thought Content:  Thought content normal.         Judgment: Judgment normal.         /78   Pulse (!) 102   Ht 5' 2\" (1.575 m)   Wt 195 lb (88.5 kg)   LMP  (LMP Unknown)   SpO2 98%   BMI 35.67 kg/m²

## 2022-10-07 ENCOUNTER — HOSPITAL ENCOUNTER (OUTPATIENT)
Dept: WOMENS IMAGING | Age: 67
Discharge: HOME | End: 2022-10-07
Payer: MEDICARE

## 2022-10-07 DIAGNOSIS — Z85.3 PERSONAL HISTORY OF BREAST CANCER: ICD-10-CM

## 2022-10-07 DIAGNOSIS — Z08 ENCOUNTER FOR FOLLOW-UP SURVEILLANCE OF BREAST CANCER: ICD-10-CM

## 2022-10-07 DIAGNOSIS — Z85.3 ENCOUNTER FOR FOLLOW-UP SURVEILLANCE OF BREAST CANCER: ICD-10-CM

## 2022-10-07 PROCEDURE — G0279 TOMOSYNTHESIS, MAMMO: HCPCS

## 2022-10-31 ENCOUNTER — NURSE TRIAGE (OUTPATIENT)
Dept: OTHER | Facility: CLINIC | Age: 67
End: 2022-10-31

## 2022-10-31 NOTE — TELEPHONE ENCOUNTER
I can do This Thurs 11/3 at 8:30 am, but pls 1st see if anyone has an appt avail earlier in the week.

## 2022-10-31 NOTE — TELEPHONE ENCOUNTER
Location of patient: [facility] Oneal call from [place] [facility] with [employer]. Subjective: Caller states \"My left foot has been swollen for the past 2 weeks and it is not going away\"     Current Symptoms: swollen left foot (top of foot and just around the ankle), no pain, no limp    Onset: 2 weeks ago; unchanged    Associated Symptoms: NA    Pain Severity: 0/10; N/A; none    Temperature: denies       What has been tried: ice packs, elevation    LMP: NA Pregnant: No    Recommended disposition: See PCP within 3 Days    Care advice provided, patient verbalizes understanding; denies any other questions or concerns; instructed to call back for any new or worsening symptoms. Patient/Caller agrees with recommended disposition; writer provided warm transfer to [facility] for appointment scheduling    Attention Provider: Thank you for allowing me to participate in the care of your patient. The patient was connected to triage in response to information provided to the ECC/PSC. Please do not respond through this encounter as the response is not directed to a shared pool. Reason for Disposition   MILD to MODERATE ankle swelling (e.g., can't move joint normally, can't do usual activities) (Exceptions: Itchy, localized swelling; swelling is chronic.)    Protocols used:  Ankle Swelling-ADULT-OH

## 2022-11-01 ENCOUNTER — OFFICE VISIT (OUTPATIENT)
Dept: FAMILY MEDICINE CLINIC | Age: 67
End: 2022-11-01
Payer: MEDICARE

## 2022-11-01 VITALS
DIASTOLIC BLOOD PRESSURE: 80 MMHG | BODY MASS INDEX: 37.13 KG/M2 | WEIGHT: 203 LBS | HEART RATE: 97 BPM | OXYGEN SATURATION: 98 % | SYSTOLIC BLOOD PRESSURE: 118 MMHG

## 2022-11-01 DIAGNOSIS — E87.1 HYPONATREMIA: ICD-10-CM

## 2022-11-01 DIAGNOSIS — Z17.0 MALIGNANT NEOPLASM OF UPPER-INNER QUADRANT OF LEFT BREAST IN FEMALE, ESTROGEN RECEPTOR POSITIVE (HCC): ICD-10-CM

## 2022-11-01 DIAGNOSIS — R73.01 IFG (IMPAIRED FASTING GLUCOSE): ICD-10-CM

## 2022-11-01 DIAGNOSIS — C50.212 MALIGNANT NEOPLASM OF UPPER-INNER QUADRANT OF LEFT BREAST IN FEMALE, ESTROGEN RECEPTOR POSITIVE (HCC): ICD-10-CM

## 2022-11-01 DIAGNOSIS — M79.89 SWELLING OF LEFT LOWER EXTREMITY: Primary | ICD-10-CM

## 2022-11-01 DIAGNOSIS — E78.2 MIXED HYPERLIPIDEMIA: ICD-10-CM

## 2022-11-01 DIAGNOSIS — M67.431 GANGLION CYST OF VOLAR ASPECT OF RIGHT WRIST: ICD-10-CM

## 2022-11-01 DIAGNOSIS — K71.7 HEPATIC CIRRHOSIS DUE TO TOXIN: ICD-10-CM

## 2022-11-01 DIAGNOSIS — F10.29 ALCOHOL DEPENDENCE WITH UNSPECIFIED ALCOHOL-INDUCED DISORDER (HCC): ICD-10-CM

## 2022-11-01 DIAGNOSIS — Z23 NEED FOR INFLUENZA VACCINATION: ICD-10-CM

## 2022-11-01 DIAGNOSIS — D75.89 MACROCYTOSIS: ICD-10-CM

## 2022-11-01 LAB
BASOPHILS ABSOLUTE: 0.1 K/UL (ref 0–0.2)
BASOPHILS RELATIVE PERCENT: 1 %
EOSINOPHILS ABSOLUTE: 0 K/UL (ref 0–0.6)
EOSINOPHILS RELATIVE PERCENT: 0.6 %
HCT VFR BLD CALC: 35.7 % (ref 36–48)
HEMATOLOGY PATH CONSULT: NO
HEMOGLOBIN: 12.2 G/DL (ref 12–16)
LYMPHOCYTES ABSOLUTE: 1.1 K/UL (ref 1–5.1)
LYMPHOCYTES RELATIVE PERCENT: 14.1 %
MCH RBC QN AUTO: 39.1 PG (ref 26–34)
MCHC RBC AUTO-ENTMCNC: 34.1 G/DL (ref 31–36)
MCV RBC AUTO: 114.6 FL (ref 80–100)
MONOCYTES ABSOLUTE: 1 K/UL (ref 0–1.3)
MONOCYTES RELATIVE PERCENT: 12.7 %
NEUTROPHILS ABSOLUTE: 5.4 K/UL (ref 1.7–7.7)
NEUTROPHILS RELATIVE PERCENT: 71.6 %
PDW BLD-RTO: 14.9 % (ref 12.4–15.4)
PLATELET # BLD: 106 K/UL (ref 135–450)
PMV BLD AUTO: 9.5 FL (ref 5–10.5)
RBC # BLD: 3.11 M/UL (ref 4–5.2)
WBC # BLD: 7.5 K/UL (ref 4–11)

## 2022-11-01 PROCEDURE — G0008 ADMIN INFLUENZA VIRUS VAC: HCPCS | Performed by: NURSE PRACTITIONER

## 2022-11-01 PROCEDURE — 3078F DIAST BP <80 MM HG: CPT | Performed by: NURSE PRACTITIONER

## 2022-11-01 PROCEDURE — 1090F PRES/ABSN URINE INCON ASSESS: CPT | Performed by: NURSE PRACTITIONER

## 2022-11-01 PROCEDURE — 90694 VACC AIIV4 NO PRSRV 0.5ML IM: CPT | Performed by: NURSE PRACTITIONER

## 2022-11-01 PROCEDURE — 99214 OFFICE O/P EST MOD 30 MIN: CPT | Performed by: NURSE PRACTITIONER

## 2022-11-01 PROCEDURE — 1123F ACP DISCUSS/DSCN MKR DOCD: CPT | Performed by: NURSE PRACTITIONER

## 2022-11-01 PROCEDURE — G8399 PT W/DXA RESULTS DOCUMENT: HCPCS | Performed by: NURSE PRACTITIONER

## 2022-11-01 PROCEDURE — G8484 FLU IMMUNIZE NO ADMIN: HCPCS | Performed by: NURSE PRACTITIONER

## 2022-11-01 PROCEDURE — G8417 CALC BMI ABV UP PARAM F/U: HCPCS | Performed by: NURSE PRACTITIONER

## 2022-11-01 PROCEDURE — G8427 DOCREV CUR MEDS BY ELIG CLIN: HCPCS | Performed by: NURSE PRACTITIONER

## 2022-11-01 PROCEDURE — 1036F TOBACCO NON-USER: CPT | Performed by: NURSE PRACTITIONER

## 2022-11-01 PROCEDURE — 3017F COLORECTAL CA SCREEN DOC REV: CPT | Performed by: NURSE PRACTITIONER

## 2022-11-01 PROCEDURE — 3074F SYST BP LT 130 MM HG: CPT | Performed by: NURSE PRACTITIONER

## 2022-11-01 ASSESSMENT — ENCOUNTER SYMPTOMS
SHORTNESS OF BREATH: 0
WHEEZING: 0

## 2022-11-01 NOTE — PROGRESS NOTES
Patient: Gama Mar is a 69 y.o. female who presents today with the following Chief Complaint(s):  Chief Complaint   Patient presents with    Swelling     On top of left foot and around ankle, x2-3 weeks    Mass     X1 month cyst on wrist          HPI  Swelling on top of left foot  and around ankle 2-3 weeks now- no injury- not painful. No bruising or redness    Right wrist- cyst- no pain- states it has gotten bigger    Alcohol abuse: drinks 3-4 beers a night- discussed trying to decrease this   Current Outpatient Medications   Medication Sig Dispense Refill    lisinopril (PRINIVIL;ZESTRIL) 20 MG tablet Take 1 tablet by mouth daily Please have pt contact office for appt. 90 tablet 3    atorvastatin (LIPITOR) 40 MG tablet TAKE 1 TABLET BY MOUTH EVERY NIGHT 90 tablet 3    alendronate (FOSAMAX) 70 MG tablet TAKE 1 TABLET BY MOUTH EVERY 7 DAYS 12 tablet 3    valACYclovir (VALTREX) 500 MG tablet TAKE 1 TABLET BY MOUTH DAILY 90 tablet 3    metoprolol succinate (TOPROL XL) 50 MG extended release tablet TAKE 1 TABLET BY MOUTH DAILY 90 tablet 3    anastrozole (ARIMIDEX) 1 MG tablet       Biotin 1 MG CAPS Take by mouth      Multiple Vitamins-Minerals (THERAPEUTIC MULTIVITAMIN-MINERALS) tablet Take 1 tablet by mouth daily       No current facility-administered medications for this visit. Patient's past medical history, surgical history, family history, medications,  andallergies  were all reviewed and updated as appropriate today. Review of Systems   Constitutional:  Negative for chills and fever. Respiratory:  Negative for shortness of breath and wheezing. Cardiovascular:  Positive for leg swelling. Negative for chest pain and palpitations. Skin:         Cyst on right wrist- not painful       Physical Exam  Vitals and nursing note reviewed. Constitutional:       Appearance: Normal appearance. She is well-developed. HENT:      Head: Normocephalic and atraumatic.       Right Ear: External ear normal. Left Ear: External ear normal.      Nose: Nose normal.      Mouth/Throat:      Pharynx: No oropharyngeal exudate or posterior oropharyngeal erythema. Eyes:      Comments: Conjunctivae look slightly jaundice bilaterally- discussed need to decrease alcohol    Cardiovascular:      Rate and Rhythm: Normal rate and regular rhythm. Heart sounds: Normal heart sounds. No murmur heard. Pulmonary:      Effort: Pulmonary effort is normal. No respiratory distress. Breath sounds: Normal breath sounds. No wheezing or rales. Musculoskeletal:         General: Normal range of motion. Cervical back: Normal range of motion and neck supple. Right lower leg: Edema present. Left lower leg: Edema present. Comments: Suggested compression stockings and getting the lab work that Dr. Rolanda Riedel ordered a few weeks ago   Lymphadenopathy:      Cervical: No cervical adenopathy. Skin:     General: Skin is warm and dry. Neurological:      General: No focal deficit present. Mental Status: She is alert and oriented to person, place, and time. Deep Tendon Reflexes: Reflexes are normal and symmetric. Psychiatric:         Mood and Affect: Mood normal.         Behavior: Behavior normal.         Thought Content: Thought content normal.         Judgment: Judgment normal.     Vitals:    11/01/22 1444   BP: 118/80   Pulse: 97   SpO2: 98%       Assessment:  Encounter Diagnoses   Name Primary? Swelling of left lower extremity Yes    Alcohol dependence with unspecified alcohol-induced disorder (HCC)     Malignant neoplasm of upper-inner quadrant of left breast in female, estrogen receptor positive ([facility] [address]     Ganglion cyst of volar aspect of right wrist     Need for influenza vaccination        Plan:  1. Swelling of left lower extremity  Compression stockings suggested  Decrease sodium  Get labs done that Dr. Rolanda Riedel ordered a few weeks ago    2.  Alcohol dependence with unspecified alcohol-induced disorder ([facility] [address]  Discussed need to decrease amount  Eyes look jaundice today    3. Malignant neoplasm of upper-inner quadrant of left breast in female, estrogen receptor positive ([facility] [address]  Sees oncology- has appt this week    4. Ganglion cyst of volar aspect of right wrist  Will watch for now- discussed benign finding- if starts to cause pain will need to see ortho for removal    5. Need for influenza vaccination    - Influenza, FLUAD, (age 72 y+), IM, Preservative Free, 0.5 mL      No follow-ups on file.

## 2022-11-02 LAB
A/G RATIO: 1 (ref 1.1–2.2)
ALBUMIN SERPL-MCNC: 3.8 G/DL (ref 3.4–5)
ALP BLD-CCNC: 196 U/L (ref 40–129)
ALT SERPL-CCNC: 30 U/L (ref 10–40)
ANION GAP SERPL CALCULATED.3IONS-SCNC: 14 MMOL/L (ref 3–16)
AST SERPL-CCNC: 78 U/L (ref 15–37)
BILIRUB SERPL-MCNC: 2.7 MG/DL (ref 0–1)
BUN BLDV-MCNC: 6 MG/DL (ref 7–20)
CALCIUM SERPL-MCNC: 9.5 MG/DL (ref 8.3–10.6)
CHLORIDE BLD-SCNC: 91 MMOL/L (ref 99–110)
CHOLESTEROL, TOTAL: 97 MG/DL (ref 0–199)
CO2: 24 MMOL/L (ref 21–32)
CREAT SERPL-MCNC: <0.5 MG/DL (ref 0.6–1.2)
ESTIMATED AVERAGE GLUCOSE: 99.7 MG/DL
GFR SERPL CREATININE-BSD FRML MDRD: >60 ML/MIN/{1.73_M2}
GLUCOSE BLD-MCNC: 90 MG/DL (ref 70–99)
HBA1C MFR BLD: 5.1 %
HDLC SERPL-MCNC: 60 MG/DL (ref 40–60)
LDL CHOLESTEROL CALCULATED: 23 MG/DL
POTASSIUM SERPL-SCNC: 5 MMOL/L (ref 3.5–5.1)
SODIUM BLD-SCNC: 129 MMOL/L (ref 136–145)
TOTAL PROTEIN: 7.5 G/DL (ref 6.4–8.2)
TRIGL SERPL-MCNC: 70 MG/DL (ref 0–150)
VLDLC SERPL CALC-MCNC: 14 MG/DL

## 2022-11-16 ENCOUNTER — TELEPHONE (OUTPATIENT)
Dept: FAMILY MEDICINE CLINIC | Age: 67
End: 2022-11-16

## 2022-11-16 NOTE — TELEPHONE ENCOUNTER
----- Message from Danette Brandon sent at 11/15/2022  4:23 PM EST -----  Subject: Message to Provider    QUESTIONS  Information for Provider? Patient states she was informed by provider that   she needs to get a colonoscopy, but patient was wondering if she could do   the Colorguard home test instead. ---------------------------------------------------------------------------  --------------  Dylan Porras [facility]  [phone]; OK to leave message on voicemail  ---------------------------------------------------------------------------  --------------  SCRIPT ANSWERS  Relationship to Patient?  Self

## 2022-11-16 NOTE — TELEPHONE ENCOUNTER
Because Kj Alexis has some scarring in her liver (what is known as cirrhosis), it's possible that she may have some scant bleeding in her GI tract, which could cause a false positive Cologuard. Her best option is to have the colonoscopy. However, if she is unable or unwilling to proceed with a colonoscopy,  the cologuard is an option as long as pt is willing to proceed with a colonoscopy if the cologuard is positive.  Braxtonx.

## 2022-12-16 ENCOUNTER — HOSPITAL ENCOUNTER (INPATIENT)
Age: 67
LOS: 18 days | Discharge: NURSING, REHAB OR LONG-TERM CARE | DRG: 432 | End: 2023-01-04
Attending: EMERGENCY MEDICINE | Admitting: INTERNAL MEDICINE
Payer: MEDICARE

## 2022-12-16 ENCOUNTER — APPOINTMENT (OUTPATIENT)
Dept: GENERAL RADIOLOGY | Age: 67
DRG: 432 | End: 2022-12-16
Payer: MEDICARE

## 2022-12-16 DIAGNOSIS — R06.02 SHORTNESS OF BREATH: ICD-10-CM
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
## 2022-12-16 DIAGNOSIS — F10.29 ALCOHOL DEPENDENCE WITH UNSPECIFIED ALCOHOL-INDUCED DISORDER (HCC): ICD-10-CM

## 2022-12-16 DIAGNOSIS — E87.1 HYPONATREMIA: Primary | ICD-10-CM

## 2022-12-16 DIAGNOSIS — I48.11 LONGSTANDING PERSISTENT ATRIAL FIBRILLATION (HCC): ICD-10-CM

## 2022-12-16 PROCEDURE — 85025 COMPLETE CBC W/AUTO DIFF WBC: CPT

## 2022-12-16 PROCEDURE — 83880 ASSAY OF NATRIURETIC PEPTIDE: CPT

## 2022-12-16 PROCEDURE — 99285 EMERGENCY DEPT VISIT HI MDM: CPT

## 2022-12-16 PROCEDURE — 84484 ASSAY OF TROPONIN QUANT: CPT

## 2022-12-16 PROCEDURE — 6360000002 HC RX W HCPCS: Performed by: PHYSICIAN ASSISTANT

## 2022-12-16 PROCEDURE — 80053 COMPREHEN METABOLIC PANEL: CPT

## 2022-12-16 PROCEDURE — 96374 THER/PROPH/DIAG INJ IV PUSH: CPT

## 2022-12-16 PROCEDURE — 93005 ELECTROCARDIOGRAM TRACING: CPT | Performed by: EMERGENCY MEDICINE

## 2022-12-16 PROCEDURE — 71045 X-RAY EXAM CHEST 1 VIEW: CPT

## 2022-12-16 PROCEDURE — 51701 INSERT BLADDER CATHETER: CPT

## 2022-12-16 RX ORDER — IPRATROPIUM BROMIDE AND ALBUTEROL SULFATE 2.5; .5 MG/3ML; MG/3ML
1 SOLUTION RESPIRATORY (INHALATION)
Status: DISCONTINUED | OUTPATIENT
Start: 2022-12-17 | End: 2022-12-16

## 2022-12-16 RX ORDER — IPRATROPIUM BROMIDE AND ALBUTEROL SULFATE 2.5; .5 MG/3ML; MG/3ML
1 SOLUTION RESPIRATORY (INHALATION) ONCE
Status: COMPLETED | OUTPATIENT
Start: 2022-12-17 | End: 2022-12-17

## 2022-12-16 RX ORDER — METHYLPREDNISOLONE SODIUM SUCCINATE 125 MG/2ML
125 INJECTION, POWDER, LYOPHILIZED, FOR SOLUTION INTRAMUSCULAR; INTRAVENOUS ONCE
Status: COMPLETED | OUTPATIENT
Start: 2022-12-16 | End: 2022-12-16

## 2022-12-16 RX ADMIN — METHYLPREDNISOLONE SODIUM SUCCINATE 125 MG: 125 INJECTION, POWDER, FOR SOLUTION INTRAMUSCULAR; INTRAVENOUS at 23:59

## 2022-12-16 ASSESSMENT — PAIN - FUNCTIONAL ASSESSMENT: PAIN_FUNCTIONAL_ASSESSMENT: NONE - DENIES PAIN

## 2022-12-17 ENCOUNTER — APPOINTMENT (OUTPATIENT)
Dept: GENERAL RADIOLOGY | Age: 67
DRG: 432 | End: 2022-12-17
Payer: MEDICARE

## 2022-12-17 ENCOUNTER — APPOINTMENT (OUTPATIENT)
Dept: CT IMAGING | Age: 67
DRG: 432 | End: 2022-12-17
Payer: MEDICARE

## 2022-12-17 PROBLEM — E87.1 ACUTE HYPONATREMIA: Status: ACTIVE | Noted: 2022-12-17

## 2022-12-17 LAB
A/G RATIO: 0.8 (ref 1.1–2.2)
ALBUMIN SERPL-MCNC: 3.1 G/DL (ref 3.4–5)
ALBUMIN SERPL-MCNC: 3.2 G/DL (ref 3.4–5)
ALP BLD-CCNC: 155 U/L (ref 40–129)
ALP BLD-CCNC: 159 U/L (ref 40–129)
ALT SERPL-CCNC: 41 U/L (ref 10–40)
ALT SERPL-CCNC: 42 U/L (ref 10–40)
AMMONIA: 19 UMOL/L (ref 11–51)
ANION GAP SERPL CALCULATED.3IONS-SCNC: 10 MMOL/L (ref 3–16)
ANION GAP SERPL CALCULATED.3IONS-SCNC: 11 MMOL/L (ref 3–16)
ANION GAP SERPL CALCULATED.3IONS-SCNC: 12 MMOL/L (ref 3–16)
ANION GAP SERPL CALCULATED.3IONS-SCNC: 12 MMOL/L (ref 3–16)
ANION GAP SERPL CALCULATED.3IONS-SCNC: 8 MMOL/L (ref 3–16)
AST SERPL-CCNC: 123 U/L (ref 15–37)
AST SERPL-CCNC: 137 U/L (ref 15–37)
ATYPICAL LYMPHOCYTE RELATIVE PERCENT: 1 % (ref 0–6)
BANDED NEUTROPHILS RELATIVE PERCENT: 9 % (ref 0–7)
BASE EXCESS VENOUS: -3.6 MMOL/L (ref -3–3)
BASOPHILS ABSOLUTE: 0 K/UL (ref 0–0.2)
BASOPHILS ABSOLUTE: 0 K/UL (ref 0–0.2)
BASOPHILS RELATIVE PERCENT: 0 %
BASOPHILS RELATIVE PERCENT: 0.1 %
BILIRUB SERPL-MCNC: 3.7 MG/DL (ref 0–1)
BILIRUB SERPL-MCNC: 3.8 MG/DL (ref 0–1)
BILIRUBIN DIRECT: 2.1 MG/DL (ref 0–0.3)
BILIRUBIN URINE: NEGATIVE
BILIRUBIN, INDIRECT: 1.7 MG/DL (ref 0–1)
BLOOD, URINE: NEGATIVE
BUN BLDV-MCNC: 12 MG/DL (ref 7–20)
BUN BLDV-MCNC: 13 MG/DL (ref 7–20)
BUN BLDV-MCNC: 14 MG/DL (ref 7–20)
BUN BLDV-MCNC: 9 MG/DL (ref 7–20)
BUN BLDV-MCNC: 9 MG/DL (ref 7–20)
CALCIUM SERPL-MCNC: 8.8 MG/DL (ref 8.3–10.6)
CALCIUM SERPL-MCNC: 9 MG/DL (ref 8.3–10.6)
CALCIUM SERPL-MCNC: 9.1 MG/DL (ref 8.3–10.6)
CARBOXYHEMOGLOBIN: 2.6 % (ref 0–1.5)
CHLORIDE BLD-SCNC: 75 MMOL/L (ref 99–110)
CHLORIDE BLD-SCNC: 75 MMOL/L (ref 99–110)
CHLORIDE BLD-SCNC: 76 MMOL/L (ref 99–110)
CHLORIDE BLD-SCNC: 77 MMOL/L (ref 99–110)
CHLORIDE BLD-SCNC: 78 MMOL/L (ref 99–110)
CLARITY: CLEAR
CO2: 21 MMOL/L (ref 21–32)
CO2: 22 MMOL/L (ref 21–32)
CO2: 22 MMOL/L (ref 21–32)
COLOR: YELLOW
CREAT SERPL-MCNC: 0.6 MG/DL (ref 0.6–1.2)
CREAT SERPL-MCNC: 0.6 MG/DL (ref 0.6–1.2)
CREAT SERPL-MCNC: 0.7 MG/DL (ref 0.6–1.2)
CREAT SERPL-MCNC: <0.5 MG/DL (ref 0.6–1.2)
CREAT SERPL-MCNC: <0.5 MG/DL (ref 0.6–1.2)
CREATININE URINE: 98.2 MG/DL (ref 28–259)
EKG ATRIAL RATE: 68 BPM
EKG DIAGNOSIS: NORMAL
EKG Q-T INTERVAL: 412 MS
EKG QRS DURATION: 78 MS
EKG QTC CALCULATION (BAZETT): 414 MS
EKG R AXIS: 78 DEGREES
EKG T AXIS: 10 DEGREES
EKG VENTRICULAR RATE: 61 BPM
EOSINOPHILS ABSOLUTE: 0 K/UL (ref 0–0.6)
EOSINOPHILS ABSOLUTE: 0.1 K/UL (ref 0–0.6)
EOSINOPHILS RELATIVE PERCENT: 0 %
EOSINOPHILS RELATIVE PERCENT: 1 %
GFR SERPL CREATININE-BSD FRML MDRD: >60 ML/MIN/{1.73_M2}
GLUCOSE BLD-MCNC: 113 MG/DL (ref 70–99)
GLUCOSE BLD-MCNC: 114 MG/DL (ref 70–99)
GLUCOSE BLD-MCNC: 193 MG/DL (ref 70–99)
GLUCOSE BLD-MCNC: 205 MG/DL (ref 70–99)
GLUCOSE BLD-MCNC: 246 MG/DL (ref 70–99)
GLUCOSE URINE: NEGATIVE MG/DL
HCO3 VENOUS: 21.6 MMOL/L (ref 23–29)
HCT VFR BLD CALC: 32.4 % (ref 36–48)
HCT VFR BLD CALC: 32.9 % (ref 36–48)
HEMATOLOGY PATH CONSULT: YES
HEMOGLOBIN: 11.3 G/DL (ref 12–16)
HEMOGLOBIN: 11.4 G/DL (ref 12–16)
INFLUENZA A: NOT DETECTED
INFLUENZA B: NOT DETECTED
INR BLD: 1.73 (ref 0.87–1.14)
KETONES, URINE: ABNORMAL MG/DL
LEUKOCYTE ESTERASE, URINE: NEGATIVE
LYMPHOCYTES ABSOLUTE: 0.3 K/UL (ref 1–5.1)
LYMPHOCYTES ABSOLUTE: 0.8 K/UL (ref 1–5.1)
LYMPHOCYTES RELATIVE PERCENT: 4.7 %
LYMPHOCYTES RELATIVE PERCENT: 9 %
MAGNESIUM: 1.2 MG/DL (ref 1.8–2.4)
MAGNESIUM: 1.6 MG/DL (ref 1.8–2.4)
MAGNESIUM: 1.8 MG/DL (ref 1.8–2.4)
MCH RBC QN AUTO: 39.1 PG (ref 26–34)
MCH RBC QN AUTO: 39.2 PG (ref 26–34)
MCHC RBC AUTO-ENTMCNC: 34.6 G/DL (ref 31–36)
MCHC RBC AUTO-ENTMCNC: 34.8 G/DL (ref 31–36)
MCV RBC AUTO: 112.3 FL (ref 80–100)
MCV RBC AUTO: 113.4 FL (ref 80–100)
METHEMOGLOBIN VENOUS: 0.5 %
MICROSCOPIC EXAMINATION: YES
MONOCYTES ABSOLUTE: 0.1 K/UL (ref 0–1.3)
MONOCYTES ABSOLUTE: 1.1 K/UL (ref 0–1.3)
MONOCYTES RELATIVE PERCENT: 14 %
MONOCYTES RELATIVE PERCENT: 2.5 %
NEUTROPHILS ABSOLUTE: 5 K/UL (ref 1.7–7.7)
NEUTROPHILS ABSOLUTE: 5.9 K/UL (ref 1.7–7.7)
NEUTROPHILS RELATIVE PERCENT: 66 %
NEUTROPHILS RELATIVE PERCENT: 92.7 %
NITRITE, URINE: NEGATIVE
O2 SAT, VEN: 79 %
O2 THERAPY: ABNORMAL
OSMOLALITY URINE: 365 MOSM/KG (ref 390–1070)
OSMOLALITY: 240 MOSM/KG (ref 280–301)
PCO2, VEN: 39.6 MMHG (ref 40–50)
PDW BLD-RTO: 14 % (ref 12.4–15.4)
PDW BLD-RTO: 14 % (ref 12.4–15.4)
PH UA: 6 (ref 5–8)
PH VENOUS: 7.35 (ref 7.35–7.45)
PHOSPHORUS: 2.5 MG/DL (ref 2.5–4.9)
PHOSPHORUS: 3.2 MG/DL (ref 2.5–4.9)
PLATELET # BLD: 101 K/UL (ref 135–450)
PLATELET # BLD: 101 K/UL (ref 135–450)
PLATELET SLIDE REVIEW: ABNORMAL
PMV BLD AUTO: 8.8 FL (ref 5–10.5)
PMV BLD AUTO: 8.8 FL (ref 5–10.5)
PO2, VEN: 43.9 MMHG (ref 25–40)
POTASSIUM REFLEX MAGNESIUM: 4.7 MMOL/L (ref 3.5–5.1)
POTASSIUM REFLEX MAGNESIUM: 4.8 MMOL/L (ref 3.5–5.1)
POTASSIUM SERPL-SCNC: 4.4 MMOL/L (ref 3.5–5.1)
POTASSIUM SERPL-SCNC: 4.5 MMOL/L (ref 3.5–5.1)
POTASSIUM SERPL-SCNC: 4.9 MMOL/L (ref 3.5–5.1)
PRO-BNP: 1326 PG/ML (ref 0–124)
PROCALCITONIN: 0.11 NG/ML (ref 0–0.15)
PROTEIN UA: ABNORMAL MG/DL
PROTHROMBIN TIME: 20.2 SEC (ref 11.7–14.5)
RBC # BLD: 2.89 M/UL (ref 4–5.2)
RBC # BLD: 2.9 M/UL (ref 4–5.2)
RBC # BLD: NORMAL 10*6/UL
RBC UA: NORMAL /HPF (ref 0–4)
SARS-COV-2 RNA, RT PCR: NOT DETECTED
SLIDE REVIEW: ABNORMAL
SODIUM BLD-SCNC: 102 MMOL/L (ref 136–145)
SODIUM BLD-SCNC: 106 MMOL/L (ref 136–145)
SODIUM BLD-SCNC: 106 MMOL/L (ref 136–145)
SODIUM BLD-SCNC: 107 MMOL/L (ref 136–145)
SODIUM BLD-SCNC: 107 MMOL/L (ref 136–145)
SODIUM BLD-SCNC: 108 MMOL/L (ref 136–145)
SODIUM BLD-SCNC: 110 MMOL/L (ref 136–145)
SODIUM BLD-SCNC: 111 MMOL/L (ref 136–145)
SODIUM URINE: <20 MMOL/L
SPECIFIC GRAVITY UA: 1.01 (ref 1–1.03)
TCO2 CALC VENOUS: 23 MMOL/L
TOTAL PROTEIN: 6.8 G/DL (ref 6.4–8.2)
TOTAL PROTEIN: 7.1 G/DL (ref 6.4–8.2)
TROPONIN: <0.01 NG/ML
URIC ACID, SERUM: 4.8 MG/DL (ref 2.6–6)
URINE REFLEX TO CULTURE: ABNORMAL
URINE TYPE: ABNORMAL
UROBILINOGEN, URINE: 1 E.U./DL
WBC # BLD: 5.3 K/UL (ref 4–11)
WBC # BLD: 7.9 K/UL (ref 4–11)
WBC UA: NORMAL /HPF (ref 0–5)

## 2022-12-17 PROCEDURE — 6370000000 HC RX 637 (ALT 250 FOR IP): Performed by: PHYSICIAN ASSISTANT

## 2022-12-17 PROCEDURE — 93010 ELECTROCARDIOGRAM REPORT: CPT | Performed by: INTERNAL MEDICINE

## 2022-12-17 PROCEDURE — 72125 CT NECK SPINE W/O DYE: CPT

## 2022-12-17 PROCEDURE — 83930 ASSAY OF BLOOD OSMOLALITY: CPT

## 2022-12-17 PROCEDURE — 80076 HEPATIC FUNCTION PANEL: CPT

## 2022-12-17 PROCEDURE — 2000000000 HC ICU R&B

## 2022-12-17 PROCEDURE — 36415 COLL VENOUS BLD VENIPUNCTURE: CPT

## 2022-12-17 PROCEDURE — 85025 COMPLETE CBC W/AUTO DIFF WBC: CPT

## 2022-12-17 PROCEDURE — 81001 URINALYSIS AUTO W/SCOPE: CPT

## 2022-12-17 PROCEDURE — 71045 X-RAY EXAM CHEST 1 VIEW: CPT

## 2022-12-17 PROCEDURE — 6360000002 HC RX W HCPCS: Performed by: INTERNAL MEDICINE

## 2022-12-17 PROCEDURE — 82570 ASSAY OF URINE CREATININE: CPT

## 2022-12-17 PROCEDURE — 2580000003 HC RX 258: Performed by: INTERNAL MEDICINE

## 2022-12-17 PROCEDURE — 84443 ASSAY THYROID STIM HORMONE: CPT

## 2022-12-17 PROCEDURE — 83735 ASSAY OF MAGNESIUM: CPT

## 2022-12-17 PROCEDURE — 84145 PROCALCITONIN (PCT): CPT

## 2022-12-17 PROCEDURE — 83935 ASSAY OF URINE OSMOLALITY: CPT

## 2022-12-17 PROCEDURE — 82803 BLOOD GASES ANY COMBINATION: CPT

## 2022-12-17 PROCEDURE — 70450 CT HEAD/BRAIN W/O DYE: CPT

## 2022-12-17 PROCEDURE — 93005 ELECTROCARDIOGRAM TRACING: CPT | Performed by: INTERNAL MEDICINE

## 2022-12-17 PROCEDURE — 82140 ASSAY OF AMMONIA: CPT

## 2022-12-17 PROCEDURE — 6360000004 HC RX CONTRAST MEDICATION: Performed by: INTERNAL MEDICINE

## 2022-12-17 PROCEDURE — 6370000000 HC RX 637 (ALT 250 FOR IP): Performed by: INTERNAL MEDICINE

## 2022-12-17 PROCEDURE — 2580000003 HC RX 258: Performed by: PHYSICIAN ASSISTANT

## 2022-12-17 PROCEDURE — 87636 SARSCOV2 & INF A&B AMP PRB: CPT

## 2022-12-17 PROCEDURE — 84300 ASSAY OF URINE SODIUM: CPT

## 2022-12-17 PROCEDURE — 84100 ASSAY OF PHOSPHORUS: CPT

## 2022-12-17 PROCEDURE — 84550 ASSAY OF BLOOD/URIC ACID: CPT

## 2022-12-17 PROCEDURE — 84295 ASSAY OF SERUM SODIUM: CPT

## 2022-12-17 PROCEDURE — 85610 PROTHROMBIN TIME: CPT

## 2022-12-17 PROCEDURE — 80048 BASIC METABOLIC PNL TOTAL CA: CPT

## 2022-12-17 PROCEDURE — 74177 CT ABD & PELVIS W/CONTRAST: CPT

## 2022-12-17 RX ORDER — VALACYCLOVIR HYDROCHLORIDE 500 MG/1
500 TABLET, FILM COATED ORAL DAILY
Status: DISCONTINUED | OUTPATIENT
Start: 2022-12-17 | End: 2023-01-04 | Stop reason: HOSPADM

## 2022-12-17 RX ORDER — LORAZEPAM 1 MG/1
2 TABLET ORAL
Status: DISCONTINUED | OUTPATIENT
Start: 2022-12-17 | End: 2023-01-04 | Stop reason: HOSPADM

## 2022-12-17 RX ORDER — ONDANSETRON 2 MG/ML
4 INJECTION INTRAMUSCULAR; INTRAVENOUS EVERY 6 HOURS PRN
Status: DISCONTINUED | OUTPATIENT
Start: 2022-12-17 | End: 2023-01-04 | Stop reason: HOSPADM

## 2022-12-17 RX ORDER — POTASSIUM CHLORIDE 7.45 MG/ML
10 INJECTION INTRAVENOUS PRN
Status: DISCONTINUED | OUTPATIENT
Start: 2022-12-17 | End: 2022-12-23

## 2022-12-17 RX ORDER — MAGNESIUM SULFATE 1 G/100ML
1000 INJECTION INTRAVENOUS PRN
Status: DISCONTINUED | OUTPATIENT
Start: 2022-12-17 | End: 2022-12-17 | Stop reason: SDUPTHER

## 2022-12-17 RX ORDER — THIAMINE HYDROCHLORIDE 100 MG/ML
100 INJECTION, SOLUTION INTRAMUSCULAR; INTRAVENOUS ONCE
Status: COMPLETED | OUTPATIENT
Start: 2022-12-17 | End: 2022-12-17

## 2022-12-17 RX ORDER — MAGNESIUM SULFATE IN WATER 40 MG/ML
2000 INJECTION, SOLUTION INTRAVENOUS PRN
Status: DISCONTINUED | OUTPATIENT
Start: 2022-12-17 | End: 2023-01-04 | Stop reason: HOSPADM

## 2022-12-17 RX ORDER — SODIUM CHLORIDE 0.9 % (FLUSH) 0.9 %
5-40 SYRINGE (ML) INJECTION PRN
Status: DISCONTINUED | OUTPATIENT
Start: 2022-12-17 | End: 2022-12-19

## 2022-12-17 RX ORDER — M-VIT,TX,IRON,MINS/CALC/FOLIC 27MG-0.4MG
1 TABLET ORAL DAILY
Status: DISCONTINUED | OUTPATIENT
Start: 2022-12-17 | End: 2023-01-04 | Stop reason: HOSPADM

## 2022-12-17 RX ORDER — ACETAMINOPHEN 650 MG/1
650 SUPPOSITORY RECTAL EVERY 6 HOURS PRN
Status: DISCONTINUED | OUTPATIENT
Start: 2022-12-17 | End: 2023-01-04 | Stop reason: HOSPADM

## 2022-12-17 RX ORDER — PROMETHAZINE HYDROCHLORIDE 25 MG/1
12.5 TABLET ORAL EVERY 6 HOURS PRN
Status: DISCONTINUED | OUTPATIENT
Start: 2022-12-17 | End: 2023-01-04 | Stop reason: HOSPADM

## 2022-12-17 RX ORDER — SODIUM CHLORIDE 0.9 % (FLUSH) 0.9 %
10 SYRINGE (ML) INJECTION PRN
Status: DISCONTINUED | OUTPATIENT
Start: 2022-12-17 | End: 2022-12-19

## 2022-12-17 RX ORDER — ACETAMINOPHEN 325 MG/1
650 TABLET ORAL EVERY 6 HOURS PRN
Status: DISCONTINUED | OUTPATIENT
Start: 2022-12-17 | End: 2023-01-04 | Stop reason: HOSPADM

## 2022-12-17 RX ORDER — SODIUM CHLORIDE 0.9 % (FLUSH) 0.9 %
5-40 SYRINGE (ML) INJECTION EVERY 12 HOURS SCHEDULED
Status: DISCONTINUED | OUTPATIENT
Start: 2022-12-17 | End: 2022-12-18

## 2022-12-17 RX ORDER — SODIUM CHLORIDE 9 MG/ML
1000 INJECTION, SOLUTION INTRAVENOUS CONTINUOUS
Status: DISCONTINUED | OUTPATIENT
Start: 2022-12-17 | End: 2022-12-17

## 2022-12-17 RX ORDER — FUROSEMIDE 10 MG/ML
20 INJECTION INTRAMUSCULAR; INTRAVENOUS ONCE
Status: COMPLETED | OUTPATIENT
Start: 2022-12-17 | End: 2022-12-17

## 2022-12-17 RX ORDER — SODIUM CHLORIDE 9 MG/ML
INJECTION, SOLUTION INTRAVENOUS PRN
Status: DISCONTINUED | OUTPATIENT
Start: 2022-12-17 | End: 2022-12-19 | Stop reason: SDUPTHER

## 2022-12-17 RX ORDER — POTASSIUM CHLORIDE 20 MEQ/1
40 TABLET, EXTENDED RELEASE ORAL PRN
Status: DISCONTINUED | OUTPATIENT
Start: 2022-12-17 | End: 2022-12-23

## 2022-12-17 RX ORDER — IPRATROPIUM BROMIDE AND ALBUTEROL SULFATE 2.5; .5 MG/3ML; MG/3ML
1 SOLUTION RESPIRATORY (INHALATION) EVERY 4 HOURS PRN
Status: DISCONTINUED | OUTPATIENT
Start: 2022-12-17 | End: 2022-12-20

## 2022-12-17 RX ORDER — LORAZEPAM 1 MG/1
3 TABLET ORAL
Status: DISCONTINUED | OUTPATIENT
Start: 2022-12-17 | End: 2023-01-04 | Stop reason: HOSPADM

## 2022-12-17 RX ORDER — LORAZEPAM 2 MG/ML
1 INJECTION INTRAMUSCULAR
Status: DISCONTINUED | OUTPATIENT
Start: 2022-12-17 | End: 2023-01-04 | Stop reason: HOSPADM

## 2022-12-17 RX ORDER — LORAZEPAM 2 MG/ML
4 INJECTION INTRAMUSCULAR
Status: DISCONTINUED | OUTPATIENT
Start: 2022-12-17 | End: 2023-01-04 | Stop reason: HOSPADM

## 2022-12-17 RX ORDER — LORAZEPAM 1 MG/1
4 TABLET ORAL
Status: DISCONTINUED | OUTPATIENT
Start: 2022-12-17 | End: 2023-01-04 | Stop reason: HOSPADM

## 2022-12-17 RX ORDER — ATORVASTATIN CALCIUM 10 MG/1
20 TABLET, FILM COATED ORAL NIGHTLY
Status: DISCONTINUED | OUTPATIENT
Start: 2022-12-17 | End: 2023-01-04 | Stop reason: HOSPADM

## 2022-12-17 RX ORDER — LORAZEPAM 2 MG/ML
3 INJECTION INTRAMUSCULAR
Status: DISCONTINUED | OUTPATIENT
Start: 2022-12-17 | End: 2023-01-04 | Stop reason: HOSPADM

## 2022-12-17 RX ORDER — LORAZEPAM 1 MG/1
1 TABLET ORAL
Status: DISCONTINUED | OUTPATIENT
Start: 2022-12-17 | End: 2023-01-04 | Stop reason: HOSPADM

## 2022-12-17 RX ORDER — METOPROLOL SUCCINATE 50 MG/1
50 TABLET, EXTENDED RELEASE ORAL DAILY
Status: DISCONTINUED | OUTPATIENT
Start: 2022-12-17 | End: 2023-01-04 | Stop reason: HOSPADM

## 2022-12-17 RX ORDER — SODIUM CHLORIDE 9 MG/ML
INJECTION, SOLUTION INTRAVENOUS PRN
Status: DISCONTINUED | OUTPATIENT
Start: 2022-12-17 | End: 2023-01-04 | Stop reason: HOSPADM

## 2022-12-17 RX ORDER — IPRATROPIUM BROMIDE AND ALBUTEROL SULFATE 2.5; .5 MG/3ML; MG/3ML
1 SOLUTION RESPIRATORY (INHALATION)
Status: DISCONTINUED | OUTPATIENT
Start: 2022-12-17 | End: 2022-12-17

## 2022-12-17 RX ORDER — LORAZEPAM 2 MG/ML
2 INJECTION INTRAMUSCULAR
Status: DISCONTINUED | OUTPATIENT
Start: 2022-12-17 | End: 2023-01-04 | Stop reason: HOSPADM

## 2022-12-17 RX ORDER — SODIUM CHLORIDE 0.9 % (FLUSH) 0.9 %
10 SYRINGE (ML) INJECTION EVERY 12 HOURS SCHEDULED
Status: DISCONTINUED | OUTPATIENT
Start: 2022-12-17 | End: 2022-12-19

## 2022-12-17 RX ADMIN — FUROSEMIDE 20 MG: 10 INJECTION, SOLUTION INTRAMUSCULAR; INTRAVENOUS at 11:41

## 2022-12-17 RX ADMIN — SODIUM CHLORIDE 1000 ML: 9 INJECTION, SOLUTION INTRAVENOUS at 02:24

## 2022-12-17 RX ADMIN — MAGNESIUM SULFATE HEPTAHYDRATE 2000 MG: 40 INJECTION, SOLUTION INTRAVENOUS at 17:03

## 2022-12-17 RX ADMIN — THIAMINE HYDROCHLORIDE 100 MG: 100 INJECTION, SOLUTION INTRAMUSCULAR; INTRAVENOUS at 04:34

## 2022-12-17 RX ADMIN — MAGNESIUM SULFATE HEPTAHYDRATE 2000 MG: 40 INJECTION, SOLUTION INTRAVENOUS at 07:16

## 2022-12-17 RX ADMIN — SODIUM CHLORIDE, PRESERVATIVE FREE 10 ML: 5 INJECTION INTRAVENOUS at 22:00

## 2022-12-17 RX ADMIN — IOPAMIDOL 75 ML: 755 INJECTION, SOLUTION INTRAVENOUS at 07:53

## 2022-12-17 RX ADMIN — SODIUM CHLORIDE, PRESERVATIVE FREE 10 ML: 5 INJECTION INTRAVENOUS at 11:34

## 2022-12-17 RX ADMIN — MAGNESIUM SULFATE HEPTAHYDRATE 2000 MG: 40 INJECTION, SOLUTION INTRAVENOUS at 22:04

## 2022-12-17 RX ADMIN — IPRATROPIUM BROMIDE AND ALBUTEROL SULFATE 1 AMPULE: .5; 2.5 SOLUTION RESPIRATORY (INHALATION) at 00:24

## 2022-12-17 RX ADMIN — SODIUM CHLORIDE, PRESERVATIVE FREE 10 ML: 5 INJECTION INTRAVENOUS at 21:59

## 2022-12-17 RX ADMIN — Medication 1 TABLET: at 11:41

## 2022-12-17 RX ADMIN — ATORVASTATIN CALCIUM 20 MG: 10 TABLET, FILM COATED ORAL at 21:59

## 2022-12-17 RX ADMIN — SODIUM CHLORIDE, PRESERVATIVE FREE 10 ML: 5 INJECTION INTRAVENOUS at 11:33

## 2022-12-17 ASSESSMENT — PAIN SCALES - GENERAL
PAINLEVEL_OUTOF10: 0

## 2022-12-17 ASSESSMENT — ENCOUNTER SYMPTOMS
ABDOMINAL PAIN: 0
WHEEZING: 1
DIARRHEA: 0
VOMITING: 0
BACK PAIN: 0
NAUSEA: 0
SHORTNESS OF BREATH: 1
CHEST TIGHTNESS: 0
SORE THROAT: 0
COUGH: 1

## 2022-12-17 NOTE — ED TRIAGE NOTES
Pt to ED via EMS for complaints of fall and wheezing. Per EMS the origional call was for a lift assist after she had had a mechanical fall, upon EMS arrival pt was found to have gross wheezes and lower extremity edema. Pt denies SOB but has audible wheezes and has trouble finishing sentences.

## 2022-12-17 NOTE — CONSULTS
[employer]  Nephrology Consult Note           Reason for Consult:  hyponatremia  Requesting Physician:  Dr. Franklin Birmingham    Chief Complaint:    Chief Complaint   Patient presents with    Wheezing    Fall     Call was for lift assist, upon EMS arrival pt had audible wheezing and lower extremity edema       History of Present Illness on 12/17/22:    69 y.o. yo female with PMH of ETOH abuse, HTN, kidney stone who is admitted for hyponatremia and sob  She reports her last drink ws 2 days ago, drinks 3-4 beers per day . She has been weak, wheezing and has been falling in the last few days as well  Her sodium was 106 on admission and was given saline 50 ml/h for few hours and has been stopped    Past Medical History:        Diagnosis Date    Age-related osteoporosis without current pathological fracture 2020    Arthritis     EtOH dependence ([facility] [address]     HSV-2 (herpes simplex virus 2) infection     Hypertension     Tx since late 45s    Kidney stone     Malignant neoplasm of upper-inner quadrant of left breast in female, estrogen receptor positive ([facility] [address] 09/23/2019       Past Surgical History:        Procedure Laterality Date    COLONOSCOPY  02/16/1999    Rectal ulcer, s/p cautery    COLONOSCOPY  4/19/13    next due 10 yrs    [address] (CERVIX STATUS UNKNOWN)  1998    Ovaries in. Had fibroids, DUB. MASTECTOMY Left 10/8/2019    LEFT BREAST NEEDLE LOCALIZATION, PARTIAL MASTECTOMY performed by Viry Cunha MD at [facility] Left 5/23/2019    LEFT TOTAL KNEE REPLACEMENT            ROTHMAN & NEPHEW performed by Elissa Atkinson MD at [address] [facility] N/A 4/20/2022    EGD W/EUS FNA performed by Hasmukh Persaud MD at [address] Medications:    No current facility-administered medications on file prior to encounter.      Current Outpatient Medications on File Prior to Encounter   Medication Sig Dispense Refill    lisinopril (PRINIVIL;ZESTRIL) 20 MG tablet Take 1 tablet by mouth daily Please have pt contact office for appt. 90 tablet 3    atorvastatin (LIPITOR) 40 MG tablet TAKE 1 TABLET BY MOUTH EVERY NIGHT 90 tablet 3    alendronate (FOSAMAX) 70 MG tablet TAKE 1 TABLET BY MOUTH EVERY 7 DAYS 12 tablet 3    valACYclovir (VALTREX) 500 MG tablet TAKE 1 TABLET BY MOUTH DAILY 90 tablet 3    metoprolol succinate (TOPROL XL) 50 MG extended release tablet TAKE 1 TABLET BY MOUTH DAILY 90 tablet 3    anastrozole (ARIMIDEX) 1 MG tablet       Biotin 1 MG CAPS Take by mouth      Multiple Vitamins-Minerals (THERAPEUTIC MULTIVITAMIN-MINERALS) tablet Take 1 tablet by mouth daily         Allergies:  Patient has no known allergies.     Social History:    Social History     Socioeconomic History    Marital status: Single     Spouse name: Not on file    Number of children: 2    Years of education: Not on file    Highest education level: Not on file   Occupational History    Not on file   Tobacco Use    Smoking status: Former     Packs/day: 1.50     Years: 5.00     Pack years: 7.50     Types: Cigarettes     Quit date: 1977     Years since quittin.9    Smokeless tobacco: Never   Vaping Use    Vaping Use: Never used   Substance and Sexual Activity    Alcohol use: Yes     Comment: 3-4 beers daily    Drug use: No    Sexual activity: Not on file   Other Topics Concern    Not on file   Social History Narrative    Not on file     Social Determinants of Health     Financial Resource Strain: Not on file   Food Insecurity: Not on file   Transportation Needs: Not on file   Physical Activity: Not on file   Stress: Not on file   Social Connections: Not on file   Intimate Partner Violence: Not on file   Housing Stability: Not on file       Family History:   Family History   Problem Relation Age of Onset    Cancer Father 62        Lung    Hypertension Father     Cancer Brother 40        Bladder,  64    Diabetes Other     Heart Failure Other     Breast Cancer Maternal Aunt 80       Review of Systems:   Pertinent positives stated above in HPI. All other 10 systems were reviewed and were negative. Physical exam:   Constitutional:  VITALS:  /75   Pulse 58   Temp 97.9 °F (36.6 °C) (Oral)   Resp 10   Ht 5' 2\" (1.575 m)   Wt 180 lb (81.6 kg)   LMP  (LMP Unknown)   SpO2 100%   BMI 32.92 kg/m²   Gen: alert, awake  Neck: No JVD  Skin: Unremarkable  Cardiovascular:  S1, S2 without m/r/g   Respiratory: CTA B without w/r/r; respiratory effort normal  Abdomen:  soft, nt, nd,   Extremities: 1+ lower extremity edema  Neuro/Psy: AAoriented times 3 ; moves all 4 ext    Data/  Recent Labs     12/16/22 2354 12/17/22  0632   WBC 7.9 5.3   HGB 11.4* 11.3*   HCT 32.9* 32.4*   .4* 112.3*   * 101*     Recent Labs     12/16/22  2354 12/17/22  0056 12/17/22  0225 12/17/22  0406 12/17/22  0632   *   < > 106* 107* 107*   K 4.7  --  4.9  --   --    CL 75*  --  76*  --   --    CO2 21  --  22  --   --    GLUCOSE 114*  --  113*  --   --    PHOS  --   --  2.5  --   --    MG  --   --  1.20*  --   --    BUN 9  --  9  --   --    CREATININE <0.5*  --  <0.5*  --   --    LABGLOM >60  --  >60  --   --     < > = values in this interval not displayed. CT chest/ap w iv contrast  Impression:     1. CHEST: Small right pleural effusion and mild atelectasis in the lung bases. 2. No consolidation is appreciated. 3. Moderate to severe cardiomegaly. 4. T12 superior endplate fracture of uncertain age. Moderate anterior height   loss of T11, likely chronic. If there is focal tenderness, non-urgent   follow-up MRI is a consideration. 5. ABDOMEN AND PELVIS: Cirrhotic morphology of the liver. 6. Prominence of the common and pancreatic ducts, also noted on 01/28/2022   Ductal ectasia or adjacent pancreatic cystic lesion(s) have been considered. Follow-up ERCP or endoscopic ultrasound was previously recommended.    7. No acute abdominopelvic abnormality identified. 8. Moderate anasarca. 9. Chronic-appearing compression fractures at L1 and L3. Urine na<20  Osm pending     Assessment  -Hyponatremia multifactorial from liver cirrhosis, alcohol to volume overload   Na of 106 at 2354 on 12/16  -liver cirrhosis  -sob  -Falls  -ETOH abuse    Plan  -follow urine osm, TSH, uric acid, mag, phos  -Fluid restriction 1500 ml/day  -seizure precautions  -watch for etoh withdrawals  -Goal na of 114 by midnight 12/17  -Call nephrology for na less than 107 and /or more than 112  -BMP q4h  -lasix 20 mg iv times one  -encourage protein intake  -urea based on  clinical response    31 min of critical care time used reviewing the chart, and managing/coordinating the care of this patient. Thank you for the consultation. Please do not hesitate to call with questions. Jesica Boyd MD  Office: [phone]  Fax:    [phone]  [facility]

## 2022-12-17 NOTE — ED PROVIDER NOTES
**ADVANCED PRACTICE PROVIDER, I HAVE EVALUATED THIS [facility]  ED  EMERGENCY DEPARTMENT ENCOUNTER      Pt Name: Pedrito Plaza  KA  [place] 1955  Date of evaluation: 2022  Provider: SHERI Knott  Note Started: 11:30 PM EST 2022        Chief Complaint:    Chief Complaint   Patient presents with    Wheezing    Fall     Call was for lift assist, upon EMS arrival pt had audible wheezing and lower extremity edema           Nursing Notes, Past Medical Hx, Past Surgical Hx, Social Hx, Allergies, and Family Hx were all reviewed and agreed with or any disagreements were addressed in the HPI.    HPI: (Location, Duration, Timing, Severity, Quality, Assoc Sx, Context, Modifying factors)    Chief Complaint of fall and shortness of breath. This is a  69 y.o. female who presents via EMS after a fall. Patient has past medical history of alcohol dependence, hypertension, and breast cancer. EMS was called to the home today after a fall for lift assist.  Upon EMS arrival the patient had audible wheezing and lower extremity edema therefore they recommend the patient go to the emergency department for evaluation. She presents today with absolutely no complaints. She is audibly wheezing on exam.  Denies any fevers, chills, chest pain, shortness of breath or cough. No abdominal pain, nausea or vomiting. Denies any dysuria or hematuria.     PastMedical/Surgical History:      Diagnosis Date    Age-related osteoporosis without current pathological fracture 2020    Arthritis     EtOH dependence ([address]     HSV-2 (herpes simplex virus 2) infection     Hypertension     Tx since late 45s    Kidney stone     Malignant neoplasm of upper-inner quadrant of left breast in female, estrogen receptor positive ([address] 2019         Procedure Laterality Date    COLONOSCOPY  1999    Rectal ulcer, s/p cautery    COLONOSCOPY  13    next due 10 yrs    DILATION AND CURETTAGE OF UTERUS  1997    HYSTERECTOMY (CERVIX STATUS UNKNOWN)  1998    Ovaries in. Had fibroids, DUB. MASTECTOMY Left 10/8/2019    LEFT BREAST NEEDLE LOCALIZATION, PARTIAL MASTECTOMY performed by Akil Chang MD at [address] Left 5/23/2019    LEFT TOTAL KNEE REPLACEMENT            ROTHMAN & NEPHEW performed by Delaney Alanis MD at [facility] 4/20/2022    EGD W/EUS FNA performed by James Agee MD at [facility]       Medications:  Previous Medications    ALENDRONATE (FOSAMAX) 70 MG TABLET    TAKE 1 TABLET BY MOUTH EVERY 7 DAYS    ANASTROZOLE (ARIMIDEX) 1 MG TABLET        ATORVASTATIN (LIPITOR) 40 MG TABLET    TAKE 1 TABLET BY MOUTH EVERY NIGHT    BIOTIN 1 MG CAPS    Take by mouth    LISINOPRIL (PRINIVIL;ZESTRIL) 20 MG TABLET    Take 1 tablet by mouth daily Please have pt contact office for appt. METOPROLOL SUCCINATE (TOPROL XL) 50 MG EXTENDED RELEASE TABLET    TAKE 1 TABLET BY MOUTH DAILY    MULTIPLE VITAMINS-MINERALS (THERAPEUTIC MULTIVITAMIN-MINERALS) TABLET    Take 1 tablet by mouth daily    VALACYCLOVIR (VALTREX) 500 MG TABLET    TAKE 1 TABLET BY MOUTH DAILY         Review of Systems:  (2-9 systems needed)  Review of Systems   Constitutional:  Negative for chills and fever. HENT:  Negative for congestion, nosebleeds and sore throat. Eyes:  Negative for visual disturbance. Respiratory:  Positive for cough, shortness of breath and wheezing. Negative for chest tightness. Cardiovascular:  Negative for chest pain and palpitations. Gastrointestinal:  Negative for abdominal pain, diarrhea, nausea and vomiting. Genitourinary:  Negative for dysuria, frequency, hematuria and urgency. Musculoskeletal:  Negative for back pain and neck pain. Skin:  Negative for rash. Neurological:  Negative for dizziness, weakness, light-headedness and headaches.      \"Positives and Pertinent negatives as per HPI\"    Physical Exam:  Physical Exam  Vitals and nursing note reviewed. Constitutional:       Appearance: Normal appearance. She is not diaphoretic. HENT:      Head: Normocephalic and atraumatic. Nose: Nose normal.      Mouth/Throat:      Mouth: Mucous membranes are moist.   Eyes:      General:         Right eye: No discharge. Left eye: No discharge. Extraocular Movements: Extraocular movements intact. Pupils: Pupils are equal, round, and reactive to light. Cardiovascular:      Rate and Rhythm: Regular rhythm. Tachycardia present. Pulses: Normal pulses. Heart sounds: Normal heart sounds. No murmur heard. No friction rub. No gallop. Pulmonary:      Effort: Pulmonary effort is normal. No respiratory distress. Breath sounds: No stridor. Wheezing present. No rhonchi or rales. Abdominal:      General: Abdomen is flat. Palpations: Abdomen is soft. Tenderness: There is no abdominal tenderness. There is no guarding or rebound. Musculoskeletal:         General: Normal range of motion. Cervical back: Normal range of motion and neck supple. Right lower leg: Edema present. Left lower leg: Edema present. Skin:     General: Skin is warm and dry. Coloration: Skin is not pale. Neurological:      Mental Status: She is alert and oriented to person, place, and time.    Psychiatric:         Mood and Affect: Mood normal.         Behavior: Behavior normal.       MEDICAL DECISION MAKING    Vitals:    Vitals:    12/17/22 0214 12/17/22 0230 12/17/22 0240 12/17/22 0300   BP: (!) 124/99 (!) 144/97 (!) 147/84 (!) 137/91   Pulse: 55 57 61 54   Resp: 15 17 15 17   Temp:       TempSrc:       SpO2: 99% 98% 100% 100%   Weight:       Height:           LABS:  Labs Reviewed   CBC WITH AUTO DIFFERENTIAL - Abnormal; Notable for the following components:       Result Value    RBC 2.90 (*)     Hemoglobin 11.4 (*)     Hematocrit 32.9 (*)     .4 (*)     MCH 39.2 (*)     Platelets 101 (*)     Lymphocytes Absolute 0.8 (*)     Bands Relative 9 (*)     All other components within normal limits   COMPREHENSIVE METABOLIC PANEL W/ REFLEX TO MG FOR LOW K - Abnormal; Notable for the following components:    Sodium 106 (*)     Chloride 75 (*)     Glucose 114 (*)     Creatinine <0.5 (*)     Albumin 3.1 (*)     Albumin/Globulin Ratio 0.8 (*)     Total Bilirubin 3.7 (*)     Alkaline Phosphatase 155 (*)     ALT 42 (*)      (*)     All other components within normal limits    Narrative:     Baldomero Sams tel. [phone],  Chemistry results called to and read back by Pernell Moya RN, 12/17/2022  00:46, by [address] - Abnormal; Notable for the following components:    Pro-BNP 1,326 (*)     All other components within normal limits    Narrative:     [address] [phone],  Chemistry results called to and read back by Pernell Moya RN, 12/17/2022  00:46, by Jenny - Abnormal; Notable for the following components:    Sodium 106 (*)     Chloride 76 (*)     Glucose 113 (*)     Creatinine <0.5 (*)     All other components within normal limits    Narrative:     Baldomero Sams tel. [phone],  Chemistry results called to and read back by Erika Garcia RN, 12/17/2022  03:19, by Myranda Mcbride   SODIUM - Abnormal; Notable for the following components:    Sodium 102 (*)     All other components within normal limits    Narrative:     Baldom[address] Sams tel. [phone],  Chemistry results called to and read back by Pernell Moya RN, 12/17/2022  01:20, by Myranda Mcbride   COVID-19 & INFLUENZA COMBO   TROPONIN    Narrative:     CALL  [facility],  Chemistry results called to and read back by Pernell Moya RN, 12/17/2022  00:46, by Myranda Mcbride   PROCALCITONIN    Narrative:     Baldom[address] Sams tel. [phone],  Chemistry results called to and read back by Erika Garcia RN, 12/17/2022  03:19, by [address] [place] [address], RANDOM URINE OSMOLALITY   SODIUM   SODIUM   SODIUM   SODIUM   SODIUM   SODIUM   CBC WITH AUTO DIFFERENTIAL   HEPATIC FUNCTION PANEL   OSMOLALITY, URINE   SODIUM, URINE, RANDOM   MAGNESIUM   PHOSPHORUS        Remainder of labs reviewed and were negative at this time or not returned at the time of this note. RADIOLOGY:   Non-plain film images such as CT, Ultrasound and MRI are read by the radiologist. SHERI Gonzalez have directly visualized the radiologic plain film image(s) with the below findings:      Interpretation per the Radiologist below, if available at the time of this note:    CT CERVICAL SPINE WO CONTRAST   Final Result   Multilevel degenerative changes with no acute fracture or traumatic   malalignment of the cervical spine. CT HEAD WO CONTRAST   Final Result   No acute intracranial abnormality. XR CHEST PORTABLE   Final Result   Mild cardiomegaly and mild central pulmonary congestion or pulmonary artery   hypertension which is more prominent. Minimal bibasilar atelectasis or small infiltrates. Questionable small bibasilar pleural effusions which is more prominent. MEDICAL DECISION MAKING / ED COURSE:      Is this patient to be included in the SEP-1 Core Measure due to severe sepsis or septic shock?    No   Exclusion criteria - the patient is NOT to be included for SEP-1 Core Measure due to:  2+ SIRS criteria are not met      PROCEDURES:   Procedures    None    Patient was given:  Medications   0.9 % sodium chloride infusion ( IntraVENous Held by provider 12/17/22 0337)   sodium chloride flush 0.9 % injection 10 mL (has no administration in time range)   sodium chloride flush 0.9 % injection 10 mL (has no administration in time range)   0.9 % sodium chloride infusion (has no administration in time range)   potassium chloride 10 mEq/100 mL IVPB (Peripheral Line) (has no administration in time range)   magnesium sulfate 2000 mg in 50 mL IVPB premix (has no administration in time range)   promethazine (PHENERGAN) tablet 12.5 mg (has no administration in time range)     Or   ondansetron (ZOFRAN) injection 4 mg (has no administration in time range)   acetaminophen (TYLENOL) tablet 650 mg (has no administration in time range)     Or   acetaminophen (TYLENOL) suppository 650 mg (has no administration in time range)   ipratropium-albuterol (DUONEB) nebulizer solution 1 ampule (has no administration in time range)   sodium chloride flush 0.9 % injection 5-40 mL (has no administration in time range)   sodium chloride flush 0.9 % injection 5-40 mL (has no administration in time range)   0.9 % sodium chloride infusion (has no administration in time range)   therapeutic multivitamin-minerals 1 tablet (has no administration in time range)   thiamine (B-1) injection 100 mg (has no administration in time range)   LORazepam (ATIVAN) tablet 1 mg (has no administration in time range)     Or   LORazepam (ATIVAN) injection 1 mg (has no administration in time range)     Or   LORazepam (ATIVAN) tablet 2 mg (has no administration in time range)     Or   LORazepam (ATIVAN) injection 2 mg (has no administration in time range)     Or   LORazepam (ATIVAN) tablet 3 mg (has no administration in time range)     Or   LORazepam (ATIVAN) injection 3 mg (has no administration in time range)     Or   LORazepam (ATIVAN) tablet 4 mg (has no administration in time range)     Or   LORazepam (ATIVAN) injection 4 mg (has no administration in time range)   methylPREDNISolone sodium (SOLU-MEDROL) injection 125 mg (125 mg IntraVENous Given 12/16/22 8988)   ipratropium-albuterol (DUONEB) nebulizer solution 1 ampule (1 ampule Inhalation Given 12/17/22 0024)       Patient was evaluated in the emergency department today after a fall. Upon evaluation she has absolutely no complaints however she is tachypneic and wheezing. She also has bilateral lower extremity pitting edema.   She was initially given nebulizer treatment and Solu-Medrol for suspected COPD exacerbation. Work-up results include:  CBC without evidence of leukocytosis. Hemoglobin is stable at 11.4. Platelet count is low at 101 although appears chronic. She does have 9% bandemia. CMP with critical result of sodium 106. Chloride is 75. Renal function maintained. Her albumin is 3.1, total bilirubin is 3.7. Alk phos is 155, ALT is 42 and AST is 137. We did redraw for repeat CDM which is now lower at 102  BNP is elevated at 1326  Troponin is less than 0.01  COVID and flu are negative. CT head without acute intracranial abnormality. CT cervical spine with multilevel degenerative changes but no acute fracture or malalignment. Chest x-ray shows mild cardiomegaly and mild central pulmonary congestion. EKG as noted by attending physician found to have atrial fibrillation. Nephrology was consulted and I spoke with Dr. Ralph Barbosa who recommended starting normal saline at 50mL/h with sodium rechecks every 2 hours. Advised for sodium not to go above 108, if sodium is not improving or it goes above 108 we will reconsult nephrology. Also advised fluid restriction of 1500 mL and a protein drink with any meals. Urine osmolality and urine sodium ordered. Patient was started on maintenance fluids and we will plan to admit to ICU for further evaluation and treatment. Central line was placed by attending physician please see separate documentation for procedure note. Repeat sodium at 2 hours is 106. Given the rapid improvement in sodium level I did discuss with hospitalist who recommended holding fluids and consulting nephrology again. The patient tolerated their visit well. I evaluated the patient. The physician was available for consultation as needed. The patient and / or the family were informed of the results of any tests, a time was given to answer questions, a plan was proposed and they agreed with plan.      I am the Primary Clinician of Record. CLINICAL IMPRESSION:  1. Hyponatremia    2. Fall, initial encounter    3. Longstanding persistent atrial fibrillation ([facility] [address]    4.  Shortness of breath      Results for orders placed or performed during the hospital encounter of 12/16/22   COVID-19 & Influenza Combo    Specimen: Nasopharyngeal Swab   Result Value Ref Range    SARS-CoV-2 RNA, RT PCR NOT DETECTED NOT DETECTED    INFLUENZA A NOT DETECTED NOT DETECTED    INFLUENZA B NOT DETECTED NOT DETECTED   CBC with Auto Differential   Result Value Ref Range    WBC 7.9 4.0 - 11.0 K/uL    RBC 2.90 (L) 4.00 - 5.20 M/uL    Hemoglobin 11.4 (L) 12.0 - 16.0 g/dL    Hematocrit 32.9 (L) 36.0 - 48.0 %    .4 (H) 80.0 - 100.0 fL    MCH 39.2 (H) 26.0 - 34.0 pg    MCHC 34.6 31.0 - 36.0 g/dL    RDW 14.0 12.4 - 15.4 %    Platelets 497 (L) 468 - 450 K/uL    MPV 8.8 5.0 - 10.5 fL    PLATELET SLIDE REVIEW Decreased     SLIDE REVIEW see below     Neutrophils % 66.0 %    Lymphocytes % 9.0 %    Monocytes % 14.0 %    Eosinophils % 1.0 %    Basophils % 0.0 %    Neutrophils Absolute 5.9 1.7 - 7.7 K/uL    Lymphocytes Absolute 0.8 (L) 1.0 - 5.1 K/uL    Monocytes Absolute 1.1 0.0 - 1.3 K/uL    Eosinophils Absolute 0.1 0.0 - 0.6 K/uL    Basophils Absolute 0.0 0.0 - 0.2 K/uL    Bands Relative 9 (H) 0 - 7 %    Atypical Lymphocytes Relative 1 0 - 6 %    RBC Morphology Normal    CMP w/ Reflex to MG   Result Value Ref Range    Sodium 106 (LL) 136 - 145 mmol/L    Potassium reflex Magnesium 4.7 3.5 - 5.1 mmol/L    Chloride 75 (L) 99 - 110 mmol/L    CO2 21 21 - 32 mmol/L    Anion Gap 10 3 - 16    Glucose 114 (H) 70 - 99 mg/dL    BUN 9 7 - 20 mg/dL    Creatinine <0.5 (L) 0.6 - 1.2 mg/dL    Est, Glom Filt Rate >60 >60    Calcium 8.8 8.3 - 10.6 mg/dL    Total Protein 7.1 6.4 - 8.2 g/dL    Albumin 3.1 (L) 3.4 - 5.0 g/dL    Albumin/Globulin Ratio 0.8 (L) 1.1 - 2.2    Total Bilirubin 3.7 (H) 0.0 - 1.0 mg/dL    Alkaline Phosphatase 155 (H) 40 - 129 U/L    ALT 42 (H) 10 - 40 U/L     (H) 15 - 37 U/L   Troponin   Result Value Ref Range    Troponin <0.01 <0.01 ng/mL   Brain Natriuretic Peptide   Result Value Ref Range    Pro-BNP 1,326 (H) 0 - 124 pg/mL   Basic Metabolic Panel   Result Value Ref Range    Sodium 106 (LL) 136 - 145 mmol/L    Potassium 4.9 3.5 - 5.1 mmol/L    Chloride 76 (L) 99 - 110 mmol/L    CO2 22 21 - 32 mmol/L    Anion Gap 8 3 - 16    Glucose 113 (H) 70 - 99 mg/dL    BUN 9 7 - 20 mg/dL    Creatinine <0.5 (L) 0.6 - 1.2 mg/dL    Est, Glom Filt Rate >60 >60    Calcium 9.0 8.3 - 10.6 mg/dL   Sodium   Result Value Ref Range    Sodium 102 (LL) 136 - 145 mmol/L   Procalcitonin   Result Value Ref Range    Procalcitonin 0.11 0.00 - 0.15 ng/mL   Magnesium   Result Value Ref Range    Magnesium 1.20 (L) 1.80 - 2.40 mg/dL   Phosphorus   Result Value Ref Range    Phosphorus 2.5 2.5 - 4.9 mg/dL   EKG 12 Lead   Result Value Ref Range    Ventricular Rate 61 BPM    Atrial Rate 68 BPM    QRS Duration 78 ms    Q-T Interval 412 ms    QTc Calculation (Bazett) 414 ms    R Axis 78 degrees    T Axis 10 degrees    Diagnosis       Atrial fibrillationAbnormal ECGWhen compared with ECG of 20-APR-2022 09:29,ST now depressed in Anterior leads       I spoke with [facility]. [facility] hospitalist. We discussed the history, physical exam, diagnostics, as well as their emergency department course. Based upon that discussion, we've decided to admit Shakila Lo for further observation and evaluation of Meagan Santacruz's   1. Hyponatremia    2. Fall, initial encounter    3. Longstanding persistent atrial fibrillation ([address]    4. Shortness of breath    . CRITICAL CARE  Total critical care time today provided was 41 minutes. This excludes seperately billable procedures and family discussion time. Time provided was for hyponatremia requiring frequent evaluations for potential decompensation. DISPOSITION Admitted 12/17/2022 03:34:59 AM      PATIENT REFERRED TO:  No follow-up provider specified.     DISCHARGE MEDICATIONS:  New Prescriptions    No medications on file       DISCONTINUED MEDICATIONS:  Discontinued Medications    No medications on file              (Please note the MDM and HPI sections of this note were completed with a voice recognition program.  Efforts were made to edit the dictations but occasionally words are mis-transcribed.)    Electronically signed, Alexandra Montano,          Alexandra Montano  12/17/22 0620

## 2022-12-17 NOTE — CONSULTS
Gastroenterology Consult Note    Patient:   Mansoor Barker   YOB: 1955   Facility:   [facility]   Referring/PCP: Aj Baker MD  Date:     12/17/2022  Consultant:   Mar Jones MD, MD    Subjective: This 69 y.o. female was admitted 12/16/2022 with a diagnosis of \"Shortness of breath [R06.02]  Acute hyponatremia [E87.1]  Hyponatremia [E87.1]  Fall, initial encounter [W19. XXXA]  Longstanding persistent atrial fibrillation ([address] [I48.11]\" and is seen in consultation regarding \"cirrhosis\". Information was obtained from interview of  the patient, examination of the patient, and review of records. I did  update the past medical, surgical, social and / or family history. Cirrhosis mild-moderate chronic in liver assoc w edema/SOB    Current status  Present  Diet Order: ADULT DIET; Regular; 1500 ml  ADULT ORAL NUTRITION SUPPLEMENT; Breakfast; Standard High Calorie/High Protein Oral Supplement and she is tolerating diet. Recently, she has experienced no abdominal  Pain and she has not required Intravenous narcotic analgesics. The patient has also experienced no constipation, diarrhea, hematochezia, melena, nausea, and vomiting      Prior to Admission medications    Medication Sig Start Date End Date Taking? Authorizing Provider   lisinopril (PRINIVIL;ZESTRIL) 20 MG tablet Take 1 tablet by mouth daily Please have pt contact office for appt.  9/1/22   Julius Noriega MD   atorvastatin (LIPITOR) 40 MG tablet TAKE 1 TABLET BY MOUTH EVERY NIGHT 7/21/22   Julius Noriega MD   alendronate (FOSAMAX) 70 MG tablet TAKE 1 TABLET BY MOUTH EVERY 7 DAYS 6/9/22   Julius Noriega MD   valACYclovir (VALTREX) 500 MG tablet TAKE 1 TABLET BY MOUTH DAILY 3/28/22   Julius Noriega MD   metoprolol succinate (TOPROL XL) 50 MG extended release tablet TAKE 1 TABLET BY MOUTH DAILY 11/1/21   Julius Noriega MD   anastrozole (ARIMIDEX) 1 MG tablet  6/5/20   Historical Provider, MD   [facility] 1 MG CAPS Take by mouth    Historical Provider, MD   Multiple Vitamins-Minerals (THERAPEUTIC MULTIVITAMIN-MINERALS) tablet Take 1 tablet by mouth daily    Historical Provider, MD      Scheduled Medications:    sodium chloride flush  10 mL IntraVENous 2 times per day    ipratropium-albuterol  1 ampule Inhalation Q4H WA    sodium chloride flush  5-40 mL IntraVENous 2 times per day    multivitamin  1 tablet Oral Daily    atorvastatin  20 mg Oral Nightly    metoprolol succinate  50 mg Oral Daily    valACYclovir  500 mg Oral Daily    furosemide  20 mg IntraVENous Once     Infusions:    sodium chloride 1,000 mL (12/17/22 0224)    sodium chloride      sodium chloride       PRN Medications: sodium chloride flush, sodium chloride, potassium chloride, magnesium sulfate, promethazine **OR** ondansetron, acetaminophen **OR** acetaminophen, sodium chloride flush, sodium chloride, LORazepam **OR** LORazepam **OR** LORazepam **OR** LORazepam **OR** LORazepam **OR** LORazepam **OR** LORazepam **OR** LORazepam, potassium chloride **OR** potassium alternative oral replacement **OR** potassium chloride  Allergies: No Known Allergies    Past Medical History:   Diagnosis Date    Age-related osteoporosis without current pathological fracture 2020    Arthritis     EtOH dependence ([facility]     HSV-2 (herpes simplex virus 2) infection     Hypertension     Tx since late 45s    Kidney stone     Malignant neoplasm of upper-inner quadrant of left breast in female, estrogen receptor positive ([facility] [address] 09/23/2019     Past Surgical History:   Procedure Laterality Date    COLONOSCOPY  02/16/1999    Rectal ulcer, s/p cautery    COLONOSCOPY  4/19/13    next due 10 yrs    [address] ([address])  1998    Ovaries in. Had fibroids, DUB.     MASTECTOMY Left 10/8/2019    LEFT BREAST NEEDLE LOCALIZATION, PARTIAL MASTECTOMY performed by Thomas Coles MD at [address] Left 5/23/2019 LEFT TOTAL KNEE REPLACEMENT            ROTHMAN & NEPHEW performed by Susy Slater MD at  [facility] N/A 2022    EGD W/EUS FNA performed by Bereket Chin MD at [facility]. [address]:   Social History     Tobacco Use    Smoking status: Former     Packs/day: 1.50     Years: 5.00     Pack years: 7.50     Types: Cigarettes     Quit date: 1977     Years since quittin.9    Smokeless tobacco: Never   Substance Use Topics    Alcohol use: Yes     Comment: 3-4 beers daily     Family:   Family History   Problem Relation Age of Onset    Cancer Father 62        Lung    Hypertension Father     Cancer Brother 36        Bladder,  64    Diabetes Other     Heart Failure Other     Breast Cancer Maternal Aunt 80       ROS: Pertinent items are noted in HPI.     Objective:   Vital Signs:  Temp (24hrs), Av.9 °F (36.6 °C), Min:97.9 °F (36.6 °C), Max:97.9 °F (64.4 °C)     Systolic (61JYY), ZIF:754 , Min:105 , EBH:617      Diastolic (36KGT), RTN:56, Min:70, Max:111     Pulse  Av.3  Min: 53  Max: 68  /75   Pulse 58   Temp 97.9 °F (36.6 °C) (Oral)   Resp 10   Ht 5' 2\" (1.575 m)   Wt 180 lb (81.6 kg)   LMP  (LMP Unknown)   SpO2 100%   BMI 32.92 kg/m²      Physical Exam:   /75   Pulse 58   Temp 97.9 °F (36.6 °C) (Oral)   Resp 10   Ht 5' 2\" (1.575 m)   Wt 180 lb (81.6 kg)   LMP  (LMP Unknown)   SpO2 100%   BMI 32.92 kg/m²   General appearance: alert, appears stated age, and cooperative  Lungs: wheezes bilaterally  Chest wall: no tenderness  Heart: regular rate and rhythm, S1, S2 normal, no murmur, click, rub or gallop  Abdomen: soft, non-tender; bowel sounds normal; no masses,  no organomegaly  Extremities: extremities normal, atraumatic, no cyanosis or edema  Skin: Skin color, texture, turgor normal. No rashes or lesions  Neurologic: Grossly normal    Lab and Imaging Review   Recent Labs     22  1518 22  4743 12/17/22  0225 12/17/22 0406 12/17/22  0408 12/17/22  0632 12/17/22  0656   WBC 7.9  --   --   --   --  5.3  --    HGB 11.4*  --   --   --   --  11.3*  --    .4*  --   --   --   --  112.3*  --    *  --   --   --   --  101*  --    INR  --   --   --   --  1.73*  --   --    *   < > 106* 107*  --  107*  --    K 4.7  --  4.9  --   --   --   --    CL 75*  --  76*  --   --   --   --    CO2 21  --  22  --   --   --   --    BUN 9  --  9  --   --   --   --    CREATININE <0.5*  --  <0.5*  --   --   --   --    GLUCOSE 114*  --  113*  --   --   --   --    CALCIUM 8.8  --  9.0  --   --   --   --    PROT 7.1  --   --   --   --  6.8  --    LABALBU 3.1*  --   --   --   --  3.2*  --    *  --   --   --   --  123*  --    ALT 42*  --   --   --   --  41*  --    ALKPHOS 155*  --   --   --   --  159*  --    BILITOT 3.7*  --   --   --   --  3.8*  --    BILIDIR  --   --   --   --   --  2.1*  --    AMMONIA  --   --   --   --   --   --  19   MG  --   --  1.20*  --   --   --   --     < > = values in this interval not displayed. Assessment:     Patient Active Problem List    Diagnosis Date Noted    Acute hyponatremia 12/17/2022    Alcohol dependence with unspecified alcohol-induced disorder ([facility] 09/01/2022    Respiratory failure with hypoxia ([facility] 04/19/2021    Hypokalemia 04/06/2021    Hypomagnesemia 04/06/2021    Bandemia 04/06/2021    Abnormal mammogram 10/16/2019    Malignant neoplasm of upper-inner quadrant of left breast in female, estrogen receptor positive ([facility] [address] 09/23/2019    Status post total left knee replacement 05/23/2019    Benign essential HTN 02/13/2019     69year old female with arthritis, HTN, COPD and cirrhosis/alcohol abuse w dilated CBD/PD due to a cystic lesion on EUS in 4/2022, admitted for falling, SOB, severe hyponatremia (Na 102) and V overload. CT non revealing except for marked cardiomegaly.     Plan:   Cardiopulmonary management per primary team  Awaiting Nephrology input   Will follow along    Adela Cordero MD       O) [phone]

## 2022-12-17 NOTE — ED NOTES
Placed call to Nephrology @ [phone]  RE: sodium of 102 per Susan Chavez MD called back @  47-7  12/17/22 0149

## 2022-12-17 NOTE — RT PROTOCOL NOTE
RT Inhaler-Nebulizer Bronchodilator Protocol Note    There is a bronchodilator order in the chart from a provider indicating to follow the RT Bronchodilator Protocol and there is an Initiate RT Inhaler-Nebulizer Bronchodilator Protocol order as well (see protocol at bottom of note). CXR Findings:  XR CHEST PORTABLE    Result Date: 12/17/2022  The left IJ central venous catheter is deflected laterally into the proximal left subclavian vein area. No evidence of pneumothorax. Marked cardiomegaly with minimal pulmonary vascular congestion without pulmonary edema. XR CHEST PORTABLE    Result Date: 12/17/2022  Left central line projects over the expected left brachiocephalic vein. Correlation for appropriate return is recommended. No pneumothorax. Stable enlargement of cardiac silhouette. XR CHEST PORTABLE    Result Date: 12/17/2022  Mild cardiomegaly and mild central pulmonary congestion or pulmonary artery hypertension which is more prominent. Minimal bibasilar atelectasis or small infiltrates. Questionable small bibasilar pleural effusions which is more prominent. The findings from the last RT Protocol Assessment were as follows:   History Pulmonary Disease: None or smoker <15 pack years  Respiratory Pattern: Regular pattern and RR 12-20 bpm  Breath Sounds: Slightly diminished and/or crackles  Cough: Strong, spontaneous, non-productive  Indication for Bronchodilator Therapy: None  Bronchodilator Assessment Score: 2    Aerosolized bronchodilator medication orders have been revised according to the RT Inhaler-Nebulizer Bronchodilator Protocol below. Respiratory Therapist to perform RT Therapy Protocol Assessment initially then follow the protocol. Repeat RT Therapy Protocol Assessment PRN for score 0-3 or on second treatment, BID, and PRN for scores above 3.     No Indications - adjust the frequency to every 6 hours PRN wheezing or bronchospasm, if no treatments needed after 48 hours then discontinue using Per Protocol order mode. If indication present, adjust the RT bronchodilator orders based on the Bronchodilator Assessment Score as indicated below. Use Inhaler orders unless patient has one or more of the following: on home nebulizer, not able to hold breath for 10 seconds, is not alert and oriented, cannot activate and use MDI correctly, or respiratory rate 25 breaths per minute or more, then use the equivalent nebulizer order(s) with same Frequency and PRN reasons based on the score. If a patient is on this medication at home then do not decrease Frequency below that used at home. 0-3 - enter or revise RT bronchodilator order(s) to equivalent RT Bronchodilator order with Frequency of every 4 hours PRN for wheezing or increased work of breathing using Per Protocol order mode. 4-6 - enter or revise RT Bronchodilator order(s) to two equivalent RT bronchodilator orders with one order with BID Frequency and one order with Frequency of every 4 hours PRN wheezing or increased work of breathing using Per Protocol order mode. 7-10 - enter or revise RT Bronchodilator order(s) to two equivalent RT bronchodilator orders with one order with TID Frequency and one order with Frequency of every 4 hours PRN wheezing or increased work of breathing using Per Protocol order mode. 11-13 - enter or revise RT Bronchodilator order(s) to one equivalent RT bronchodilator order with QID Frequency and an Albuterol order with Frequency of every 4 hours PRN wheezing or increased work of breathing using Per Protocol order mode. Greater than 13 - enter or revise RT Bronchodilator order(s) to one equivalent RT bronchodilator order with every 4 hours Frequency and an Albuterol order with Frequency of every 2 hours PRN wheezing or increased work of breathing using Per Protocol order mode.      RT to enter RT Home Evaluation for COPD & MDI Assessment order using Per Protocol order mode.    Electronically signed by Syd Snell RCP on 12/17/2022 at 11:38 AM

## 2022-12-17 NOTE — PROGRESS NOTES
Patient intermittently confused. Patient found with dislodged central line, blood oozing from site. Central line removed by this RN without complication. [employer]AS[employer] camera obtained for patient safety.

## 2022-12-17 NOTE — ED PROVIDER NOTES
Attending Supervisory Note/Shared Visit   I have personally performed a face to face diagnostic evaluation on this patient. I have reviewed the mid-levels findings and agree. History and Exam by me shows an ill-appearing female in mild respiratory distress. EMS been called to the patient's home for lift assist the patient fallen while trying to do laundry. Patient with audible wheezing and increased work of breathing and EMS patient be brought to the ED for evaluation. Patient states that she does drink alcohol daily. She states that typically she drinks about 4 beers a day but also drinks hard liquor. Denies numbness or weakness. She does report generalized fatigue. Denies recent fevers chest pains headache or changes in vision. On evaluation patient resting comfortably. She is answering questions appropriately. Cranial nerves II through XII are grossly intact. Sensation intact in all extremities. Patient with diffuse audible wheezing and tachypnea. He has a regular rate and rhythm. Patient was initially seen by advanced practice provider. Laboratory work-up remarkable for significant hyponatremia. Renal function within normal limits. Sodium was rechecked and again was noted to be significantly low. LFTs noted to be elevated as well as the INR likely secondary to alcohol use. Head and C-spine without emergent findings. Due to patient's severe hyponatremia nephrology was consulted. They recommended every 2 hours sodium checks and gentle IV fluid rehydration. Due to patient's severe hyponatremia a central line was placed. 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 It was retracted with better placement. Patient admitted in stable condition. I agree with the JENNIFER plan of care. Please JENNIFER Note for full ED course and final disposition.      KG demonstrates noticeable P waves with an irregular rhythm consistent with atrial fibrillation with a ventricular rate of  61 bpm.  QTc interval within normal limits. Patient has normal axis. There are no significant ST elevations or depressions EKG is nondiagnostic for ACS. Cesilia Mitchell Compared EKG from 4/20/2022 did not appreciate a second change. Central Line    Date/Time: 12/17/2022 5:39 AM  Performed by: Arsen Rodrigez MD  Authorized by: Sangeeta Mike DO     Consent:     Consent obtained:  Verbal and written    Consent given by:  Patient    Risks, benefits, and alternatives were discussed: yes      Risks discussed:  Arterial puncture, incorrect placement and infection    Alternatives discussed:  Delayed treatment and no treatment  Universal protocol:     Patient identity confirmed:  Verbally with patient, arm band and hospital-assigned identification number  Pre-procedure details:     Indication(s): central venous access      Hand hygiene: Hand hygiene performed prior to insertion      Sterile barrier technique:  All elements of maximal sterile technique followed      Skin preparation:  Chlorhexidine    Skin preparation agent: Skin preparation agent completely dried prior to procedure    Sedation:     Sedation type:  None  Anesthesia:     Anesthesia method:  Local infiltration    Local anesthetic:  Lidocaine 1% w/o epi  Procedure details:     Location:  L internal jugular    Site selection rationale:  Habitus    Patient position:  Supine    Procedural supplies:  Triple lumen    Catheter size:  7 Fr    Landmarks identified: yes      Ultrasound guidance: yes      Ultrasound guidance timing: prior to insertion      Sterile ultrasound techniques: Sterile gel and sterile probe covers were used      Number of attempts:  1    Successful placement: yes    Post-procedure details:     Post-procedure:  Line sutured    Assessment:  Blood return through all ports, free fluid flow, no pneumothorax on x-ray and placement verified by x-ray    Procedure completion:  Tolerated  Comments:      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 Disposition:  1. Hyponatremia    2. Fall, initial encounter    3. Longstanding persistent atrial fibrillation ([facility] [address]    4.  Shortness of breath          David Macario MD  Attending Emergency Physician        David Macario MD  12/21/22 4296

## 2022-12-17 NOTE — PLAN OF CARE
Problem: Discharge Planning  Goal: Discharge to home or other facility with appropriate resources  Outcome: Progressing  Flowsheets (Taken 12/17/2022 1100)  Discharge to home or other facility with appropriate resources: Identify barriers to discharge with patient and caregiver     Problem: Pain  Goal: Verbalizes/displays adequate comfort level or baseline comfort level  Outcome: Progressing     Problem: Safety - Adult  Goal: Free from fall injury  Outcome: Progressing     Problem: Skin/Tissue Integrity  Goal: Absence of new skin breakdown  Description: 1. Monitor for areas of redness and/or skin breakdown  2. Assess vascular access sites hourly  3. Every 4-6 hours minimum:  Change oxygen saturation probe site  4. Every 4-6 hours:  If on nasal continuous positive airway pressure, respiratory therapy assess nares and determine need for appliance change or resting period.   Outcome: Progressing

## 2022-12-18 LAB
ALBUMIN SERPL-MCNC: 3.3 G/DL (ref 3.4–5)
ALP BLD-CCNC: 176 U/L (ref 40–129)
ALT SERPL-CCNC: 42 U/L (ref 10–40)
ANION GAP SERPL CALCULATED.3IONS-SCNC: 10 MMOL/L (ref 3–16)
ANION GAP SERPL CALCULATED.3IONS-SCNC: 13 MMOL/L (ref 3–16)
ANION GAP SERPL CALCULATED.3IONS-SCNC: 8 MMOL/L (ref 3–16)
ANION GAP SERPL CALCULATED.3IONS-SCNC: 9 MMOL/L (ref 3–16)
AST SERPL-CCNC: 103 U/L (ref 15–37)
BILIRUB SERPL-MCNC: 2.9 MG/DL (ref 0–1)
BILIRUBIN DIRECT: 1.8 MG/DL (ref 0–0.3)
BILIRUBIN, INDIRECT: 1.1 MG/DL (ref 0–1)
BUN BLDV-MCNC: 14 MG/DL (ref 7–20)
BUN BLDV-MCNC: 15 MG/DL (ref 7–20)
CALCIUM SERPL-MCNC: 8.4 MG/DL (ref 8.3–10.6)
CALCIUM SERPL-MCNC: 8.5 MG/DL (ref 8.3–10.6)
CALCIUM SERPL-MCNC: 8.9 MG/DL (ref 8.3–10.6)
CALCIUM SERPL-MCNC: 9 MG/DL (ref 8.3–10.6)
CALCIUM SERPL-MCNC: 9 MG/DL (ref 8.3–10.6)
CALCIUM SERPL-MCNC: 9.2 MG/DL (ref 8.3–10.6)
CHLORIDE BLD-SCNC: 77 MMOL/L (ref 99–110)
CHLORIDE BLD-SCNC: 77 MMOL/L (ref 99–110)
CHLORIDE BLD-SCNC: 78 MMOL/L (ref 99–110)
CHLORIDE BLD-SCNC: 81 MMOL/L (ref 99–110)
CHLORIDE BLD-SCNC: 81 MMOL/L (ref 99–110)
CHLORIDE BLD-SCNC: 83 MMOL/L (ref 99–110)
CO2: 20 MMOL/L (ref 21–32)
CO2: 21 MMOL/L (ref 21–32)
CO2: 23 MMOL/L (ref 21–32)
CO2: 23 MMOL/L (ref 21–32)
CO2: 25 MMOL/L (ref 21–32)
CO2: 26 MMOL/L (ref 21–32)
CREAT SERPL-MCNC: 0.6 MG/DL (ref 0.6–1.2)
CREAT SERPL-MCNC: <0.5 MG/DL (ref 0.6–1.2)
EKG ATRIAL RATE: 120 BPM
EKG DIAGNOSIS: NORMAL
EKG Q-T INTERVAL: 462 MS
EKG QRS DURATION: 90 MS
EKG QTC CALCULATION (BAZETT): 417 MS
EKG R AXIS: 71 DEGREES
EKG T AXIS: 22 DEGREES
EKG VENTRICULAR RATE: 49 BPM
GFR SERPL CREATININE-BSD FRML MDRD: >60 ML/MIN/{1.73_M2}
GLUCOSE BLD-MCNC: 117 MG/DL (ref 70–99)
GLUCOSE BLD-MCNC: 125 MG/DL (ref 70–99)
GLUCOSE BLD-MCNC: 130 MG/DL (ref 70–99)
GLUCOSE BLD-MCNC: 138 MG/DL (ref 70–99)
GLUCOSE BLD-MCNC: 144 MG/DL (ref 70–99)
GLUCOSE BLD-MCNC: 146 MG/DL (ref 70–99)
HCT VFR BLD CALC: 34.7 % (ref 36–48)
HEMATOLOGY PATH CONSULT: NO
HEMOGLOBIN: 12 G/DL (ref 12–16)
MAGNESIUM: 2.1 MG/DL (ref 1.8–2.4)
MCH RBC QN AUTO: 39.4 PG (ref 26–34)
MCHC RBC AUTO-ENTMCNC: 34.6 G/DL (ref 31–36)
MCV RBC AUTO: 114 FL (ref 80–100)
PDW BLD-RTO: 14.4 % (ref 12.4–15.4)
PHOSPHORUS: 2.7 MG/DL (ref 2.5–4.9)
PLATELET # BLD: 106 K/UL (ref 135–450)
PMV BLD AUTO: 9.4 FL (ref 5–10.5)
POTASSIUM REFLEX MAGNESIUM: 4.6 MMOL/L (ref 3.5–5.1)
POTASSIUM SERPL-SCNC: 3.2 MMOL/L (ref 3.5–5.1)
POTASSIUM SERPL-SCNC: 3.4 MMOL/L (ref 3.5–5.1)
POTASSIUM SERPL-SCNC: 4.2 MMOL/L (ref 3.5–5.1)
POTASSIUM SERPL-SCNC: 4.2 MMOL/L (ref 3.5–5.1)
POTASSIUM SERPL-SCNC: 4.6 MMOL/L (ref 3.5–5.1)
RBC # BLD: 3.04 M/UL (ref 4–5.2)
SODIUM BLD-SCNC: 108 MMOL/L (ref 136–145)
SODIUM BLD-SCNC: 110 MMOL/L (ref 136–145)
SODIUM BLD-SCNC: 110 MMOL/L (ref 136–145)
SODIUM BLD-SCNC: 113 MMOL/L (ref 136–145)
SODIUM BLD-SCNC: 115 MMOL/L (ref 136–145)
SODIUM BLD-SCNC: 117 MMOL/L (ref 136–145)
TOTAL PROTEIN: 7 G/DL (ref 6.4–8.2)
TSH REFLEX: 2.69 UIU/ML (ref 0.27–4.2)
WBC # BLD: 10.8 K/UL (ref 4–11)

## 2022-12-18 PROCEDURE — 84100 ASSAY OF PHOSPHORUS: CPT

## 2022-12-18 PROCEDURE — 36415 COLL VENOUS BLD VENIPUNCTURE: CPT

## 2022-12-18 PROCEDURE — 93010 ELECTROCARDIOGRAM REPORT: CPT | Performed by: INTERNAL MEDICINE

## 2022-12-18 PROCEDURE — 80048 BASIC METABOLIC PNL TOTAL CA: CPT

## 2022-12-18 PROCEDURE — 6360000002 HC RX W HCPCS: Performed by: INTERNAL MEDICINE

## 2022-12-18 PROCEDURE — 2580000003 HC RX 258: Performed by: INTERNAL MEDICINE

## 2022-12-18 PROCEDURE — 85027 COMPLETE CBC AUTOMATED: CPT

## 2022-12-18 PROCEDURE — 6370000000 HC RX 637 (ALT 250 FOR IP): Performed by: INTERNAL MEDICINE

## 2022-12-18 PROCEDURE — 2000000000 HC ICU R&B

## 2022-12-18 PROCEDURE — 83735 ASSAY OF MAGNESIUM: CPT

## 2022-12-18 PROCEDURE — 80076 HEPATIC FUNCTION PANEL: CPT

## 2022-12-18 PROCEDURE — 99291 CRITICAL CARE FIRST HOUR: CPT | Performed by: INTERNAL MEDICINE

## 2022-12-18 RX ORDER — ANASTROZOLE 1 MG/1
1 TABLET ORAL DAILY
Status: DISCONTINUED | OUTPATIENT
Start: 2022-12-18 | End: 2023-01-04 | Stop reason: HOSPADM

## 2022-12-18 RX ORDER — SODIUM CHLORIDE 9 MG/ML
INJECTION, SOLUTION INTRAVENOUS CONTINUOUS
Status: DISCONTINUED | OUTPATIENT
Start: 2022-12-18 | End: 2022-12-18

## 2022-12-18 RX ORDER — CHLORDIAZEPOXIDE HYDROCHLORIDE 5 MG/1
5 CAPSULE, GELATIN COATED ORAL 4 TIMES DAILY
Status: DISCONTINUED | OUTPATIENT
Start: 2022-12-18 | End: 2022-12-20

## 2022-12-18 RX ORDER — SODIUM CHLORIDE 0.9 % (FLUSH) 0.9 %
5-40 SYRINGE (ML) INJECTION EVERY 12 HOURS SCHEDULED
Status: DISCONTINUED | OUTPATIENT
Start: 2022-12-18 | End: 2022-12-18

## 2022-12-18 RX ORDER — SODIUM CHLORIDE 0.9 % (FLUSH) 0.9 %
5-40 SYRINGE (ML) INJECTION PRN
Status: DISCONTINUED | OUTPATIENT
Start: 2022-12-18 | End: 2023-01-04 | Stop reason: HOSPADM

## 2022-12-18 RX ORDER — SODIUM CHLORIDE 9 MG/ML
INJECTION, SOLUTION INTRAVENOUS PRN
Status: DISCONTINUED | OUTPATIENT
Start: 2022-12-18 | End: 2022-12-19 | Stop reason: SDUPTHER

## 2022-12-18 RX ORDER — LANOLIN ALCOHOL/MO/W.PET/CERES
100 CREAM (GRAM) TOPICAL DAILY
Status: DISCONTINUED | OUTPATIENT
Start: 2022-12-18 | End: 2023-01-04 | Stop reason: HOSPADM

## 2022-12-18 RX ORDER — PANTOPRAZOLE SODIUM 40 MG/1
40 TABLET, DELAYED RELEASE ORAL
Status: DISCONTINUED | OUTPATIENT
Start: 2022-12-18 | End: 2023-01-04 | Stop reason: HOSPADM

## 2022-12-18 RX ORDER — FUROSEMIDE 10 MG/ML
20 INJECTION INTRAMUSCULAR; INTRAVENOUS EVERY 4 HOURS
Status: DISCONTINUED | OUTPATIENT
Start: 2022-12-18 | End: 2022-12-18

## 2022-12-18 RX ORDER — FUROSEMIDE 10 MG/ML
20 INJECTION INTRAMUSCULAR; INTRAVENOUS
Status: DISCONTINUED | OUTPATIENT
Start: 2022-12-18 | End: 2022-12-18

## 2022-12-18 RX ADMIN — FUROSEMIDE 20 MG: 10 INJECTION, SOLUTION INTRAMUSCULAR; INTRAVENOUS at 14:26

## 2022-12-18 RX ADMIN — LORAZEPAM 2 MG: 1 TABLET ORAL at 07:26

## 2022-12-18 RX ADMIN — FUROSEMIDE 20 MG: 10 INJECTION, SOLUTION INTRAMUSCULAR; INTRAVENOUS at 11:28

## 2022-12-18 RX ADMIN — POTASSIUM CHLORIDE 40 MEQ: 1500 TABLET, EXTENDED RELEASE ORAL at 17:17

## 2022-12-18 RX ADMIN — VALACYCLOVIR HYDROCHLORIDE 500 MG: 500 TABLET, FILM COATED ORAL at 17:12

## 2022-12-18 RX ADMIN — SODIUM CHLORIDE, PRESERVATIVE FREE 10 ML: 5 INJECTION INTRAVENOUS at 22:45

## 2022-12-18 RX ADMIN — SODIUM CHLORIDE, PRESERVATIVE FREE 10 ML: 5 INJECTION INTRAVENOUS at 11:37

## 2022-12-18 RX ADMIN — Medication 100 MG: at 17:12

## 2022-12-18 RX ADMIN — Medication 1 TABLET: at 17:12

## 2022-12-18 RX ADMIN — ANASTROZOLE 1 MG: 1 TABLET, COATED ORAL at 17:17

## 2022-12-18 RX ADMIN — PANTOPRAZOLE SODIUM 40 MG: 40 TABLET, DELAYED RELEASE ORAL at 17:12

## 2022-12-18 ASSESSMENT — PAIN SCALES - GENERAL
PAINLEVEL_OUTOF10: 0

## 2022-12-18 NOTE — FLOWSHEET NOTE
0720 Pt scored on CIWA, ativan administered. 1500 Pt's daughter Ranjeet Fu, up dated and answered all questions. [address] to Dr. Jon Benites, Lasix order clarified and is now discontinued after the second dose.

## 2022-12-18 NOTE — PROGRESS NOTES
Hospitalist Progress Note      PCP: Mellissa Chavez MD    Date of Admission: 12/16/2022    Chief Complaint: fell at home       Subjective:  She is lethargic, obtunded. Medications:  Reviewed    Infusion Medications    sodium chloride      sodium chloride      sodium chloride       Scheduled Medications    sodium chloride flush  5-40 mL IntraVENous 2 times per day    thiamine  100 mg Oral Daily    chlordiazePOXIDE  5 mg Oral 4x Daily    pantoprazole  40 mg Oral QAM AC    furosemide  20 mg IntraVENous Q2H    urea  15 g Oral BID    sodium chloride flush  10 mL IntraVENous 2 times per day    sodium chloride flush  5-40 mL IntraVENous 2 times per day    multivitamin  1 tablet Oral Daily    atorvastatin  20 mg Oral Nightly    metoprolol succinate  50 mg Oral Daily    valACYclovir  500 mg Oral Daily     PRN Meds: sodium chloride flush, sodium chloride, sodium chloride flush, sodium chloride, potassium chloride, magnesium sulfate, promethazine **OR** ondansetron, acetaminophen **OR** acetaminophen, sodium chloride flush, sodium chloride, LORazepam **OR** LORazepam **OR** LORazepam **OR** LORazepam **OR** LORazepam **OR** LORazepam **OR** LORazepam **OR** LORazepam, potassium chloride **OR** potassium alternative oral replacement **OR** potassium chloride, ipratropium-albuterol      Intake/Output Summary (Last 24 hours) at 12/18/2022 1222  Last data filed at 12/18/2022 1143  Gross per 24 hour   Intake 2004 ml   Output 1965 ml   Net 39 ml       Physical Exam Performed:    /86   Pulse 53   Temp 96.8 °F (36 °C) (Bladder)   Resp 22   Ht 5' 2\" (1.575 m)   Wt 180 lb (81.6 kg)   LMP  (LMP Unknown)   SpO2 98%   BMI 32.92 kg/m²     General appearance: No apparent distress, appears stated age. HEENT: Pupils equal, round, and reactive to light. Conjunctivae/corneas clear. Neck: Supple, with full range of motion. No jugular venous distention. Trachea midline. Respiratory:  Normal respiratory effort.  Clear to auscultation, bilaterally without Rales/Wheezes/Rhonchi. Cardiovascular: Regular rate and rhythm with normal S1/S2 without murmurs, rubs or gallops. Abdomen: Soft, non-tender, non-distended with normal bowel sounds. Musculoskeletal: No clubbing, cyanosis. 2+ BLE pitting edema. Full range of motion without deformity. Skin: Skin color, texture, turgor normal.  No rashes or lesions. Neurologic:  Neurovascularly intact without any focal sensory/motor deficits. Cranial nerves: II-XII intact, grossly non-focal.  Psychiatric: obtunded  Capillary Refill: Brisk, 3 seconds, normal   Peripheral Pulses: +2 palpable, equal bilaterally       Labs:   Recent Labs     12/16/22 2354 12/17/22  0632 12/18/22  0435   WBC 7.9 5.3 10.8   HGB 11.4* 11.3* 12.0   HCT 32.9* 32.4* 34.7*   * 101* 106*     Recent Labs     12/17/22  0225 12/17/22  0406 12/17/22  1128 12/17/22  1534 12/18/22  0012 12/18/22  0435 12/18/22  0746   *   < > 108*   < > 108* 110* 110*   K 4.9  --  4.8   < > 4.6 4.6 4.2   CL 76*  --  75*   < > 77* 77* 78*   CO2 22  --  21   < > 21 20* 23   BUN 9  --  12   < > 14 15 15   CREATININE <0.5*  --  0.6   < > 0.6 <0.5* <0.5*   CALCIUM 9.0  --  9.1   < > 9.0 9.2 8.9   PHOS 2.5  --  3.2  --   --  2.7  --     < > = values in this interval not displayed.      Recent Labs     12/16/22 2354 12/17/22  0632 12/18/22  0435   * 123* 103*   ALT 42* 41* 42*   BILIDIR  --  2.1* 1.8*   BILITOT 3.7* 3.8* 2.9*   ALKPHOS 155* 159* 176*     Recent Labs     12/17/22  0408   INR 1.73*     Recent Labs     12/16/22  2354   TROPONINI <0.01       Urinalysis:      Lab Results   Component Value Date/Time    NITRU Negative 12/17/2022 06:32 AM    WBCUA 0-2 12/17/2022 06:32 AM    BACTERIA 1+ 04/19/2021 04:39 PM    RBCUA None seen 12/17/2022 06:32 AM    BLOODU Negative 12/17/2022 06:32 AM    SPECGRAV 1.015 12/17/2022 06:32 AM    GLUCOSEU Negative 12/17/2022 06:32 AM       Radiology:  CT CHEST ABDOMEN PELVIS W CONTRAST Additional Contrast? None   Final Result   1. CHEST: Small right pleural effusion and mild atelectasis in the lung bases. 2. No consolidation is appreciated. 3. Moderate to severe cardiomegaly. 4. T12 superior endplate fracture of uncertain age. Moderate anterior height   loss of T11, likely chronic. If there is focal tenderness, non-urgent   follow-up MRI is a consideration. 5. ABDOMEN AND PELVIS: Cirrhotic morphology of the liver. 6. Prominence of the common and pancreatic ducts, also noted on 01/28/2022   Ductal ectasia or adjacent pancreatic cystic lesion(s) have been considered. Follow-up ERCP or endoscopic ultrasound was previously recommended. 7. No acute abdominopelvic abnormality identified. 8. Moderate anasarca. 9. Chronic-appearing compression fractures at L1 and L3. XR CHEST PORTABLE   Final Result   Left central line projects over the expected left brachiocephalic vein. Correlation for appropriate return is recommended. No pneumothorax. Stable enlargement of cardiac silhouette. XR CHEST PORTABLE   Final Result   The left IJ central venous catheter is deflected laterally into the proximal   left subclavian vein area. No evidence of pneumothorax. Marked cardiomegaly with minimal pulmonary vascular congestion without   pulmonary edema. CT CERVICAL SPINE WO CONTRAST   Final Result   Multilevel degenerative changes with no acute fracture or traumatic   malalignment of the cervical spine. CT HEAD WO CONTRAST   Final Result   No acute intracranial abnormality. XR CHEST PORTABLE   Final Result   Mild cardiomegaly and mild central pulmonary congestion or pulmonary artery   hypertension which is more prominent. Minimal bibasilar atelectasis or small infiltrates. Questionable small bibasilar pleural effusions which is more prominent.              IP CONSULT TO NEPHROLOGY  IP CONSULT TO NEPHROLOGY  IP CONSULT TO SOCIAL WORK  IP CONSULT TO GI  IP CONSULT TO CRITICAL CARE  IP CONSULT TO SOCIAL WORK    Assessment/Plan:    Active Hospital Problems    Diagnosis     Acute hyponatremia [E87.1]      Priority: Medium       The patient is a pleasant 69 Y F with a h/o former COPD, cirrhosis with ongoing alcoholism (possibly 3-4 cans of beer daily), HTN, and L breast cancer s/p resection in 2019. History is limited. Apparently she fell at home, couldn't get up. EMS arrived to help her up, and ultimately decided to bring her to the ED because she was breathing heavily and edematous. When she arrived here she had \"no complaints\" but her Na was 102 (compared to baseline of upper 120's as of 11/1/2022). The patient was lethargic and a poor historian but she actually answered all the orientation questions correctly on arrival.  Hospital medicine admitted her to the ICU overnight. Acute on chronic hyponatremia. Due to cirrhosis, volume overload, poor solute intake. Encourage protein intake, also gave urea. Furosemide and fluid restriction per nephrology. Alcohol dependence and withdrawal.  CIWA with chlordiazepam and PRN lorazepam.  Thiamine. Acute metabolic encephalopathy. Due to above issues, treat accordingly. No focal neurological deficits, but if her mental state does not improve after the above issues have improved then we will consider MRI brain (in light of her breast cancer and unanticoagulated Afib). HTN. Held lisinopril due to lower BPs. PAF (new diagnosis). It actually looks like an EKG showed Afib back in 4/2022 during an outpatient colonoscopy but I do not see any mention of this in the chart. Started metoprolol. F/u TTE. The patient has chronic thrombocytopenia, alcohol abuse, an falls, so she is not a good candidate for anticoagulation. Alcoholic cirrhosis. CGI was consulted by admitting physician. DCIS L breast, diagnosed 10/2019. S/p partial mastectomy and radiation in 2019.   She is prescribed anastrazole by Dr. Marcell Ramey. She is on chronic valacyclovir, presumably for suppressive therapy of HSV. DVT Prophylaxis: SCDs  Diet: ADULT DIET; Regular; 1500 ml  ADULT ORAL NUTRITION SUPPLEMENT; Breakfast; Standard High Calorie/High Protein Oral Supplement  Code Status: Full Code  PT/OT Eval Status: order when she can participate    Dispo - when her Na has improved and when she is thinking more clearly. Perhaps 12/25 - 12/28? She came from home but will presumably need some form of rehab stay.     Appropriate for A1 Discharge Unit: Brianda Holguin MD

## 2022-12-18 NOTE — PROGRESS NOTES
[facility]  Nephrology Follow up Note           Reason for Consult:  hyponatremia  Requesting Physician:  Dr. Hawk Payne history  On CIWA protocol   Confused and pulled her TLC overnight  Na stuck at 110  Saline started this am    Last 24 h uop 1.7    ROS: confused  PSFH: No visitor    Scheduled Meds:   sodium chloride flush  5-40 mL IntraVENous 2 times per day    thiamine  100 mg Oral Daily    chlordiazePOXIDE  5 mg Oral 4x Daily    pantoprazole  40 mg Oral QAM AC    furosemide  20 mg IntraVENous Q2H    urea  15 g Oral BID    sodium chloride flush  10 mL IntraVENous 2 times per day    sodium chloride flush  5-40 mL IntraVENous 2 times per day    multivitamin  1 tablet Oral Daily    atorvastatin  20 mg Oral Nightly    metoprolol succinate  50 mg Oral Daily    valACYclovir  500 mg Oral Daily     Continuous Infusions:   sodium chloride      sodium chloride      sodium chloride       PRN Meds:.sodium chloride flush, sodium chloride, sodium chloride flush, sodium chloride, potassium chloride, magnesium sulfate, promethazine **OR** ondansetron, acetaminophen **OR** acetaminophen, sodium chloride flush, sodium chloride, LORazepam **OR** LORazepam **OR** LORazepam **OR** LORazepam **OR** LORazepam **OR** LORazepam **OR** LORazepam **OR** LORazepam, potassium chloride **OR** potassium alternative oral replacement **OR** potassium chloride, ipratropium-albuterol    History of Present Illness on 12/17/22:    69 y.o. yo female with PMH of ETOH abuse, HTN, kidney stone who is admitted for hyponatremia and sob  She reports her last drink ws 2 days ago, drinks 3-4 beers per day .   She has been weak, wheezing and has been falling in the last few days as well  Her sodium was 106 on admission and was given saline 50 ml/h for few hours and has been stopped    Physical exam:   Constitutional:  VITALS:  /86   Pulse 64   Temp 96.8 °F (36 °C) (Bladder)   Resp 22   Ht 5' 2\" (1.575 m)   Wt 180 lb (81.6 kg) LMP  (LMP Unknown)   SpO2 98%   BMI 32.92 kg/m²   Gen: resting  Neck: No JVD  Skin: Unremarkable  Cardiovascular:  S1, S2 without m/r/g   Respiratory: CTA B without w/r/r; respiratory effort normal  Abdomen:  soft, nt, nd,   Extremities: 1+ lower extremity edema  Neuro/Psy: confused     Data/  Recent Labs     12/16/22  2354 12/17/22  0632 12/18/22  0435   WBC 7.9 5.3 10.8   HGB 11.4* 11.3* 12.0   HCT 32.9* 32.4* 34.7*   .4* 112.3* 114.0*   * 101* 106*       Recent Labs     12/17/22  0225 12/17/22  0406 12/17/22  1128 12/17/22  1534 12/17/22  1937 12/18/22  0012 12/18/22  0435 12/18/22  0746   *   < > 108*   < > 110* 108* 110* 110*   K 4.9  --  4.8   < > 4.5 4.6 4.6 4.2   CL 76*  --  75*   < > 77* 77* 77* 78*   CO2 22  --  21   < > 21 21 20* 23   GLUCOSE 113*  --  246*   < > 193* 144* 146* 138*   PHOS 2.5  --  3.2  --   --   --  2.7  --    MG 1.20*  --  1.60*  --  1.80  --  2.10  --    BUN 9  --  12   < > 14 14 15 15   CREATININE <0.5*  --  0.6   < > 0.7 0.6 <0.5* <0.5*   LABGLOM >60  --  >60   < > >60 >60 >60 >60    < > = values in this interval not displayed. CT chest/ap w iv contrast  Impression:     1. CHEST: Small right pleural effusion and mild atelectasis in the lung bases. 2. No consolidation is appreciated. 3. Moderate to severe cardiomegaly. 4. T12 superior endplate fracture of uncertain age. Moderate anterior height   loss of T11, likely chronic. If there is focal tenderness, non-urgent   follow-up MRI is a consideration. 5. ABDOMEN AND PELVIS: Cirrhotic morphology of the liver. 6. Prominence of the common and pancreatic ducts, also noted on 01/28/2022   Ductal ectasia or adjacent pancreatic cystic lesion(s) have been considered. Follow-up ERCP or endoscopic ultrasound was previously recommended. 7. No acute abdominopelvic abnormality identified. 8. Moderate anasarca. 9. Chronic-appearing compression fractures at L1 and L3.       Urine na<20 osm 365  TSH 2.69  Uric acid 4.8    Assessment  -Hyponatremia multifactorial from liver cirrhosis, alcohol to volume overload   Na of 106 at 2354 on 12/16  -liver cirrhosis  -sob  -Falls  -ETOH abuse w withdrawals    Plan  -Fluid restriction 1500 ml/day  -seizure precautions  -Goal na of 120 by 12/19 am  -BMP q4h  -lasix 20 mg iv times 2 q2h  -encourage protein intake  -urea based 15 gms BID      Thank you for the consultation. Please do not hesitate to call with questions. Barbara Stubbs MD  Office: [phone]  Fax:    [phone]  [facility]

## 2022-12-18 NOTE — PLAN OF CARE
Problem: Discharge Planning  Goal: Discharge to home or other facility with appropriate resources  12/18/2022 0815 by Scarlet Clark RN  Outcome: Progressing  12/17/2022 1822 by Noé Hicks RN  Outcome: Progressing  Flowsheets (Taken 12/17/2022 1100)  Discharge to home or other facility with appropriate resources: Identify barriers to discharge with patient and caregiver     Problem: Pain  Goal: Verbalizes/displays adequate comfort level or baseline comfort level  12/18/2022 0815 by Scarlet Clark RN  Outcome: Progressing  12/17/2022 1822 by Noé Hicks RN  Outcome: Progressing     Problem: Safety - Adult  Goal: Free from fall injury  12/18/2022 0815 by Scarlet Clark RN  Outcome: Progressing  12/17/2022 1822 by Noé Hicks RN  Outcome: Progressing     Problem: Skin/Tissue Integrity  Goal: Absence of new skin breakdown  Description: 1. Monitor for areas of redness and/or skin breakdown  2. Assess vascular access sites hourly  3. Every 4-6 hours minimum:  Change oxygen saturation probe site  4. Every 4-6 hours:  If on nasal continuous positive airway pressure, respiratory therapy assess nares and determine need for appliance change or resting period.   12/18/2022 0815 by Scarlet Clark RN  Outcome: Progressing  12/17/2022 1822 by Noé Hicks RN  Outcome: Progressing     Problem: Respiratory - Adult  Goal: Achieves optimal ventilation and oxygenation  Outcome: Progressing     Problem: Cardiovascular - Adult  Goal: Maintains optimal cardiac output and hemodynamic stability  Outcome: Progressing     Problem: Skin/Tissue Integrity - Adult  Goal: Skin integrity remains intact  Outcome: Progressing     Problem: Genitourinary - Adult  Goal: Absence of urinary retention  Outcome: Progressing  Goal: Urinary catheter remains patent  Outcome: Progressing     Problem: Safety - Medical Restraint  Goal: Remains free of injury from restraints (Restraint for Interference with Medical Device)  Description: INTERVENTIONS:  1. Determine that other, less restrictive measures have been tried or would not be effective before applying the restraint  2. Evaluate the patient's condition at the time of restraint application  3. Inform patient/family regarding the reason for restraint  4.  Q2H: Monitor safety, psychosocial status, comfort, nutrition and hydration  Outcome: Progressing

## 2022-12-18 NOTE — CONSULTS
Consult Note            Date:12/18/2022        Patient Name:Meagan Santacruz     YOB: 1955     Age:67 y.o. Inpatient consult to Critical Care  Consult performed by: Es Mcgovern MD  Consult ordered by: Julisa Nice MD  Reason for consult: Acute hyponatremia        Chief Complaint     Chief Complaint   Patient presents with    Wheezing    Fall     Call was for lift assist, upon EMS arrival pt had audible wheezing and lower extremity edema             History Obtained From   patient, electronic medical record    History of Present Illness   Is a 64-year-old female who came to the hospital because of wheezing and a fall. Patient was brought by EMS and admitted on 12/16/2022. Patient has a history of alcohol dependency, hypertension, cirrhosis. Patient reports that she drinks at least 4 beers per day and has been doing this for years. Patient is a active smoker who has been diagnosed with COPD. Patient does not regularly follow with a pulmonologist and does not have an PFT in the system. Patient unable to give any history, she is appears to be withdrawing and scoring high on the CIWA protocol  Was given Ativan this morning  Overnight she pulled out her triple-lumen and a peripheral IVs    Past Medical History     Past Medical History:   Diagnosis Date    Age-related osteoporosis without current pathological fracture 2020    Arthritis     EtOH dependence ([facility]     HSV-2 (herpes simplex virus 2) infection     Hypertension     Tx since late 45s    Kidney stone     Malignant neoplasm of upper-inner quadrant of left breast in female, estrogen receptor positive ([facility] 09/23/2019        Past Surgical History     Past Surgical History:   Procedure Laterality Date    COLONOSCOPY  02/16/1999    Rectal ulcer, s/p cautery    COLONOSCOPY  4/19/13    next due 10 yrs    [address] (CERVIX STATUS UNKNOWN)  1998    Ovaries in. Had fibroids, DUB.     MASTECTOMY Left 10/8/2019    LEFT BREAST NEEDLE LOCALIZATION, PARTIAL MASTECTOMY performed by Basia Gannon MD at [address] Left 5/23/2019    LEFT TOTAL KNEE REPLACEMENT            ROTHMAN & NEPHEW performed by Debby Boateng MD at [address] [facility]/A 4/20/2022    EGD W/EUS FNA performed by Ignacio Trivedi MD at [address] [facility]        Medications     Prior to Admission medications    Medication Sig Start Date End Date Taking? Authorizing Provider   lisinopril (PRINIVIL;ZESTRIL) 20 MG tablet Take 1 tablet by mouth daily Please have pt contact office for appt.  9/1/22   Venancio Hardy MD   atorvastatin (LIPITOR) 40 MG tablet TAKE 1 TABLET BY MOUTH EVERY NIGHT 7/21/22   Venancio Hardy MD   alendronate (FOSAMAX) 70 MG tablet TAKE 1 TABLET BY MOUTH EVERY 7 DAYS 6/9/22   Venancio Hardy MD   valACYclovir (VALTREX) 500 MG tablet TAKE 1 TABLET BY MOUTH DAILY 3/28/22   Venancio Hardy MD   metoprolol succinate (TOPROL XL) 50 MG extended release tablet TAKE 1 TABLET BY MOUTH DAILY 11/1/21   Venancio Hardy MD   anastrozole (ARIMIDEX) 1 MG tablet  6/5/20   Historical Provider, MD   Biotin 1 MG CAPS Take by mouth    Historical Provider, MD   Multiple Vitamins-Minerals (THERAPEUTIC MULTIVITAMIN-MINERALS) tablet Take 1 tablet by mouth daily    Historical Provider, MD        0.9 % sodium chloride infusion, Continuous  sodium chloride flush 0.9 % injection 10 mL, 2 times per day  sodium chloride flush 0.9 % injection 10 mL, PRN  0.9 % sodium chloride infusion, PRN  potassium chloride 10 mEq/100 mL IVPB (Peripheral Line), PRN  magnesium sulfate 2000 mg in 50 mL IVPB premix, PRN  promethazine (PHENERGAN) tablet 12.5 mg, Q6H PRN   Or  ondansetron (ZOFRAN) injection 4 mg, Q6H PRN  acetaminophen (TYLENOL) tablet 650 mg, Q6H PRN   Or  acetaminophen (TYLENOL) suppository 650 mg, Q6H PRN  sodium chloride flush 0.9 % injection 5-40 mL, 2 times per day  sodium chloride flush 0.9 % injection 5-40 mL, PRN  0.9 % sodium chloride infusion, PRN  therapeutic multivitamin-minerals 1 tablet, Daily  LORazepam (ATIVAN) tablet 1 mg, Q1H PRN   Or  LORazepam (ATIVAN) injection 1 mg, Q1H PRN   Or  LORazepam (ATIVAN) tablet 2 mg, Q1H PRN   Or  LORazepam (ATIVAN) injection 2 mg, Q1H PRN   Or  LORazepam (ATIVAN) tablet 3 mg, Q1H PRN   Or  LORazepam (ATIVAN) injection 3 mg, Q1H PRN   Or  LORazepam (ATIVAN) tablet 4 mg, Q1H PRN   Or  LORazepam (ATIVAN) injection 4 mg, Q1H PRN  atorvastatin (LIPITOR) tablet 20 mg, Nightly  metoprolol succinate (TOPROL XL) extended release tablet 50 mg, Daily  valACYclovir (VALTREX) tablet 500 mg, Daily  potassium chloride (KLOR-CON M) extended release tablet 40 mEq, PRN   Or  potassium bicarb-citric acid (EFFER-K) effervescent tablet 40 mEq, PRN   Or  potassium chloride 10 mEq/100 mL IVPB (Peripheral Line), PRN  ipratropium-albuterol (DUONEB) nebulizer solution 1 ampule, Q4H PRN        Allergies   Patient has no known allergies.     Social History     Social History       Tobacco History       Smoking Status  Former Quit Date  1977 Smoking Frequency  1.50 packs/day for 5.00 years (7.50 pk-yrs) Smoking Tobacco Type  Cigarettes quit in 1977      Smokeless Tobacco Use  Never              Alcohol History       Alcohol Use Status  Yes Comment  3-4 beers daily              Drug Use       Drug Use Status  No              Sexual Activity       Sexually Active  Not Asked                    Family History     Family History   Problem Relation Age of Onset    Cancer Father 62        Lung    Hypertension Father     Cancer Brother 36        Bladder,  64    Diabetes Other     Heart Failure Other     Breast Cancer Maternal Aunt 80       Review of Systems   Review of Systems   Unable to perform ROS: Mental status change      Physical Exam   /86   Pulse 64   Temp 96.8 °F (36 °C) (Bladder)   Resp 24   Ht 5' 2\" (1.575 m)   Wt 180 lb (81.6 kg)   LMP  (LMP Unknown)   SpO2 98%   BMI 32.92 kg/m²      Physical Exam  Vitals and nursing note reviewed. Constitutional:       General: She is not in acute distress. Appearance: Normal appearance. She is not ill-appearing. Comments: Somnolent at this time but given Ativan because of alcohol withdrawal and agitation this morning   HENT:      Head: Normocephalic and atraumatic. Right Ear: External ear normal.      Left Ear: External ear normal.      Nose: Nose normal.      Mouth/Throat:      Mouth: Mucous membranes are moist.      Pharynx: Oropharynx is clear. Eyes:      General: No scleral icterus. Extraocular Movements: Extraocular movements intact. Conjunctiva/sclera: Conjunctivae normal.      Pupils: Pupils are equal, round, and reactive to light. Cardiovascular:      Rate and Rhythm: Normal rate and regular rhythm. Pulses: Normal pulses. Heart sounds: Normal heart sounds. No murmur heard. No friction rub. Pulmonary:      Effort: No respiratory distress. Breath sounds: No wheezing or rales. Abdominal:      General: Abdomen is flat. Bowel sounds are normal. There is no distension. Tenderness: There is no abdominal tenderness. There is no guarding. Musculoskeletal:         General: No swelling or tenderness. Normal range of motion. Cervical back: Normal range of motion and neck supple. No rigidity. Skin:     General: Skin is warm and dry. Coloration: Skin is not jaundiced. Neurological:      Mental Status: She is disoriented.       Comments: Patient somnolent and on EMERY's protocol  Was given Ativan just recently       Labs    CBC:  Recent Labs     12/16/22  2354 12/17/22  0632 12/18/22  0435   WBC 7.9 5.3 10.8   RBC 2.90* 2.89* 3.04*   HGB 11.4* 11.3* 12.0   HCT 32.9* 32.4* 34.7*   .4* 112.3* 114.0*   RDW 14.0 14.0 14.4   * 101* 106*     CHEMISTRIES:  Recent Labs     12/17/22  0225 12/17/22  0406 12/17/22  1128 12/17/22  1534 12/17/22  1937 12/18/22  0012 12/18/22  0435   *   < > 108*   < > 110* 108* 110*   K 4.9  --  4.8   < > 4.5 4.6 4.6   CL 76*  --  75*   < > 77* 77* 77*   CO2 22  --  21   < > 21 21 20*   BUN 9  --  12   < > 14 14 15   CREATININE <0.5*  --  0.6   < > 0.7 0.6 <0.5*   GLUCOSE 113*  --  246*   < > 193* 144* 146*   PHOS 2.5  --  3.2  --   --   --  2.7   MG 1.20*  --  1.60*  --  1.80  --   --     < > = values in this interval not displayed. PT/INR:  Recent Labs     12/17/22  0408   PROTIME 20.2*   INR 1.73*     APTT:No results for input(s): APTT in the last 72 hours. LIVER PROFILE:  Recent Labs     12/16/22  2354 12/17/22  0632 12/18/22  0435   * 123* 103*   ALT 42* 41* 42*   BILIDIR  --  2.1* 1.8*   BILITOT 3.7* 3.8* 2.9*   ALKPHOS 155* 159* 176*       Imaging/Diagnostics   CT HEAD WO CONTRAST    Result Date: 12/17/2022  No acute intracranial abnormality. CT CERVICAL SPINE WO CONTRAST    Result Date: 12/17/2022  Multilevel degenerative changes with no acute fracture or traumatic malalignment of the cervical spine. XR CHEST PORTABLE    Result Date: 12/17/2022  The left IJ central venous catheter is deflected laterally into the proximal left subclavian vein area. No evidence of pneumothorax. Marked cardiomegaly with minimal pulmonary vascular congestion without pulmonary edema. XR CHEST PORTABLE    Result Date: 12/17/2022  Left central line projects over the expected left brachiocephalic vein. Correlation for appropriate return is recommended. No pneumothorax. Stable enlargement of cardiac silhouette. XR CHEST PORTABLE    Result Date: 12/17/2022  Mild cardiomegaly and mild central pulmonary congestion or pulmonary artery hypertension which is more prominent. Minimal bibasilar atelectasis or small infiltrates. Questionable small bibasilar pleural effusions which is more prominent. CT CHEST ABDOMEN PELVIS W CONTRAST Additional Contrast? None    Result Date: 12/17/2022  1.  CHEST: Small right pleural effusion and mild atelectasis in the lung bases. 2. No consolidation is appreciated. 3. Moderate to severe cardiomegaly. 4. T12 superior endplate fracture of uncertain age. Moderate anterior height loss of T11, likely chronic. If there is focal tenderness, non-urgent follow-up MRI is a consideration. 5. ABDOMEN AND PELVIS: Cirrhotic morphology of the liver. 6. Prominence of the common and pancreatic ducts, also noted on 01/28/2022 Ductal ectasia or adjacent pancreatic cystic lesion(s) have been considered. Follow-up ERCP or endoscopic ultrasound was previously recommended. 7. No acute abdominopelvic abnormality identified. 8. Moderate anasarca. 9. Chronic-appearing compression fractures at L1 and L3. Assessment      Hospital Problems             Last Modified POA    * (Principal) Acute hyponatremia 12/17/2022 Yes       Plan     Neurology  Alcohol dependency    WA protocol  Rally pack given, thiamine and folate  Ativan  We will schedule Librium      Cardiovascular  Hypertension    Continue with  Lipitor 20 mg at night  Toprol-XL 50 mg daily    Currently in A. fib without RVR  Patient able to take p.o. medications at this time    Pulmonary  COPD, active smoker     Impression:       The left IJ central venous catheter is deflected laterally into the proximal   left subclavian vein area. No evidence of pneumothorax. Marked cardiomegaly with minimal pulmonary vascular congestion without   pulmonary edema.     Chest ray on admission 12/17/2022      Gastrointestinal   We will add  protonix    Elevated liver enzymes, slowly increasing, will follow most likely secondary to alcohol abuse    CT scan of the abdomen 12/17/2022  Showing cirrhosis bile duct issues  , Gastroenterology has been consulted    Patient able to take p.o. medications    Endo  TSH is normal    Renal  History of kidney stones    Hyponatremia  Probably longstanding  Nephrology has been consulted  Fluid restriction because of excessive intake of alcohol/water      Prophylaxis  We will add  protonix  Hold on anticoagulation as platelets are low      Lines  P IV    Had left IJ TLC pulled out by patient    Code Status: Full code          Hematology/oncology  History of breast cancer in 2019      Infectious disease  Continue with   Valtrex 500 mg         Orthopedics  CT of the abdomen chest and pelvis shows multiple rib fractures  Done 12/17/2022   Impression:       1. CHEST: Small right pleural effusion and mild atelectasis in the lung bases. 2. No consolidation is appreciated. 3. Moderate to severe cardiomegaly. 4. T12 superior endplate fracture of uncertain age. Moderate anterior height   loss of T11, likely chronic. If there is focal tenderness, non-urgent   follow-up MRI is a consideration. 5. ABDOMEN AND PELVIS: Cirrhotic morphology of the liver. 6. Prominence of the common and pancreatic ducts, also noted on 01/28/2022   Ductal ectasia or adjacent pancreatic cystic lesion(s) have been considered. Follow-up ERCP or endoscopic ultrasound was previously recommended. 7. No acute abdominopelvic abnormality identified. 8. Moderate anasarca. 9. Chronic-appearing compression fractures at L1 and L3. Thank you for the consult, will follow    Discussed with nursing      Critical Care Services Provided: This patient had a critical illness or injury that acutely impaired one or more vital organ systems such that there was a high probability of imminent or life-threatening deterioration in the patient's condition. This required high complexity decision-making to assess, manipulate, and support vital system function(s) to treat single or multiple vital organ system failure and/or to prevent further life-threatening deterioration of the patient's condition.  Total attending physician time, excluding unbundled procedures, spent at the bedside, and away from the bedside but on the unit or floor, reviewing laboratory test results, discussing care with medical staff, and discussing care with family when the patient was unable or incompetent to participate:   Critical Care Time (Minutes) # 35   (Discontinuous)           Electronically signed by Fermín Hernández MD on 12/18/22 at 7:40 AM EST

## 2022-12-18 NOTE — PROGRESS NOTES
Gastroenterology Progress Note    Patient:   Curtis Spain   YOB: 1955   Facility:   [facility]   Referring/PCP: Nichole Seaman MD  Date:     12/18/2022  Consultant:   Ly Gonzalez MD, MD    Subjective: This 69 y.o. female was admitted 12/16/2022 with a diagnosis of \"Shortness of breath [R06.02]  Acute hyponatremia [E87.1]  Hyponatremia [E87.1]  Fall, initial encounter [W19. XXXA]  Longstanding persistent atrial fibrillation ([address] [I48.11]\" and is seen in consultation regarding \"cirrhosis\". Information was obtained from interview of  the patient, examination of the patient, and review of records. I did  update the past medical, surgical, social and / or family history. Cirrhosis mild-moderate chronic in liver assoc w edema/SOB    Current status  Present  Diet Order: ADULT DIET; Regular; 1500 ml  ADULT ORAL NUTRITION SUPPLEMENT; Breakfast; Standard High Calorie/High Protein Oral Supplement and she is tolerating diet. Recently, she has experienced no abdominal  Pain and she has not required Intravenous narcotic analgesics. The patient has also experienced no constipation, diarrhea, hematochezia, melena, nausea, and vomiting      Prior to Admission medications    Medication Sig Start Date End Date Taking? Authorizing Provider   lisinopril (PRINIVIL;ZESTRIL) 20 MG tablet Take 1 tablet by mouth daily Please have pt contact office for appt.  9/1/22   Kb Nguyen MD   atorvastatin (LIPITOR) 40 MG tablet TAKE 1 TABLET BY MOUTH EVERY NIGHT 7/21/22   Kb Nguyen MD   alendronate (FOSAMAX) 70 MG tablet TAKE 1 TABLET BY MOUTH EVERY 7 DAYS 6/9/22   Kb Nguyen MD   valACYclovir (VALTREX) 500 MG tablet TAKE 1 TABLET BY MOUTH DAILY 3/28/22   Kb Nguyen MD   metoprolol succinate (TOPROL XL) 50 MG extended release tablet TAKE 1 TABLET BY MOUTH DAILY 11/1/21   Kb Nguyen MD   anastrozole (ARIMIDEX) 1 MG tablet  6/5/20   Historical Provider, MD   [facility] 1 MG CAPS Take by mouth    Historical Provider, MD   Multiple Vitamins-Minerals (THERAPEUTIC MULTIVITAMIN-MINERALS) tablet Take 1 tablet by mouth daily    Historical Provider, MD      Scheduled Medications:    sodium chloride flush  10 mL IntraVENous 2 times per day    sodium chloride flush  5-40 mL IntraVENous 2 times per day    multivitamin  1 tablet Oral Daily    atorvastatin  20 mg Oral Nightly    metoprolol succinate  50 mg Oral Daily    valACYclovir  500 mg Oral Daily     Infusions:    sodium chloride 50 mL/hr at 12/18/22 0058    sodium chloride      sodium chloride       PRN Medications: sodium chloride flush, sodium chloride, potassium chloride, magnesium sulfate, promethazine **OR** ondansetron, acetaminophen **OR** acetaminophen, sodium chloride flush, sodium chloride, LORazepam **OR** LORazepam **OR** LORazepam **OR** LORazepam **OR** LORazepam **OR** LORazepam **OR** LORazepam **OR** LORazepam, potassium chloride **OR** potassium alternative oral replacement **OR** potassium chloride, ipratropium-albuterol  Allergies: No Known Allergies    Past Medical History:   Diagnosis Date    Age-related osteoporosis without current pathological fracture 2020    Arthritis     EtOH dependence ([facility] [address]     HSV-2 (herpes simplex virus 2) infection     Hypertension     Tx since late 45s    Kidney stone     Malignant neoplasm of upper-inner quadrant of left breast in female, estrogen receptor positive ([facility] 09/23/2019     Past Surgical History:   Procedure Laterality Date    COLONOSCOPY  02/16/1999    Rectal ulcer, s/p cautery    COLONOSCOPY  4/19/13    next due 10 yrs    [address] ([facility])  1998    Ovaries in. Had fibroids, DUB.     MASTECTOMY Left 10/8/2019    LEFT BREAST NEEDLE LOCALIZATION, PARTIAL MASTECTOMY performed by José Manuel Huffman MD at [address] Left 5/23/2019    LEFT TOTAL KNEE REPLACEMENT            ROTHMAN & NEPHEW performed by Jun Corona MD at [address] [place] [address] 2022    EGD W/EUS FNA performed by Maria Esther Suresh MD at [facility]. [address]:   Social History     Tobacco Use    Smoking status: Former     Packs/day: 1.50     Years: 5.00     Pack years: 7.50     Types: Cigarettes     Quit date: 1977     Years since quittin.9    Smokeless tobacco: Never   Substance Use Topics    Alcohol use: Yes     Comment: 3-4 beers daily     Family:   Family History   Problem Relation Age of Onset    Cancer Father 62        Lung    Hypertension Father     Cancer Brother 36        Bladder,  64    Diabetes Other     Heart Failure Other     Breast Cancer Maternal Aunt 80       ROS: Pertinent items are noted in HPI.     Objective:   Vital Signs:  Temp (24hrs), Av.1 °F (36.2 °C), Min:96.2 °F (35.7 °C), Max:97.8 °F (70.1 °C)     Systolic (00DKN), TWV:948 , Min:63 , TCW:632      Diastolic (48VYG), TBV:74, Min:41, Max:119     Pulse  Av.9  Min: 51  Max: 65  /86   Pulse 64   Temp 96.8 °F (36 °C) (Bladder)   Resp 24   Ht 5' 2\" (1.575 m)   Wt 180 lb (81.6 kg)   LMP  (LMP Unknown)   SpO2 98%   BMI 32.92 kg/m²      Physical Exam:   /86   Pulse 64   Temp 96.8 °F (36 °C) (Bladder)   Resp 24   Ht 5' 2\" (1.575 m)   Wt 180 lb (81.6 kg)   LMP  (LMP Unknown)   SpO2 98%   BMI 32.92 kg/m²   General appearance: alert, appears stated age, and cooperative  Heart: regular rate and rhythm, S1, S2 normal, no murmur, click, rub or gallop  Abdomen: soft, non-tender; bowel sounds normal; no masses,  no organomegaly  Neurologic: Grossly normal    Lab and Imaging Review   Recent Labs     22  2354 22  0056 22  0408 22  0632 22  0656 22  1128 22  1534 22  1937 22  0012 22  0435   WBC 7.9  --   --  5.3  --   --   --   --   --  10.8   HGB 11.4*  --   --  11.3*  --   --   --   --   --  12.0   .4*  --   --  112.3*  -- --   --   --   --  114.0*   *  --   --  101*  --   --   --   --   --  106*   INR  --   --  1.73*  --   --   --   --   --   --   --    *   < >  --  107*  --  108*   < > 110* 108* 110*   K 4.7   < >  --   --   --  4.8   < > 4.5 4.6 4.6   CL 75*   < >  --   --   --  75*   < > 77* 77* 77*   CO2 21   < >  --   --   --  21   < > 21 21 20*   BUN 9   < >  --   --   --  12   < > 14 14 15   CREATININE <0.5*   < >  --   --   --  0.6   < > 0.7 0.6 <0.5*   GLUCOSE 114*   < >  --   --   --  246*   < > 193* 144* 146*   CALCIUM 8.8   < >  --   --   --  9.1   < > 9.0 9.0 9.2   PROT 7.1  --   --  6.8  --   --   --   --   --  7.0   LABALBU 3.1*  --   --  3.2*  --   --   --   --   --  3.3*   *  --   --  123*  --   --   --   --   --  103*   ALT 42*  --   --  41*  --   --   --   --   --  42*   ALKPHOS 155*  --   --  159*  --   --   --   --   --  176*   BILITOT 3.7*  --   --  3.8*  --   --   --   --   --  2.9*   BILIDIR  --   --   --  2.1*  --   --   --   --   --  1.8*   AMMONIA  --   --   --   --  19  --   --   --   --   --    MG  --    < >  --   --   --  1.60*  --  1.80  --  2.10    < > = values in this interval not displayed. Assessment:     Patient Active Problem List    Diagnosis Date Noted    Acute hyponatremia 12/17/2022    Alcohol dependence with unspecified [facility]induced disorder [facility] 09/01/2022    Respiratory failure with hypoxia ([facility] 04/19/2021    Hypokalemia 04/06/2021    Hypomagnesemia 04/06/2021    Bandemia 04/06/2021    Abnormal mammogram 10/16/2019    Malignant neoplasm of upper-inner quadrant of left breast in female, estrogen receptor positive ([facility] 09/23/2019    Status post total left knee replacement 05/23/2019    Benign essential HTN 02/13/2019     69year old female with arthritis, HTN, COPD and cirrhosis/alcohol abuse w dilated CBD/PD due to a cystic lesion on EUS in 4/2022, admitted for falling, SOB, severe hyponatremia (Na 102, improving) and V overload.  CT chest/abd/pelvis non revealing except for marked cardiomegaly.   Is lethargic since the Ativan this am.    Plan:   Cardiopulmonary management per primary team  Nephrology input appreciated  Would avoid MS altering meds for now  Will follow along    Sari Proctor MD       [phone]

## 2022-12-19 ENCOUNTER — APPOINTMENT (OUTPATIENT)
Dept: ULTRASOUND IMAGING | Age: 67
DRG: 432 | End: 2022-12-19
Payer: MEDICARE

## 2022-12-19 PROBLEM — K86.89 PANCREATIC DUCT DILATED: Status: ACTIVE | Noted: 2022-12-19

## 2022-12-19 PROBLEM — K70.31 ALCOHOLIC CIRRHOSIS OF LIVER WITH ASCITES (HCC): Status: ACTIVE | Noted: 2022-12-19

## 2022-12-19 PROBLEM — D53.9 MACROCYTIC ANEMIA: Status: ACTIVE | Noted: 2022-12-19

## 2022-12-19 PROBLEM — R29.818 SUSPECTED SLEEP APNEA: Status: ACTIVE | Noted: 2022-12-19

## 2022-12-19 PROBLEM — R79.89 ELEVATED LFTS: Status: ACTIVE | Noted: 2022-12-19

## 2022-12-19 PROBLEM — R60.1 ANASARCA: Status: ACTIVE | Noted: 2022-12-19

## 2022-12-19 PROBLEM — I48.0 PAROXYSMAL ATRIAL FIBRILLATION (HCC): Status: ACTIVE | Noted: 2022-12-19

## 2022-12-19 PROBLEM — E80.6 HYPERBILIRUBINEMIA: Status: ACTIVE | Noted: 2022-12-19

## 2022-12-19 PROBLEM — D69.6 THROMBOCYTOPENIA (HCC): Status: ACTIVE | Noted: 2022-12-19

## 2022-12-19 PROBLEM — E66.01 MORBID OBESITY WITH BMI OF 40.0-44.9, ADULT (HCC): Status: ACTIVE | Noted: 2022-12-19

## 2022-12-19 LAB
ALBUMIN SERPL-MCNC: 2.9 G/DL (ref 3.4–5)
ALP BLD-CCNC: 151 U/L (ref 40–129)
ALT SERPL-CCNC: 45 U/L (ref 10–40)
ANION GAP SERPL CALCULATED.3IONS-SCNC: 10 MMOL/L (ref 3–16)
ANION GAP SERPL CALCULATED.3IONS-SCNC: 9 MMOL/L (ref 3–16)
ANION GAP SERPL CALCULATED.3IONS-SCNC: 9 MMOL/L (ref 3–16)
AST SERPL-CCNC: 105 U/L (ref 15–37)
BILIRUB SERPL-MCNC: 2.5 MG/DL (ref 0–1)
BILIRUBIN DIRECT: 1.6 MG/DL (ref 0–0.3)
BILIRUBIN, INDIRECT: 0.9 MG/DL (ref 0–1)
BUN BLDV-MCNC: 16 MG/DL (ref 7–20)
BUN BLDV-MCNC: 16 MG/DL (ref 7–20)
BUN BLDV-MCNC: 17 MG/DL (ref 7–20)
CALCIUM SERPL-MCNC: 8.6 MG/DL (ref 8.3–10.6)
CALCIUM SERPL-MCNC: 8.7 MG/DL (ref 8.3–10.6)
CALCIUM SERPL-MCNC: 8.7 MG/DL (ref 8.3–10.6)
CHLORIDE BLD-SCNC: 81 MMOL/L (ref 99–110)
CHLORIDE BLD-SCNC: 83 MMOL/L (ref 99–110)
CHLORIDE BLD-SCNC: 83 MMOL/L (ref 99–110)
CO2: 24 MMOL/L (ref 21–32)
CREAT SERPL-MCNC: 0.6 MG/DL (ref 0.6–1.2)
CREAT SERPL-MCNC: 0.6 MG/DL (ref 0.6–1.2)
CREAT SERPL-MCNC: 0.7 MG/DL (ref 0.6–1.2)
FERRITIN: 578.1 NG/ML (ref 15–150)
GFR SERPL CREATININE-BSD FRML MDRD: >60 ML/MIN/{1.73_M2}
GLUCOSE BLD-MCNC: 118 MG/DL (ref 70–99)
GLUCOSE BLD-MCNC: 138 MG/DL (ref 70–99)
GLUCOSE BLD-MCNC: 140 MG/DL (ref 70–99)
HCT VFR BLD CALC: 34.2 % (ref 36–48)
HEMATOLOGY PATH CONSULT: NO
HEMATOLOGY PATH CONSULT: NORMAL
HEMOGLOBIN: 12.1 G/DL (ref 12–16)
LIPASE: 143 U/L (ref 13–60)
MAGNESIUM: 1.5 MG/DL (ref 1.8–2.4)
MCH RBC QN AUTO: 39.7 PG (ref 26–34)
MCHC RBC AUTO-ENTMCNC: 35.3 G/DL (ref 31–36)
MCV RBC AUTO: 112.6 FL (ref 80–100)
PDW BLD-RTO: 14.5 % (ref 12.4–15.4)
PHOSPHORUS: 2.7 MG/DL (ref 2.5–4.9)
PLATELET # BLD: 94 K/UL (ref 135–450)
PLATELET SLIDE REVIEW: ABNORMAL
PMV BLD AUTO: 8.2 FL (ref 5–10.5)
POTASSIUM SERPL-SCNC: 3.7 MMOL/L (ref 3.5–5.1)
POTASSIUM SERPL-SCNC: 3.7 MMOL/L (ref 3.5–5.1)
POTASSIUM SERPL-SCNC: 3.9 MMOL/L (ref 3.5–5.1)
RBC # BLD: 3.04 M/UL (ref 4–5.2)
SODIUM BLD-SCNC: 114 MMOL/L (ref 136–145)
SODIUM BLD-SCNC: 116 MMOL/L (ref 136–145)
SODIUM BLD-SCNC: 117 MMOL/L (ref 136–145)
SODIUM BLD-SCNC: 119 MMOL/L (ref 136–145)
SODIUM BLD-SCNC: 120 MMOL/L (ref 136–145)
TOTAL PROTEIN: 6.4 G/DL (ref 6.4–8.2)
TRIGL SERPL-MCNC: 45 MG/DL (ref 0–150)
WBC # BLD: 8.4 K/UL (ref 4–11)

## 2022-12-19 PROCEDURE — 84295 ASSAY OF SERUM SODIUM: CPT

## 2022-12-19 PROCEDURE — 80048 BASIC METABOLIC PNL TOTAL CA: CPT

## 2022-12-19 PROCEDURE — 36415 COLL VENOUS BLD VENIPUNCTURE: CPT

## 2022-12-19 PROCEDURE — 6370000000 HC RX 637 (ALT 250 FOR IP): Performed by: INTERNAL MEDICINE

## 2022-12-19 PROCEDURE — 84100 ASSAY OF PHOSPHORUS: CPT

## 2022-12-19 PROCEDURE — 6370000000 HC RX 637 (ALT 250 FOR IP): Performed by: NURSE PRACTITIONER

## 2022-12-19 PROCEDURE — 80076 HEPATIC FUNCTION PANEL: CPT

## 2022-12-19 PROCEDURE — 2580000003 HC RX 258: Performed by: INTERNAL MEDICINE

## 2022-12-19 PROCEDURE — 82728 ASSAY OF FERRITIN: CPT

## 2022-12-19 PROCEDURE — 83690 ASSAY OF LIPASE: CPT

## 2022-12-19 PROCEDURE — 83735 ASSAY OF MAGNESIUM: CPT

## 2022-12-19 PROCEDURE — C8929 TTE W OR WO FOL WCON,DOPPLER: HCPCS

## 2022-12-19 PROCEDURE — 6360000002 HC RX W HCPCS: Performed by: INTERNAL MEDICINE

## 2022-12-19 PROCEDURE — 85027 COMPLETE CBC AUTOMATED: CPT

## 2022-12-19 PROCEDURE — 84478 ASSAY OF TRIGLYCERIDES: CPT

## 2022-12-19 PROCEDURE — 99233 SBSQ HOSP IP/OBS HIGH 50: CPT | Performed by: INTERNAL MEDICINE

## 2022-12-19 PROCEDURE — 76705 ECHO EXAM OF ABDOMEN: CPT

## 2022-12-19 PROCEDURE — 2000000000 HC ICU R&B

## 2022-12-19 RX ORDER — FUROSEMIDE 10 MG/ML
20 INJECTION INTRAMUSCULAR; INTRAVENOUS ONCE
Status: COMPLETED | OUTPATIENT
Start: 2022-12-19 | End: 2022-12-19

## 2022-12-19 RX ORDER — MAGNESIUM SULFATE IN WATER 40 MG/ML
2000 INJECTION, SOLUTION INTRAVENOUS ONCE
Status: COMPLETED | OUTPATIENT
Start: 2022-12-19 | End: 2022-12-19

## 2022-12-19 RX ORDER — LACTULOSE 10 G/15ML
20 SOLUTION ORAL DAILY
Status: DISCONTINUED | OUTPATIENT
Start: 2022-12-19 | End: 2023-01-04 | Stop reason: HOSPADM

## 2022-12-19 RX ORDER — SODIUM CHLORIDE 0.9 % (FLUSH) 0.9 %
5-40 SYRINGE (ML) INJECTION EVERY 12 HOURS SCHEDULED
Status: DISCONTINUED | OUTPATIENT
Start: 2022-12-19 | End: 2023-01-04 | Stop reason: HOSPADM

## 2022-12-19 RX ADMIN — FUROSEMIDE 20 MG: 10 INJECTION, SOLUTION INTRAMUSCULAR; INTRAVENOUS at 18:52

## 2022-12-19 RX ADMIN — METOPROLOL SUCCINATE 50 MG: 50 TABLET, EXTENDED RELEASE ORAL at 08:56

## 2022-12-19 RX ADMIN — Medication 15 G: at 21:09

## 2022-12-19 RX ADMIN — MAGNESIUM SULFATE HEPTAHYDRATE 2000 MG: 40 INJECTION, SOLUTION INTRAVENOUS at 09:02

## 2022-12-19 RX ADMIN — CHLORDIAZEPOXIDE HYDROCHLORIDE 5 MG: 5 CAPSULE ORAL at 08:56

## 2022-12-19 RX ADMIN — LACTULOSE 20 G: 20 SOLUTION ORAL at 18:48

## 2022-12-19 RX ADMIN — CHLORDIAZEPOXIDE HYDROCHLORIDE 5 MG: 5 CAPSULE ORAL at 21:08

## 2022-12-19 RX ADMIN — Medication 15 G: at 08:56

## 2022-12-19 RX ADMIN — ANASTROZOLE 1 MG: 1 TABLET, COATED ORAL at 08:56

## 2022-12-19 RX ADMIN — FUROSEMIDE 20 MG: 10 INJECTION, SOLUTION INTRAMUSCULAR; INTRAVENOUS at 09:14

## 2022-12-19 RX ADMIN — CHLORDIAZEPOXIDE HYDROCHLORIDE 5 MG: 5 CAPSULE ORAL at 15:29

## 2022-12-19 RX ADMIN — MUPIROCIN: 20 OINTMENT TOPICAL at 21:09

## 2022-12-19 RX ADMIN — ACETAMINOPHEN 650 MG: 325 TABLET ORAL at 03:29

## 2022-12-19 RX ADMIN — MUPIROCIN: 20 OINTMENT TOPICAL at 08:57

## 2022-12-19 RX ADMIN — SODIUM CHLORIDE, PRESERVATIVE FREE 10 ML: 5 INJECTION INTRAVENOUS at 08:58

## 2022-12-19 RX ADMIN — Medication 100 MG: at 09:13

## 2022-12-19 RX ADMIN — SODIUM CHLORIDE, PRESERVATIVE FREE 10 ML: 5 INJECTION INTRAVENOUS at 21:09

## 2022-12-19 RX ADMIN — Medication 1 TABLET: at 08:56

## 2022-12-19 RX ADMIN — ATORVASTATIN CALCIUM 20 MG: 10 TABLET, FILM COATED ORAL at 21:09

## 2022-12-19 RX ADMIN — VALACYCLOVIR HYDROCHLORIDE 500 MG: 500 TABLET, FILM COATED ORAL at 11:13

## 2022-12-19 RX ADMIN — PANTOPRAZOLE SODIUM 40 MG: 40 TABLET, DELAYED RELEASE ORAL at 06:37

## 2022-12-19 RX ADMIN — LORAZEPAM 2 MG: 1 TABLET ORAL at 22:30

## 2022-12-19 ASSESSMENT — PAIN DESCRIPTION - LOCATION: LOCATION: KNEE

## 2022-12-19 ASSESSMENT — PAIN SCALES - GENERAL
PAINLEVEL_OUTOF10: 4
PAINLEVEL_OUTOF10: 0

## 2022-12-19 ASSESSMENT — PAIN DESCRIPTION - ORIENTATION: ORIENTATION: RIGHT

## 2022-12-19 ASSESSMENT — PAIN DESCRIPTION - DESCRIPTORS: DESCRIPTORS: ACHING;DISCOMFORT

## 2022-12-19 NOTE — CARE COORDINATION
CASE MANAGEMENT INITIAL ASSESSMENT      Reviewed chart and completed assessment with patient:at bedside  Family present: no  Explained Case Management role/services. yes    Primary contact information:    Health Care Decision Maker :   Primary Decision Maker: Mark Ortiz - [phone]          Can this person be reached and be able to respond quickly, such as within a few minutes or hours? Yes  Who would be your back-up decision maker? Name none named  Phone Number:    Admit date/status:12/17/2022  Diagnosis:Hyponatremia   Is this a Readmission?:  No      Insurance:[facility]   Precert required for SNF: No       3 night stay required: No    Living arrangements, Adls, care needs, prior to admission:Lives alone in condo has a few MABEL. States is [employer] and drives. Has consult order for rehab- discussed w pt- declines SA resources or assist from CM for ETOH use. Durable Medical Equipment at home:  Walker_x_Cane_x_RTS__ BSC__Shower Chair__  02__ HHN__ CPAP__  BiPap__  Hospital Bed__ W/C___ Other_____    Services in the home and/or outpatient, prior to admission:none    Current PCP: Musa Cardozo MD                               Medications: Prescription coverage? Yes Will pt require financial assistance with medications No     Transportation needs: pending dispo         PT/OT recs:none    Hospital Exemption Notification (HEN):if snf    Barriers to discharge:lives alone    Plan/comments:plans home at this time- CM following for needs     ECOC on chart for MD joce Poole RN

## 2022-12-19 NOTE — PROGRESS NOTES
2040 Lab called pt's Na level as 117. Nurse communication states not to call Nephro unless <115 or > 119. Pt falls into those parameters and is on current track to regulate Na level.

## 2022-12-19 NOTE — PROGRESS NOTES
INPATIENT PULMONARY CRITICAL CARE PROGRESS NOTE      Reason for visit    Acute hyponatremia    SUBJECTIVE: Patient when seen this morning was much more alert and communicative, patient was not having any profound shortness of breath, patient was not hypothermic now, patient's blood pressure is borderline, patient is in atrial fibrillation with controlled ventricular rate on the monitor, patient was not hypoxemic and was on room air oxygen with saturation of 97%, patient had adequate urine output overnight with cumulative fluid balance of -4.8 L, patient's glycemic control was acceptable, no other pertinent review of system of concern           Physical Exam:  Blood pressure 107/69, pulse 73, temperature 97.7 °F (36.5 °C), temperature source Oral, resp. rate 18, height 5' 2\" (1.575 m), weight 236 lb 1.8 oz (107.1 kg), SpO2 97 %, not currently breastfeeding.'     Constitutional:  No acute distress. HENT:  Oropharynx is clear and moist. No thyromegaly. Eyes:  Conjunctivae are normal. Pupils equal, round, and reactive to light. No scleral icterus. Neck: . No tracheal deviation present. No obvious thyroid mass. Short enlarged neck   Cardiovascular: S1-S2 irregularly irregular, normal heart sounds. No right ventricular heave. some  lower extremity edema. Pulmonary/Chest: No wheezes. No rales. Chest wall is not dull to percussion. No accessory muscle usage or stridor. Decreased BSI   Abdominal: Soft. Bowel sounds present. No distension or hernia. No tenderness. Musculoskeletal: No cyanosis. No clubbing. No obvious joint deformity. Lymphadenopathy: No cervical or supraclavicular adenopathy. Skin: Skin is warm and dry. No rash or nodules on the exposed extremities. Psychiatric: Normal mood and affect. Behavior is normal.  No anxiety. Neurologic: Alert, awake and oriented. PERRL.   Speech fluent        Results:  CBC:   Recent Labs     12/17/22  0632 12/18/22  0435 12/19/22  0428   WBC 5.3 10.8 8.4   HGB 11.3* 12.0 12.1   HCT 32.4* 34.7* 34.2*   .3* 114.0* 112.6*   * 106* 94*     BMP:   Recent Labs     12/17/22  1128 12/17/22  1534 12/18/22  0435 12/18/22  0746 12/18/22 2013 12/19/22  0428 12/19/22  0835   *   < > 110*   < > 117* 116*  114* 117*   K 4.8   < > 4.6   < > 3.4* 3.7  3.7 3.9   CL 75*   < > 77*   < > 83* 83*  81* 83*   CO2 21   < > 20*   < > 26 24  24 24   PHOS 3.2  --  2.7  --   --  2.7  --    BUN 12   < > 15   < > 15 16  16 17   CREATININE 0.6   < > <0.5*   < > <0.5* 0.6  0.7 0.6    < > = values in this interval not displayed. LIVER PROFILE:   Recent Labs     12/17/22  0632 12/18/22  0435 12/19/22  0428   * 103* 105*   ALT 41* 42* 45*   LIPASE  --   --  143.0*   BILIDIR 2.1* 1.8* 1.6*   BILITOT 3.8* 2.9* 2.5*   ALKPHOS 159* 176* 151*     PT/INR:   Recent Labs     12/17/22  0408   PROTIME 20.2*   INR 1.73*     UA:  Recent Labs     12/17/22  0632   COLORU Yellow   PHUR 6.0   WBCUA 0-2   RBCUA None seen   CLARITYU Clear   SPECGRAV 1.015   LEUKOCYTESUR Negative   UROBILINOGEN 1.0   BILIRUBINUR Negative   BLOODU Negative   GLUCOSEU Negative       Imaging:  I have reviewed radiology images personally. CT CHEST ABDOMEN PELVIS W CONTRAST Additional Contrast? None   Final Result   1. CHEST: Small right pleural effusion and mild atelectasis in the lung bases. 2. No consolidation is appreciated. 3. Moderate to severe cardiomegaly. 4. T12 superior endplate fracture of uncertain age. Moderate anterior height   loss of T11, likely chronic. If there is focal tenderness, non-urgent   follow-up MRI is a consideration. 5. ABDOMEN AND PELVIS: Cirrhotic morphology of the liver. 6. Prominence of the common and pancreatic ducts, also noted on 01/28/2022   Ductal ectasia or adjacent pancreatic cystic lesion(s) have been considered. Follow-up ERCP or endoscopic ultrasound was previously recommended. 7. No acute abdominopelvic abnormality identified.    8. Moderate anasarca. 9. Chronic-appearing compression fractures at L1 and L3. XR CHEST PORTABLE   Final Result   Left central line projects over the expected left brachiocephalic vein. Correlation for appropriate return is recommended. No pneumothorax. Stable enlargement of cardiac silhouette. XR CHEST PORTABLE   Final Result   The left IJ central venous catheter is deflected laterally into the proximal   left subclavian vein area. No evidence of pneumothorax. Marked cardiomegaly with minimal pulmonary vascular congestion without   pulmonary edema. CT CERVICAL SPINE WO CONTRAST   Final Result   Multilevel degenerative changes with no acute fracture or traumatic   malalignment of the cervical spine. CT HEAD WO CONTRAST   Final Result   No acute intracranial abnormality. XR CHEST PORTABLE   Final Result   Mild cardiomegaly and mild central pulmonary congestion or pulmonary artery   hypertension which is more prominent. Minimal bibasilar atelectasis or small infiltrates. Questionable small bibasilar pleural effusions which is more prominent. CT HEAD WO CONTRAST    Result Date: 12/17/2022  EXAMINATION: CT OF THE HEAD WITHOUT CONTRAST  12/17/2022 12:34 am TECHNIQUE: CT of the head was performed without the administration of intravenous contrast. Automated exposure control, iterative reconstruction, and/or weight based adjustment of the mA/kV was utilized to reduce the radiation dose to as low as reasonably achievable. COMPARISON: 12 25,006 HISTORY: ORDERING SYSTEM PROVIDED HISTORY: fall TECHNOLOGIST PROVIDED HISTORY: Reason for exam:->fall Has a \"code stroke\" or \"stroke alert\" been called? ->No Decision Support Exception - unselect if not a suspected or confirmed emergency medical condition->Emergency Medical Condition (MA) Reason for Exam: fall with possible head injury FINDINGS: BRAIN/VENTRICLES: There is mild generalized atrophy.   There is no acute intracranial hemorrhage, mass effect or midline shift. No abnormal extra-axial fluid collection. The gray-white differentiation is maintained without evidence of an acute infarct. ORBITS: The visualized portion of the orbits demonstrate no acute abnormality. SINUSES: The visualized paranasal sinuses and mastoid air cells demonstrate no acute abnormality. SOFT TISSUES/SKULL:  No acute abnormality of the visualized skull or soft tissues. No acute intracranial abnormality. CT CERVICAL SPINE WO CONTRAST    Result Date: 12/17/2022  EXAMINATION: CT OF THE CERVICAL SPINE WITHOUT CONTRAST 12/17/2022 12:37 am TECHNIQUE: CT of the cervical spine was performed without the administration of intravenous contrast. Multiplanar reformatted images are provided for review. Automated exposure control, iterative reconstruction, and/or weight based adjustment of the mA/kV was utilized to reduce the radiation dose to as low as reasonably achievable. COMPARISON: None. HISTORY: ORDERING SYSTEM PROVIDED HISTORY: fall TECHNOLOGIST PROVIDED HISTORY: Reason for exam:->fall Decision Support Exception - unselect if not a suspected or confirmed emergency medical condition->Emergency Medical Condition (MA) Reason for Exam: GLF, possible head injury, neck pain FINDINGS: BONES/ALIGNMENT: There is a 3 mm degenerative anterolisthesis of C4 on C5.  2 mm degenerative anterolisthesis of C7 on T1. Vertebral body alignment is otherwise unremarkable. Cervical vertebral body heights are normal.  Facet joints are normally aligned. There is no acute fracture or traumatic malalignment. DEGENERATIVE CHANGES: There is degenerative disc disease at C5-C6 and C6-C7. There is multilevel facet arthropathy. SOFT TISSUES: There is no prevertebral soft tissue swelling. Multilevel degenerative changes with no acute fracture or traumatic malalignment of the cervical spine.      XR CHEST PORTABLE    Result Date: 12/17/2022  EXAMINATION: ONE XRAY VIEW OF THE CHEST 12/17/2022 3:20 am COMPARISON: Portable chest x-ray on 12/16/2022 HISTORY: ORDERING SYSTEM PROVIDED HISTORY: s/p line plcmnt TECHNOLOGIST PROVIDED HISTORY: Reason for exam:->s/p line plcmnt Reason for Exam: s/p line plcmnt FINDINGS: There is a left IJ central venous catheter. The inner end of the central venous catheter is deflected laterally into the proximal portion of left subclavian vein. No evidence of a pneumothorax. Redemonstration of marked cardiomegaly. Pulmonary vascularity is minimally congested. There is no overt pulmonary edema. Lung bases are not well visualized due to significant cardiomegaly. Mild atelectatic change at the lung bases may not be excluded. No change in bony thorax. The left IJ central venous catheter is deflected laterally into the proximal left subclavian vein area. No evidence of pneumothorax. Marked cardiomegaly with minimal pulmonary vascular congestion without pulmonary edema. XR CHEST PORTABLE    Result Date: 12/17/2022  EXAMINATION: ONE XRAY VIEW OF THE CHEST 12/17/2022 6:30 am COMPARISON: 1 hour ago HISTORY: ORDERING SYSTEM PROVIDED HISTORY: line replacement TECHNOLOGIST PROVIDED HISTORY: Reason for exam:->line replacement Reason for Exam: line replacement FINDINGS: Left jugular venous catheter projects over the leftward superior mediastinum, expected left brachiocephalic vein. Cardiac silhouette is globally prominent. Mediastinal contours are stable. No consolidation, pleural effusion or pneumothorax is seen. Left central line projects over the expected left brachiocephalic vein. Correlation for appropriate return is recommended. No pneumothorax. Stable enlargement of cardiac silhouette.      XR CHEST PORTABLE    Result Date: 12/17/2022  EXAMINATION: ONE XRAY VIEW OF THE CHEST 12/16/2022 11:39 pm COMPARISON: 04/19/2021 HISTORY: ORDERING SYSTEM PROVIDED HISTORY: sob TECHNOLOGIST PROVIDED HISTORY: Reason for exam:->sob Reason for Exam: sob FINDINGS: The heart is mildly enlarged and more prominent. The pulmonary vessels are engorged centrally and more prominent. There are minimal bibasilar opacities which is less prominent. There is mild blunting of the costophrenic sulci which is more apparent. Mild cardiomegaly and mild central pulmonary congestion or pulmonary artery hypertension which is more prominent. Minimal bibasilar atelectasis or small infiltrates. Questionable small bibasilar pleural effusions which is more prominent. CT CHEST ABDOMEN PELVIS W CONTRAST Additional Contrast? None    Result Date: 12/17/2022  EXAMINATION: CT OF THE CHEST, ABDOMEN, AND PELVIS WITH CONTRAST 12/17/2022 7:40 am TECHNIQUE: CT of the chest, abdomen and pelvis was performed with the administration of intravenous contrast. Multiplanar reformatted images are provided for review. Automated exposure control, iterative reconstruction, and/or weight based adjustment of the mA/kV was utilized to reduce the radiation dose to as low as reasonably achievable. COMPARISON: 12/17/2022 chest x-ray CTA PA, 04/19/2021 MRI abdomen, 01/20/2022 HISTORY: ORDERING SYSTEM PROVIDED HISTORY: cirrhosis? cancer? Pna? TECHNOLOGIST PROVIDED HISTORY: Reason for exam:->cirrhosis? cancer? Pna? Additional Contrast?->None Reason for Exam: wheezing, eval for chirrosis or pneumonia FINDINGS: Chest: Mediastinum: Moderate to severe cardiomegaly is noted. Coronary artery calcification is present. There is no pericardial effusion, mediastinal or hilar adenopathy. Lungs/pleura: There is a small right pleural effusion. There is mild atelectasis in the lung bases. Soft Tissues/Bones: At T11 there is moderate anterior height loss. At T12 there is a superior endplate Schmorl's node with possible endplate fracture, new since 2021. Multilevel degenerative disease. Chest wall is unremarkable. Abdomen/Pelvis: Organs: Liver has undulating contour. Left paraumbilical vein is noted. No masses are evident. There are hepatic cysts/hypodensities noted, measuring up to 2.8 cm; no specific follow-up recommended. The gallbladder is partially contracted, not well evaluated. The common duct is prominent measuring approximately 12 mm near the odilon hepatis. Pancreatic duct is dilated measuring 3 mm. The adrenal glands and spleen are unremarkable. The kidneys enhance symmetrically, without hydronephrosis or suspicious abnormality. GI/Bowel: There is a small sliding hiatal hernia. No dilated loops of small bowel are identified. There is no focal mural thickening of the colon Pelvis: Urinary bladder is unremarkable. Uterus is surgically absent. No pelvic masses are identified. Peritoneum/Retroperitoneum: No ascites is identified. No adenopathy. No aneurysm. Mild aortoiliac calcification. Bones/Soft Tissues: Mild L1 and moderate L3 anterior height loss. No acute fracture lines are appreciated. Multilevel degenerative disease. Symmetrical, mild-to-moderate osteoarthritis of the hips. No acute osseous injury. Moderate anasarca. 1. CHEST: Small right pleural effusion and mild atelectasis in the lung bases. 2. No consolidation is appreciated. 3. Moderate to severe cardiomegaly. 4. T12 superior endplate fracture of uncertain age. Moderate anterior height loss of T11, likely chronic. If there is focal tenderness, non-urgent follow-up MRI is a consideration. 5. ABDOMEN AND PELVIS: Cirrhotic morphology of the liver. 6. Prominence of the common and pancreatic ducts, also noted on 01/28/2022 Ductal ectasia or adjacent pancreatic cystic lesion(s) have been considered. Follow-up ERCP or endoscopic ultrasound was previously recommended. 7. No acute abdominopelvic abnormality identified. 8. Moderate anasarca. 9. Chronic-appearing compression fractures at L1 and L3.         Latest Reference Range & Units 12/17/22 00:04   INFLUENZA A NOT DETECTED  NOT DETECTED   INFLUENZA B NOT DETECTED  NOT DETECTED   SARS-CoV-2 RNA, RT PCR NOT DETECTED  NOT DETECTED         Assessment:  Principal Problem:    Acute hyponatremia  Active Problems:    Macrocytic anemia    Thrombocytopenia (HCC)    Elevated LFTs    Hyperbilirubinemia    Anasarca    Alcoholic cirrhosis of liver with ascites (HCC)    Pancreatic duct dilated    Paroxysmal atrial fibrillation (HCC)    Morbid obesity with BMI of 40.0-44.9, adult ([facility] [address]    Suspected sleep apnea  Resolved Problems:    * No resolved hospital problems.  *          Plan:   Oxygen supplementation, if required, to keep saturation between 90 and 94% only  Patient was on room air oxygen when seen  Patient has been started on low-dose of Lasix along with Ferd Batters by nephrology along with fluid restriction in the diet  Monitor input output closely  Monitor sodium levels closely  Make sure that patient's sodium levels do not increase too fast to decrease the chance of osmotic demyelination syndrome  Patient does have history of alcohol use and also is having alcohol cirrhosis with ascites with anasarca on imaging  Patient also has macrocytic anemia with thrombocytopenia  Patient's CT of the abdomen also shows patient to have enlargement of pancreatic duct  Patient is also showing paroxysmal atrial fibrillation at this time with controlled ventricular rate-patient also appears to be fluid overloaded  Consider echocardiogram if deemed appropriate to establish any diastolic dysfunction or pulmonary hypertension contributing to the complexity of the disease process  IM to decide upon any anticoagulation given that patient has history of alcohol use and thrombocytopenia at this time  Consider GI evaluation to assess for the need for any ERCP, if deemed appropriate  Monitor input output and BMP  Correct electrolytes on whenever necessary basis  Patient has been started on low-dose of Librium for prevention of alcohol withdrawal symptoms  Trend LFTs  Arimidex per history of breast cancer to continue  SCDs ordered  PUD prophylaxis    Case discussed with ICU team        Electronically signed by:  Boris Chance MD    12/19/2022    10:44 AM.

## 2022-12-19 NOTE — PROGRESS NOTES
Shift change, bedside report given to  [facility]. Pt exhibits no s/s of distress. Call light in reach. Care has been transferred at this time.

## 2022-12-19 NOTE — PROGRESS NOTES
[facility] Follow [facility]           Reason for Consult:  hyponatremia  Requesting Physician:  Dr. Juan Willams history  Patient was seen and examined. Confused. Serum sodium 114-116mmol/L this morning. Still with significant fluid on.     Last 24 h uop 4.9L    ROS: confused  PSFH: No visitor    Scheduled Meds:   mupirocin   Nasal BID    magnesium sulfate  2,000 mg IntraVENous Once    sodium chloride flush  5-40 mL IntraVENous 2 times per day    thiamine  100 mg Oral Daily    chlordiazePOXIDE  5 mg Oral 4x Daily    pantoprazole  40 mg Oral QAM AC    urea  15 g Oral BID    anastrozole  1 mg Oral Daily    multivitamin  1 tablet Oral Daily    atorvastatin  20 mg Oral Nightly    metoprolol succinate  50 mg Oral Daily    valACYclovir  500 mg Oral Daily     Continuous Infusions:   sodium chloride       PRN Meds:.sodium chloride flush, sodium chloride, potassium chloride, magnesium sulfate, promethazine **OR** ondansetron, acetaminophen **OR** acetaminophen, LORazepam **OR** LORazepam **OR** LORazepam **OR** LORazepam **OR** LORazepam **OR** LORazepam **OR** LORazepam **OR** LORazepam, potassium chloride **OR** potassium alternative oral replacement **OR** potassium chloride, ipratropium-albuterol    Physical exam:   Constitutional:  VITALS:  /69   Pulse 73   Temp 97.7 °F (36.5 °C) (Oral)   Resp 18   Ht 5' 2\" (1.575 m)   Wt 236 lb 1.8 oz (107.1 kg)   LMP  (LMP Unknown)   SpO2 97%   BMI 43.19 kg/m²   Gen: resting  Skin: Unremarkable  Cardiovascular:  S1, S2 without m/r/g   Respiratory: CTA B without w/r/r; respiratory effort normal  Abdomen:  soft, nt, nd,   Extremities: 1-2+ lower extremity edema  Neuro/Psy: confused     Data/  Recent Labs     12/17/22  0632 12/18/22  0435 12/19/22  0428   WBC 5.3 10.8 8.4   HGB 11.3* 12.0 12.1   HCT 32.4* 34.7* 34.2*   .3* 114.0* 112.6*   * 106* 94*     Recent Labs     12/17/22  1128 12/17/22  1534 12/17/22  1937 12/18/22  0012 12/18/22  0435 12/18/22  0746 12/18/22  1607 12/18/22 2013 12/19/22  0428   *   < > 110*   < > 110*   < > 115* 117* 116*  114*   K 4.8   < > 4.5   < > 4.6   < > 3.2* 3.4* 3.7  3.7   CL 75*   < > 77*   < > 77*   < > 81* 83* 83*  81*   CO2 21   < > 21   < > 20*   < > 25 26 24  24   GLUCOSE 246*   < > 193*   < > 146*   < > 130* 117* 138*  140*   PHOS 3.2  --   --   --  2.7  --   --   --  2.7   MG 1.60*  --  1.80  --  2.10  --   --   --  1.50*   BUN 12   < > 14   < > 15   < > 15 15 16  16   CREATININE 0.6   < > 0.7   < > <0.5*   < > <0.5* <0.5* 0.6  0.7   LABGLOM >60   < > >60   < > >60   < > >60 >60 >60  >60    < > = values in this interval not displayed. CT chest/ap w iv contrast  Impression:     1. CHEST: Small right pleural effusion and mild atelectasis in the lung bases. 2. No consolidation is appreciated. 3. Moderate to severe cardiomegaly. 4. T12 superior endplate fracture of uncertain age. Moderate anterior height   loss of T11, likely chronic. If there is focal tenderness, non-urgent   follow-up MRI is a consideration. 5. ABDOMEN AND PELVIS: Cirrhotic morphology of the liver. 6. Prominence of the common and pancreatic ducts, also noted on 01/28/2022   Ductal ectasia or adjacent pancreatic cystic lesion(s) have been considered. Follow-up ERCP or endoscopic ultrasound was previously recommended. 7. No acute abdominopelvic abnormality identified. 8. Moderate anasarca. 9. Chronic-appearing compression fractures at L1 and L3. Urine na<20 osm 365  TSH 2.69  Uric acid 4.8    Assessment  -Hyponatremia multifactorial from liver cirrhosis, alcohol to volume overload   Na of 106 at 2354 on 12/16, appropriate correction.   -liver cirrhosis  -sob  -Falls  -ETOH abuse w withdrawals    Plan  -Fluid restriction 1500 ml/day  -seizure precautions  -Goal na of 122 by 12/20 am  -Serum sodium q6h  -lasix 20 mg iv times 2 today.  -encourage protein intake  -urea 15 gms BID      Thank you for the consultation. Please do not hesitate to call with questions. Oliver Mansfield MD  Office: [phone]  Fax:    [phone] [id number] [facility]

## 2022-12-19 NOTE — PROGRESS NOTES
Hospitalist Progress Note      PCP: Jaydon Walsh MD    Date of Admission: 12/16/2022    Chief Complaint: fell at home       Subjective:  She is can at least wake up this AM but remains lethargic. Oriented to self only. Her only complaint is that \"I want out of here. \"      Medications:  Reviewed    Infusion Medications    sodium chloride       Scheduled Medications    mupirocin   Nasal BID    magnesium sulfate  2,000 mg IntraVENous Once    sodium chloride flush  5-40 mL IntraVENous 2 times per day    furosemide  20 mg IntraVENous Once    thiamine  100 mg Oral Daily    chlordiazePOXIDE  5 mg Oral 4x Daily    pantoprazole  40 mg Oral QAM AC    urea  15 g Oral BID    anastrozole  1 mg Oral Daily    multivitamin  1 tablet Oral Daily    atorvastatin  20 mg Oral Nightly    metoprolol succinate  50 mg Oral Daily    valACYclovir  500 mg Oral Daily     PRN Meds: sodium chloride flush, sodium chloride, potassium chloride, magnesium sulfate, promethazine **OR** ondansetron, acetaminophen **OR** acetaminophen, LORazepam **OR** LORazepam **OR** LORazepam **OR** LORazepam **OR** LORazepam **OR** LORazepam **OR** LORazepam **OR** LORazepam, potassium chloride **OR** potassium alternative oral replacement **OR** potassium chloride, ipratropium-albuterol      Intake/Output Summary (Last 24 hours) at 12/19/2022 1046  Last data filed at 12/19/2022 0858  Gross per 24 hour   Intake 1985 ml   Output 4900 ml   Net -2915 ml       Physical Exam Performed:    /69   Pulse 73   Temp 97.7 °F (36.5 °C) (Oral)   Resp 18   Ht 5' 2\" (1.575 m)   Wt 236 lb 1.8 oz (107.1 kg)   LMP  (LMP Unknown)   SpO2 97%   BMI 43.19 kg/m²     General appearance: No apparent distress, appears stated age. HEENT: Pupils equal, round, and reactive to light. Conjunctivae/corneas clear. Neck: Supple, with full range of motion. No jugular venous distention. Trachea midline. Respiratory:  Normal respiratory effort.  Clear to auscultation, bilaterally without Rales/Wheezes/Rhonchi. Cardiovascular: Regular rate and rhythm with normal S1/S2 without murmurs, rubs or gallops. Abdomen: Soft, non-tender, non-distended with normal bowel sounds. Musculoskeletal: No clubbing, cyanosis. 2+ BLE pitting edema. Full range of motion without deformity. Skin: Skin color, texture, turgor normal.  No rashes or lesions. Neurologic:  Neurovascularly intact without any focal sensory/motor deficits. Cranial nerves: II-XII intact, grossly non-focal.  Psychiatric: obtunded  Capillary Refill: Brisk, 3 seconds, normal   Peripheral Pulses: +2 palpable, equal bilaterally       Labs:   Recent Labs     12/17/22  0632 12/18/22 0435 12/19/22 0428   WBC 5.3 10.8 8.4   HGB 11.3* 12.0 12.1   HCT 32.4* 34.7* 34.2*   * 106* 94*     Recent Labs     12/17/22  1128 12/17/22  1534 12/18/22 0435 12/18/22  0746 12/18/22 2013 12/19/22 0428 12/19/22  0835   *   < > 110*   < > 117* 116*  114* 117*   K 4.8   < > 4.6   < > 3.4* 3.7  3.7 3.9   CL 75*   < > 77*   < > 83* 83*  81* 83*   CO2 21   < > 20*   < > 26 24  24 24   BUN 12   < > 15   < > 15 16  16 17   CREATININE 0.6   < > <0.5*   < > <0.5* 0.6  0.7 0.6   CALCIUM 9.1   < > 9.2   < > 8.5 8.6  8.7 8.7   PHOS 3.2  --  2.7  --   --  2.7  --     < > = values in this interval not displayed.      Recent Labs     12/17/22  0632 12/18/22 0435 12/19/22 0428   * 103* 105*   ALT 41* 42* 45*   BILIDIR 2.1* 1.8* 1.6*   BILITOT 3.8* 2.9* 2.5*   ALKPHOS 159* 176* 151*     Recent Labs     12/17/22  0408   INR 1.73*     Recent Labs     12/16/22  2354   TROPONINI <0.01       Urinalysis:      Lab Results   Component Value Date/Time    NITRU Negative 12/17/2022 06:32 AM    WBCUA 0-2 12/17/2022 06:32 AM    BACTERIA 1+ 04/19/2021 04:39 PM    RBCUA None seen 12/17/2022 06:32 AM    BLOODU Negative 12/17/2022 06:32 AM    SPECGRAV 1.015 12/17/2022 06:32 AM    GLUCOSEU Negative 12/17/2022 06:32 AM       Radiology:  CT CHEST ABDOMEN PELVIS W CONTRAST Additional Contrast? None   Final Result   1. CHEST: Small right pleural effusion and mild atelectasis in the lung bases. 2. No consolidation is appreciated. 3. Moderate to severe cardiomegaly. 4. T12 superior endplate fracture of uncertain age. Moderate anterior height   loss of T11, likely chronic. If there is focal tenderness, non-urgent   follow-up MRI is a consideration. 5. ABDOMEN AND PELVIS: Cirrhotic morphology of the liver. 6. Prominence of the common and pancreatic ducts, also noted on 01/28/2022   Ductal ectasia or adjacent pancreatic cystic lesion(s) have been considered. Follow-up ERCP or endoscopic ultrasound was previously recommended. 7. No acute abdominopelvic abnormality identified. 8. Moderate anasarca. 9. Chronic-appearing compression fractures at L1 and L3. XR CHEST PORTABLE   Final Result   Left central line projects over the expected left brachiocephalic vein. Correlation for appropriate return is recommended. No pneumothorax. Stable enlargement of cardiac silhouette. XR CHEST PORTABLE   Final Result   The left IJ central venous catheter is deflected laterally into the proximal   left subclavian vein area. No evidence of pneumothorax. Marked cardiomegaly with minimal pulmonary vascular congestion without   pulmonary edema. CT CERVICAL SPINE WO CONTRAST   Final Result   Multilevel degenerative changes with no acute fracture or traumatic   malalignment of the cervical spine. CT HEAD WO CONTRAST   Final Result   No acute intracranial abnormality. XR CHEST PORTABLE   Final Result   Mild cardiomegaly and mild central pulmonary congestion or pulmonary artery   hypertension which is more prominent. Minimal bibasilar atelectasis or small infiltrates. Questionable small bibasilar pleural effusions which is more prominent.              IP CONSULT TO NEPHROLOGY  IP CONSULT TO NEPHROLOGY  IP CONSULT TO SOCIAL WORK  IP CONSULT TO GI  IP CONSULT TO CRITICAL CARE  IP CONSULT TO SOCIAL WORK    Assessment/Plan:    Active Hospital Problems    Diagnosis     Macrocytic anemia [D53.9]      Priority: Medium    Thrombocytopenia ([facility] [address] [D69.6]      Priority: Medium    Elevated LFTs [R79.89]      Priority: Medium    Hyperbilirubinemia [E80.6]      Priority: Medium    Anasarca [R60.1]      Priority: Medium    Alcoholic cirrhosis of liver with ascites ([facility] [K70.31]      Priority: Medium    Pancreatic duct dilated [K86.89]      Priority: Medium    Paroxysmal atrial fibrillation ([facility] [I48.0]      Priority: Medium    Morbid obesity with BMI of 40.0-44.9, adult ([facility] [E66.01, Z68.41]      Priority: Medium    Suspected sleep apnea [R29.818]      Priority: Medium    Acute hyponatremia [E87.1]      Priority: Medium       The patient is a pleasant 69 Y F with a h/o former COPD, cirrhosis with ongoing alcoholism (possibly 3-4 cans of beer daily), HTN, and L breast cancer s/p resection in 2019. History is limited. Apparently she fell at home, couldn't get up. EMS arrived to help her up, and ultimately decided to bring her to the ED because she was breathing heavily and edematous. When she arrived here she had \"no complaints\" but her Na was 102 (compared to baseline of upper 120's as of 11/1/2022). The patient was lethargic and a poor historian but she actually answered all the orientation questions correctly on arrival.  Hospital medicine admitted her to the ICU overnight. Acute on chronic hyponatremia. Due to cirrhosis, volume overload, poor solute intake. Encourage protein intake, also gave urea. Furosemide IV and fluid restriction per nephrology. Alcohol dependence and withdrawal.  CIWA with chlordiazepam and PRN lorazepam.  Thiamine. Acute metabolic encephalopathy. Due to above issues, treat accordingly.   No focal neurological deficits, but if her mental state does not improve after the above issues have improved then we will consider MRI brain (in light of her breast cancer and unanticoagulated Afib). HTN. Held lisinopril due to lower BPs. PAF (new diagnosis). It actually looks like an EKG showed Afib back in 4/2022 during an outpatient colonoscopy but I do not see any mention of this in the chart. Started metoprolol. F/u TTE. The patient has chronic thrombocytopenia, alcohol abuse, an falls, so she is not a good candidate for anticoagulation. Alcoholic cirrhosis. CGI was consulted by admitting physician. DCIS L breast, diagnosed 10/2019. S/p partial mastectomy and radiation in 2019. She is prescribed anastrazole by Dr. Corrine Grant. She is on chronic valacyclovir, presumably for suppressive therapy of HSV. DVT Prophylaxis: SCDs  Diet: ADULT DIET; Regular; 1500 ml  ADULT ORAL NUTRITION SUPPLEMENT; Breakfast; Standard High Calorie/High Protein Oral Supplement  Code Status: Full Code  PT/OT Eval Status: order when she can participate    Dispo - when her Na has improved and when she is thinking more clearly. Perhaps 12/24 - 12/28? She came from home but will presumably need some form of rehab stay.     Appropriate for A1 Discharge Unit: Brianda Harley MD

## 2022-12-19 NOTE — PROGRESS NOTES
PROGRESS NOTE    HPI: Remi Brizuela is a(n)67 y.o. female admitted for work-up and treatment for Shortness of breath [R06.02]  Acute hyponatremia [E87.1]  Hyponatremia [E87.1]  Fall, initial encounter [W19. XXXA]  Longstanding persistent atrial fibrillation ([facility] [address] [I48.11]. We are following for liver cirrhosis. Subjective:     No GI complaints today. Still disoriented. Tells me she has no prior history of liver disease. Objective:     I/O last 3 completed shifts: In: 1975 [P.O.:240; I.V.:1735]  Out: 5565 [Urine:5565]      /69   Pulse 73   Temp 97.7 °F (36.5 °C) (Oral)   Resp 18   Ht 5' 2\" (1.575 m)   Wt 236 lb 1.8 oz (107.1 kg)   LMP  (LMP Unknown)   SpO2 97%   BMI 43.19 kg/m²     Physical Exam:  HEENT: + slightly icteric sclera. sclera, oropharyngeal membranes pink and moist.  Cor: RRR  Lungs: non-labored, no respiratory distress  Abdomen: soft, + small fluid wave, NT  Extremities: +2 BLE edema  Neuro: alert, oriented to person, no asterixis      Results:   Lab Results   Component Value Date    ALT 45 (H) 12/19/2022     (H) 12/19/2022    ALKPHOS 151 (H) 12/19/2022    BILIDIR 1.6 (H) 12/19/2022    PROT 6.4 12/19/2022    LABALBU 2.9 (L) 12/19/2022    INR 1.73 (H) 12/17/2022    LIPASE 143.0 (H) 12/19/2022     Lab Results   Component Value Date    WBC 8.4 12/19/2022    HGB 12.1 12/19/2022    HCT 34.2 (L) 12/19/2022    .6 (H) 12/19/2022    PLT 94 (L) 12/19/2022     BUN/Cr/glu/ALT/AST/amyl/lip:  16, 16/0.7, 0.6/--/45/105/--/143.0 (12/19 0428)  CT HEAD WO CONTRAST    Result Date: 12/17/2022  No acute intracranial abnormality. CT CERVICAL SPINE WO CONTRAST    Result Date: 12/17/2022  Multilevel degenerative changes with no acute fracture or traumatic malalignment of the cervical spine.      XR CHEST PORTABLE    Result Date: 12/17/2022  The left IJ central venous catheter is deflected laterally into the proximal left subclavian vein area. No evidence of pneumothorax. Marked cardiomegaly with minimal pulmonary vascular congestion without pulmonary edema. XR CHEST PORTABLE    Result Date: 12/17/2022  Left central line projects over the expected left brachiocephalic vein. Correlation for appropriate return is recommended. No pneumothorax. Stable enlargement of cardiac silhouette. XR CHEST PORTABLE    Result Date: 12/17/2022  Mild cardiomegaly and mild central pulmonary congestion or pulmonary artery hypertension which is more prominent. Minimal bibasilar atelectasis or small infiltrates. Questionable small bibasilar pleural effusions which is more prominent. CT CHEST ABDOMEN PELVIS W CONTRAST Additional Contrast? None    Result Date: 12/17/2022  1. CHEST: Small right pleural effusion and mild atelectasis in the lung bases. 2. No consolidation is appreciated. 3. Moderate to severe cardiomegaly. 4. T12 superior endplate fracture of uncertain age. Moderate anterior height loss of T11, likely chronic. If there is focal tenderness, non-urgent follow-up MRI is a consideration. 5. ABDOMEN AND PELVIS: Cirrhotic morphology of the liver. 6. Prominence of the common and pancreatic ducts, also noted on 01/28/2022 Ductal ectasia or adjacent pancreatic cystic lesion(s) have been considered. Follow-up ERCP or endoscopic ultrasound was previously recommended. 7. No acute abdominopelvic abnormality identified. 8. Moderate anasarca. 9. Chronic-appearing compression fractures at L1 and L3. Impression:  Alcoholic liver cirrhosis. No ascites noted on CT imaging, but apparent on exam.  Thrombocytopenia, coagulopathy, hyperbilirubinemia. 2. Alcohol abuse, withdrawal. On CIWA    3. Hyponatremia. Nephrology following. 4. Fluid overload. 5. Mild pulmonary hypertension, echo 12/19/ 6. CBD/PD dilation, cystic lesions. S/p EUS 4/2022, FNA - mucinous cyst, high fluid CEA.    Bilirubin elevated (also with cirrhosis), slightly elevated alk phos. Relatively stable. 7. AMS. Ammonia normal.     8. New diagnosis a-fib. 9. Hx breast cancer dx 2019. Plan:  US ascites. 2. Cardiopulmonary management. 3. Daily lactulose. 4. Office follow-up. Will have office coordinate with her daughter     Please do not hesitate to call with questions or concerns.       Electronically signed by: JOHNY Gallardo 12/19/2022 8:42 AM

## 2022-12-20 LAB
ANION GAP SERPL CALCULATED.3IONS-SCNC: 9 MMOL/L (ref 3–16)
BUN BLDV-MCNC: 32 MG/DL (ref 7–20)
CALCIUM SERPL-MCNC: 8.9 MG/DL (ref 8.3–10.6)
CHLORIDE BLD-SCNC: 82 MMOL/L (ref 99–110)
CO2: 30 MMOL/L (ref 21–32)
CREAT SERPL-MCNC: 0.9 MG/DL (ref 0.6–1.2)
GFR SERPL CREATININE-BSD FRML MDRD: >60 ML/MIN/{1.73_M2}
GLUCOSE BLD-MCNC: 118 MG/DL (ref 70–99)
MAGNESIUM: 1.5 MG/DL (ref 1.8–2.4)
POTASSIUM SERPL-SCNC: 3.1 MMOL/L (ref 3.5–5.1)
SODIUM BLD-SCNC: 121 MMOL/L (ref 136–145)
SODIUM BLD-SCNC: 123 MMOL/L (ref 136–145)
SODIUM BLD-SCNC: 125 MMOL/L (ref 136–145)
SODIUM BLD-SCNC: 128 MMOL/L (ref 136–145)

## 2022-12-20 PROCEDURE — 36415 COLL VENOUS BLD VENIPUNCTURE: CPT

## 2022-12-20 PROCEDURE — 83540 ASSAY OF IRON: CPT

## 2022-12-20 PROCEDURE — 6370000000 HC RX 637 (ALT 250 FOR IP): Performed by: INTERNAL MEDICINE

## 2022-12-20 PROCEDURE — 83735 ASSAY OF MAGNESIUM: CPT

## 2022-12-20 PROCEDURE — 1200000000 HC SEMI PRIVATE

## 2022-12-20 PROCEDURE — 97530 THERAPEUTIC ACTIVITIES: CPT

## 2022-12-20 PROCEDURE — 51702 INSERT TEMP BLADDER CATH: CPT

## 2022-12-20 PROCEDURE — 83516 IMMUNOASSAY NONANTIBODY: CPT

## 2022-12-20 PROCEDURE — 86015 ACTIN ANTIBODY EACH: CPT

## 2022-12-20 PROCEDURE — 82390 ASSAY OF CERULOPLASMIN: CPT

## 2022-12-20 PROCEDURE — 97165 OT EVAL LOW COMPLEX 30 MIN: CPT

## 2022-12-20 PROCEDURE — 99233 SBSQ HOSP IP/OBS HIGH 50: CPT | Performed by: INTERNAL MEDICINE

## 2022-12-20 PROCEDURE — 86038 ANTINUCLEAR ANTIBODIES: CPT

## 2022-12-20 PROCEDURE — 80048 BASIC METABOLIC PNL TOTAL CA: CPT

## 2022-12-20 PROCEDURE — 6360000002 HC RX W HCPCS: Performed by: INTERNAL MEDICINE

## 2022-12-20 PROCEDURE — 84295 ASSAY OF SERUM SODIUM: CPT

## 2022-12-20 PROCEDURE — 97162 PT EVAL MOD COMPLEX 30 MIN: CPT

## 2022-12-20 PROCEDURE — 82103 ALPHA-1-ANTITRYPSIN TOTAL: CPT

## 2022-12-20 PROCEDURE — 84466 ASSAY OF TRANSFERRIN: CPT

## 2022-12-20 PROCEDURE — 2580000003 HC RX 258: Performed by: INTERNAL MEDICINE

## 2022-12-20 RX ORDER — IPRATROPIUM BROMIDE AND ALBUTEROL SULFATE 2.5; .5 MG/3ML; MG/3ML
1 SOLUTION RESPIRATORY (INHALATION) EVERY 6 HOURS PRN
Status: DISCONTINUED | OUTPATIENT
Start: 2022-12-20 | End: 2023-01-04 | Stop reason: HOSPADM

## 2022-12-20 RX ORDER — CHLORDIAZEPOXIDE HYDROCHLORIDE 5 MG/1
5 CAPSULE, GELATIN COATED ORAL 3 TIMES DAILY
Status: DISCONTINUED | OUTPATIENT
Start: 2022-12-20 | End: 2022-12-30

## 2022-12-20 RX ORDER — LANOLIN ALCOHOL/MO/W.PET/CERES
400 CREAM (GRAM) TOPICAL 2 TIMES DAILY
Status: COMPLETED | OUTPATIENT
Start: 2022-12-20 | End: 2022-12-22

## 2022-12-20 RX ADMIN — Medication 100 MG: at 07:53

## 2022-12-20 RX ADMIN — Medication 400 MG: at 19:32

## 2022-12-20 RX ADMIN — Medication 400 MG: at 10:12

## 2022-12-20 RX ADMIN — POTASSIUM CHLORIDE 40 MEQ: 1500 TABLET, EXTENDED RELEASE ORAL at 06:19

## 2022-12-20 RX ADMIN — CHLORDIAZEPOXIDE HYDROCHLORIDE 5 MG: 5 CAPSULE ORAL at 19:46

## 2022-12-20 RX ADMIN — SODIUM CHLORIDE, PRESERVATIVE FREE 10 ML: 5 INJECTION INTRAVENOUS at 10:02

## 2022-12-20 RX ADMIN — Medication 15 G: at 19:33

## 2022-12-20 RX ADMIN — VALACYCLOVIR HYDROCHLORIDE 500 MG: 500 TABLET, FILM COATED ORAL at 08:21

## 2022-12-20 RX ADMIN — Medication 15 G: at 08:00

## 2022-12-20 RX ADMIN — ATORVASTATIN CALCIUM 20 MG: 10 TABLET, FILM COATED ORAL at 19:32

## 2022-12-20 RX ADMIN — MUPIROCIN: 20 OINTMENT TOPICAL at 07:51

## 2022-12-20 RX ADMIN — PANTOPRAZOLE SODIUM 40 MG: 40 TABLET, DELAYED RELEASE ORAL at 06:19

## 2022-12-20 RX ADMIN — SODIUM CHLORIDE, PRESERVATIVE FREE 10 ML: 5 INJECTION INTRAVENOUS at 19:33

## 2022-12-20 RX ADMIN — CHLORDIAZEPOXIDE HYDROCHLORIDE 5 MG: 5 CAPSULE ORAL at 07:53

## 2022-12-20 RX ADMIN — ANASTROZOLE 1 MG: 1 TABLET, COATED ORAL at 07:53

## 2022-12-20 RX ADMIN — CHLORDIAZEPOXIDE HYDROCHLORIDE 5 MG: 5 CAPSULE ORAL at 13:49

## 2022-12-20 RX ADMIN — MUPIROCIN: 20 OINTMENT TOPICAL at 19:33

## 2022-12-20 RX ADMIN — MAGNESIUM SULFATE HEPTAHYDRATE 2000 MG: 40 INJECTION, SOLUTION INTRAVENOUS at 07:51

## 2022-12-20 RX ADMIN — METOPROLOL SUCCINATE 50 MG: 50 TABLET, EXTENDED RELEASE ORAL at 07:53

## 2022-12-20 RX ADMIN — Medication 1 TABLET: at 07:53

## 2022-12-20 NOTE — PROGRESS NOTES
INPATIENT PULMONARY CRITICAL CARE PROGRESS NOTE      Reason for visit    Acute hyponatremia    SUBJECTIVE: Patient when seen this morning was much more alert and communicative, patient was not having any profound shortness of breath, patient is afebrile and has borderline hemodynamics, patient continues to be in atrial fibrillation with controlled ventricular rate on the monitor, patient was not hypoxemic and was on room air oxygen with saturation of 98% when evaluated, patient's p.o. intake is adequate, patient has had good urine output overnight with cumulative fluid balance of -8.8 L, patient's glycemic control was acceptable, patient got Ativan only once overnight as per nursing, no other pertinent review of system of concern           Physical Exam:  Blood pressure 96/85, pulse 77, temperature (!) 96.1 °F (35.6 °C), temperature source Axillary, resp. rate 24, height 5' 2\" (1.575 m), weight 222 lb 10.6 oz (101 kg), SpO2 98 %, not currently breastfeeding.'     Constitutional:  No acute distress. HENT:  Oropharynx is clear and moist. No thyromegaly. Eyes:  Conjunctivae are normal. Pupils equal, round, and reactive to light. No scleral icterus. Neck: . No tracheal deviation present. No obvious thyroid mass. Short enlarged neck   Cardiovascular: S1-S2 irregularly irregular, normal heart sounds. No right ventricular heave. some  lower extremity edema. Pulmonary/Chest: No wheezes. No rales. Chest wall is not dull to percussion. No accessory muscle usage or stridor. Decreased BSI   Abdominal: Soft. Bowel sounds present. No distension or hernia. No tenderness. Musculoskeletal: No cyanosis. No clubbing. No obvious joint deformity. Lymphadenopathy: No cervical or supraclavicular adenopathy. Skin: Skin is warm and dry. No rash or nodules on the exposed extremities. Psychiatric: Normal mood and affect. Behavior is normal.  No anxiety. Neurologic: Alert, awake and oriented. PERRL.   Speech fluent        Results:  CBC:   Recent Labs     12/18/22  0435 12/19/22  0428   WBC 10.8 8.4   HGB 12.0 12.1   HCT 34.7* 34.2*   .0* 112.6*   * 94*       BMP:   Recent Labs     12/17/22  1128 12/17/22  1534 12/18/22  0435 12/18/22  0746 12/19/22  0428 12/19/22  0835 12/19/22  1435 12/19/22  2047 12/20/22  0208 12/20/22  0839   *   < > 110*   < > 116*  114* 117*   < > 120* 121* 123*   K 4.8   < > 4.6   < > 3.7  3.7 3.9  --   --  3.1*  --    CL 75*   < > 77*   < > 83*  81* 83*  --   --  82*  --    CO2 21   < > 20*   < > 24  24 24  --   --  30  --    PHOS 3.2  --  2.7  --  2.7  --   --   --   --   --    BUN 12   < > 15   < > 16  16 17  --   --  32*  --    CREATININE 0.6   < > <0.5*   < > 0.6  0.7 0.6  --   --  0.9  --     < > = values in this interval not displayed. LIVER PROFILE:   Recent Labs     12/18/22 0435 12/19/22 0428   * 105*   ALT 42* 45*   LIPASE  --  143.0*   BILIDIR 1.8* 1.6*   BILITOT 2.9* 2.5*   ALKPHOS 176* 151*           Imaging:  I have reviewed radiology images personally. US ABDOMEN LIMITED   Final Result   No ascites identified         CT CHEST ABDOMEN PELVIS W CONTRAST Additional Contrast? None   Final Result   1. CHEST: Small right pleural effusion and mild atelectasis in the lung bases. 2. No consolidation is appreciated. 3. Moderate to severe cardiomegaly. 4. T12 superior endplate fracture of uncertain age. Moderate anterior height   loss of T11, likely chronic. If there is focal tenderness, non-urgent   follow-up MRI is a consideration. 5. ABDOMEN AND PELVIS: Cirrhotic morphology of the liver. 6. Prominence of the common and pancreatic ducts, also noted on 01/28/2022   Ductal ectasia or adjacent pancreatic cystic lesion(s) have been considered. Follow-up ERCP or endoscopic ultrasound was previously recommended. 7. No acute abdominopelvic abnormality identified. 8. Moderate anasarca.    9. Chronic-appearing compression fractures at L1 and L3. XR CHEST PORTABLE   Final Result   Left central line projects over the expected left brachiocephalic vein. Correlation for appropriate return is recommended. No pneumothorax. Stable enlargement of cardiac silhouette. XR CHEST PORTABLE   Final Result   The left IJ central venous catheter is deflected laterally into the proximal   left subclavian vein area. No evidence of pneumothorax. Marked cardiomegaly with minimal pulmonary vascular congestion without   pulmonary edema. CT CERVICAL SPINE WO CONTRAST   Final Result   Multilevel degenerative changes with no acute fracture or traumatic   malalignment of the cervical spine. CT HEAD WO CONTRAST   Final Result   No acute intracranial abnormality. XR CHEST PORTABLE   Final Result   Mild cardiomegaly and mild central pulmonary congestion or pulmonary artery   hypertension which is more prominent. Minimal bibasilar atelectasis or small infiltrates. Questionable small bibasilar pleural effusions which is more prominent. CT HEAD WO CONTRAST    Result Date: 12/17/2022  EXAMINATION: CT OF THE HEAD WITHOUT CONTRAST  12/17/2022 12:34 am TECHNIQUE: CT of the head was performed without the administration of intravenous contrast. Automated exposure control, iterative reconstruction, and/or weight based adjustment of the mA/kV was utilized to reduce the radiation dose to as low as reasonably achievable. COMPARISON: 12 25,006 HISTORY: ORDERING SYSTEM PROVIDED HISTORY: fall TECHNOLOGIST PROVIDED HISTORY: Reason for exam:->fall Has a \"code stroke\" or \"stroke alert\" been called? ->No Decision Support Exception - unselect if not a suspected or confirmed emergency medical condition->Emergency Medical Condition (MA) Reason for Exam: fall with possible head injury FINDINGS: BRAIN/VENTRICLES: There is mild generalized atrophy. There is no acute intracranial hemorrhage, mass effect or midline shift.   No abnormal extra-axial fluid collection. The gray-white differentiation is maintained without evidence of an acute infarct. ORBITS: The visualized portion of the orbits demonstrate no acute abnormality. SINUSES: The visualized paranasal sinuses and mastoid air cells demonstrate no acute abnormality. SOFT TISSUES/SKULL:  No acute abnormality of the visualized skull or soft tissues. No acute intracranial abnormality. CT CERVICAL SPINE WO CONTRAST    Result Date: 12/17/2022  EXAMINATION: CT OF THE CERVICAL SPINE WITHOUT CONTRAST 12/17/2022 12:37 am TECHNIQUE: CT of the cervical spine was performed without the administration of intravenous contrast. Multiplanar reformatted images are provided for review. Automated exposure control, iterative reconstruction, and/or weight based adjustment of the mA/kV was utilized to reduce the radiation dose to as low as reasonably achievable. COMPARISON: None. HISTORY: ORDERING SYSTEM PROVIDED HISTORY: fall TECHNOLOGIST PROVIDED HISTORY: Reason for exam:->fall Decision Support Exception - unselect if not a suspected or confirmed emergency medical condition->Emergency Medical Condition (MA) Reason for Exam: GLF, possible head injury, neck pain FINDINGS: BONES/ALIGNMENT: There is a 3 mm degenerative anterolisthesis of C4 on C5.  2 mm degenerative anterolisthesis of C7 on T1. Vertebral body alignment is otherwise unremarkable. Cervical vertebral body heights are normal.  Facet joints are normally aligned. There is no acute fracture or traumatic malalignment. DEGENERATIVE CHANGES: There is degenerative disc disease at C5-C6 and C6-C7. There is multilevel facet arthropathy. SOFT TISSUES: There is no prevertebral soft tissue swelling. Multilevel degenerative changes with no acute fracture or traumatic malalignment of the cervical spine.      XR CHEST PORTABLE    Result Date: 12/17/2022  EXAMINATION: ONE XRAY VIEW OF THE CHEST 12/17/2022 3:20 am COMPARISON: Portable chest x-ray on 12/16/2022 HISTORY: ORDERING SYSTEM PROVIDED HISTORY: s/p line plcmnt TECHNOLOGIST PROVIDED HISTORY: Reason for exam:->s/p line plcmnt Reason for Exam: s/p line plcmnt FINDINGS: There is a left IJ central venous catheter. The inner end of the central venous catheter is deflected laterally into the proximal portion of left subclavian vein. No evidence of a pneumothorax. Redemonstration of marked cardiomegaly. Pulmonary vascularity is minimally congested. There is no overt pulmonary edema. Lung bases are not well visualized due to significant cardiomegaly. Mild atelectatic change at the lung bases may not be excluded. No change in bony thorax. The left IJ central venous catheter is deflected laterally into the proximal left subclavian vein area. No evidence of pneumothorax. Marked cardiomegaly with minimal pulmonary vascular congestion without pulmonary edema. XR CHEST PORTABLE    Result Date: 12/17/2022  EXAMINATION: ONE XRAY VIEW OF THE CHEST 12/17/2022 6:30 am COMPARISON: 1 hour ago HISTORY: ORDERING SYSTEM PROVIDED HISTORY: line replacement TECHNOLOGIST PROVIDED HISTORY: Reason for exam:->line replacement Reason for Exam: line replacement FINDINGS: Left jugular venous catheter projects over the leftward superior mediastinum, expected left brachiocephalic vein. Cardiac silhouette is globally prominent. Mediastinal contours are stable. No consolidation, pleural effusion or pneumothorax is seen. Left central line projects over the expected left brachiocephalic vein. Correlation for appropriate return is recommended. No pneumothorax. Stable enlargement of cardiac silhouette. XR CHEST PORTABLE    Result Date: 12/17/2022  EXAMINATION: ONE XRAY VIEW OF THE CHEST 12/16/2022 11:39 pm COMPARISON: 04/19/2021 HISTORY: ORDERING SYSTEM PROVIDED HISTORY: sob TECHNOLOGIST PROVIDED HISTORY: Reason for exam:->sob Reason for Exam: sob FINDINGS: The heart is mildly enlarged and more prominent.   The pulmonary vessels are engorged centrally and more prominent. There are minimal bibasilar opacities which is less prominent. There is mild blunting of the costophrenic sulci which is more apparent. Mild cardiomegaly and mild central pulmonary congestion or pulmonary artery hypertension which is more prominent. Minimal bibasilar atelectasis or small infiltrates. Questionable small bibasilar pleural effusions which is more prominent. CT CHEST ABDOMEN PELVIS W CONTRAST Additional Contrast? None    Result Date: 12/17/2022  EXAMINATION: CT OF THE CHEST, ABDOMEN, AND PELVIS WITH CONTRAST 12/17/2022 7:40 am TECHNIQUE: CT of the chest, abdomen and pelvis was performed with the administration of intravenous contrast. Multiplanar reformatted images are provided for review. Automated exposure control, iterative reconstruction, and/or weight based adjustment of the mA/kV was utilized to reduce the radiation dose to as low as reasonably achievable. COMPARISON: 12/17/2022 chest x-ray CTA PA, 04/19/2021 MRI abdomen, 01/20/2022 HISTORY: ORDERING SYSTEM PROVIDED HISTORY: cirrhosis? cancer? Pna? TECHNOLOGIST PROVIDED HISTORY: Reason for exam:->cirrhosis? cancer? Pna? Additional Contrast?->None Reason for Exam: wheezing, eval for chirrosis or pneumonia FINDINGS: Chest: Mediastinum: Moderate to severe cardiomegaly is noted. Coronary artery calcification is present. There is no pericardial effusion, mediastinal or hilar adenopathy. Lungs/pleura: There is a small right pleural effusion. There is mild atelectasis in the lung bases. Soft Tissues/Bones: At T11 there is moderate anterior height loss. At T12 there is a superior endplate Schmorl's node with possible endplate fracture, new since 2021. Multilevel degenerative disease. Chest wall is unremarkable. Abdomen/Pelvis: Organs: Liver has undulating contour. Left paraumbilical vein is noted. No masses are evident.   There are hepatic cysts/hypodensities noted, measuring up to 2.8 cm; no specific follow-up recommended. The gallbladder is partially contracted, not well evaluated. The common duct is prominent measuring approximately 12 mm near the odilon hepatis. Pancreatic duct is dilated measuring 3 mm. The adrenal glands and spleen are unremarkable. The kidneys enhance symmetrically, without hydronephrosis or suspicious abnormality. GI/Bowel: There is a small sliding hiatal hernia. No dilated loops of small bowel are identified. There is no focal mural thickening of the colon Pelvis: Urinary bladder is unremarkable. Uterus is surgically absent. No pelvic masses are identified. Peritoneum/Retroperitoneum: No ascites is identified. No adenopathy. No aneurysm. Mild aortoiliac calcification. Bones/Soft Tissues: Mild L1 and moderate L3 anterior height loss. No acute fracture lines are appreciated. Multilevel degenerative disease. Symmetrical, mild-to-moderate osteoarthritis of the hips. No acute osseous injury. Moderate anasarca. 1. CHEST: Small right pleural effusion and mild atelectasis in the lung bases. 2. No consolidation is appreciated. 3. Moderate to severe cardiomegaly. 4. T12 superior endplate fracture of uncertain age. Moderate anterior height loss of T11, likely chronic. If there is focal tenderness, non-urgent follow-up MRI is a consideration. 5. ABDOMEN AND PELVIS: Cirrhotic morphology of the liver. 6. Prominence of the common and pancreatic ducts, also noted on 01/28/2022 Ductal ectasia or adjacent pancreatic cystic lesion(s) have been considered. Follow-up ERCP or endoscopic ultrasound was previously recommended. 7. No acute abdominopelvic abnormality identified. 8. Moderate anasarca. 9. Chronic-appearing compression fractures at L1 and L3.         Latest Reference Range & Units 12/17/22 00:04   INFLUENZA A NOT DETECTED  NOT DETECTED   INFLUENZA B NOT DETECTED  NOT DETECTED   SARS-CoV-2 RNA, RT PCR NOT DETECTED  NOT DETECTED         Assessment:  Principal Problem:    Acute hyponatremia  Active Problems:    Macrocytic anemia    Thrombocytopenia (HCC)    Elevated LFTs    Hyperbilirubinemia    Anasarca    Alcoholic cirrhosis of liver with ascites (HCC)    Pancreatic duct dilated    Paroxysmal atrial fibrillation (HCC)    Morbid obesity with BMI of 40.0-44.9, adult ([facility] [address]    Suspected sleep apnea  Resolved Problems:    * No resolved hospital problems.  *          Plan:   Oxygen supplementation, if required, to keep saturation between 90 and 94% only  Patient was on room air oxygen when seen  Patient given 1 dose was on low-dose of Lasix along with Jasper Sales by nephrology along with fluid restriction in the diet  Monitor input output closely  Monitor sodium levels closely-patient sodium was 121 this morning  Make sure that patient's sodium levels do not increase too fast to decrease the chance of osmotic demyelination syndrome  Patient does have history of alcohol use and also is having alcohol cirrhosis with ascites with anasarca on imaging  Patient also has macrocytic anemia with thrombocytopenia  Patient's CT of the abdomen also shows patient to have enlargement of pancreatic duct  Patient also has slight elevation of lipase levels  Patient is also showing paroxysmal atrial fibrillation at this time with controlled ventricular rate-patient also appears to be fluid overloaded  Consider echocardiogram if deemed appropriate to establish any diastolic dysfunction or pulmonary hypertension contributing to the complexity of the disease process  IM to decide upon any anticoagulation given that patient has history of alcohol use and thrombocytopenia at this time  Consider GI evaluation to assess for the need for any ERCP, if deemed appropriate  Monitor input output and BMP  Correct electrolytes on whenever necessary basis-magnesium being replaced aggressively  Patient has been started on low-dose of Librium for prevention of alcohol withdrawal symptoms-dose of Librium was decreased to 5 mg 3 times a day  Trend LFTs  Arimidex per history of breast cancer to continue  SCDs ordered  PUD prophylaxis  PT/OT    Case discussed with ICU team    Patient can be transferred out of the ICU to a stepdown unit from pulmonary/critical care standpoint of view        Electronically signed by:  Zoe Trejo MD    12/20/2022    9:48 AM.

## 2022-12-20 NOTE — PROGRESS NOTES
Shift: [id number]    Admitting diagnosis: hyponatremia    Presentation to hospital: EMS called for fall & weakness; [id number]    Surgery: no     Nursing assessment at handoff  stable    Emergency Contact/POA:dtr Breann German  Family updated: yes - visited today    Most recent vitals: BP (!) 148/92   Pulse 65   Temp (!) 96.1 °F (35.6 °C) (Axillary)   Resp 20   Ht 5' 2\" (1.575 m)   Wt 222 lb 10.6 oz (101 kg)   LMP  (LMP Unknown)   SpO2 98%   BMI 40.73 kg/m²      Rhythm: Atrial Fibrillation controlled     NC/HFNC- NA lpm  Respiratory support: - No ventilator support    Vent days: Day NA    Increased O2 requirements: no    Admission weight Weight: 180 lb (81.6 kg)  Today's weight   Wt Readings from Last 1 Encounters:   12/20/22 222 lb 10.6 oz (101 kg)         UOP >30ml/hr: yes -      Wilson need assessed each shift: yes, 12/17    Restraints: no  Order current and documentation up to date? Lines/Drains  LDA Insertion Date Discontinued Date Dressing Changes   PIV  x1     TLC       Arterial       Wilson  12/17     Vas Cath      ETT       Surgical drains        Night Shift Hospitalist Interventions    Problem(Brief) Date Time Intervention Physician contacted                                               Drip rates at handoff:    sodium chloride         Hospital Course Daily Updates:  Admit Day# 4  112/20 Days: OK to tx to any tele bed; Na checks q6h    Lab Data:   CBC:   Recent Labs     12/18/22 0435 12/19/22 0428   WBC 10.8 8.4   HGB 12.0 12.1   HCT 34.7* 34.2*   .0* 112.6*   * 94*     BMP:    Recent Labs     12/19/22  0835 12/19/22  1435 12/20/22  0208 12/20/22  0839 12/20/22  1415   *   < > 121* 123* 125*   K 3.9  --  3.1*  --   --    CO2 24  --  30  --   --    BUN 17  --  32*  --   --    CREATININE 0.6  --  0.9  --   --     < > = values in this interval not displayed.      LIVR:   Recent Labs     12/18/22  0435 12/19/22  0428   * 105*   ALT 42* 45*     PT/INR:   Recent Labs     12/18/22  8233 12/19/22  0428   PROT 7.0 6.4     APTT: No results for input(s): APTT in the last 72 hours. ABG: No results for input(s): PHART, KTT7EJJ, PO2ART in the last 72 hours.   Consults (if GI or Nephrology- which group?)-  [facility]; [facility]

## 2022-12-20 NOTE — CARE COORDINATION
Dr Vernon Perez updated Dovie Opitz, pt to move out of ICU. PT/OT ordered. CM will continue to follow pt's progress and coordinate discharge arrangements as appropriate.   TANYA Velez-RN

## 2022-12-20 NOTE — PROGRESS NOTES
Occupational Therapy  Facility/Department: [facility] C2 CARD TELEMETRY  Occupational Therapy Initial Assessment/Treatment    Name: Dianna Evans  : 1955  MRN: [id number]  Date of Service: 2022    Discharge Recommendations:  [address]          Patient Diagnosis(es): The primary encounter diagnosis was Hyponatremia. Diagnoses of Fall, initial encounter, Longstanding persistent atrial fibrillation ([facility] [address] and Shortness of breath were also pertinent to this visit. Past Medical History:  has a past medical history of Age-related osteoporosis without current pathological fracture, Arthritis, EtOH dependence ([address] HSV-2 (herpes simplex virus 2) infection, Hypertension, Kidney stone, and Malignant neoplasm of upper-inner quadrant of left breast in female, estrogen receptor positive ([facility] [address] Past Surgical History:  has a past surgical history that includes Hysterectomy (); Dilation and curettage of uterus (); Colonoscopy (1999); Colonoscopy (13); Total knee arthroplasty (Left, 2019); Mastectomy (Left, 10/8/2019); and Upper gastrointestinal endoscopy (N/A, 2022). Assessment   Performance deficits / Impairments: Decreased functional mobility ; Decreased endurance;Decreased ADL status; Decreased balance;Decreased cognition;Decreased strength;Decreased safe awareness;Decreased high-level IADLs;Decreased posture  Patient admitted from home s/p fall, h/o etoh abuse. Patient lives alone, Gema Polio however minimal activity at home. Patient max of 2 for bed mobility and stedy transfer bed to chair, set-up for feeding with totalA for ADLs. Patient seen as Co-tx collaboration this date to safely meet goals and will have better occupational performance outcomes with in a co-treatment than 1:1 session. After evaluation, pt found to be presenting with the above mentioned occupational performance deficits which are affecting participation in daily living skills.  Pt would benefit from continued skilled occupational therapy to address ADLs, functional mobility, and safety while in acute care. Prognosis: Good  Decision Making: Medium Complexity  REQUIRES OT FOLLOW-UP: Yes  Activity Tolerance  Activity Tolerance: Patient Tolerated treatment well;Patient limited by fatigue        Plan   Occupational Therapy Plan  Times Per Week: 3-5x's a week while in acute care     Restrictions  Restrictions/Precautions  Restrictions/Precautions: Fall Risk  Position Activity Restriction  Other position/activity restrictions: avasys, up with assistance    Subjective   General  Chart Reviewed: Yes, Orders, Progress Notes, History and Physical  Patient assessed for rehabilitation services?: Yes  Additional Pertinent Hx: etoh  Family / Caregiver Present: Yes (daughter and ex-)  Referring Practitioner: Lisseth Serna MD 12/20/22  Diagnosis: Hyponatremia, fall  Subjective  Subjective: Pt pleasantly confused in bed upon entry.      Social/Functional History  Social/Functional History  Lives With: Alone  Type of Home: Condo  Home Layout: Laundry in basement, Multi-level, Work area in basement  Home Access: Stairs to enter with rails (garage enters through basement)  Entrance Stairs - Number of Steps: 10  Entrance Stairs - Rails: Right  Bathroom Shower/Tub: Tub/Shower unit  Bathroom Toilet: Standard  Home Equipment: Yoana Peals  Has the patient had two or more falls in the past year or any fall with injury in the past year?: Yes (\"several\")  Receives Help From: Family  ADL Assistance: Independent  Homemaking Assistance: Independent  Ambulation Assistance: Independent  Transfer Assistance: Independent  Active : Yes (short distances)  Additional Comments: Daughter helps prn       Objective   Heart Rate: 72  BP: (!) 143/100  BP Location: Right lower arm  BP Method: Automatic  MAP (Calculated): 114  Resp: 22  SpO2: 98 %  O2 Device: None (Room air)     Denies pain:        Safety Devices  Type of Devices: Left in chair;Call light within reach;Nurse notified;Telesitter in use; Chair alarm in place;Gait belt    Bed Mobility Training  Bed Mobility Training: Yes  Supine to Sit: Maximum assistance;Assist X2 (to R)  Sit to Supine: Other (comment) (up to chair at end of session)  Transfer Training  Transfer Training: Yes  Sit to Stand: Assist X2;Maximum assistance (up to [place])  Stand to Sit: Maximum assistance;Assist X2     AROM: Within functional limits  PROM: Within functional limits  Strength: Generally decreased, functional  Coordination: Generally decreased, functional  Tone: Normal  Sensation: Intact    ADL  Feeding: Setup  UE Dressing: Maximum assistance  LE Dressing: Dependent/Total  Toileting: Dependent/Total      Cognition  Overall Cognitive Status: Exceptions  Arousal/Alertness: Delayed responses to stimuli;Inconsistent responses to stimuli  Following Commands: Inconsistently follows commands  Attention Span: Attends with cues to redirect  Memory: Decreased recall of recent events;Decreased short term memory  Safety Judgement: Decreased awareness of need for assistance;Decreased awareness of need for safety  Problem Solving: Decreased awareness of errors  Insights: Decreased awareness of deficits  Initiation: Requires cues for some  Sequencing: Requires cues for some    Orientation  Overall Orientation Status: Impaired  Orientation Level: Oriented to person;Disoriented to situation;Disoriented to place; Disoriented to time          Education Given To: Patient; Family  Education Provided: Role of Therapy;Plan of Care;Orientation; ADL Adaptive Strategies;Transfer Training; Fall Prevention Strategies; Equipment; Family Education  Education Method: Demonstration;Verbal  Barriers to Learning: Cognition  Education Outcome: Continued education needed      Pt educated on benefits of OOB activity to decrease risks associated with prolonged bedrest while in hospital.  Given patient's history of falls, patient educated on fall risks and prevention techniques, including: While in the hospital, CALL before you FALL; bed/chair alarms; wearing non-skid socks/shoes; having all needed items within reach, including nurse call light. Patient verbalized understanding. AM-PAC Score        AM-PAC Inpatient Daily Activity Raw Score: 9 (12/20/22 1621)  AM-PAC Inpatient ADL T-Scale Score : 25.33 (12/20/22 1621)  ADL Inpatient CMS 0-100% Score: 79.59 (12/20/22 1621)  ADL Inpatient CMS G-Code Modifier : CL (12/20/22 1621)       Goals  Short Term Goals  Time Frame for Short Term Goals: 1 week 12/27  Short Term Goal 1: Pt will complete LE dressing with modA  Short Term Goal 2: Pt will complete toilet transfers with min of 1  Short Term Goal 3: Pt will complete 10 reps of BUE AROM exercises to increase strength for ADLs/transfers by 12/25  Short Term Goal 4: Pt will complete 1 min of static standing Anna of 1 for balance for ADLs prep. Patient Goals   Patient goals : \"to go home\"       Therapy Time   Individual Concurrent Group Co-treatment   Time In 7634         Time Out 1508         Minutes 33         Timed Code Treatment Minutes: 23 Minutes       Gladys Babinski, OTR/L  If pt is unable to be seen after this session, please let this note serve as discharge summary. Please see case management note for discharge disposition. Thank you.

## 2022-12-20 NOTE — PLAN OF CARE
Problem: Safety - Adult  Goal: Free from fall injury  Outcome: Progressing     Problem: Safety - Medical Restraint  Goal: Remains free of injury from restraints (Restraint for Interference with Medical Device)  Description: INTERVENTIONS:  1. Determine that other, less restrictive measures have been tried or would not be effective before applying the restraint  2. Evaluate the patient's condition at the time of restraint application  3. Inform patient/family regarding the reason for restraint  4. Q2H: Monitor safety, psychosocial status, comfort, nutrition and hydration  Outcome: Completed     Problem: Skin/Tissue Integrity  Goal: Absence of new skin breakdown  Description: 1. Monitor for areas of redness and/or skin breakdown  2. Assess vascular access sites hourly  3. Every 4-6 hours minimum:  Change oxygen saturation probe site  4. Every 4-6 hours:  If on nasal continuous positive airway pressure, respiratory therapy assess nares and determine need for appliance change or resting period.   Outcome: Not Progressing

## 2022-12-20 NOTE — PROGRESS NOTES
Shift Summary    Shift Events  Bilateral wrist restrains discontinued. CIWA scoring minimal. Scheduled librium given. NO PRNS    Vitals STABLE    Neuro  OX 2, disoriented to situation and place. Forgetful  RASS:0    I/O & Drains  Urine output~ 1700ml  Fluid Restriction.   840 ml Oral intake     Bedside Shift Handoff given to: Hamilton Young RN

## 2022-12-20 NOTE — PROGRESS NOTES
PROGRESS NOTE    HPI: Shazia Luna is a(n)67 y.o. female admitted for work-up and treatment for Shortness of breath [R06.02]  Acute hyponatremia [E87.1]  Hyponatremia [E87.1]  Fall, initial encounter [W19. XXXA]  Longstanding persistent atrial fibrillation ([facility] [address] [I48.11]. We are following for liver cirrhosis. Subjective:     No acute events overnight. Confused when I saw her this morning. More alert when I stopped back in to speak with her daughter. Objective:     I/O last 3 completed shifts: In: 1440 [P.O.:1380; I.V.:10; IV Piggyback:50]  Out: 5750 [Urine:5750]      BP (!) 143/100   Pulse 72   Temp (!) 96.1 °F (35.6 °C) (Axillary)   Resp 22   Ht 5' 2\" (1.575 m)   Wt 222 lb 10.6 oz (101 kg)   LMP  (LMP Unknown)   SpO2 98%   BMI 40.73 kg/m²     Physical Exam:  HEENT: anicteric sclera, oropharyngeal membranes pink and moist.  Cor: RRR  Lungs: non-labored, no respiratory distress  Abdomen: soft,NT. No ascites. No hepatomegaly or splenomegaly  Extremities: +2 BLE  edema  Neuro: lethargic, oriented to person, no asterixis      Results:   Lab Results   Component Value Date    ALT 45 (H) 12/19/2022     (H) 12/19/2022    ALKPHOS 151 (H) 12/19/2022    BILIDIR 1.6 (H) 12/19/2022    PROT 6.4 12/19/2022    LABALBU 2.9 (L) 12/19/2022    INR 1.73 (H) 12/17/2022    LIPASE 143.0 (H) 12/19/2022     Lab Results   Component Value Date    WBC 8.4 12/19/2022    HGB 12.1 12/19/2022    HCT 34.2 (L) 12/19/2022    .6 (H) 12/19/2022    PLT 94 (L) 12/19/2022     BUN/Cr/glu/ALT/AST/amyl/lip:  32/0.9/--/--/--/--/-- (12/20 0208)  CT HEAD WO CONTRAST    Result Date: 12/17/2022  No acute intracranial abnormality. CT CERVICAL SPINE WO CONTRAST    Result Date: 12/17/2022  Multilevel degenerative changes with no acute fracture or traumatic malalignment of the cervical spine.      XR CHEST PORTABLE    Result Date: 12/17/2022  The left IJ central venous catheter is deflected laterally into the proximal left subclavian vein area. No evidence of pneumothorax. Marked cardiomegaly with minimal pulmonary vascular congestion without pulmonary edema. XR CHEST PORTABLE    Result Date: 12/17/2022  Left central line projects over the expected left brachiocephalic vein. Correlation for appropriate return is recommended. No pneumothorax. Stable enlargement of cardiac silhouette. XR CHEST PORTABLE    Result Date: 12/17/2022  Mild cardiomegaly and mild central pulmonary congestion or pulmonary artery hypertension which is more prominent. Minimal bibasilar atelectasis or small infiltrates. Questionable small bibasilar pleural effusions which is more prominent. US ABDOMEN LIMITED    Result Date: 12/19/2022  No ascites identified     CT CHEST ABDOMEN PELVIS W CONTRAST Additional Contrast? None    Result Date: 12/17/2022  1. CHEST: Small right pleural effusion and mild atelectasis in the lung bases. 2. No consolidation is appreciated. 3. Moderate to severe cardiomegaly. 4. T12 superior endplate fracture of uncertain age. Moderate anterior height loss of T11, likely chronic. If there is focal tenderness, non-urgent follow-up MRI is a consideration. 5. ABDOMEN AND PELVIS: Cirrhotic morphology of the liver. 6. Prominence of the common and pancreatic ducts, also noted on 01/28/2022 Ductal ectasia or adjacent pancreatic cystic lesion(s) have been considered. Follow-up ERCP or endoscopic ultrasound was previously recommended. 7. No acute abdominopelvic abnormality identified. 8. Moderate anasarca. 9. Chronic-appearing compression fractures at L1 and L3. Impression:  Liver cirrhosis without ascites, most likely alcohol related. Thrombocytopenia, coagulopathy, hyperbilirubinemia. On review of records from OSH, she has had evidence of cirrhosis on imaging dating back to 2021.     2. Alcohol abuse, withdrawal. On CIWA     3. Hyponatremia. Nephrology following.      4. Fluid overload. 5. Mild pulmonary hypertension, echo 12/19/ 6. CBD/PD dilation, cystic lesions. S/p EUS 4/2022, FNA - mucinous cyst, high fluid CEA. Bilirubin elevated (also with cirrhosis), slightly elevated alk phos. Relatively stable. 7. AMS. Ammonia normal.      8. New diagnosis a-fib. 9. Hx breast cancer dx 2019. Plan:  Continue cardiopulmonary management. Noted elevated transferrin, iron in the past. Will recheck these, and obtain the rest of serological CLD work-up. Will need follow-up in our office, fibroscan, variceal screening. Discussed above and management course with the patient's daughter. Please do not hesitate to call with questions or concerns.       Electronically signed by: JOHNY Mendenhall 12/20/2022 3:15 PM

## 2022-12-20 NOTE — PROGRESS NOTES
Hospitalist Progress Note      PCP: Bony Chow MD    Date of Admission: 12/16/2022    Chief Complaint: fell at home       Subjective:  She is more alert and talkative this AM.  Fully oriented for nursing. No complaints. She tells me that she doesn't want me to call her daughter, prefers privacy in this instance. Medications:  Reviewed    Infusion Medications    sodium chloride       Scheduled Medications    magnesium oxide  400 mg Oral BID    chlordiazePOXIDE  5 mg Oral TID    mupirocin   Nasal BID    sodium chloride flush  5-40 mL IntraVENous 2 times per day    lactulose  20 g Oral Daily    thiamine  100 mg Oral Daily    pantoprazole  40 mg Oral QAM AC    urea  15 g Oral BID    anastrozole  1 mg Oral Daily    multivitamin  1 tablet Oral Daily    atorvastatin  20 mg Oral Nightly    metoprolol succinate  50 mg Oral Daily    valACYclovir  500 mg Oral Daily     PRN Meds: ipratropium-albuterol, sodium chloride flush, sodium chloride, potassium chloride, magnesium sulfate, promethazine **OR** ondansetron, acetaminophen **OR** acetaminophen, LORazepam **OR** LORazepam **OR** LORazepam **OR** LORazepam **OR** LORazepam **OR** LORazepam **OR** LORazepam **OR** LORazepam, potassium chloride **OR** potassium alternative oral replacement **OR** potassium chloride      Intake/Output Summary (Last 24 hours) at 12/20/2022 1257  Last data filed at 12/20/2022 1200  Gross per 24 hour   Intake 1030 ml   Output 5900 ml   Net -4870 ml       Physical Exam Performed:    /74   Pulse 57   Temp (!) 96.1 °F (35.6 °C) (Axillary)   Resp 24   Ht 5' 2\" (1.575 m)   Wt 222 lb 10.6 oz (101 kg)   LMP  (LMP Unknown)   SpO2 97%   BMI 40.73 kg/m²     General appearance: No apparent distress, appears stated age. HEENT: Pupils equal, round, and reactive to light. Conjunctivae/corneas clear. Neck: Supple, with full range of motion. No jugular venous distention. Trachea midline. Respiratory:  Normal respiratory effort. Clear to auscultation, bilaterally without Rales/Wheezes/Rhonchi. Cardiovascular: Regular rate and rhythm with normal S1/S2 without murmurs, rubs or gallops. Abdomen: Soft, non-tender, non-distended with normal bowel sounds. Musculoskeletal: No clubbing, cyanosis. 2+ BLE pitting edema. Full range of motion without deformity. Skin: Skin color, texture, turgor normal.  No rashes or lesions. Neurologic:  Neurovascularly intact without any focal sensory/motor deficits. Cranial nerves: II-XII intact, grossly non-focal.  Psychiatric: obtunded  Capillary Refill: Brisk, 3 seconds, normal   Peripheral Pulses: +2 palpable, equal bilaterally       Labs:   Recent Labs     12/18/22 0435 12/19/22 0428   WBC 10.8 8.4   HGB 12.0 12.1   HCT 34.7* 34.2*   * 94*     Recent Labs     12/18/22 0435 12/18/22  0746 12/19/22  0428 12/19/22  0835 12/19/22  1435 12/19/22  2047 12/20/22  0208 12/20/22  0839   *   < > 116*  114* 117*   < > 120* 121* 123*   K 4.6   < > 3.7  3.7 3.9  --   --  3.1*  --    CL 77*   < > 83*  81* 83*  --   --  82*  --    CO2 20*   < > 24  24 24  --   --  30  --    BUN 15   < > 16  16 17  --   --  32*  --    CREATININE <0.5*   < > 0.6  0.7 0.6  --   --  0.9  --    CALCIUM 9.2   < > 8.6  8.7 8.7  --   --  8.9  --    PHOS 2.7  --  2.7  --   --   --   --   --     < > = values in this interval not displayed. Recent Labs     12/18/22 0435 12/19/22 0428   * 105*   ALT 42* 45*   BILIDIR 1.8* 1.6*   BILITOT 2.9* 2.5*   ALKPHOS 176* 151*     No results for input(s): INR in the last 72 hours. No results for input(s): Lucretia Hansel in the last 72 hours.       Urinalysis:      Lab Results   Component Value Date/Time    NITRU Negative 12/17/2022 06:32 AM    WBCUA 0-2 12/17/2022 06:32 AM    BACTERIA 1+ 04/19/2021 04:39 PM    RBCUA None seen 12/17/2022 06:32 AM    BLOODU Negative 12/17/2022 06:32 AM    SPECGRAV 1.015 12/17/2022 06:32 AM    GLUCOSEU Negative 12/17/2022 06:32 AM Radiology:  US ABDOMEN LIMITED   Final Result   No ascites identified         CT CHEST ABDOMEN PELVIS W CONTRAST Additional Contrast? None   Final Result   1. CHEST: Small right pleural effusion and mild atelectasis in the lung bases. 2. No consolidation is appreciated. 3. Moderate to severe cardiomegaly. 4. T12 superior endplate fracture of uncertain age. Moderate anterior height   loss of T11, likely chronic. If there is focal tenderness, non-urgent   follow-up MRI is a consideration. 5. ABDOMEN AND PELVIS: Cirrhotic morphology of the liver. 6. Prominence of the common and pancreatic ducts, also noted on 01/28/2022   Ductal ectasia or adjacent pancreatic cystic lesion(s) have been considered. Follow-up ERCP or endoscopic ultrasound was previously recommended. 7. No acute abdominopelvic abnormality identified. 8. Moderate anasarca. 9. Chronic-appearing compression fractures at L1 and L3. XR CHEST PORTABLE   Final Result   Left central line projects over the expected left brachiocephalic vein. Correlation for appropriate return is recommended. No pneumothorax. Stable enlargement of cardiac silhouette. XR CHEST PORTABLE   Final Result   The left IJ central venous catheter is deflected laterally into the proximal   left subclavian vein area. No evidence of pneumothorax. Marked cardiomegaly with minimal pulmonary vascular congestion without   pulmonary edema. CT CERVICAL SPINE WO CONTRAST   Final Result   Multilevel degenerative changes with no acute fracture or traumatic   malalignment of the cervical spine. CT HEAD WO CONTRAST   Final Result   No acute intracranial abnormality. XR CHEST PORTABLE   Final Result   Mild cardiomegaly and mild central pulmonary congestion or pulmonary artery   hypertension which is more prominent. Minimal bibasilar atelectasis or small infiltrates.       Questionable small bibasilar pleural effusions which is more prominent. IP CONSULT TO NEPHROLOGY  IP CONSULT TO NEPHROLOGY  IP CONSULT TO SOCIAL WORK  IP CONSULT TO GI  IP CONSULT TO CRITICAL CARE  IP CONSULT TO SOCIAL WORK    Assessment/Plan:    Active Hospital Problems    Diagnosis     Macrocytic anemia [D53.9]      Priority: Medium    Thrombocytopenia ([address] [D69.6]      Priority: Medium    Elevated LFTs [R79.89]      Priority: Medium    Hyperbilirubinemia [E80.6]      Priority: Medium    Anasarca [R60.1]      Priority: Medium    Alcoholic cirrhosis of liver with ascites (HCC) [K70.31]      Priority: Medium    Pancreatic duct dilated [K86.89]      Priority: Medium    Paroxysmal atrial fibrillation (HCC) [I48.0]      Priority: Medium    Morbid obesity with BMI of 40.0-44.9, adult (HCC) [E66.01, Z68.41]      Priority: Medium    Suspected sleep apnea [R29.818]      Priority: Medium    Acute hyponatremia [E87.1]      Priority: Medium       The patient is a pleasant 69 Y F with a h/o former COPD, cirrhosis with ongoing alcoholism (possibly 3-4 cans of beer daily), HTN, and L breast cancer s/p resection in 2019. History is limited. Apparently she fell at home, couldn't get up. EMS arrived to help her up, and ultimately decided to bring her to the ED because she was breathing heavily and edematous. When she arrived here she had \"no complaints\" but her Na was 102 (compared to baseline of upper 120's as of 11/1/2022). The patient was lethargic and a poor historian but she actually answered all the orientation questions correctly on arrival.  Hospital medicine admitted her to the ICU overnight. Acute on chronic hyponatremia. Due to cirrhosis, volume overload, poor solute intake. Encourage protein intake, also gave urea. Intermittent furosemide IV and fluid restriction per nephrology. Alcohol dependence and withdrawal.  CIWA with chlordiazepam and PRN lorazepam.  Thiamine. Acute metabolic encephalopathy.   Due to above issues, treat accordingly. No focal neurological deficits. HTN. Held lisinopril due to lower BPs. PAF (new diagnosis). It actually looks like an EKG showed Afib back in 4/2022 during an outpatient colonoscopy but I do not see any mention of this in the chart. Started metoprolol. F/u TTE. The patient has chronic thrombocytopenia, alcohol abuse, an falls, so she is not a good candidate for anticoagulation. Alcoholic cirrhosis. CGI was consulted by admitting physician. DCIS L breast, diagnosed 10/2019. S/p partial mastectomy and radiation in 2019. She is prescribed anastrazole by Dr. Calderon Carlson. She is on chronic valacyclovir, presumably for suppressive therapy of HSV. DVT Prophylaxis: SCDs  Diet: ADULT DIET; Regular; 1500 ml  ADULT ORAL NUTRITION SUPPLEMENT; Breakfast; Standard High Calorie/High Protein Oral Supplement  Code Status: Full Code  PT/OT Eval Status: eval ordered    Dispo - when her Na has improved and when she is thinking more clearly. Perhaps 12/23 - 12/24? She came from home but will presumably need some form of rehab stay.     Appropriate for A1 Discharge Unit: Brianda Grant MD

## 2022-12-20 NOTE — PROGRESS NOTES
Physical Therapy  Facility/Department: [facility] C2 CARD TELEMETRY  Physical Therapy Initial Assessment/Treatment    Name: Robles Rawls  : 1955  MRN: [id number]  Date of Service: 2022    Discharge Recommendations:  Subacute/Skilled Nursing Facility   PT Equipment Recommendations  Equipment Needed: No      Patient Diagnosis(es): The primary encounter diagnosis was Hyponatremia. Diagnoses of Fall, initial encounter, Longstanding persistent atrial fibrillation ([facility] [address] and Shortness of breath were also pertinent to this visit. Past Medical History:  has a past medical history of Age-related osteoporosis without current pathological fracture, Arthritis, EtOH dependence ([address] HSV-2 (herpes simplex virus 2) infection, Hypertension, Kidney stone, and Malignant neoplasm of upper-inner quadrant of left breast in female, estrogen receptor positive ([facility] [address] Past Surgical History:  has a past surgical history that includes Hysterectomy (); Dilation and curettage of uterus (); Colonoscopy (1999); Colonoscopy (13); Total knee arthroplasty (Left, 2019); Mastectomy (Left, 10/8/2019); and Upper gastrointestinal endoscopy (N/A, 2022). Assessment   Body Structures, Functions, Activity Limitations Requiring Skilled Therapeutic Intervention: Decreased functional mobility ; Decreased endurance;Decreased cognition;Decreased strength;Decreased safe awareness;Decreased balance  Assessment: Pt [facility] to [facility] with acute hyponatremia. PTA, pt lives alone in [facility] with about 10 steps to enter and daughter assists prn for homemaking tasks. Pt currently requires max(A)X2 for bed mobility and transfers and requires use of Damien Falling. Pt would benefit from continued skilled PT to address current deficits.  Recommend SNF upon d/c  Treatment Diagnosis: impaired functional mobility  Therapy Prognosis: Fair  Decision Making: Medium Complexity  Requires PT Follow-Up: Yes  Activity Tolerance  Activity Tolerance: Patient tolerated evaluation without incident;Patient limited by endurance     Plan   Physcial Therapy Plan  General Plan: 3-5 times per week  Current Treatment Recommendations: Strengthening, Balance training, Functional mobility training, Transfer training, Endurance training, Gait training, Neuromuscular re-education, Stair training, Home exercise program, Safety education & training, Patient/Caregiver education & training, Equipment evaluation, education, & procurement, Therapeutic activities  Safety Devices  Type of Devices: Left in chair, Call light within reach, Nurse notified, Carlos  in use, Chair alarm in place, Gait belt     Restrictions  Restrictions/Precautions  Restrictions/Precautions: Fall Risk  Position Activity Restriction  Other position/activity restrictions: avasys, up with assistance     Subjective   General  Chart Reviewed: Yes  Patient assessed for rehabilitation services?: Yes  Response To Previous Treatment: Not applicable  Family / Caregiver Present: Yes (daughter ans spouse at bedside at beginning of session, but do not stay)  Referring Practitioner: Virginia Hernandez MD  Referral Date : 12/20/22  Diagnosis: Acute hyponatremia  Follows Commands: Impaired  General Comment  Comments: RN cleared pt for PT eval  Subjective  Subjective: Pt semi-supine in bed upon arrival, agreeable to PT eval    Social/Functional History  Social/Functional History  Lives With: Alone  Type of Home: [facility]  Home Layout: Laundry in basement, Multi-level, Work area in [address] [place] [address] Access: Stairs to enter with rails (garage enters through basement)  Entrance Stairs - Number of Steps: 10  Entrance Stairs - Rails: Right  Bathroom Shower/Tub: Tub/Shower unit  Bathroom Toilet: [address]: Francisco Cuellar  Has the patient had two or more falls in the past year or any fall with injury in the past year?: Yes (\"several\")  Receives Help From: Family  ADL Assistance: [address]: Independent  Ambulation Assistance: Independent  Transfer Assistance: Independent  Active : Yes (short distances)  Additional Comments: Daughter helps prn    Cognition   Orientation  Overall Orientation Status: Impaired  Orientation Level: Oriented to person;Disoriented to situation;Disoriented to place; Disoriented to time     Objective   Heart Rate: 72  BP: (!) 143/100  BP Location: Right lower arm  BP Method: Automatic  MAP (Calculated): 114  Resp: 22  SpO2: 98 %  O2 Device: None (Room air)    Gross Assessment  AROM: Generally decreased, functional  Strength: Grossly decreased, non-functional  Coordination: Generally decreased, functional  Tone: Normal  Sensation: Intact     Bed Mobility Training  Bed Mobility Training: Yes  Supine to Sit: Maximum assistance;Assist X2 (to R)  Sit to Supine: Other (comment) (up to chair at end of session)  Balance  Sitting: Impaired  Sitting - Static: Fair (occasional)  Sitting - Dynamic: Poor (constant support)  Standing: Impaired  Standing - Static: Constant support;Poor  Standing - Dynamic: Constant support;Poor  Transfer Training  Transfer Training: Yes  Sit to Stand: Assist X2;Maximum assistance  Stand to Sit: Maximum assistance;Assist X2  Bed to Chair: Total assistance;Assist X2 (bed to chair with Steady)    AM-PAC Score  AM-PAC Inpatient Mobility Raw Score : 9 (12/20/22 1625)  AM-PAC Inpatient T-Scale Score : 30.55 (12/20/22 1625)  Mobility Inpatient CMS 0-100% Score: 81.38 (12/20/22 1625)  Mobility Inpatient CMS G-Code Modifier : CM (12/20/22 1625)      Goals  Short Term Goals  Time Frame for Short Term Goals: 7 days (12/27/22) unless otherwise noted  Short Term Goal 1: Pt will perform bed mobility with min(A)  Short Term Goal 2: Pt will perform transfers with LRAD and with min(A)  Short Term Goal 3: Pt will ambulate 15 ft with LRAD and min(A)  Short Term Goal 4: Pt will perform 12-15 reps of BLE exercises by 12/23/22  Patient Goals   Patient Goals : Agustín Rolling go home\" Education  Patient Education  Education Given To: Patient  Education Provided: Role of Therapy;Plan of Care;Transfer Training;Precautions; Equipment  Education Method: Verbal  Barriers to Learning: Cognition  Education Outcome: Verbalized understanding;Continued education needed      Therapy Time   Individual Concurrent Group Co-treatment   Time In 1435         Time Out 1508         Minutes 33         Timed Code Treatment Minutes: 23 Minutes (10 min eval)     If pt is unable to be seen after this session, please let this note serve as discharge summary. Please see case management note for discharge disposition. Thank you.     Alicia Neal, PT

## 2022-12-20 NOTE — PROGRESS NOTES
[facility] Progress Note           Subjective/   69y.o. year old female who we are seeing in consultation for Hyponatremia. HPI:  Sodium slowly improving per goal with diuresis, non-oliguric with lasix. Edema improving. ROS:  Intake better, weakness improving. Objective/   GEN:  Chronically ill, /81   Pulse 68   Temp (!) 96.1 °F (35.6 °C) (Axillary)   Resp 24   Ht 5' 2\" (1.575 m)   Wt 222 lb 10.6 oz (101 kg)   LMP  (LMP Unknown)   SpO2 98%   BMI 40.73 kg/m²   HEENT: non-icteric, no JVD  CV: S1, S2 without m/r/g; + LE edema  RESP: CTA B without w/r/r; breathing wnl  ABD: +bs, soft, nt, no hsm  SKIN: warm, no rashes    Data/  Recent Labs     12/18/22  0435 12/19/22  0428   WBC 10.8 8.4   HGB 12.0 12.1   HCT 34.7* 34.2*   .0* 112.6*   * 94*     Recent Labs     12/17/22  1128 12/17/22  1534 12/18/22  0435 12/18/22  0746 12/19/22  0428 12/19/22  0835 12/19/22  1435 12/19/22  2047 12/20/22  0208 12/20/22  0839   *   < > 110*   < > 116*  114* 117*   < > 120* 121* 123*   K 4.8   < > 4.6   < > 3.7  3.7 3.9  --   --  3.1*  --    CL 75*   < > 77*   < > 83*  81* 83*  --   --  82*  --    CO2 21   < > 20*   < > 24  24 24  --   --  30  --    GLUCOSE 246*   < > 146*   < > 138*  140* 118*  --   --  118*  --    PHOS 3.2  --  2.7  --  2.7  --   --   --   --   --    MG 1.60*   < > 2.10  --  1.50*  --   --   --  1.50*  --    BUN 12   < > 15   < > 16  16 17  --   --  32*  --    CREATININE 0.6   < > <0.5*   < > 0.6  0.7 0.6  --   --  0.9  --    LABGLOM >60   < > >60   < > >60  >60 >60  --   --  >60  --     < > = values in this interval not displayed.        Assessment/     -Hyponatremia multifactorial from liver cirrhosis, alcohol to volume overload                Na of 106 at 2354 on 12/16, appropriate correction.    -Liver cirrhosis c/b fluid overload    -Falls    -ETOH abuse with withdrawals    Plan/     -Fluid restriction 1500 ml/day  -Goal na of ~130 by 12/21 am  -Serum sodium q6h  -Holding lasix with improved edema & recent diuresis  -Encouraged protein intake  -Urea 15 gms BID    ____________________________________  Sudarshan Hyde MD  [facility]  [email or web address]  Office: [phone]

## 2022-12-20 NOTE — RT PROTOCOL NOTE
RT Nebulizer Bronchodilator Protocol Note    There is a bronchodilator order in the chart from a provider indicating to follow the RT Bronchodilator Protocol and there is an Initiate RT Bronchodilator Protocol order as well (see protocol at bottom of note). CXR Findings:  No results found. The findings from the last RT Protocol Assessment were as follows:  Smoking: None or smoker <15 pack years  Respiratory Pattern: Regular pattern and RR 12-20 bpm  Breath Sounds: Slightly diminished and/or crackles  Cough: Strong, spontaneous, non-productive  Indication for Bronchodilator Therapy: None  Bronchodilator Assessment Score: 2    Aerosolized bronchodilator medication orders have been revised according to the RT Nebulizer Bronchodilator Protocol below. Respiratory Therapist to perform RT Therapy Protocol Assessment initially then follow the protocol. Repeat RT Therapy Protocol Assessment PRN for score 0-3 or on second treatment, BID, and PRN for scores above 3. No Indications - adjust the frequency to every 6 hours PRN wheezing or bronchospasm, if no treatments needed after 48 hours then discontinue using Per Protocol order mode. If indication present, adjust the RT bronchodilator orders based on the Bronchodilator Assessment Score as indicated below. If a patient is on this medication at home then do not decrease Frequency below that used at home. 0-3 - enter or revise RT bronchodilator order(s) to equivalent RT Bronchodilator order with Frequency of every 4 hours PRN for wheezing or increased work of breathing using Per Protocol order mode. 4-6 - enter or revise RT Bronchodilator order(s) to two equivalent RT bronchodilator orders with one order with BID Frequency and one order with Frequency of every 4 hours PRN wheezing or increased work of breathing using Per Protocol order mode.          7-10 - enter or revise RT Bronchodilator order(s) to two equivalent RT bronchodilator orders with one order with TID Frequency and one order with Frequency of every 4 hours PRN wheezing or increased work of breathing using Per Protocol order mode. 11-13 - enter or revise RT Bronchodilator order(s) to one equivalent RT bronchodilator order with QID Frequency and an Albuterol order with Frequency of every 4 hours PRN wheezing or increased work of breathing using Per Protocol order mode. Greater than 13 - enter or revise RT Bronchodilator order(s) to one equivalent RT bronchodilator order with every 4 hours Frequency and an Albuterol order with Frequency of every 2 hours PRN wheezing or increased work of breathing using Per Protocol order mode. RT to enter RT Home Evaluation for COPD & MDI Assessment order using Per Protocol order mode.     Electronically signed by iJ Bui RCP, RRT, RRT-ACCS on 12/20/2022 at 9:26 AM

## 2022-12-21 LAB
ANION GAP SERPL CALCULATED.3IONS-SCNC: 7 MMOL/L (ref 3–16)
ANION GAP SERPL CALCULATED.3IONS-SCNC: 7 MMOL/L (ref 3–16)
ANTI-NUCLEAR ANTIBODY (ANA): NEGATIVE
BASOPHILS ABSOLUTE: 0.1 K/UL (ref 0–0.2)
BASOPHILS RELATIVE PERCENT: 1.8 %
BUN BLDV-MCNC: 29 MG/DL (ref 7–20)
BUN BLDV-MCNC: 33 MG/DL (ref 7–20)
CALCIUM SERPL-MCNC: 8.8 MG/DL (ref 8.3–10.6)
CALCIUM SERPL-MCNC: 9.2 MG/DL (ref 8.3–10.6)
CHLORIDE BLD-SCNC: 88 MMOL/L (ref 99–110)
CHLORIDE BLD-SCNC: 90 MMOL/L (ref 99–110)
CO2: 33 MMOL/L (ref 21–32)
CO2: 34 MMOL/L (ref 21–32)
CREAT SERPL-MCNC: 0.6 MG/DL (ref 0.6–1.2)
CREAT SERPL-MCNC: 0.6 MG/DL (ref 0.6–1.2)
EOSINOPHILS ABSOLUTE: 0.1 K/UL (ref 0–0.6)
EOSINOPHILS RELATIVE PERCENT: 1.3 %
GFR SERPL CREATININE-BSD FRML MDRD: >60 ML/MIN/{1.73_M2}
GFR SERPL CREATININE-BSD FRML MDRD: >60 ML/MIN/{1.73_M2}
GLUCOSE BLD-MCNC: 117 MG/DL (ref 70–99)
GLUCOSE BLD-MCNC: 146 MG/DL (ref 70–99)
HCT VFR BLD CALC: 35.9 % (ref 36–48)
HEMATOLOGY PATH CONSULT: NO
HEMOGLOBIN: 12.5 G/DL (ref 12–16)
IRON SATURATION: 16 % (ref 15–50)
IRON: 32 UG/DL (ref 37–145)
LYMPHOCYTES ABSOLUTE: 0.8 K/UL (ref 1–5.1)
LYMPHOCYTES RELATIVE PERCENT: 12.8 %
MCH RBC QN AUTO: 39.4 PG (ref 26–34)
MCHC RBC AUTO-ENTMCNC: 34.9 G/DL (ref 31–36)
MCV RBC AUTO: 112.6 FL (ref 80–100)
MONOCYTES ABSOLUTE: 1.2 K/UL (ref 0–1.3)
MONOCYTES RELATIVE PERCENT: 18 %
NEUTROPHILS ABSOLUTE: 4.3 K/UL (ref 1.7–7.7)
NEUTROPHILS RELATIVE PERCENT: 66.1 %
PDW BLD-RTO: 14.4 % (ref 12.4–15.4)
PLATELET # BLD: 75 K/UL (ref 135–450)
PMV BLD AUTO: 8.6 FL (ref 5–10.5)
POTASSIUM SERPL-SCNC: 3 MMOL/L (ref 3.5–5.1)
POTASSIUM SERPL-SCNC: 3.2 MMOL/L (ref 3.5–5.1)
POTASSIUM SERPL-SCNC: 3.2 MMOL/L (ref 3.5–5.1)
RBC # BLD: 3.19 M/UL (ref 4–5.2)
SODIUM BLD-SCNC: 129 MMOL/L (ref 136–145)
SODIUM BLD-SCNC: 129 MMOL/L (ref 136–145)
SODIUM BLD-SCNC: 130 MMOL/L (ref 136–145)
TOTAL IRON BINDING CAPACITY: 201 UG/DL (ref 260–445)
TRANSFERRIN: 169 MG/DL (ref 200–360)
WBC # BLD: 6.5 K/UL (ref 4–11)

## 2022-12-21 PROCEDURE — 6370000000 HC RX 637 (ALT 250 FOR IP): Performed by: INTERNAL MEDICINE

## 2022-12-21 PROCEDURE — 84295 ASSAY OF SERUM SODIUM: CPT

## 2022-12-21 PROCEDURE — 6360000002 HC RX W HCPCS: Performed by: INTERNAL MEDICINE

## 2022-12-21 PROCEDURE — 2580000003 HC RX 258: Performed by: INTERNAL MEDICINE

## 2022-12-21 PROCEDURE — 85025 COMPLETE CBC W/AUTO DIFF WBC: CPT

## 2022-12-21 PROCEDURE — 36415 COLL VENOUS BLD VENIPUNCTURE: CPT

## 2022-12-21 PROCEDURE — 1200000000 HC SEMI PRIVATE

## 2022-12-21 PROCEDURE — 6370000000 HC RX 637 (ALT 250 FOR IP): Performed by: NURSE PRACTITIONER

## 2022-12-21 PROCEDURE — 02HV33Z INSERTION OF INFUSION DEVICE INTO SUPERIOR VENA CAVA, PERCUTANEOUS APPROACH: ICD-10-PCS | Performed by: EMERGENCY MEDICINE

## 2022-12-21 PROCEDURE — 80048 BASIC METABOLIC PNL TOTAL CA: CPT

## 2022-12-21 PROCEDURE — 84132 ASSAY OF SERUM POTASSIUM: CPT

## 2022-12-21 RX ADMIN — LACTULOSE 20 G: 20 SOLUTION ORAL at 09:50

## 2022-12-21 RX ADMIN — ANASTROZOLE 1 MG: 1 TABLET, COATED ORAL at 09:50

## 2022-12-21 RX ADMIN — POTASSIUM CHLORIDE 10 MEQ: 7.46 INJECTION, SOLUTION INTRAVENOUS at 12:00

## 2022-12-21 RX ADMIN — CHLORDIAZEPOXIDE HYDROCHLORIDE 5 MG: 5 CAPSULE ORAL at 09:50

## 2022-12-21 RX ADMIN — SODIUM CHLORIDE, PRESERVATIVE FREE 10 ML: 5 INJECTION INTRAVENOUS at 08:25

## 2022-12-21 RX ADMIN — CHLORDIAZEPOXIDE HYDROCHLORIDE 5 MG: 5 CAPSULE ORAL at 21:12

## 2022-12-21 RX ADMIN — POTASSIUM CHLORIDE 10 MEQ: 7.46 INJECTION, SOLUTION INTRAVENOUS at 09:27

## 2022-12-21 RX ADMIN — POTASSIUM CHLORIDE 10 MEQ: 7.46 INJECTION, SOLUTION INTRAVENOUS at 10:46

## 2022-12-21 RX ADMIN — MUPIROCIN: 20 OINTMENT TOPICAL at 21:12

## 2022-12-21 RX ADMIN — Medication 15 G: at 09:50

## 2022-12-21 RX ADMIN — ATORVASTATIN CALCIUM 20 MG: 10 TABLET, FILM COATED ORAL at 21:12

## 2022-12-21 RX ADMIN — POTASSIUM CHLORIDE 10 MEQ: 7.46 INJECTION, SOLUTION INTRAVENOUS at 14:21

## 2022-12-21 RX ADMIN — Medication 15 G: at 22:18

## 2022-12-21 RX ADMIN — SODIUM CHLORIDE, PRESERVATIVE FREE 10 ML: 5 INJECTION INTRAVENOUS at 21:13

## 2022-12-21 RX ADMIN — METOPROLOL SUCCINATE 50 MG: 50 TABLET, EXTENDED RELEASE ORAL at 09:50

## 2022-12-21 RX ADMIN — Medication 100 MG: at 09:50

## 2022-12-21 RX ADMIN — PANTOPRAZOLE SODIUM 40 MG: 40 TABLET, DELAYED RELEASE ORAL at 05:25

## 2022-12-21 RX ADMIN — Medication 1 TABLET: at 09:50

## 2022-12-21 RX ADMIN — VALACYCLOVIR HYDROCHLORIDE 500 MG: 500 TABLET, FILM COATED ORAL at 09:50

## 2022-12-21 RX ADMIN — CHLORDIAZEPOXIDE HYDROCHLORIDE 5 MG: 5 CAPSULE ORAL at 14:19

## 2022-12-21 RX ADMIN — POTASSIUM CHLORIDE 10 MEQ: 7.46 INJECTION, SOLUTION INTRAVENOUS at 08:25

## 2022-12-21 RX ADMIN — POTASSIUM CHLORIDE 40 MEQ: 1500 TABLET, EXTENDED RELEASE ORAL at 21:12

## 2022-12-21 RX ADMIN — MUPIROCIN: 20 OINTMENT TOPICAL at 08:24

## 2022-12-21 RX ADMIN — POTASSIUM CHLORIDE 10 MEQ: 7.46 INJECTION, SOLUTION INTRAVENOUS at 13:19

## 2022-12-21 RX ADMIN — Medication 400 MG: at 09:50

## 2022-12-21 RX ADMIN — Medication 400 MG: at 21:12

## 2022-12-21 RX ADMIN — POTASSIUM CHLORIDE 40 MEQ: 1500 TABLET, EXTENDED RELEASE ORAL at 18:00

## 2022-12-21 NOTE — CARE COORDINATION
Per previous CM note, pt from home alone and refusing SA resources. PT/OT recommend SNF. Pt unsure and wants to think about it. Pt unable to lift legs to put on pillow. CM spoke with pt's daughter, Breann Porter. Dtr states she will talk to family and pt regarding need for SNF. CM emailed SNF list to daughter at  [email or web address]. CM will continue to follow.  Gisele Art, RN

## 2022-12-21 NOTE — PROGRESS NOTES
Hospitalist Progress Note      PCP: Xander Isaacs MD    Date of Admission: 12/16/2022    Chief Complaint: Fall at home    KALEB FORD Course: H&p reviewed     Subjective: No voiced complaints, gen weak. Medications:  Reviewed    Infusion Medications    sodium chloride       Scheduled Medications    magnesium oxide  400 mg Oral BID    chlordiazePOXIDE  5 mg Oral TID    mupirocin   Nasal BID    sodium chloride flush  5-40 mL IntraVENous 2 times per day    lactulose  20 g Oral Daily    thiamine  100 mg Oral Daily    pantoprazole  40 mg Oral QAM AC    urea  15 g Oral BID    anastrozole  1 mg Oral Daily    multivitamin  1 tablet Oral Daily    atorvastatin  20 mg Oral Nightly    metoprolol succinate  50 mg Oral Daily    valACYclovir  500 mg Oral Daily     PRN Meds: ipratropium-albuterol, sodium chloride flush, sodium chloride, potassium chloride, magnesium sulfate, promethazine **OR** ondansetron, acetaminophen **OR** acetaminophen, LORazepam **OR** LORazepam **OR** LORazepam **OR** LORazepam **OR** LORazepam **OR** LORazepam **OR** LORazepam **OR** LORazepam, potassium chloride **OR** potassium alternative oral replacement **OR** potassium chloride      Intake/Output Summary (Last 24 hours) at 12/21/2022 1638  Last data filed at 12/21/2022 1438  Gross per 24 hour   Intake 210 ml   Output 3375 ml   Net -3165 ml       Physical Exam Performed:    /77   Pulse 67   Temp 97.6 °F (36.4 °C)   Resp 16   Ht 5' 2\" (1.575 m)   Wt 209 lb 7 oz (95 kg)   LMP  (LMP Unknown)   SpO2 100%   BMI 38.31 kg/m²     General appearance: Obese. Ill appearing, NAD. HEENT: Pupils equal, round, and reactive to light. Conjunctivae/corneas clear. Neck: Supple, with full range of motion. No jugular venous distention. Trachea midline. Respiratory:  Normal respiratory effort. Clear to auscultation, bilaterally without Rales/Wheezes/Rhonchi.   Cardiovascular: Regular rate and rhythm with normal S1/S2 without murmurs, rubs or gallops. Abdomen: Soft, non-tender, non-distended with normal bowel sounds. Musculoskeletal: No clubbing/cyanosis 2+edema bilaterally. Skin: Skin color, texture, turgor normal.  No rashes or lesions. Neurologic:  Neurovascularly intact without any focal sensory/motor deficits. Cranial nerves: II-XII intact, grossly non-focal.  Psychiatric: Lethargic, poor insight  Capillary Refill: Brisk, 3 seconds, normal   Peripheral Pulses: +2 palpable, equal bilaterally       Labs:   Recent Labs     12/19/22 0428 12/21/22 0237   WBC 8.4 6.5   HGB 12.1 12.5   HCT 34.2* 35.9*   PLT 94* 75*     Recent Labs     12/19/22  0428 12/19/22  0835 12/19/22  1435 12/20/22  0208 12/20/22  0839 12/20/22  2030 12/21/22  0237 12/21/22  0822 12/21/22  1555   *  114* 117*   < > 121*   < > 128* 130* 129*  --    K 3.7  3.7 3.9  --  3.1*  --   --  3.0*  --  3.2*   CL 83*  81* 83*  --  82*  --   --  90*  --   --    CO2 24  24 24  --  30  --   --  33*  --   --    BUN 16  16 17  --  32*  --   --  33*  --   --    CREATININE 0.6  0.7 0.6  --  0.9  --   --  0.6  --   --    CALCIUM 8.6  8.7 8.7  --  8.9  --   --  8.8  --   --    PHOS 2.7  --   --   --   --   --   --   --   --     < > = values in this interval not displayed. Recent Labs     12/19/22 0428   *   ALT 45*   BILIDIR 1.6*   BILITOT 2.5*   ALKPHOS 151*     No results for input(s): INR in the last 72 hours. No results for input(s): Ardelle Chalk in the last 72 hours. Urinalysis:      Lab Results   Component Value Date/Time    NITRU Negative 12/17/2022 06:32 AM    WBCUA 0-2 12/17/2022 06:32 AM    BACTERIA 1+ 04/19/2021 04:39 PM    RBCUA None seen 12/17/2022 06:32 AM    BLOODU Negative 12/17/2022 06:32 AM    SPECGRAV 1.015 12/17/2022 06:32 AM    GLUCOSEU Negative 12/17/2022 06:32 AM       Radiology:  US ABDOMEN LIMITED   Final Result   No ascites identified         CT CHEST ABDOMEN PELVIS W CONTRAST Additional Contrast? None   Final Result   1.  CHEST: Small right pleural effusion and mild atelectasis in the lung bases. 2. No consolidation is appreciated. 3. Moderate to severe cardiomegaly. 4. T12 superior endplate fracture of uncertain age. Moderate anterior height   loss of T11, likely chronic. If there is focal tenderness, non-urgent   follow-up MRI is a consideration. 5. ABDOMEN AND PELVIS: Cirrhotic morphology of the liver. 6. Prominence of the common and pancreatic ducts, also noted on 01/28/2022   Ductal ectasia or adjacent pancreatic cystic lesion(s) have been considered. Follow-up ERCP or endoscopic ultrasound was previously recommended. 7. No acute abdominopelvic abnormality identified. 8. Moderate anasarca. 9. Chronic-appearing compression fractures at L1 and L3. XR CHEST PORTABLE   Final Result   Left central line projects over the expected left brachiocephalic vein. Correlation for appropriate return is recommended. No pneumothorax. Stable enlargement of cardiac silhouette. XR CHEST PORTABLE   Final Result   The left IJ central venous catheter is deflected laterally into the proximal   left subclavian vein area. No evidence of pneumothorax. Marked cardiomegaly with minimal pulmonary vascular congestion without   pulmonary edema. CT CERVICAL SPINE WO CONTRAST   Final Result   Multilevel degenerative changes with no acute fracture or traumatic   malalignment of the cervical spine. CT HEAD WO CONTRAST   Final Result   No acute intracranial abnormality. XR CHEST PORTABLE   Final Result   Mild cardiomegaly and mild central pulmonary congestion or pulmonary artery   hypertension which is more prominent. Minimal bibasilar atelectasis or small infiltrates. Questionable small bibasilar pleural effusions which is more prominent.              IP CONSULT TO NEPHROLOGY  IP CONSULT TO NEPHROLOGY  IP CONSULT TO SOCIAL WORK  IP CONSULT TO GI  IP CONSULT TO CRITICAL CARE  IP CONSULT TO SOCIAL WORK    Assessment/Plan:    Active Hospital Problems    Diagnosis     Macrocytic anemia [D53.9]      Priority: Medium    Thrombocytopenia ([facility] [D69.6]      Priority: Medium    Elevated LFTs [R79.89]      Priority: Medium    Hyperbilirubinemia [E80.6]      Priority: Medium    Anasarca [R60.1]      Priority: Medium    Alcoholic cirrhosis of liver with ascites (HCC) [K70.31]      Priority: Medium    Pancreatic duct dilated [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 History is limited. Apparently she fell at home, couldn't get up. EMS arrived to help her up, and ultimately decided to bring her to the ED because she was breathing heavily and edematous. When she arrived here she had \"no complaints\" but her Na was 102 (compared to baseline of upper 120's as of 11/1/2022). The patient was lethargic and a poor historian but she actually answered all the orientation questions correctly on arrival.  Hospital medicine admitted her to the ICU overnight. Acute on chronic hyponatremia. Due to cirrhosis, volume overload, poor solute intake. Encourage protein intake, also gave urea. Intermittent furosemide IV and fluid restriction per nephrology. Alcohol dependence and withdrawal.  CIWA with chlordiazepam and PRN lorazepam.  Thiamine. Acute metabolic encephalopathy. Due to above issues, treat accordingly. No focal neurological deficits. HTN. Held lisinopril due to lower BPs. PAF (new diagnosis).   It actually looks like an EKG showed Afib back in 4/2022 during an outpatient colonoscopy but I do not see any mention of this in the chart. Started metoprolol. F/u TTE. The patient has chronic thrombocytopenia, alcohol abuse, an falls, so she is not a good candidate for anticoagulation. Alcoholic cirrhosis. CGI was consulted by admitting physician. DCIS L breast, diagnosed 10/2019. S/p partial mastectomy and radiation in 2019. She is prescribed anastrazole by Dr. Misty Mae. She is on chronic valacyclovir, presumably for suppressive therapy of HSV. DVT Prophylaxis: SCDs  Diet: ADULT DIET;  Regular; 1500 ml  ADULT ORAL NUTRITION SUPPLEMENT; Breakfast; Standard High Calorie/High Protein Oral Supplement  Code Status: Full Code  PT/OT Eval Status: Consulted    Dispo - pending clinical improvement, 2 days    Appropriate for A1 Discharge Unit: JOHNY Santiago - CNP

## 2022-12-21 NOTE — PLAN OF CARE
Problem: Discharge Planning  Goal: Discharge to home or other facility with appropriate resources  Outcome: Progressing     Problem: Pain  Goal: Verbalizes/displays adequate comfort level or baseline comfort level  Outcome: Progressing     Problem: Safety - Adult  Goal: Free from fall injury  Outcome: Progressing     Problem: Skin/Tissue Integrity  Goal: Absence of new skin breakdown  Description: 1. Monitor for areas of redness and/or skin breakdown  2. Assess vascular access sites hourly  3. Every 4-6 hours minimum:  Change oxygen saturation probe site  4. Every 4-6 hours:  If on nasal continuous positive airway pressure, respiratory therapy assess nares and determine need for appliance change or resting period.   Outcome: Progressing     Problem: Respiratory - Adult  Goal: Achieves optimal ventilation and oxygenation  Outcome: Progressing

## 2022-12-21 NOTE — PROGRESS NOTES
Shift assessment completed. Pt A&O, VSS. Pansy Files notified this AM that pt ws very lethargic upon this RN first entering the room. Pt is more alert at this time. Pt receiving Kcl replacement IV. Denies any needs at this time. Bed locked and in lowest position. Call light within reach. Will continue to monitor.

## 2022-12-21 NOTE — PROGRESS NOTES
4 Eyes Admission Assessment     I agree as the admission nurse that 2 RN's have performed a thorough Head to Toe Skin Assessment on the patient. ALL assessment sites listed below have been assessed on admission. Areas assessed by both nurses:   [x]   Head, Face, and Ears   [x]   Shoulders, Back, and Chest  [x]   Arms, Elbows, and Hands   [x]   Coccyx, Sacrum, and Ischum  [x]   Legs, Feet, and Heels        Does the Patient have Skin Breakdown?   No         Genaro Prevention initiated:  No   Wound Care Orders initiated:  RAFAT Mendez consulted for Pressure Injury (Stage 3,4, Unstageable, DTI, NWPT, and Complex wounds):  NA      Nurse 1 eSignature: Electronically signed by Jana Marrero RN on 12/21/22 at 6:17 AM EST    **SHARE this note so that the co-signing nurse is able to place an eSignature**    Nurse 2 eSignature: Electronically signed by Manuelito Goldstein RN on 12/21/22 at 6:24 AM EST

## 2022-12-21 NOTE — PLAN OF CARE
Problem: Cardiovascular - Adult  Goal: Absence of cardiac dysrhythmias or at baseline  Outcome: Progressing     Problem: Genitourinary - Adult  Goal: Absence of urinary retention  Outcome: Progressing     Problem: Genitourinary - Adult  Goal: Urinary catheter remains patent  Outcome: Progressing   Wilson's patent and draining well  Problem: Neurosensory - Adult  Goal: Achieves stable or improved neurological status  Outcome: Progressing     Problem: Neurosensory - Adult  Goal: Absence of seizures  Outcome: Progressing   No episode of seizure recorded

## 2022-12-21 NOTE — PROGRESS NOTES
[facility] Progress Note           Subjective/   69y.o. year old female who we are seeing in consultation for Hyponatremia. HPI:  Sodium slowly improving per goal with diuresis, non-oliguric. Edema improving. ROS:  Intake better, weakness improving. Objective/   GEN:  Chronically ill, BP (!) 135/90   Pulse 70   Temp 97.7 °F (36.5 °C) (Axillary)   Resp 16   Ht 5' 2\" (1.575 m)   Wt 209 lb 7 oz (95 kg)   LMP  (LMP Unknown)   SpO2 99%   BMI 38.31 kg/m²   HEENT: non-icteric, no JVD  CV: S1, S2 without m/r/g; trace LE edema  RESP: CTA B without w/r/r; breathing wnl  ABD: +bs, soft, nt, no hsm  SKIN: warm, no rashes    Data/  Recent Labs     12/19/22  0428 12/21/22  0237   WBC 8.4 6.5   HGB 12.1 12.5   HCT 34.2* 35.9*   .6* 112.6*   PLT 94* 75*       Recent Labs     12/19/22  0428 12/19/22  0835 12/19/22  1435 12/20/22  0208 12/20/22  0839 12/20/22  2030 12/21/22  0237 12/21/22  0822   *  114* 117*   < > 121*   < > 128* 130* 129*   K 3.7  3.7 3.9  --  3.1*  --   --  3.0*  --    CL 83*  81* 83*  --  82*  --   --  90*  --    CO2 24  24 24  --  30  --   --  33*  --    GLUCOSE 138*  140* 118*  --  118*  --   --  117*  --    PHOS 2.7  --   --   --   --   --   --   --    MG 1.50*  --   --  1.50*  --   --   --   --    BUN 16  16 17  --  32*  --   --  33*  --    CREATININE 0.6  0.7 0.6  --  0.9  --   --  0.6  --    LABGLOM >60  >60 >60  --  >60  --   --  >60  --     < > = values in this interval not displayed.          Assessment/     -Hyponatremia multifactorial from liver cirrhosis, alcohol to volume overload                Na of 106 at 2354 on 12/16, appropriate correction.    -Liver cirrhosis c/b fluid overload    -Falls    -ETOH abuse with withdrawals    -Hypokalemia - prn replacement per protocol    Plan/     -Fluid restriction 1500 ml/day  -Holding lasix with improved edema & recent diuresis  -Encouraged protein intake  -Urea 15 gms BID  -Trend labs bid - goal SNa mid 130's by 12/22 AM    ____________________________________  Sharon Centeno MD  [facility]  [email or web address]  Office: [phone]

## 2022-12-21 NOTE — PROGRESS NOTES
PROGRESS NOTE    HPI: Jes Mcqueen is a(n)67 y.o. female admitted for work-up and treatment for Shortness of breath [R06.02]  Acute hyponatremia [E87.1]  Hyponatremia [E87.1]  Fall, initial encounter [W19. XXXA]  Longstanding persistent atrial fibrillation ([address] [I48.11]. We are following for liver cirrhosis. Subjective:     Always seems to be more confused when I see her in the AM, improved in afternoon. Tells me she is in the hospital. Nothing further. No acute events overnight. Objective:     I/O last 3 completed shifts: In: 1 [P.O.:830]  Out: 6200 [Urine:6200]      BP (!) 135/90   Pulse 70   Temp 97.7 °F (36.5 °C) (Axillary)   Resp 16   Ht 5' 2\" (1.575 m)   Wt 209 lb 7 oz (95 kg)   LMP  (LMP Unknown)   SpO2 99%   BMI 38.31 kg/m²     Physical Exam:  HEENT: + icteric sclera, oropharyngeal membranes pink and moist.  Cor: RRR  Lungs: non-labored, no respiratory distress  Abdomen: soft, + BS, NT. No ascites. No hepatomegaly or splenomegaly  Extremities: +2 BLE edema  Neuro: lethargic but able to arouse, oriented to person. Results:  Lab Results   Component Value Date    ALT 45 (H) 12/19/2022     (H) 12/19/2022    ALKPHOS 151 (H) 12/19/2022    BILIDIR 1.6 (H) 12/19/2022    PROT 6.4 12/19/2022    LABALBU 2.9 (L) 12/19/2022    INR 1.73 (H) 12/17/2022    LIPASE 143.0 (H) 12/19/2022     Lab Results   Component Value Date    WBC 6.5 12/21/2022    HGB 12.5 12/21/2022    HCT 35.9 (L) 12/21/2022    .6 (H) 12/21/2022    PLT 75 (L) 12/21/2022     BUN/Cr/glu/ALT/AST/amyl/lip:  33/0.6/--/--/--/--/-- (12/21 0237)  CT HEAD WO CONTRAST    Result Date: 12/17/2022  No acute intracranial abnormality. CT CERVICAL SPINE WO CONTRAST    Result Date: 12/17/2022  Multilevel degenerative changes with no acute fracture or traumatic malalignment of the cervical spine.      XR CHEST PORTABLE    Result Date: 12/17/2022  The left IJ central venous catheter is deflected laterally into the proximal left subclavian vein area. No evidence of pneumothorax. Marked cardiomegaly with minimal pulmonary vascular congestion without pulmonary edema. XR CHEST PORTABLE    Result Date: 12/17/2022  Left central line projects over the expected left brachiocephalic vein. Correlation for appropriate return is recommended. No pneumothorax. Stable enlargement of cardiac silhouette. XR CHEST PORTABLE    Result Date: 12/17/2022  Mild cardiomegaly and mild central pulmonary congestion or pulmonary artery hypertension which is more prominent. Minimal bibasilar atelectasis or small infiltrates. Questionable small bibasilar pleural effusions which is more prominent. US ABDOMEN LIMITED    Result Date: 12/19/2022  No ascites identified     CT CHEST ABDOMEN PELVIS W CONTRAST Additional Contrast? None    Result Date: 12/17/2022  1. CHEST: Small right pleural effusion and mild atelectasis in the lung bases. 2. No consolidation is appreciated. 3. Moderate to severe cardiomegaly. 4. T12 superior endplate fracture of uncertain age. Moderate anterior height loss of T11, likely chronic. If there is focal tenderness, non-urgent follow-up MRI is a consideration. 5. ABDOMEN AND PELVIS: Cirrhotic morphology of the liver. 6. Prominence of the common and pancreatic ducts, also noted on 01/28/2022 Ductal ectasia or adjacent pancreatic cystic lesion(s) have been considered. Follow-up ERCP or endoscopic ultrasound was previously recommended. 7. No acute abdominopelvic abnormality identified. 8. Moderate anasarca. 9. Chronic-appearing compression fractures at L1 and L3. Impression:  Liver cirrhosis without ascites, most likely alcohol related. Thrombocytopenia, coagulopathy, hyperbilirubinemia. On review of records from [facility], she has had evidence of cirrhosis on imaging dating back to 2021. CLD work- up in process. 2. HE clinically evident now.     3. Alcohol abuse, withdrawal. On CIWA. 4. Hyponatremia. Nephrology following. Improving. 5. Fluid overload. 6. Mild pulmonary hypertension, echo 12/19/ 7. CBD/PD dilation, cystic lesions. S/p EUS 4/2022, FNA - mucinous cyst, high fluid CEA. Bilirubin elevated (also with cirrhosis), slightly elevated alk phos. Relatively stable. 8. New diagnosis a-fib. 9. Hx breast cancer dx 2019. Plan:  Continue current treatment plan. Will need follow-up in our office, fibroscan, variceal screening. Although ammonia normal on admission, she has evidence of asterixis on today's exam. Continue lactulose daily (did not receive yesterday, started 12/19) and titrate to 3 BMs daily. Discussed above and management course with the patient's daughter yesterday. Please do not hesitate to call with questions or concerns.       Electronically signed by: JOHNY Alvarez 12/21/2022 8:52 AM

## 2022-12-22 LAB
ANION GAP SERPL CALCULATED.3IONS-SCNC: 7 MMOL/L (ref 3–16)
ANION GAP SERPL CALCULATED.3IONS-SCNC: 7 MMOL/L (ref 3–16)
BASOPHILS ABSOLUTE: 0.1 K/UL (ref 0–0.2)
BASOPHILS RELATIVE PERCENT: 1.3 %
BUN BLDV-MCNC: 24 MG/DL (ref 7–20)
BUN BLDV-MCNC: 27 MG/DL (ref 7–20)
CALCIUM SERPL-MCNC: 9.2 MG/DL (ref 8.3–10.6)
CALCIUM SERPL-MCNC: 9.4 MG/DL (ref 8.3–10.6)
CHLORIDE BLD-SCNC: 92 MMOL/L (ref 99–110)
CHLORIDE BLD-SCNC: 93 MMOL/L (ref 99–110)
CO2: 32 MMOL/L (ref 21–32)
CO2: 33 MMOL/L (ref 21–32)
CREAT SERPL-MCNC: 0.6 MG/DL (ref 0.6–1.2)
CREAT SERPL-MCNC: 0.7 MG/DL (ref 0.6–1.2)
EOSINOPHILS ABSOLUTE: 0.2 K/UL (ref 0–0.6)
EOSINOPHILS RELATIVE PERCENT: 3.2 %
F-ACTIN AB IGG: 12 UNITS (ref 0–19)
GFR SERPL CREATININE-BSD FRML MDRD: >60 ML/MIN/{1.73_M2}
GFR SERPL CREATININE-BSD FRML MDRD: >60 ML/MIN/{1.73_M2}
GLUCOSE BLD-MCNC: 102 MG/DL (ref 70–99)
GLUCOSE BLD-MCNC: 141 MG/DL (ref 70–99)
HCT VFR BLD CALC: 36.6 % (ref 36–48)
HEMATOLOGY PATH CONSULT: NO
HEMOGLOBIN: 12.5 G/DL (ref 12–16)
LYMPHOCYTES ABSOLUTE: 1.5 K/UL (ref 1–5.1)
LYMPHOCYTES RELATIVE PERCENT: 24.1 %
MCH RBC QN AUTO: 38.8 PG (ref 26–34)
MCHC RBC AUTO-ENTMCNC: 34.2 G/DL (ref 31–36)
MCV RBC AUTO: 113.7 FL (ref 80–100)
MONOCYTES ABSOLUTE: 1.2 K/UL (ref 0–1.3)
MONOCYTES RELATIVE PERCENT: 19.5 %
NEUTROPHILS ABSOLUTE: 3.2 K/UL (ref 1.7–7.7)
NEUTROPHILS RELATIVE PERCENT: 51.9 %
PDW BLD-RTO: 14.5 % (ref 12.4–15.4)
PLATELET # BLD: 83 K/UL (ref 135–450)
PMV BLD AUTO: 8.1 FL (ref 5–10.5)
POTASSIUM SERPL-SCNC: 3.7 MMOL/L (ref 3.5–5.1)
POTASSIUM SERPL-SCNC: 4.2 MMOL/L (ref 3.5–5.1)
RBC # BLD: 3.22 M/UL (ref 4–5.2)
SODIUM BLD-SCNC: 132 MMOL/L (ref 136–145)
SODIUM BLD-SCNC: 132 MMOL/L (ref 136–145)
WBC # BLD: 6.1 K/UL (ref 4–11)

## 2022-12-22 PROCEDURE — 97535 SELF CARE MNGMENT TRAINING: CPT

## 2022-12-22 PROCEDURE — 6370000000 HC RX 637 (ALT 250 FOR IP): Performed by: INTERNAL MEDICINE

## 2022-12-22 PROCEDURE — 80048 BASIC METABOLIC PNL TOTAL CA: CPT

## 2022-12-22 PROCEDURE — 1200000000 HC SEMI PRIVATE

## 2022-12-22 PROCEDURE — 36415 COLL VENOUS BLD VENIPUNCTURE: CPT

## 2022-12-22 PROCEDURE — 6370000000 HC RX 637 (ALT 250 FOR IP): Performed by: NURSE PRACTITIONER

## 2022-12-22 PROCEDURE — 2580000003 HC RX 258: Performed by: INTERNAL MEDICINE

## 2022-12-22 PROCEDURE — 85025 COMPLETE CBC W/AUTO DIFF WBC: CPT

## 2022-12-22 RX ORDER — FOLIC ACID 1 MG/1
1 TABLET ORAL DAILY
Status: DISCONTINUED | OUTPATIENT
Start: 2022-12-22 | End: 2023-01-04 | Stop reason: HOSPADM

## 2022-12-22 RX ORDER — FUROSEMIDE 40 MG/1
40 TABLET ORAL DAILY
Status: DISCONTINUED | OUTPATIENT
Start: 2022-12-22 | End: 2022-12-27

## 2022-12-22 RX ADMIN — LACTULOSE 20 G: 20 SOLUTION ORAL at 08:49

## 2022-12-22 RX ADMIN — METOPROLOL SUCCINATE 50 MG: 50 TABLET, EXTENDED RELEASE ORAL at 08:49

## 2022-12-22 RX ADMIN — MUPIROCIN: 20 OINTMENT TOPICAL at 20:31

## 2022-12-22 RX ADMIN — PANTOPRAZOLE SODIUM 40 MG: 40 TABLET, DELAYED RELEASE ORAL at 05:45

## 2022-12-22 RX ADMIN — Medication 400 MG: at 08:49

## 2022-12-22 RX ADMIN — SODIUM CHLORIDE, PRESERVATIVE FREE 10 ML: 5 INJECTION INTRAVENOUS at 20:32

## 2022-12-22 RX ADMIN — Medication 1 TABLET: at 08:49

## 2022-12-22 RX ADMIN — SODIUM CHLORIDE, PRESERVATIVE FREE 10 ML: 5 INJECTION INTRAVENOUS at 08:49

## 2022-12-22 RX ADMIN — Medication 100 MG: at 08:49

## 2022-12-22 RX ADMIN — Medication 400 MG: at 20:31

## 2022-12-22 RX ADMIN — CHLORDIAZEPOXIDE HYDROCHLORIDE 5 MG: 5 CAPSULE ORAL at 13:58

## 2022-12-22 RX ADMIN — CHLORDIAZEPOXIDE HYDROCHLORIDE 5 MG: 5 CAPSULE ORAL at 20:31

## 2022-12-22 RX ADMIN — FOLIC ACID 1 MG: 1 TABLET ORAL at 17:35

## 2022-12-22 RX ADMIN — MUPIROCIN: 20 OINTMENT TOPICAL at 08:49

## 2022-12-22 RX ADMIN — Medication 15 G: at 08:51

## 2022-12-22 RX ADMIN — ATORVASTATIN CALCIUM 20 MG: 10 TABLET, FILM COATED ORAL at 20:31

## 2022-12-22 RX ADMIN — CHLORDIAZEPOXIDE HYDROCHLORIDE 5 MG: 5 CAPSULE ORAL at 08:49

## 2022-12-22 RX ADMIN — FUROSEMIDE 40 MG: 40 TABLET ORAL at 12:32

## 2022-12-22 RX ADMIN — ANASTROZOLE 1 MG: 1 TABLET, COATED ORAL at 08:51

## 2022-12-22 NOTE — PROGRESS NOTES
Hospitalist Progress Note      PCP: Smith Luna MD    Date of Admission: 12/16/2022    Chief Complaint: Fall at home    KALEB FORD Course: H&p reviewed     Subjective: No voiced complaints, gen weak. Sitting in chair, more alert today. Updated daughter on plan of care. Medications:  Reviewed    Infusion Medications    sodium chloride       Scheduled Medications    furosemide  40 mg Oral Daily    folic acid  1 mg Oral Daily    magnesium oxide  400 mg Oral BID    chlordiazePOXIDE  5 mg Oral TID    mupirocin   Nasal BID    sodium chloride flush  5-40 mL IntraVENous 2 times per day    lactulose  20 g Oral Daily    thiamine  100 mg Oral Daily    pantoprazole  40 mg Oral QAM AC    anastrozole  1 mg Oral Daily    multivitamin  1 tablet Oral Daily    atorvastatin  20 mg Oral Nightly    metoprolol succinate  50 mg Oral Daily    valACYclovir  500 mg Oral Daily     PRN Meds: ipratropium-albuterol, sodium chloride flush, sodium chloride, potassium chloride, magnesium sulfate, promethazine **OR** ondansetron, acetaminophen **OR** acetaminophen, LORazepam **OR** LORazepam **OR** LORazepam **OR** LORazepam **OR** LORazepam **OR** LORazepam **OR** LORazepam **OR** LORazepam, potassium chloride **OR** potassium alternative oral replacement **OR** potassium chloride      Intake/Output Summary (Last 24 hours) at 12/22/2022 1644  Last data filed at 12/22/2022 1551  Gross per 24 hour   Intake 742 ml   Output 2525 ml   Net -1783 ml         Physical Exam Performed:    BP (!) 157/98   Pulse 68   Temp 97.3 °F (36.3 °C) (Oral)   Resp 17   Ht 5' 2\" (1.575 m)   Wt 245 lb 9.6 oz (111.4 kg)   LMP  (LMP Unknown)   SpO2 98%   BMI 44.92 kg/m²     General appearance: Obese. Ill appearing, NAD. HEENT: Pupils equal, round, and reactive to light. Conjunctivae/corneas clear. Neck: Supple, with full range of motion. No jugular venous distention. Trachea midline. Respiratory:  Normal respiratory effort.  Clear to auscultation, bilaterally without Rales/Wheezes/Rhonchi. Cardiovascular: Regular rate and rhythm with normal S1/S2 without murmurs, rubs or gallops. Abdomen: Soft, non-tender, non-distended with normal bowel sounds. Musculoskeletal: No clubbing/cyanosis 2+edema bilaterally. Skin: Skin color, texture, turgor normal.  No rashes or lesions. Neurologic:  Neurovascularly intact without any focal sensory/motor deficits. Cranial nerves: II-XII intact, grossly non-focal.  Psychiatric: Alert, poor insight  Capillary Refill: Brisk, 3 seconds, normal   Peripheral Pulses: +2 palpable, equal bilaterally       Labs:   Recent Labs     12/21/22  0237 12/22/22  0905   WBC 6.5 6.1   HGB 12.5 12.5   HCT 35.9* 36.6   PLT 75* 83*       Recent Labs     12/21/22  0237 12/21/22  0822 12/21/22  1555 12/21/22  1756 12/22/22  0905   * 129*  --  129* 132*   K 3.0*  --  3.2* 3.2* 4.2   CL 90*  --   --  88* 93*   CO2 33*  --   --  34* 32   BUN 33*  --   --  29* 24*   CREATININE 0.6  --   --  0.6 0.6   CALCIUM 8.8  --   --  9.2 9.2       No results for input(s): AST, ALT, BILIDIR, BILITOT, ALKPHOS in the last 72 hours. No results for input(s): INR in the last 72 hours. No results for input(s): [facility] in the last 72 hours. Urinalysis:      Lab Results   Component Value Date/Time    NITRU Negative 12/17/2022 06:32 AM    WBCUA 0-2 12/17/2022 06:32 AM    BACTERIA 1+ 04/19/2021 04:39 PM    RBCUA None seen 12/17/2022 06:32 AM    BLOODU Negative 12/17/2022 06:32 AM    SPECGRAV 1.015 12/17/2022 06:32 AM    GLUCOSEU Negative 12/17/2022 06:32 AM       Radiology:  US ABDOMEN LIMITED   Final Result   No ascites identified         CT CHEST ABDOMEN PELVIS W CONTRAST Additional Contrast? None   Final Result   1. CHEST: Small right pleural effusion and mild atelectasis in the lung bases. 2. No consolidation is appreciated. 3. Moderate to severe cardiomegaly. 4. T12 superior endplate fracture of uncertain age.   Moderate anterior height loss of T11, likely chronic. If there is focal tenderness, non-urgent   follow-up MRI is a consideration. 5. ABDOMEN AND PELVIS: Cirrhotic morphology of the liver. 6. Prominence of the common and pancreatic ducts, also noted on 01/28/2022   Ductal ectasia or adjacent pancreatic cystic lesion(s) have been considered. Follow-up ERCP or endoscopic ultrasound was previously recommended. 7. No acute abdominopelvic abnormality identified. 8. Moderate anasarca. 9. Chronic-appearing compression fractures at L1 and L3. XR CHEST PORTABLE   Final Result   Left central line projects over the expected left brachiocephalic vein. Correlation for appropriate return is recommended. No pneumothorax. Stable enlargement of cardiac silhouette. XR CHEST PORTABLE   Final Result   The left IJ central venous catheter is deflected laterally into the proximal   left subclavian vein area. No evidence of pneumothorax. Marked cardiomegaly with minimal pulmonary vascular congestion without   pulmonary edema. CT CERVICAL SPINE WO CONTRAST   Final Result   Multilevel degenerative changes with no acute fracture or traumatic   malalignment of the cervical spine. CT HEAD WO CONTRAST   Final Result   No acute intracranial abnormality. XR CHEST PORTABLE   Final Result   Mild cardiomegaly and mild central pulmonary congestion or pulmonary artery   hypertension which is more prominent. Minimal bibasilar atelectasis or small infiltrates. Questionable small bibasilar pleural effusions which is more prominent.              IP CONSULT TO NEPHROLOGY  IP CONSULT TO NEPHROLOGY  IP CONSULT TO SOCIAL WORK  IP CONSULT TO GI  IP CONSULT TO CRITICAL CARE  IP CONSULT TO SOCIAL WORK    Assessment/Plan:    Active Hospital Problems    Diagnosis     Macrocytic anemia [D53.9]      Priority: Medium    Thrombocytopenia ([facility] [D69.6]      Priority: Medium    Elevated LFTs [R79.89] Priority: Medium    Hyperbilirubinemia [E80.6]      Priority: Medium    Anasarca [R60.1]      Priority: Medium    Alcoholic cirrhosis of liver with ascites (HCC) [K70.31]      Priority: Medium    Pancreatic duct dilated [K86.89]      Priority: Medium    Paroxysmal atrial fibrillation (HCC) [I48.0]      Priority: Medium    Morbid obesity with BMI of 40.0-44.9, adult ([facility] [address] [E66.01, Z68.41]      Priority: Medium    Suspected sleep apnea [R29.818]      Priority: Medium    Acute hyponatremia [E87.1]      Priority: Medium     69 Y F with a h/o former COPD, cirrhosis with ongoing alcoholism (possibly 3-4 cans of beer daily), HTN, and L breast cancer s/p resection in 2019. History is limited. Apparently she fell at home, couldn't get up. EMS arrived to help her up, and ultimately decided to bring her to the ED because she was breathing heavily and edematous. When she arrived here she had \"no complaints\" but her Na was 102 (compared to baseline of upper 120's as of 11/1/2022). The patient was lethargic and a poor historian but she actually answered all the orientation questions correctly on arrival.  Hospital medicine admitted her to the ICU overnight. Acute on chronic hyponatremia. Due to cirrhosis, volume overload, poor solute intake. Encourage protein intake, also gave urea. Intermittent furosemide IV and fluid restriction per nephrology. Now on PO lasix     Alcohol dependence and withdrawal.  CIWA with chlordiazepam and PRN lorazepam.  Thiamine. Acute metabolic encephalopathy. Due to above issues, treat accordingly. No focal neurological deficits. HTN. Held lisinopril due to lower BPs. PAF (new diagnosis). It actually looks like an EKG showed Afib back in 4/2022 during an outpatient colonoscopy but I do not see any mention of this in the chart. Started metoprolol. F/u TTE.   The patient has chronic thrombocytopenia, alcohol abuse, an falls, so she is not a good candidate for anticoagulation. Alcoholic cirrhosis. CGI was consulted by admitting physician. DCIS L breast, diagnosed 10/2019. S/p partial mastectomy and radiation in 2019. She is prescribed anastrazole by Dr. Radha Morelos. She is on chronic valacyclovir, presumably for suppressive therapy of HSV. DVT Prophylaxis: SCDs  Diet: ADULT DIET;  Regular; 1500 ml  ADULT ORAL NUTRITION SUPPLEMENT; Breakfast; Standard High Calorie/High Protein Oral Supplement  Code Status: Full Code  PT/OT Eval Status: Consulted    Dispo - pending clinical improvement, rec snf,, 1-2 days    Appropriate for A1 Discharge Unit: JOHNY Herrmann - CNP

## 2022-12-22 NOTE — PROGRESS NOTES
Occupational Therapy  Facility/Department: [facility] B3 - MED SURG  Daily Treatment Note  NAME: Tha Faye  : 1955  MRN: [id number]    Date of Service: 2022    Discharge Recommendations:  Subacute/Skilled Nursing Facility  OT Equipment Recommendations  Equipment Needed: No  Other: defer to next level of care    Patient Diagnosis(es): The primary encounter diagnosis was Hyponatremia. Diagnoses of Fall, initial encounter, Longstanding persistent atrial fibrillation ([address] and Shortness of breath were also pertinent to this visit. Assessment   At this time pt is SBA bed mobility, Anna STS with stedy, dep toileting. Pt is currently functioning below baseline, will continue to benefit from skilled OT services in the acute care setting, and SNF is recommended at discharge to increase pt safety and ind with ADLs/transfers/ambulation. Activity Tolerance: Patient tolerated evaluation without incident;Patient limited by endurance;Treatment limited secondary to decreased cognition  Discharge Recommendations: Subacute/Skilled Nursing Facility  Equipment Needed: No  Other: defer to next level of care      Plan  Occupational Therapy Plan  Times Per Week: 3-5x's a week while in acute care  Current Treatment Recommendations: Strengthening;ROM; Functional mobility training;Balance training; Endurance training;Gait training; Safety education & training;Patient/Caregiver education & training;Equipment evaluation, education, & procurement;Positioning;Self-Care / ADL     Restrictions  Restrictions/Precautions: Fall Risk;General Precautions  Other position/activity restrictions: avasys, up with assistance, tele, IV, terrazas    Subjective  Subjective: pt semi fowlers in bed agreeable to OT services upon arrival. RN approved therapy. Pain: Pt denies pain at this time.     Orientation  Overall Orientation Status: Impaired  Orientation Level: Oriented to person;Disoriented to situation;Disoriented to place;Oriented to time    Cognition  Overall Cognitive Status: Exceptions  Arousal/Alertness: Delayed responses to stimuli;Inconsistent responses to stimuli  Following Commands: Inconsistently follows commands  Attention Span: Attends with cues to redirect  Memory: Decreased recall of recent events;Decreased short term memory  Safety Judgement: Decreased awareness of need for assistance;Decreased awareness of need for safety  Problem Solving: Decreased awareness of errors;Assistance required to generate solutions;Assistance required to implement solutions;Assistance required to correct errors made;Assistance required to identify errors made  Insights: Decreased awareness of deficits  Initiation: Requires cues for some  Sequencing: Requires cues for some    Objective  Bed Mobility Training  Overall Level of Assistance: Stand-by assistance  Interventions: Verbal cues  Supine to Sit: Stand-by assistance; Adaptive equipment (HOB raised, bed rails)    Balance  Sitting: Intact (seated EOB)  Sitting - Static: Good (unsupported)  Sitting - Dynamic: Good (unsupported)  Standing: With support (stedy)  Standing - Static: Poor;Constant support (stedy)  Standing - Dynamic: Poor;Constant support (stedy)    Transfer Training  Overall Level of Assistance: Minimum assistance; Adaptive equipment; Additional time (stedy)  Interventions: Verbal cues  Sit to Stand: Minimum assistance; Adaptive equipment; Additional time (stedy)  Stand to Sit: Minimum assistance; Adaptive equipment; Additional time (stedy)  Toilet Transfer: Minimum assistance; Adaptive equipment; Additional time (stedy, grab bars, standard toilet)    Gait  Overall Level of Assistance: Total assistance (stedy to from bathroom)  Assistive Device: Other (comment) (stedy)    ADL  Toileting: Dependent/Total  Toileting Skilled Clinical Factors: standard toilet, stedy, grab bars  Additional Comments: pt declined any further ADL needs this date    Safety Devices  Type of Devices:  All mirtha prominences offloaded; All fall risk precautions in place; Bed alarm in place;Call light within reach;Gait belt; Heels elevated for pressure relief;Patient at risk for falls; Left in bed;Nurse notified  Restraints Initially in Place: No    Patient Education  Education Given To: Patient  Education Provided: Role of Therapy;Plan of Care;Orientation; ADL Adaptive Strategies;Transfer Training; Fall Prevention Strategies; Equipment  Education Method: Demonstration;Verbal  Barriers to Learning: Cognition  Education Outcome: Continued education needed  Disease specific: Pt educated on benefits of OOB activity to decrease risks associated with prolonged bedrest while in hospital.    Goals  Short Term Goals  Time Frame for Short Term Goals: 1 week 12/27/2022 - all non-met goals ongoing as of 12/22/2022  Short Term Goal 1: Pt will complete LE dressing with modA  Short Term Goal 2: Pt will complete toilet transfers with min of 1  Short Term Goal 3: Pt will complete 10 reps of BUE AROM exercises to increase strength for ADLs/transfers by 12/25  Short Term Goal 4: Pt will complete 1 min of static standing Anna of 1 for balance for ADLs prep. Patient Goals   Patient goals : \"to go home\"    Therapy Time   Individual Concurrent Group Co-treatment   Time In 1510         Time Out 1533         Minutes 23         Timed Code Treatment Minutes: [address], OTR/L  If pt is unable to be seen after this session, please let this note serve as discharge summary. Please see case management note for discharge disposition. Thank you.

## 2022-12-22 NOTE — CARE COORDINATION
Chart reviewed. S/p fall at home, BLE edema, SOB, alcohol abuse with cirrhosis, encephalopathy. Management per GI, nephro and IM. Pt from home, alone. IPTA. PT/OT rec SNF. Pt refusing, but unable to stand/walk on own. Pt's daughters are trying to convince pt that she needs SNF. SNF list emailed to daughter yesterday. They are reviewing list. Pt refusing SA resources. CM will continue to follow.  Gisele Mckinley RN

## 2022-12-22 NOTE — PLAN OF CARE
Problem: Discharge Planning  Goal: Discharge to home or other facility with appropriate resources  Outcome: Progressing     Problem: Pain  Goal: Verbalizes/displays adequate comfort level or baseline comfort level  Outcome: Progressing     Problem: Safety - Adult  Goal: Free from fall injury  Outcome: Progressing     Problem: Skin/Tissue Integrity  Goal: Absence of new skin breakdown  Description: 1. Monitor for areas of redness and/or skin breakdown  2. Assess vascular access sites hourly  3. Every 4-6 hours minimum:  Change oxygen saturation probe site  4. Every 4-6 hours:  If on nasal continuous positive airway pressure, respiratory therapy assess nares and determine need for appliance change or resting period.   Outcome: Progressing     Problem: Respiratory - Adult  Goal: Achieves optimal ventilation and oxygenation  Outcome: Progressing     Problem: Cardiovascular - Adult  Goal: Maintains optimal cardiac output and hemodynamic stability  Outcome: Progressing  Goal: Absence of cardiac dysrhythmias or at baseline  Outcome: Progressing     Problem: Skin/Tissue Integrity - Adult  Goal: Skin integrity remains intact  Outcome: Progressing     Problem: Genitourinary - Adult  Goal: Absence of urinary retention  Outcome: Progressing  Goal: Urinary catheter remains patent  Outcome: Progressing     Problem: Neurosensory - Adult  Goal: Achieves stable or improved neurological status  Outcome: Progressing  Goal: Absence of seizures  Outcome: Progressing     Problem: Musculoskeletal - Adult  Goal: Return mobility to safest level of function  Outcome: Progressing  Goal: Return ADL status to a safe level of function  Outcome: Progressing     Problem: Gastrointestinal - Adult  Goal: Maintains adequate nutritional intake  Outcome: Progressing     Problem: Infection - Adult  Goal: Absence of infection at discharge  Outcome: Progressing  Goal: Absence of infection during hospitalization  Outcome: Progressing     Problem: Metabolic/Fluid and Electrolytes - Adult  Goal: Electrolytes maintained within normal limits  Outcome: Progressing

## 2022-12-22 NOTE — PROGRESS NOTES
PROGRESS NOTE    HPI: Fitz Hercules is a(n)67 y.o. female admitted for work-up and treatment for Shortness of breath [R06.02]  Acute hyponatremia [E87.1]  Hyponatremia [E87.1]  Fall, initial encounter [W19. XXXA]  Longstanding persistent atrial fibrillation ([address] [I48.11]. We are following for liver cirrhosis. Subjective:     Up in chair. Following conversation this morning better than yesterday. Tells me she is in pain when asked but does not answer where it is. No acute events reported overnight. Objective:     I/O last 3 completed shifts: In: 532 [P.O.:532]  Out: 3425 [Urine:3425]      /76   Pulse 69   Temp 97.5 °F (36.4 °C) (Oral)   Resp 18   Ht 5' 2\" (1.575 m)   Wt 245 lb 9.6 oz (111.4 kg)   LMP  (LMP Unknown)   SpO2 96%   BMI 44.92 kg/m²     Physical Exam:  HEENT: + icteric sclera, oropharyngeal membranes pink and moist.  Cor: RRR  Lungs: non-labored, no respiratory distress  Abdomen: distended but soft, NT  Extremities: +2 BLE edema  Neuro: alert and oriented to person and place, + mild asterixis today      Results:   Lab Results   Component Value Date    ALT 45 (H) 12/19/2022     (H) 12/19/2022    ALKPHOS 151 (H) 12/19/2022    BILIDIR 1.6 (H) 12/19/2022    PROT 6.4 12/19/2022    LABALBU 2.9 (L) 12/19/2022    INR 1.73 (H) 12/17/2022    LIPASE 143.0 (H) 12/19/2022     Lab Results   Component Value Date    WBC 6.5 12/21/2022    HGB 12.5 12/21/2022    HCT 35.9 (L) 12/21/2022    .6 (H) 12/21/2022    PLT 75 (L) 12/21/2022     BUN/Cr/glu/ALT/AST/amyl/lip:  29/0.6/--/--/--/--/-- (12/21 1756)  CT HEAD WO CONTRAST    Result Date: 12/17/2022  No acute intracranial abnormality. CT CERVICAL SPINE WO CONTRAST    Result Date: 12/17/2022  Multilevel degenerative changes with no acute fracture or traumatic malalignment of the cervical spine.      XR CHEST PORTABLE    Result Date: 12/17/2022  The left IJ central venous catheter is deflected laterally into the proximal left subclavian vein area. No evidence of pneumothorax. Marked cardiomegaly with minimal pulmonary vascular congestion without pulmonary edema. XR CHEST PORTABLE    Result Date: 12/17/2022  Left central line projects over the expected left brachiocephalic vein. Correlation for appropriate return is recommended. No pneumothorax. Stable enlargement of cardiac silhouette. XR CHEST PORTABLE    Result Date: 12/17/2022  Mild cardiomegaly and mild central pulmonary congestion or pulmonary artery hypertension which is more prominent. Minimal bibasilar atelectasis or small infiltrates. Questionable small bibasilar pleural effusions which is more prominent. US ABDOMEN LIMITED    Result Date: 12/19/2022  No ascites identified     CT CHEST ABDOMEN PELVIS W CONTRAST Additional Contrast? None    Result Date: 12/17/2022  1. CHEST: Small right pleural effusion and mild atelectasis in the lung bases. 2. No consolidation is appreciated. 3. Moderate to severe cardiomegaly. 4. T12 superior endplate fracture of uncertain age. Moderate anterior height loss of T11, likely chronic. If there is focal tenderness, non-urgent follow-up MRI is a consideration. 5. ABDOMEN AND PELVIS: Cirrhotic morphology of the liver. 6. Prominence of the common and pancreatic ducts, also noted on 01/28/2022 Ductal ectasia or adjacent pancreatic cystic lesion(s) have been considered. Follow-up ERCP or endoscopic ultrasound was previously recommended. 7. No acute abdominopelvic abnormality identified. 8. Moderate anasarca. 9. Chronic-appearing compression fractures at L1 and L3. Impression:  Liver cirrhosis without ascites, most likely alcohol related. Thrombocytopenia, coagulopathy, hyperbilirubinemia. On review of records from [facility], she has had evidence of cirrhosis on imaging dating back to 2021. CLD work- up remains in process. 2. HE clinically evident on exam yesterday.  Asterixis minimal this morning. Averaging about 3 BMs on daily lactulose. 3. Alcohol abuse, withdrawal. On CIWA. 4. Hyponatremia. Nephrology following. Improving. 5. Fluid overload. 6. Mild pulmonary hypertension, echo 12/16. 7. CBD/PD dilation, cystic lesions. S/p EUS 4/2022, FNA - mucinous cyst, high fluid CEA. Bilirubin elevated (also with cirrhosis), slightly elevated alk phos. Relatively stable. 8. New diagnosis a-fib. On eliquis. 9. Hx breast cancer dx 2019. Plan:  Continue daily lactulose, goal 2-3 BMs daily. Nothing further to add from GI standpoint at this time. Outpatient follow-up in office, fibroscan, variceal screening. Please do not hesitate to call with questions or concerns.       Electronically signed by: JOHNY Ching 12/22/2022 8:11 AM

## 2022-12-22 NOTE — PROGRESS NOTES
[facility] Progress Note           Subjective/   69y.o. year old female who we are seeing in consultation for Hyponatremia. HPI:  Sodium slowly improving per goal with diuresis, non-oliguric. Edema improving. ROS:  Intake better, weakness improving. Objective/   GEN:  Chronically ill, /71   Pulse 69   Temp 97.6 °F (36.4 °C) (Oral)   Resp 18   Ht 5' 2\" (1.575 m)   Wt 245 lb 9.6 oz (111.4 kg)   LMP  (LMP Unknown)   SpO2 98%   BMI 44.92 kg/m²   HEENT: non-icteric, no JVD  CV: S1, S2 without m/r/g; +++ LE edema  RESP: CTA B without w/r/r; breathing wnl  ABD: +bs, soft, nt, no hsm  SKIN: warm, no rashes    Data/  Recent Labs     12/21/22  0237 12/22/22  0905   WBC 6.5 6.1   HGB 12.5 12.5   HCT 35.9* 36.6   .6* 113.7*   PLT 75* 83*       Recent Labs     12/20/22  0208 12/20/22  0839 12/21/22  0237 12/21/22  0822 12/21/22  1555 12/21/22  1756 12/22/22  0905   *   < > 130* 129*  --  129* 132*   K 3.1*  --  3.0*  --  3.2* 3.2* 4.2   CL 82*  --  90*  --   --  88* 93*   CO2 30  --  33*  --   --  34* 32   GLUCOSE 118*  --  117*  --   --  146* 102*   MG 1.50*  --   --   --   --   --   --    BUN 32*  --  33*  --   --  29* 24*   CREATININE 0.9  --  0.6  --   --  0.6 0.6   LABGLOM >60  --  >60  --   --  >60 >60    < > = values in this interval not displayed.          Assessment/     -Hyponatremia multifactorial from liver cirrhosis, alcohol to volume overload                Na of 106 at 2354 on 12/16, appropriate correction.    -Liver cirrhosis c/b fluid overload    -Falls    -ETOH abuse with withdrawals    -Hypokalemia - prn replacement per protocol    Plan/     -Fluid restriction 1500 ml/day  -Start lasix 40 mg a day today with continued edema which will take weeks to resolve  -Encouraged protein intake  -Can trial off urea  -Trend labs bid - goal SNa mid 130's by 12/23 AM    ____________________________________  Issa Malcolm MD  [facility] [address] [place] [address]. [facility]  Office: [phone]

## 2022-12-23 LAB
ANION GAP SERPL CALCULATED.3IONS-SCNC: 9 MMOL/L (ref 3–16)
BUN BLDV-MCNC: 20 MG/DL (ref 7–20)
CALCIUM SERPL-MCNC: 8.8 MG/DL (ref 8.3–10.6)
CHLORIDE BLD-SCNC: 90 MMOL/L (ref 99–110)
CO2: 30 MMOL/L (ref 21–32)
CREAT SERPL-MCNC: <0.5 MG/DL (ref 0.6–1.2)
GFR SERPL CREATININE-BSD FRML MDRD: >60 ML/MIN/{1.73_M2}
GLUCOSE BLD-MCNC: 90 MG/DL (ref 70–99)
POTASSIUM SERPL-SCNC: 3.2 MMOL/L (ref 3.5–5.1)
SODIUM BLD-SCNC: 129 MMOL/L (ref 136–145)

## 2022-12-23 PROCEDURE — 97110 THERAPEUTIC EXERCISES: CPT

## 2022-12-23 PROCEDURE — 97535 SELF CARE MNGMENT TRAINING: CPT

## 2022-12-23 PROCEDURE — 36415 COLL VENOUS BLD VENIPUNCTURE: CPT

## 2022-12-23 PROCEDURE — 97530 THERAPEUTIC ACTIVITIES: CPT

## 2022-12-23 PROCEDURE — 6370000000 HC RX 637 (ALT 250 FOR IP): Performed by: NURSE PRACTITIONER

## 2022-12-23 PROCEDURE — 6370000000 HC RX 637 (ALT 250 FOR IP): Performed by: INTERNAL MEDICINE

## 2022-12-23 PROCEDURE — 2580000003 HC RX 258: Performed by: INTERNAL MEDICINE

## 2022-12-23 PROCEDURE — 1200000000 HC SEMI PRIVATE

## 2022-12-23 PROCEDURE — 80048 BASIC METABOLIC PNL TOTAL CA: CPT

## 2022-12-23 RX ADMIN — ATORVASTATIN CALCIUM 20 MG: 10 TABLET, FILM COATED ORAL at 20:31

## 2022-12-23 RX ADMIN — POTASSIUM BICARBONATE 20 MEQ: 782 TABLET, EFFERVESCENT ORAL at 13:03

## 2022-12-23 RX ADMIN — VALACYCLOVIR HYDROCHLORIDE 500 MG: 500 TABLET, FILM COATED ORAL at 10:00

## 2022-12-23 RX ADMIN — Medication 100 MG: at 09:59

## 2022-12-23 RX ADMIN — ANASTROZOLE 1 MG: 1 TABLET, COATED ORAL at 10:00

## 2022-12-23 RX ADMIN — Medication 1 TABLET: at 09:59

## 2022-12-23 RX ADMIN — SODIUM CHLORIDE, PRESERVATIVE FREE 10 ML: 5 INJECTION INTRAVENOUS at 10:01

## 2022-12-23 RX ADMIN — LACTULOSE 20 G: 20 SOLUTION ORAL at 09:59

## 2022-12-23 RX ADMIN — CHLORDIAZEPOXIDE HYDROCHLORIDE 5 MG: 5 CAPSULE ORAL at 20:31

## 2022-12-23 RX ADMIN — PANTOPRAZOLE SODIUM 40 MG: 40 TABLET, DELAYED RELEASE ORAL at 09:59

## 2022-12-23 RX ADMIN — SODIUM CHLORIDE, PRESERVATIVE FREE 10 ML: 5 INJECTION INTRAVENOUS at 21:43

## 2022-12-23 RX ADMIN — CHLORDIAZEPOXIDE HYDROCHLORIDE 5 MG: 5 CAPSULE ORAL at 09:59

## 2022-12-23 RX ADMIN — MUPIROCIN: 20 OINTMENT TOPICAL at 20:31

## 2022-12-23 RX ADMIN — MUPIROCIN: 20 OINTMENT TOPICAL at 10:03

## 2022-12-23 RX ADMIN — CHLORDIAZEPOXIDE HYDROCHLORIDE 5 MG: 5 CAPSULE ORAL at 14:56

## 2022-12-23 RX ADMIN — FOLIC ACID 1 MG: 1 TABLET ORAL at 09:59

## 2022-12-23 RX ADMIN — FUROSEMIDE 40 MG: 40 TABLET ORAL at 09:59

## 2022-12-23 NOTE — CARE COORDINATION
Chart reviewed. S/p fall at home, BLE edema, SOB, alcohol abuse with cirrhosis, encephalopathy. Management per GI, nephro and IM. Pt from home, alone. IPTA. Declines SA resources. PT/OT rec SNF. Pt now agreeable. Daughter, [place], called with preferences. 1) [facility] 2) [facility] 3) [facility]. CM left voice message for Tabitha Giles at [facility]. Referral sent electronically and faxed. Awaiting response. CM will continue to follow.  Gisele Westfall RN

## 2022-12-23 NOTE — PROGRESS NOTES
[facility] Progress Note           Subjective/   69y.o. year old female who we are seeing in consultation for Hyponatremia. HPI:  Sodium slowly improving per goal with diuresis, non-oliguric. Edema improving. ROS:  Intake better, weakness improving. Objective/   GEN:  Chronically ill, /61   Pulse 65   Temp 97.7 °F (36.5 °C) (Oral)   Resp 18   Ht 5' 2\" (1.575 m)   Wt 213 lb (96.6 kg)   LMP  (LMP Unknown)   SpO2 98%   BMI 38.96 kg/m²   HEENT: non-icteric, no JVD  CV: S1, S2 without m/r/g; +++ LE edema  RESP: CTA B without w/r/r; breathing wnl  ABD: +bs, soft, nt, no hsm  SKIN: warm, no rashes    Data/  Recent Labs     12/21/22  0237 12/22/22  0905   WBC 6.5 6.1   HGB 12.5 12.5   HCT 35.9* 36.6   .6* 113.7*   PLT 75* 83*       Recent Labs     12/22/22  0905 12/22/22  1734 12/23/22  0745   * 132* 129*   K 4.2 3.7 3.2*   CL 93* 92* 90*   CO2 32 33* 30   GLUCOSE 102* 141* 90   BUN 24* 27* 20   CREATININE 0.6 0.7 <0.5*   LABGLOM >60 >60 >60         Assessment/     -Hyponatremia multifactorial from liver cirrhosis, alcohol to volume overload                Na of 106 at 2354 on 12/16, appropriate correction.    -Liver cirrhosis c/b fluid overload    -Falls    -ETOH abuse with withdrawals    -Hypokalemia - prn replacement per protocol    Plan/     -Started lasix 40 mg a day since 12/22 with continued edema which will take days to weeks to resolve  -Start potassium 20 meq a day as maintenance while on lasix  -Fluid restriction 1500 ml/day  -Encouraged protein intake  -Monitoring off urea  -Trend labs - goal SNa mid 130's by AM    ____________________________________  Juan R Garcia MD  [facility]  [email or web address]  Office: [phone]

## 2022-12-23 NOTE — PROGRESS NOTES
Occupational Therapy  Facility/Department: [facility] - MED SURG  Treatment Note    Name: Mani Vaughn  : 1955  MRN: [id number]  Date of Service: 2022    Discharge Recommendations:  Subacute/Skilled Nursing Facility  Barriers to home discharge:   [x] Steps to access home entry or bed/bath:   [] No rail with steps to access home entry or bed/bath   [x] Reported available assist at home upon discharge limited   [x] family requests DC to other than home             Patient Diagnosis(es): The primary encounter diagnosis was Hyponatremia. Diagnoses of Fall, initial encounter, Longstanding persistent atrial fibrillation ([facility] and Shortness of breath were also pertinent to this visit. Past Medical History:  has a past medical history of Age-related osteoporosis without current pathological fracture, Arthritis, EtOH dependence ([facility] HSV-2 (herpes simplex virus 2) infection, Hypertension, Kidney stone, and Malignant neoplasm of upper-inner quadrant of left breast in female, estrogen receptor positive ([facility] Past Surgical History:  has a past surgical history that includes Hysterectomy (); Dilation and curettage of uterus (); Colonoscopy (1999); Colonoscopy (13); Total knee arthroplasty (Left, 2019); Mastectomy (Left, 10/8/2019); and Upper gastrointestinal endoscopy (N/A, 2022). Assessment   Performance deficits / Impairments: Decreased functional mobility ; Decreased endurance;Decreased ADL status; Decreased balance;Decreased cognition;Decreased strength;Decreased safe awareness;Decreased high-level IADLs;Decreased posture  Assessment: pt from home alone, normally independent with BADL's & functional mobility with cane PRN, but admitted with frequent falls, now requiring min assist with functional transfers/bathroom mobility with RW , max assist/dependent with LE self care & CGA static standing balance at sink for ~ 1 minutes; pt continues to benefit from skilled OT services  REQUIRES OT FOLLOW-UP: Yes  Activity Tolerance  Activity Tolerance: Patient Tolerated treatment well  Activity Tolerance Comments: sitting in chair on RA:  BP = 100 % O 2 sats, HR = 73,BP = 121/77;        Plan   Occupational Therapy Plan  Times Per Week: 3-5x's a week while in acute care  Current Treatment Recommendations: Strengthening, ROM, Functional mobility training, Balance training, Endurance training, Safety education & training, Patient/Caregiver education & training, Equipment evaluation, education, & procurement, Positioning, Self-Care / ADL     Restrictions  Restrictions/Precautions  Restrictions/Precautions: Fall Risk, General Precautions  Position Activity Restriction  Other position/activity restrictions: avasys, up with assistance, tele, terrazas catheter    Subjective   General  Chart Reviewed: Yes  Patient assessed for rehabilitation services?: Yes  Additional Pertinent Hx: etoh  Family / Caregiver Present: No  Referring Practitioner: Perlita Fry MD 12/20/22  Diagnosis: Hyponatremia, fall  Subjective  Subjective: denies pain; \"I didn't think this would be so hard\"(walking into bathroom with RW)  General Comment  Comments: RN cleared pt for OT; pt sitting up in chair agreeable to therapy            Objective                Safety Devices  Type of Devices: Left in chair;Chair alarm in place;Call light within reach;Nurse notified; Patient at risk for falls     Toilet Transfers  Toilet - Technique: Ambulating (min assist to manage RW)  Equipment Used: Grab bars  Toilet Transfer: Minimal assistance (max cues for safe hand placement)     ADL  Grooming: Contact guard assistance (standing at sink to wash hands)  LE Dressing: Maximum assistance (to franck briefs seated on toilet & standing)  Toileting: Dependent/Total (terrazas catheter; max assist with pericare after bowel movement on toilet)        Bed mobility  Supine to Sit: Unable to assess (already up in chair)  Sit to Supine: Unable to assess (Left up in chair for lunch, pt agreeable)  Transfers  Sit to stand: Minimal assistance (with max cues for safe hand placement with RW)  Stand to sit: Contact guard assistance     Cognition  Overall Cognitive Status: Exceptions (flat affect)  Arousal/Alertness: Appropriate responses to stimuli  Following Commands:  Follows one step commands with increased time  Attention Span: Attends with cues to redirect  Memory: Decreased recall of precautions  Safety Judgement: Decreased awareness of need for assistance;Decreased awareness of need for safety  Insights: Decreased awareness of deficits  Initiation: Requires cues for some  Sequencing: Requires cues for some  Orientation  Overall Orientation Status: Within Functional Limits  Orientation Level: Oriented X4               Exercise Treatment: BUE & BLE AROM seated in chair  Hand flex/ext: x   15 Reps  Wrist flex/ext:  X 15 Reps  Elbow flex/ext:  x   15 Reps  Forearm sup/pron:  x   15 Reps  Shld flex/ext:  x   10 Reps           Education Given To: Patient  Education Provided: Role of Therapy;Plan of Care;Precautions  Education Provided Comments: disease specific: importance of use of RED /nurse call light for assist with ADL's, transfers;  Education Method: Demonstration;Verbal  Barriers to Learning: Cognition  Education Outcome: Demonstrated understanding;Continued education needed          AM-PAC Score        AM-[facility] Inpatient Daily Activity Raw Score: 14 (12/23/22 1223)  AM-PAC Inpatient ADL T-Scale Score : 33.39 (12/23/22 1223)  ADL Inpatient CMS 0-100% Score: 59.67 (12/23/22 1223)  ADL Inpatient CMS G-Code Modifier : CK (12/23/22 1223)    Tinneti Score       Goals  Short Term Goals  Time Frame for Short Term Goals: 1 week 12/27  Short Term Goal 1: Pt will complete LE dressing with modA; 12/23 Max assist  Short Term Goal 2: Pt will complete toilet transfers with min of 1; 12/23 STG met; New STG: supervision with toilet transfers by 12-30-22  Short Term Goal 3: Pt will complete 10 reps of BUE AROM exercises to increase strength for ADLs/transfers by 12/25; 12/23 STG met, continue for light strengthening  Short Term Goal 4: Pt will complete 1 min of static standing Anna of 1 for balance for ADLs prep.; 12/23 STG met, pt CGA: New STG: SBA for 3 minutes by 12-30-22;  Patient Goals   Patient goals : \"to go home\"       Therapy Time   Individual Concurrent Group Co-treatment   Time In 1125         Time Out 1203         Minutes [address] [place]

## 2022-12-23 NOTE — PROGRESS NOTES
Hospitalist Progress Note      PCP: NIRU MENDEZ MD    Date of Admission: 12/16/2022    Chief Complaint: Fall at home    Hospital Course: H&P reviewed     Subjective: No voiced complaints, gen weak.       Medications:  Reviewed    Infusion Medications    sodium chloride       Scheduled Medications    potassium bicarb-citric acid  20 mEq Oral Daily    furosemide  40 mg Oral Daily    folic acid  1 mg Oral Daily    chlordiazePOXIDE  5 mg Oral TID    mupirocin   Nasal BID    sodium chloride flush  5-40 mL IntraVENous 2 times per day    lactulose  20 g Oral Daily    thiamine  100 mg Oral Daily    pantoprazole  40 mg Oral QAM AC    anastrozole  1 mg Oral Daily    multivitamin  1 tablet Oral Daily    atorvastatin  20 mg Oral Nightly    metoprolol succinate  50 mg Oral Daily    valACYclovir  500 mg Oral Daily     PRN Meds: ipratropium-albuterol, sodium chloride flush, sodium chloride, magnesium sulfate, promethazine **OR** ondansetron, acetaminophen **OR** acetaminophen, LORazepam **OR** LORazepam **OR** LORazepam **OR** LORazepam **OR** LORazepam **OR** LORazepam **OR** LORazepam **OR** LORazepam      Intake/Output Summary (Last 24 hours) at 12/23/2022 1528  Last data filed at 12/23/2022 1404  Gross per 24 hour   Intake 1090 ml   Output 3375 ml   Net -2285 ml         Physical Exam Performed:    /65   Pulse 69   Temp 97.5 °F (36.4 °C) (Oral)   Resp 18   Ht 5' 2\" (1.575 m)   Wt 213 lb (96.6 kg)   LMP  (LMP Unknown)   SpO2 100%   BMI 38.96 kg/m²     General appearance: Obese. Ill appearing, NAD.  HEENT: Pupils equal, round, and reactive to light. Conjunctivae/corneas clear.  Neck: Supple, with full range of motion. No jugular venous distention. Trachea midline.  Respiratory:  Normal respiratory effort. Clear to auscultation, bilaterally without Rales/Wheezes/Rhonchi.  Cardiovascular: Regular rate and rhythm with normal S1/S2 without murmurs, rubs or gallops.  Abdomen: Soft, non-tender, non-distended  with normal bowel sounds. Musculoskeletal: No clubbing/cyanosis 2+edema bilaterally. Skin: Skin color, texture, turgor normal.  No rashes or lesions. Neurologic:  Neurovascularly intact without any focal sensory/motor deficits. Cranial nerves: II-XII intact, grossly non-focal.  Psychiatric: Alert, poor insight  Capillary Refill: Brisk, 3 seconds, normal   Peripheral Pulses: +2 palpable, equal bilaterally       Labs:   Recent Labs     12/21/22  0237 12/22/22  0905   WBC 6.5 6.1   HGB 12.5 12.5   HCT 35.9* 36.6   PLT 75* 83*       Recent Labs     12/22/22  0905 12/22/22  1734 12/23/22  0745   * 132* 129*   K 4.2 3.7 3.2*   CL 93* 92* 90*   CO2 32 33* 30   BUN 24* 27* 20   CREATININE 0.6 0.7 <0.5*   CALCIUM 9.2 9.4 8.8       No results for input(s): AST, ALT, BILIDIR, BILITOT, ALKPHOS in the last 72 hours. No results for input(s): INR in the last 72 hours. No results for input(s): Kalie Height in the last 72 hours. Urinalysis:      Lab Results   Component Value Date/Time    NITRU Negative 12/17/2022 06:32 AM    WBCUA 0-2 12/17/2022 06:32 AM    BACTERIA 1+ 04/19/2021 04:39 PM    RBCUA None seen 12/17/2022 06:32 AM    BLOODU Negative 12/17/2022 06:32 AM    SPECGRAV 1.015 12/17/2022 06:32 AM    GLUCOSEU Negative 12/17/2022 06:32 AM       Radiology:  US ABDOMEN LIMITED   Final Result   No ascites identified         CT CHEST ABDOMEN PELVIS W CONTRAST Additional Contrast? None   Final Result   1. CHEST: Small right pleural effusion and mild atelectasis in the lung bases. 2. No consolidation is appreciated. 3. Moderate to severe cardiomegaly. 4. T12 superior endplate fracture of uncertain age. Moderate anterior height   loss of T11, likely chronic. If there is focal tenderness, non-urgent   follow-up MRI is a consideration. 5. ABDOMEN AND PELVIS: Cirrhotic morphology of the liver.    6. Prominence of the common and pancreatic ducts, also noted on 01/28/2022   Ductal ectasia or adjacent pancreatic cystic lesion(s) have been considered. Follow-up ERCP or endoscopic ultrasound was previously recommended. 7. No acute abdominopelvic abnormality identified. 8. Moderate anasarca. 9. Chronic-appearing compression fractures at L1 and L3. XR CHEST PORTABLE   Final Result   Left central line projects over the expected left brachiocephalic vein. Correlation for appropriate return is recommended. No pneumothorax. Stable enlargement of cardiac silhouette. XR CHEST PORTABLE   Final Result   The left IJ central venous catheter is deflected laterally into the proximal   left subclavian vein area. No evidence of pneumothorax. Marked cardiomegaly with minimal pulmonary vascular congestion without   pulmonary edema. CT CERVICAL SPINE WO CONTRAST   Final Result   Multilevel degenerative changes with no acute fracture or traumatic   malalignment of the cervical spine. CT HEAD WO CONTRAST   Final Result   No acute intracranial abnormality. XR CHEST PORTABLE   Final Result   Mild cardiomegaly and mild central pulmonary congestion or pulmonary artery   hypertension which is more prominent. Minimal bibasilar atelectasis or small infiltrates. Questionable small bibasilar pleural effusions which is more prominent.              IP CONSULT TO NEPHROLOGY  IP CONSULT TO NEPHROLOGY  IP CONSULT TO SOCIAL WORK  IP CONSULT TO GI  IP CONSULT TO CRITICAL CARE  IP CONSULT TO SOCIAL WORK    Assessment/Plan:    Active Hospital Problems    Diagnosis     Macrocytic anemia [D53.9]      Priority: Medium    Thrombocytopenia ([facility] [address] [D69.6]      Priority: Medium    Elevated LFTs [R79.89]      Priority: Medium    Hyperbilirubinemia [E80.6]      Priority: Medium    Anasarca [R60.1]      Priority: Medium    Alcoholic cirrhosis of liver with ascites (HCC) [K70.31]      Priority: Medium    Pancreatic duct dilated [K86.89]      Priority: Medium    Paroxysmal atrial fibrillation ([facility] [address] [I48.0]      Priority: Medium    Morbid obesity with BMI of 40.0-44.9, adult (HCC) [E66.01, Z68.41]      Priority: Medium    Suspected sleep apnea [R29.818]      Priority: Medium    Acute hyponatremia [E87.1]      Priority: Medium     69 Y F with a h/o former COPD, cirrhosis with ongoing alcoholism (possibly 3-4 cans of beer daily), HTN, and L breast cancer s/p resection in 2019. History is limited. Apparently she fell at home, couldn't get up. EMS arrived to help her up, and ultimately decided to bring her to the ED because she was breathing heavily and edematous. When she arrived here she had \"no complaints\" but her Na was 102 (compared to baseline of upper 120's as of 11/1/2022). The patient was lethargic and a poor historian but she actually answered all the orientation questions correctly on arrival.  Hospital medicine admitted her to the ICU overnight. Acute on chronic hyponatremia. Due to cirrhosis, volume overload, poor solute intake. Encourage protein intake, also gave urea. Intermittent furosemide IV and fluid restriction per nephrology. Now on PO lasix     Alcohol dependence and withdrawal.  CIWA with chlordiazepam and PRN lorazepam.  Thiamine. Acute metabolic encephalopathy. Due to above issues, treat accordingly. No focal neurological deficits. HTN. Held lisinopril due to lower BPs. PAF (new diagnosis). It actually looks like an EKG showed Afib back in 4/2022 during an outpatient colonoscopy but I do not see any mention of this in the chart. Started metoprolol. F/u TTE. The patient has chronic thrombocytopenia, alcohol abuse, an falls, so she is not a good candidate for anticoagulation. Alcoholic cirrhosis. CGI was consulted by admitting physician. DCIS L breast, diagnosed 10/2019. S/p partial mastectomy and radiation in 2019. She is prescribed anastrazole by Dr. Gato Ashraf.       She is on chronic valacyclovir, presumably for suppressive therapy of HSV. DVT Prophylaxis: SCDs  Diet: ADULT DIET;  Regular; 1500 ml  ADULT ORAL NUTRITION SUPPLEMENT; Breakfast, Dinner; Standard High Calorie/High Protein Oral Supplement  Code Status: Full Code  PT/OT Eval Status: Consulted    Dispo - pending clinical improvement, rec snf,, 1-2 days    Appropriate for A1 Discharge Unit: JOHNY Michael - CNP

## 2022-12-23 NOTE — PLAN OF CARE
Problem: Discharge Planning  Goal: Discharge to home or other facility with appropriate resources  Outcome: Progressing  Flowsheets (Taken 12/23/2022 1006)  Discharge to home or other facility with appropriate resources:   Identify barriers to discharge with patient and caregiver   Arrange for needed discharge resources and transportation as appropriate   Identify discharge learning needs (meds, wound care, etc)     Problem: Safety - Adult  Goal: Free from fall injury  Outcome: Progressing     Problem: Skin/Tissue Integrity  Goal: Absence of new skin breakdown  Description: 1. Monitor for areas of redness and/or skin breakdown  2. Assess vascular access sites hourly  3. Every 4-6 hours minimum:  Change oxygen saturation probe site  4. Every 4-6 hours:  If on nasal continuous positive airway pressure, respiratory therapy assess nares and determine need for appliance change or resting period.   Outcome: Progressing

## 2022-12-23 NOTE — PROGRESS NOTES
Comprehensive Nutrition Assessment    Type and Reason for Visit:  Initial, RD Nutrition Re-Screen/LOS    Nutrition Recommendations/Plan:   Continue general diet with 1500 mL FR   Increase Ensure to BID - monitor acceptance   Encourage PO intakes as tolerated   Monitor nutrition adequacy, pertinent labs, bowel habits, wt changes, and clinical progress     Malnutrition Assessment:  Malnutrition Status: At risk for malnutrition (Comment) (12/23/22 1400)    Context:  Acute Illness       Nutrition Assessment:    Pt seen for LOS. Admitted with hyponatremia. Nephrology following. Hx of cirrhosis, alcoholism, and breast cancer. Pt currently ordered general diet with 1500 mL FR. PO intakes of % this admission. Weights trending down, but likely r/t fluid loss. MD wants increased protein intakes. ONS ordered daily, recommend increasing to BID. Will monitor. Nutrition Related Findings:    -15 L per I&Os. +Lactulose. BM x10 poast 24 hrs. Na 129. K 3.2. +3 pitting BLE, +2 pitting generalized and trace BUE edema. Wound Type: None       Current Nutrition Intake & Therapies:    Average Meal Intake: %  Average Supplements Intake: Unable to assess  ADULT DIET; Regular; 1500 ml  ADULT ORAL NUTRITION SUPPLEMENT; Breakfast; Standard High Calorie/High Protein Oral Supplement    Anthropometric Measures:  Height: 5' 2\" (157.5 cm)  Ideal Body Weight (IBW): 110 lbs (50 kg)    Admission Body Weight: 236 lb (107 kg) (bed scale)  Current Body Weight: 213 lb (96.6 kg), 193.6 % IBW.  Weight Source: Standing Scale  Current BMI (kg/m2): 38.9                          BMI Categories: Obese Class 2 (BMI 35.0 -39.9)    Estimated Daily Nutrient Needs:  Energy Requirements Based On: Kcal/kg (30-35)  Weight Used for Energy Requirements: Ideal  Energy (kcal/day): 4036-2733 kcal  Weight Used for Protein Requirements: Ideal (1.2-1.5 g/kg)  Protein (g/day): 60-75 g     Fluid (ml/day): 1500 mL FR    Nutrition Diagnosis:   Increased nutrient needs related to increase demand for energy/nutrients as evidenced by  (extended hospital stay)    Nutrition Interventions:   Food and/or Nutrient Delivery: Continue Current Diet, Modify Oral Nutrition Supplement  Nutrition Education/Counseling: No recommendation at this time  Coordination of Nutrition Care: Continue to monitor while inpatient       Goals:     Goals: PO intake 50% or greater, prior to discharge       Nutrition Monitoring and Evaluation:   Behavioral-Environmental Outcomes: None Identified  Food/Nutrient Intake Outcomes: Food and Nutrient Intake, Supplement Intake  Physical Signs/Symptoms Outcomes: Biochemical Data, Nutrition Focused Physical Findings, Weight    Discharge Planning:    Continue current diet, Continue Oral Nutrition Supplement     Amaya Saldaña, MS, RD, LD  Contact: [phone]

## 2022-12-24 LAB
ALBUMIN SERPL-MCNC: 2.5 G/DL (ref 3.4–5)
ANION GAP SERPL CALCULATED.3IONS-SCNC: 8 MMOL/L (ref 3–16)
BUN BLDV-MCNC: 17 MG/DL (ref 7–20)
CALCIUM SERPL-MCNC: 8.4 MG/DL (ref 8.3–10.6)
CHLORIDE BLD-SCNC: 91 MMOL/L (ref 99–110)
CO2: 32 MMOL/L (ref 21–32)
CREAT SERPL-MCNC: 0.6 MG/DL (ref 0.6–1.2)
GFR SERPL CREATININE-BSD FRML MDRD: >60 ML/MIN/{1.73_M2}
GLUCOSE BLD-MCNC: 121 MG/DL (ref 70–99)
MAGNESIUM: 1.2 MG/DL (ref 1.8–2.4)
PHOSPHORUS: 4 MG/DL (ref 2.5–4.9)
POTASSIUM SERPL-SCNC: 3.3 MMOL/L (ref 3.5–5.1)
SODIUM BLD-SCNC: 131 MMOL/L (ref 136–145)

## 2022-12-24 PROCEDURE — 83735 ASSAY OF MAGNESIUM: CPT

## 2022-12-24 PROCEDURE — 6370000000 HC RX 637 (ALT 250 FOR IP): Performed by: INTERNAL MEDICINE

## 2022-12-24 PROCEDURE — 6360000002 HC RX W HCPCS: Performed by: INTERNAL MEDICINE

## 2022-12-24 PROCEDURE — 1200000000 HC SEMI PRIVATE

## 2022-12-24 PROCEDURE — 2580000003 HC RX 258: Performed by: INTERNAL MEDICINE

## 2022-12-24 PROCEDURE — 36415 COLL VENOUS BLD VENIPUNCTURE: CPT

## 2022-12-24 PROCEDURE — 6370000000 HC RX 637 (ALT 250 FOR IP): Performed by: NURSE PRACTITIONER

## 2022-12-24 PROCEDURE — 51702 INSERT TEMP BLADDER CATH: CPT

## 2022-12-24 PROCEDURE — 80069 RENAL FUNCTION PANEL: CPT

## 2022-12-24 RX ADMIN — FOLIC ACID 1 MG: 1 TABLET ORAL at 09:04

## 2022-12-24 RX ADMIN — FUROSEMIDE 40 MG: 40 TABLET ORAL at 09:03

## 2022-12-24 RX ADMIN — VALACYCLOVIR HYDROCHLORIDE 500 MG: 500 TABLET, FILM COATED ORAL at 09:04

## 2022-12-24 RX ADMIN — CHLORDIAZEPOXIDE HYDROCHLORIDE 5 MG: 5 CAPSULE ORAL at 09:03

## 2022-12-24 RX ADMIN — MAGNESIUM SULFATE HEPTAHYDRATE 2000 MG: 40 INJECTION, SOLUTION INTRAVENOUS at 11:49

## 2022-12-24 RX ADMIN — ANASTROZOLE 1 MG: 1 TABLET, COATED ORAL at 09:04

## 2022-12-24 RX ADMIN — Medication 100 MG: at 09:03

## 2022-12-24 RX ADMIN — Medication 1 TABLET: at 09:03

## 2022-12-24 RX ADMIN — CHLORDIAZEPOXIDE HYDROCHLORIDE 5 MG: 5 CAPSULE ORAL at 14:14

## 2022-12-24 RX ADMIN — MAGNESIUM SULFATE HEPTAHYDRATE 2000 MG: 40 INJECTION, SOLUTION INTRAVENOUS at 14:18

## 2022-12-24 RX ADMIN — CHLORDIAZEPOXIDE HYDROCHLORIDE 5 MG: 5 CAPSULE ORAL at 20:32

## 2022-12-24 RX ADMIN — POTASSIUM BICARBONATE 20 MEQ: 782 TABLET, EFFERVESCENT ORAL at 09:03

## 2022-12-24 RX ADMIN — LACTULOSE 20 G: 20 SOLUTION ORAL at 09:03

## 2022-12-24 RX ADMIN — SODIUM CHLORIDE, PRESERVATIVE FREE 10 ML: 5 INJECTION INTRAVENOUS at 09:04

## 2022-12-24 RX ADMIN — SODIUM CHLORIDE, PRESERVATIVE FREE 10 ML: 5 INJECTION INTRAVENOUS at 20:32

## 2022-12-24 RX ADMIN — ATORVASTATIN CALCIUM 20 MG: 10 TABLET, FILM COATED ORAL at 20:32

## 2022-12-24 RX ADMIN — SODIUM CHLORIDE: 9 INJECTION, SOLUTION INTRAVENOUS at 11:49

## 2022-12-24 RX ADMIN — PANTOPRAZOLE SODIUM 40 MG: 40 TABLET, DELAYED RELEASE ORAL at 06:33

## 2022-12-24 NOTE — PROGRESS NOTES
Hospitalist Progress Note      PCP: Mellissa Chavez MD    Date of Admission: 12/16/2022    Chief Complaint: Fall at home    KALEB FORD Course: H&P reviewed     Subjective: No voiced complaints, sitting in chair eating meal.       Medications:  Reviewed    Infusion Medications    sodium chloride 25 mL/hr at 12/24/22 1149     Scheduled Medications    potassium bicarb-citric acid  20 mEq Oral Daily    furosemide  40 mg Oral Daily    folic acid  1 mg Oral Daily    chlordiazePOXIDE  5 mg Oral TID    sodium chloride flush  5-40 mL IntraVENous 2 times per day    lactulose  20 g Oral Daily    thiamine  100 mg Oral Daily    pantoprazole  40 mg Oral QAM AC    anastrozole  1 mg Oral Daily    multivitamin  1 tablet Oral Daily    atorvastatin  20 mg Oral Nightly    metoprolol succinate  50 mg Oral Daily    valACYclovir  500 mg Oral Daily     PRN Meds: ipratropium-albuterol, sodium chloride flush, sodium chloride, magnesium sulfate, promethazine **OR** ondansetron, acetaminophen **OR** acetaminophen, LORazepam **OR** LORazepam **OR** LORazepam **OR** LORazepam **OR** LORazepam **OR** LORazepam **OR** LORazepam **OR** LORazepam      Intake/Output Summary (Last 24 hours) at 12/24/2022 1254  Last data filed at 12/24/2022 1229  Gross per 24 hour   Intake 1490 ml   Output 2350 ml   Net -860 ml         Physical Exam Performed:    /68   Pulse 93   Temp 97.6 °F (36.4 °C) (Oral)   Resp 18   Ht 5' 2\" (1.575 m)   Wt 211 lb 3.2 oz (95.8 kg)   LMP  (LMP Unknown)   SpO2 98%   BMI 38.63 kg/m²     General appearance: Obese. Ill appearing, NAD. HEENT: Pupils equal, round, and reactive to light. Conjunctivae/corneas clear. Neck: Supple, with full range of motion. No jugular venous distention. Trachea midline. Respiratory:  Normal respiratory effort. Clear to auscultation, bilaterally without Rales/Wheezes/Rhonchi. Cardiovascular: Regular rate and rhythm with normal S1/S2 without murmurs, rubs or gallops.   Abdomen: Soft, non-tender, non-distended with normal bowel sounds. Musculoskeletal: No clubbing/cyanosis 2+edema bilaterally. Skin: Skin color, texture, turgor normal.  No rashes or lesions. Neurologic:  Neurovascularly intact without any focal sensory/motor deficits. Cranial nerves: II-XII intact, grossly non-focal.  Psychiatric: Alert, poor insight  Capillary Refill: Brisk, 3 seconds, normal   Peripheral Pulses: +2 palpable, equal bilaterally       Labs:   Recent Labs     12/22/22  0905   WBC 6.1   HGB 12.5   HCT 36.6   PLT 83*       Recent Labs     12/22/22  1734 12/23/22  0745 12/24/22  0724   * 129* 131*   K 3.7 3.2* 3.3*   CL 92* 90* 91*   CO2 33* 30 32   BUN 27* 20 17   CREATININE 0.7 <0.5* 0.6   CALCIUM 9.4 8.8 8.4   PHOS  --   --  4.0       No results for input(s): AST, ALT, BILIDIR, BILITOT, ALKPHOS in the last 72 hours. No results for input(s): INR in the last 72 hours. No results for input(s): Naye Parisian in the last 72 hours. Urinalysis:      Lab Results   Component Value Date/Time    NITRU Negative 12/17/2022 06:32 AM    WBCUA 0-2 12/17/2022 06:32 AM    BACTERIA 1+ 04/19/2021 04:39 PM    RBCUA None seen 12/17/2022 06:32 AM    BLOODU Negative 12/17/2022 06:32 AM    SPECGRAV 1.015 12/17/2022 06:32 AM    GLUCOSEU Negative 12/17/2022 06:32 AM       Radiology:  US ABDOMEN LIMITED   Final Result   No ascites identified         CT CHEST ABDOMEN PELVIS W CONTRAST Additional Contrast? None   Final Result   1. CHEST: Small right pleural effusion and mild atelectasis in the lung bases. 2. No consolidation is appreciated. 3. Moderate to severe cardiomegaly. 4. T12 superior endplate fracture of uncertain age. Moderate anterior height   loss of T11, likely chronic. If there is focal tenderness, non-urgent   follow-up MRI is a consideration. 5. ABDOMEN AND PELVIS: Cirrhotic morphology of the liver.    6. Prominence of the common and pancreatic ducts, also noted on 01/28/2022   Ductal ectasia or adjacent pancreatic cystic lesion(s) have been considered. Follow-up ERCP or endoscopic ultrasound was previously recommended. 7. No acute abdominopelvic abnormality identified. 8. Moderate anasarca. 9. Chronic-appearing compression fractures at L1 and L3. XR CHEST PORTABLE   Final Result   Left central line projects over the expected left brachiocephalic vein. Correlation for appropriate return is recommended. No pneumothorax. Stable enlargement of cardiac silhouette. XR CHEST PORTABLE   Final Result   The left IJ central venous catheter is deflected laterally into the proximal   left subclavian vein area. No evidence of pneumothorax. Marked cardiomegaly with minimal pulmonary vascular congestion without   pulmonary edema. CT CERVICAL SPINE WO CONTRAST   Final Result   Multilevel degenerative changes with no acute fracture or traumatic   malalignment of the cervical spine. CT HEAD WO CONTRAST   Final Result   No acute intracranial abnormality. XR CHEST PORTABLE   Final Result   Mild cardiomegaly and mild central pulmonary congestion or pulmonary artery   hypertension which is more prominent. Minimal bibasilar atelectasis or small infiltrates. Questionable small bibasilar pleural effusions which is more prominent.              IP CONSULT TO NEPHROLOGY  IP CONSULT TO NEPHROLOGY  IP CONSULT TO SOCIAL WORK  IP CONSULT TO GI  IP CONSULT TO CRITICAL CARE  IP CONSULT TO SOCIAL WORK    Assessment/Plan:    Active Hospital Problems    Diagnosis     Macrocytic anemia [D53.9]      Priority: Medium    Thrombocytopenia ([facility] [address] [D69.6]      Priority: Medium    Elevated LFTs [R79.89]      Priority: Medium    Hyperbilirubinemia [E80.6]      Priority: Medium    Anasarca [R60.1]      Priority: Medium    Alcoholic cirrhosis of liver with ascites (HCC) [K70.31]      Priority: Medium    Pancreatic duct dilated [K86.89]      Priority: Medium    Paroxysmal atrial fibrillation ([facility] [address] [I48.0]      Priority: Medium    Morbid obesity with BMI of 40.0-44.9, adult (HCC) [E66.01, Z68.41]      Priority: Medium    Suspected sleep apnea [R29.818]      Priority: Medium    Acute hyponatremia [E87.1]      Priority: Medium     69 Y F with a h/o former COPD, cirrhosis with ongoing alcoholism (possibly 3-4 cans of beer daily), HTN, and L breast cancer s/p resection in 2019. History is limited. Apparently she fell at home, couldn't get up. EMS arrived to help her up, and ultimately decided to bring her to the ED because she was breathing heavily and edematous. When she arrived here she had \"no complaints\" but her Na was 102 (compared to baseline of upper 120's as of 11/1/2022). The patient was lethargic and a poor historian but she actually answered all the orientation questions correctly on arrival.  Hospital medicine admitted her to the ICU overnight. Acute on chronic hyponatremia. Due to cirrhosis, volume overload, poor solute intake. Encourage protein intake, also gave urea. Intermittent furosemide IV and fluid restriction per nephrology. Now on PO lasix     Alcohol dependence and withdrawal.  CIWA with chlordiazepam and PRN lorazepam.  Thiamine. Acute metabolic encephalopathy. Due to above issues, treat accordingly. No focal neurological deficits. HTN. Held lisinopril due to lower BPs. Hypomag-replete     PAF (new diagnosis). It actually looks like an EKG showed Afib back in 4/2022 during an outpatient colonoscopy but I do not see any mention of this in the chart. Started metoprolol. F/u TTE. The patient has chronic thrombocytopenia, alcohol abuse, an falls, so she is not a good candidate for anticoagulation. Alcoholic cirrhosis. CGI was consulted by admitting physician. DCIS L breast, diagnosed 10/2019. S/p partial mastectomy and radiation in 2019. She is prescribed anastrazole by Dr. Ariana Cevallos.       She is on chronic valacyclovir, presumably for suppressive therapy of HSV. DVT Prophylaxis: SCDs  Diet: ADULT DIET; Regular; 1500 ml  ADULT ORAL NUTRITION SUPPLEMENT; Breakfast, Dinner; Standard High Calorie/High Protein Oral Supplement  Code Status: Full Code  PT/OT Eval Status: Consulted    Dispo -replete electrolytes, snf placement pending.  DC 1-2 days    Appropriate for A1 Discharge Unit: JOHNY Guerin - CNP

## 2022-12-24 NOTE — PLAN OF CARE
Problem: Discharge Planning  Goal: Discharge to home or other facility with appropriate resources  Outcome: Progressing  Flowsheets (Taken 12/24/2022 0909)  Discharge to home or other facility with appropriate resources:   Identify barriers to discharge with patient and caregiver   Arrange for needed discharge resources and transportation as appropriate   Identify discharge learning needs (meds, wound care, etc)     Problem: Pain  Goal: Verbalizes/displays adequate comfort level or baseline comfort level  Outcome: Progressing     Problem: Safety - Adult  Goal: Free from fall injury  Outcome: Progressing     Problem: Skin/Tissue Integrity  Goal: Absence of new skin breakdown  Description: 1. Monitor for areas of redness and/or skin breakdown  2. Assess vascular access sites hourly  3. Every 4-6 hours minimum:  Change oxygen saturation probe site  4. Every 4-6 hours:  If on nasal continuous positive airway pressure, respiratory therapy assess nares and determine need for appliance change or resting period.   Outcome: Progressing     Problem: Respiratory - Adult  Goal: Achieves optimal ventilation and oxygenation  Recent Flowsheet Documentation  Taken 12/24/2022 0909 by Cornelio Garcia RN  Achieves optimal ventilation and oxygenation: Assess for changes in respiratory status

## 2022-12-24 NOTE — PROGRESS NOTES
[facility] Progress Note           Subjective/   69y.o. year old female who we are seeing in consultation for Hyponatremia. HPI:  Sodium slowly improving per goal with diuresis, non-oliguric. Edema improving. ROS:  Intake better, weakness improving. Objective/   GEN:  Chronically ill, /67   Pulse 92   Temp 97.9 °F (36.6 °C) (Oral)   Resp 18   Ht 5' 2\" (1.575 m)   Wt 211 lb 3.2 oz (95.8 kg)   LMP  (LMP Unknown)   SpO2 98%   BMI 38.63 kg/m²   HEENT: non-icteric, no JVD  CV: S1, S2 without m/r/g; +++ LE edema  RESP: CTA B without w/r/r; breathing wnl  ABD: +bs, soft, nt, no hsm  SKIN: warm, no rashes    Data/  Recent Labs     12/22/22  0905   WBC 6.1   HGB 12.5   HCT 36.6   .7*   PLT 83*       Recent Labs     12/22/22  1734 12/23/22  0745 12/24/22  0724   * 129* 131*   K 3.7 3.2* 3.3*   CL 92* 90* 91*   CO2 33* 30 32   GLUCOSE 141* 90 121*   PHOS  --   --  4.0   MG  --   --  1.20*   BUN 27* 20 17   CREATININE 0.7 <0.5* 0.6   LABGLOM >60 >60 >60         Assessment/     -Hyponatremia multifactorial from liver cirrhosis, alcohol to volume overload                Na of 106 at 2354 on 12/16, appropriate correction, s/p course of urea, now on diuresis    -Liver cirrhosis c/b fluid overload    -Falls    -ETOH abuse with withdrawals    -Hypokalemia - prn replacement per protocol    Plan/     -Started lasix 40 mg a day since 12/22 with continued edema which will take days to weeks to resolve  -Started potassium 20 meq a day as maintenance while on lasix  -Fluid restriction 1500 ml/day  -Encouraged protein intake  -Trend labs     ____________________________________  Ladoris MD Eduardo  [facility]  [email or web address]  Office: [phone]

## 2022-12-25 LAB
ALBUMIN SERPL-MCNC: 2.5 G/DL (ref 3.4–5)
ANION GAP SERPL CALCULATED.3IONS-SCNC: 8 MMOL/L (ref 3–16)
BUN BLDV-MCNC: 18 MG/DL (ref 7–20)
CALCIUM SERPL-MCNC: 8.5 MG/DL (ref 8.3–10.6)
CHLORIDE BLD-SCNC: 92 MMOL/L (ref 99–110)
CO2: 32 MMOL/L (ref 21–32)
CREAT SERPL-MCNC: 0.6 MG/DL (ref 0.6–1.2)
GFR SERPL CREATININE-BSD FRML MDRD: >60 ML/MIN/{1.73_M2}
GLUCOSE BLD-MCNC: 122 MG/DL (ref 70–99)
MAGNESIUM: 1.6 MG/DL (ref 1.8–2.4)
PHOSPHORUS: 3.2 MG/DL (ref 2.5–4.9)
POTASSIUM SERPL-SCNC: 3.2 MMOL/L (ref 3.5–5.1)
SODIUM BLD-SCNC: 132 MMOL/L (ref 136–145)

## 2022-12-25 PROCEDURE — 6370000000 HC RX 637 (ALT 250 FOR IP): Performed by: INTERNAL MEDICINE

## 2022-12-25 PROCEDURE — 2580000003 HC RX 258: Performed by: INTERNAL MEDICINE

## 2022-12-25 PROCEDURE — 36415 COLL VENOUS BLD VENIPUNCTURE: CPT

## 2022-12-25 PROCEDURE — 6370000000 HC RX 637 (ALT 250 FOR IP): Performed by: NURSE PRACTITIONER

## 2022-12-25 PROCEDURE — 80069 RENAL FUNCTION PANEL: CPT

## 2022-12-25 PROCEDURE — 1200000000 HC SEMI PRIVATE

## 2022-12-25 PROCEDURE — 83735 ASSAY OF MAGNESIUM: CPT

## 2022-12-25 PROCEDURE — 51702 INSERT TEMP BLADDER CATH: CPT

## 2022-12-25 RX ORDER — LANOLIN ALCOHOL/MO/W.PET/CERES
400 CREAM (GRAM) TOPICAL 2 TIMES DAILY
Status: DISCONTINUED | OUTPATIENT
Start: 2022-12-25 | End: 2023-01-04 | Stop reason: HOSPADM

## 2022-12-25 RX ADMIN — VALACYCLOVIR HYDROCHLORIDE 500 MG: 500 TABLET, FILM COATED ORAL at 09:48

## 2022-12-25 RX ADMIN — FOLIC ACID 1 MG: 1 TABLET ORAL at 09:49

## 2022-12-25 RX ADMIN — POTASSIUM BICARBONATE 20 MEQ: 782 TABLET, EFFERVESCENT ORAL at 09:49

## 2022-12-25 RX ADMIN — CHLORDIAZEPOXIDE HYDROCHLORIDE 5 MG: 5 CAPSULE ORAL at 15:09

## 2022-12-25 RX ADMIN — CHLORDIAZEPOXIDE HYDROCHLORIDE 5 MG: 5 CAPSULE ORAL at 21:23

## 2022-12-25 RX ADMIN — LACTULOSE 20 G: 20 SOLUTION ORAL at 09:48

## 2022-12-25 RX ADMIN — SODIUM CHLORIDE, PRESERVATIVE FREE 10 ML: 5 INJECTION INTRAVENOUS at 21:25

## 2022-12-25 RX ADMIN — METOPROLOL SUCCINATE 50 MG: 50 TABLET, EXTENDED RELEASE ORAL at 09:49

## 2022-12-25 RX ADMIN — POTASSIUM BICARBONATE 20 MEQ: 782 TABLET, EFFERVESCENT ORAL at 15:03

## 2022-12-25 RX ADMIN — FUROSEMIDE 40 MG: 40 TABLET ORAL at 09:49

## 2022-12-25 RX ADMIN — Medication 1 TABLET: at 09:49

## 2022-12-25 RX ADMIN — ANASTROZOLE 1 MG: 1 TABLET, COATED ORAL at 09:52

## 2022-12-25 RX ADMIN — SODIUM CHLORIDE, PRESERVATIVE FREE 10 ML: 5 INJECTION INTRAVENOUS at 09:48

## 2022-12-25 RX ADMIN — Medication 400 MG: at 15:08

## 2022-12-25 RX ADMIN — Medication 100 MG: at 09:48

## 2022-12-25 RX ADMIN — PANTOPRAZOLE SODIUM 40 MG: 40 TABLET, DELAYED RELEASE ORAL at 05:46

## 2022-12-25 RX ADMIN — ATORVASTATIN CALCIUM 20 MG: 10 TABLET, FILM COATED ORAL at 21:23

## 2022-12-25 RX ADMIN — CHLORDIAZEPOXIDE HYDROCHLORIDE 5 MG: 5 CAPSULE ORAL at 09:49

## 2022-12-25 RX ADMIN — Medication 400 MG: at 21:23

## 2022-12-25 NOTE — PROGRESS NOTES
Hospitalist Progress Note      PCP: Ace Rae MD    Date of Admission: 12/16/2022    Chief Complaint: Fall at home    Marie Jimenez Course: H&P reviewed     Subjective: No voiced complaints, sitting in chair eating meal, talking with daughter. Discussed plan for SNF. Medications:  Reviewed    Infusion Medications    sodium chloride Stopped (12/24/22 1729)     Scheduled Medications    magnesium oxide  400 mg Oral BID    potassium bicarb-citric acid  20 mEq Oral Once    potassium bicarb-citric acid  20 mEq Oral Daily    furosemide  40 mg Oral Daily    folic acid  1 mg Oral Daily    chlordiazePOXIDE  5 mg Oral TID    sodium chloride flush  5-40 mL IntraVENous 2 times per day    lactulose  20 g Oral Daily    thiamine  100 mg Oral Daily    pantoprazole  40 mg Oral QAM AC    anastrozole  1 mg Oral Daily    multivitamin  1 tablet Oral Daily    atorvastatin  20 mg Oral Nightly    metoprolol succinate  50 mg Oral Daily    valACYclovir  500 mg Oral Daily     PRN Meds: ipratropium-albuterol, sodium chloride flush, sodium chloride, magnesium sulfate, promethazine **OR** ondansetron, acetaminophen **OR** acetaminophen, LORazepam **OR** LORazepam **OR** LORazepam **OR** LORazepam **OR** LORazepam **OR** LORazepam **OR** LORazepam **OR** LORazepam      Intake/Output Summary (Last 24 hours) at 12/25/2022 1409  Last data filed at 12/25/2022 1357  Gross per 24 hour   Intake 1582.15 ml   Output 1750 ml   Net -167.85 ml         Physical Exam Performed:    BP (!) 141/89   Pulse 89   Temp 97.9 °F (36.6 °C) (Oral)   Resp 20   Ht 5' 2\" (1.575 m)   Wt 211 lb 13.8 oz (96.1 kg)   LMP  (LMP Unknown)   SpO2 100%   BMI 38.75 kg/m²     General appearance: Obese. Ill appearing, NAD. HEENT: Pupils equal, round, and reactive to light. Conjunctivae/corneas clear. Neck: Supple, with full range of motion. No jugular venous distention. Trachea midline. Respiratory:  Normal respiratory effort.  Clear to auscultation, bilaterally without Rales/Wheezes/Rhonchi. Cardiovascular: Regular rate and rhythm with normal S1/S2 without murmurs, rubs or gallops. Abdomen: Soft, non-tender, non-distended with normal bowel sounds. Musculoskeletal: No clubbing/cyanosis 2+edema bilaterally. Skin: Skin color, texture, turgor normal.  No rashes or lesions. Neurologic:  Neurovascularly intact without any focal sensory/motor deficits. Cranial nerves: II-XII intact, grossly non-focal.  Psychiatric: Alert, poor insight  Capillary Refill: Brisk, 3 seconds, normal   Peripheral Pulses: +2 palpable, equal bilaterally       Labs:   No results for input(s): WBC, HGB, HCT, PLT in the last 72 hours. Recent Labs     12/23/22  0745 12/24/22  0724 12/25/22  0600   * 131* 132*   K 3.2* 3.3* 3.2*   CL 90* 91* 92*   CO2 30 32 32   BUN 20 17 18   CREATININE <0.5* 0.6 0.6   CALCIUM 8.8 8.4 8.5   PHOS  --  4.0 3.2       No results for input(s): AST, ALT, BILIDIR, BILITOT, ALKPHOS in the last 72 hours. No results for input(s): INR in the last 72 hours. No results for input(s): Kalie Height in the last 72 hours. Urinalysis:      Lab Results   Component Value Date/Time    NITRU Negative 12/17/2022 06:32 AM    WBCUA 0-2 12/17/2022 06:32 AM    BACTERIA 1+ 04/19/2021 04:39 PM    RBCUA None seen 12/17/2022 06:32 AM    BLOODU Negative 12/17/2022 06:32 AM    SPECGRAV 1.015 12/17/2022 06:32 AM    GLUCOSEU Negative 12/17/2022 06:32 AM       Radiology:  US ABDOMEN LIMITED   Final Result   No ascites identified         CT CHEST ABDOMEN PELVIS W CONTRAST Additional Contrast? None   Final Result   1. CHEST: Small right pleural effusion and mild atelectasis in the lung bases. 2. No consolidation is appreciated. 3. Moderate to severe cardiomegaly. 4. T12 superior endplate fracture of uncertain age. Moderate anterior height   loss of T11, likely chronic. If there is focal tenderness, non-urgent   follow-up MRI is a consideration.    5. ABDOMEN AND PELVIS: Cirrhotic morphology of the liver. 6. Prominence of the common and pancreatic ducts, also noted on 01/28/2022   Ductal ectasia or adjacent pancreatic cystic lesion(s) have been considered. Follow-up ERCP or endoscopic ultrasound was previously recommended. 7. No acute abdominopelvic abnormality identified. 8. Moderate anasarca. 9. Chronic-appearing compression fractures at L1 and L3. XR CHEST PORTABLE   Final Result   Left central line projects over the expected left brachiocephalic vein. Correlation for appropriate return is recommended. No pneumothorax. Stable enlargement of cardiac silhouette. XR CHEST PORTABLE   Final Result   The left IJ central venous catheter is deflected laterally into the proximal   left subclavian vein area. No evidence of pneumothorax. Marked cardiomegaly with minimal pulmonary vascular congestion without   pulmonary edema. CT CERVICAL SPINE WO CONTRAST   Final Result   Multilevel degenerative changes with no acute fracture or traumatic   malalignment of the cervical spine. CT HEAD WO CONTRAST   Final Result   No acute intracranial abnormality. XR CHEST PORTABLE   Final Result   Mild cardiomegaly and mild central pulmonary congestion or pulmonary artery   hypertension which is more prominent. Minimal bibasilar atelectasis or small infiltrates. Questionable small bibasilar pleural effusions which is more prominent.              IP CONSULT TO NEPHROLOGY  IP CONSULT TO NEPHROLOGY  IP CONSULT TO SOCIAL WORK  IP CONSULT TO GI  IP CONSULT TO CRITICAL CARE  IP CONSULT TO SOCIAL WORK    Assessment/Plan:    Active Hospital Problems    Diagnosis     Macrocytic anemia [D53.9]      Priority: Medium    Thrombocytopenia ([address] [D69.6]      Priority: Medium    Elevated LFTs [R79.89]      Priority: Medium    Hyperbilirubinemia [E80.6]      Priority: Medium    Anasarca [R60.1]      Priority: Medium    Alcoholic cirrhosis of liver with ascites ([facility] [address] [K70.31]      Priority: Medium    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 History is limited. Apparently she fell at home, couldn't get up. EMS arrived to help her up, and ultimately decided to bring her to the ED because she was breathing heavily and edematous. When she arrived here she had \"no complaints\" but her Na was 102 (compared to baseline of upper 120's as of 11/1/2022). The patient was lethargic and a poor historian but she actually answered all the orientation questions correctly on arrival.  Hospital medicine admitted her to the ICU overnight. Acute on chronic hyponatremia. Due to cirrhosis, volume overload, poor solute intake. Encourage protein intake, also gave urea. Intermittent furosemide IV and fluid restriction per nephrology. Now on PO lasix     Alcohol dependence and withdrawal.  CIWA with chlordiazepam and PRN lorazepam.  Thiamine. Acute metabolic encephalopathy. Due to above issues, treat accordingly. No focal neurological deficits. HTN. Held lisinopril due to lower BPs. Hypomag-replete     PAF (new diagnosis). It actually looks like an EKG showed Afib back in 4/2022 during an outpatient colonoscopy but I do not see any mention of this in the chart. Started metoprolol. F/u TTE. The patient has chronic thrombocytopenia, alcohol abuse, an falls, so she is not a good candidate for anticoagulation. Alcoholic cirrhosis. CGI was consulted by admitting physician. DCIS L breast, diagnosed 10/2019.   S/p partial mastectomy and radiation in 2019. She is prescribed anastrazole by Dr. Zenon South. She is on chronic valacyclovir, presumably for suppressive therapy of HSV. DVT Prophylaxis: SCDs  Diet: ADULT DIET; Regular; 1500 ml  ADULT ORAL NUTRITION SUPPLEMENT; Breakfast, Dinner; Standard High Calorie/High Protein Oral Supplement  Code Status: Full Code  PT/OT Eval Status: Consulted    Dispo -replete electrolytes, snf placement pending.  DC 1-2 days    Appropriate for A1 Discharge Unit: Brianda Aguila, APRN - CNP

## 2022-12-25 NOTE — PLAN OF CARE
Problem: Discharge Planning  Goal: Discharge to home or other facility with appropriate resources  12/25/2022 0416 by Hanane Murillo RN  Outcome: Progressing  12/24/2022 1509 by Rafita Joseph RN  Outcome: Progressing  Flowsheets (Taken 12/24/2022 0909)  Discharge to home or other facility with appropriate resources:   Identify barriers to discharge with patient and caregiver   Arrange for needed discharge resources and transportation as appropriate   Identify discharge learning needs (meds, wound care, etc)     Problem: Pain  Goal: Verbalizes/displays adequate comfort level or baseline comfort level  12/25/2022 0416 by Hanane Murillo RN  Outcome: Progressing  12/24/2022 1509 by Rafita Joseph RN  Outcome: Progressing     Problem: Safety - Adult  Goal: Free from fall injury  12/25/2022 0416 by Hanane Murillo RN  Outcome: Progressing  12/24/2022 1509 by Rafita Joseph RN  Outcome: Progressing     Problem: Skin/Tissue Integrity  Goal: Absence of new skin breakdown  Description: 1. Monitor for areas of redness and/or skin breakdown  2. Assess vascular access sites hourly  3. Every 4-6 hours minimum:  Change oxygen saturation probe site  4. Every 4-6 hours:  If on nasal continuous positive airway pressure, respiratory therapy assess nares and determine need for appliance change or resting period. 12/25/2022 0416 by Hanane Murillo RN  Outcome: Progressing  12/24/2022 1509 by Rafita Joseph RN  Outcome: Progressing     Problem: Cardiovascular - Adult  Goal: Maintains optimal cardiac output and hemodynamic stability  Outcome: Progressing   Cardiac rhythm remains at baseline  Problem: Skin/Tissue Integrity  Goal: Absence of new skin breakdown  Description: 1. Monitor for areas of redness and/or skin breakdown  2. Assess vascular access sites hourly  3. Every 4-6 hours minimum:  Change oxygen saturation probe site  4.   Every 4-6 hours:  If on nasal continuous positive airway pressure, respiratory therapy assess nares and determine need for appliance change or resting period.   12/25/2022 0416 by Esperanza Phalen, RN  Outcome: Progressing  12/24/2022 1509 by Tanja Thomas RN  Outcome: Progressing     Problem: Safety - Adult  Goal: Free from fall injury  12/25/2022 0416 by Esperanza Phalen, RN  Outcome: Progressing  12/24/2022 1509 by Tanja Thomas RN  Outcome: Progressing     Problem: Nutrition Deficit:  Goal: Optimize nutritional status  Outcome: Progressing     Problem: Metabolic/Fluid and Electrolytes - Adult  Goal: Electrolytes maintained within normal limits  Outcome: Progressing  electrolytes levels gradually improving  Problem: Infection - Adult  Goal: Absence of infection during hospitalization  Outcome: Progressing     Problem: Infection - Adult  Goal: Absence of infection at discharge  Outcome: Progressing     Problem: Gastrointestinal - Adult  Goal: Maintains adequate nutritional intake  Outcome: Progressing     Problem: Neurosensory - Adult  Goal: Absence of seizures  Outcome: Progressing     Problem: Neurosensory - Adult  Goal: Achieves stable or improved neurological status  Outcome: Progressing   Patient continues to improve neurologically with no confusion or seizure episode recodred

## 2022-12-25 NOTE — PROGRESS NOTES
[facility] Progress Note           Subjective/   69y.o. year old female who we are seeing in consultation for Hyponatremia. HPI:  Sodium slowly improving per goal with diuresis, non-oliguric. Edema improving. ROS:  Intake better, weakness improving. Objective/   GEN:  Chronically ill, BP (!) 141/89   Pulse 89   Temp 97.9 °F (36.6 °C) (Oral)   Resp 20   Ht 5' 2\" (1.575 m)   Wt 211 lb 13.8 oz (96.1 kg)   LMP  (LMP Unknown)   SpO2 100%   BMI 38.75 kg/m²   HEENT: non-icteric, no JVD  CV: S1, S2 without m/r/g; ++ LE edema  RESP: CTA B without w/r/r; breathing wnl  ABD: +bs, soft, nt, no hsm  SKIN: warm, no rashes    Data/  No results for input(s): WBC, HGB, HCT, MCV, PLT in the last 72 hours. Recent Labs     12/23/22  0745 12/24/22  0724 12/25/22  0600   * 131* 132*   K 3.2* 3.3* 3.2*   CL 90* 91* 92*   CO2 30 32 32   GLUCOSE 90 121* 122*   PHOS  --  4.0 3.2   MG  --  1.20* 1.60*   BUN 20 17 18   CREATININE <0.5* 0.6 0.6   LABGLOM >60 >60 >60         Assessment/     -Hyponatremia multifactorial from liver cirrhosis, alcohol to volume overload                Na of 106 at 2354 on 12/16, appropriate correction, s/p course of urea, now on diuresis    -Liver cirrhosis c/b fluid overload    -Falls    -ETOH abuse with withdrawals    -Hypokalemia - started on potassium 20 meq a day, prn replacement    -Hypomagnesemia - started on oral MgOxide 400 mg po bid    Plan/     -Started lasix 40 mg a day since 12/22 with continued edema which will take days to weeks to resolve  -Started potassium 20 meq a day as maintenance while on lasix  -Fluid restriction 1500 ml/day  -Encouraged protein intake  -Trend labs     ____________________________________  Terrell Eden MD  [facility]  [email or web address]  Office: [phone]

## 2022-12-26 LAB
ALBUMIN SERPL-MCNC: 2.4 G/DL (ref 3.4–5)
ANION GAP SERPL CALCULATED.3IONS-SCNC: 8 MMOL/L (ref 3–16)
BASOPHILS ABSOLUTE: 0 K/UL (ref 0–0.2)
BASOPHILS RELATIVE PERCENT: 0.5 %
BUN BLDV-MCNC: 21 MG/DL (ref 7–20)
CALCIUM SERPL-MCNC: 9 MG/DL (ref 8.3–10.6)
CHLORIDE BLD-SCNC: 92 MMOL/L (ref 99–110)
CO2: 33 MMOL/L (ref 21–32)
CREAT SERPL-MCNC: 0.6 MG/DL (ref 0.6–1.2)
EOSINOPHILS ABSOLUTE: 0.2 K/UL (ref 0–0.6)
EOSINOPHILS RELATIVE PERCENT: 3.6 %
GFR SERPL CREATININE-BSD FRML MDRD: >60 ML/MIN/{1.73_M2}
GLUCOSE BLD-MCNC: 105 MG/DL (ref 70–99)
HCT VFR BLD CALC: 32.4 % (ref 36–48)
HEMATOLOGY PATH CONSULT: NO
HEMOGLOBIN: 11.3 G/DL (ref 12–16)
LYMPHOCYTES ABSOLUTE: 1.5 K/UL (ref 1–5.1)
LYMPHOCYTES RELATIVE PERCENT: 25.8 %
MAGNESIUM: 1.4 MG/DL (ref 1.8–2.4)
MCH RBC QN AUTO: 39 PG (ref 26–34)
MCHC RBC AUTO-ENTMCNC: 34.8 G/DL (ref 31–36)
MCV RBC AUTO: 112.1 FL (ref 80–100)
MONOCYTES ABSOLUTE: 1.1 K/UL (ref 0–1.3)
MONOCYTES RELATIVE PERCENT: 19.3 %
NEUTROPHILS ABSOLUTE: 3 K/UL (ref 1.7–7.7)
NEUTROPHILS RELATIVE PERCENT: 50.8 %
PDW BLD-RTO: 14.2 % (ref 12.4–15.4)
PHOSPHORUS: 3.8 MG/DL (ref 2.5–4.9)
PLATELET # BLD: 77 K/UL (ref 135–450)
PMV BLD AUTO: 8.4 FL (ref 5–10.5)
POTASSIUM SERPL-SCNC: 3.3 MMOL/L (ref 3.5–5.1)
RBC # BLD: 2.89 M/UL (ref 4–5.2)
SODIUM BLD-SCNC: 133 MMOL/L (ref 136–145)
WBC # BLD: 5.9 K/UL (ref 4–11)

## 2022-12-26 PROCEDURE — 6370000000 HC RX 637 (ALT 250 FOR IP): Performed by: NURSE PRACTITIONER

## 2022-12-26 PROCEDURE — 85025 COMPLETE CBC W/AUTO DIFF WBC: CPT

## 2022-12-26 PROCEDURE — 6360000002 HC RX W HCPCS: Performed by: INTERNAL MEDICINE

## 2022-12-26 PROCEDURE — 97530 THERAPEUTIC ACTIVITIES: CPT

## 2022-12-26 PROCEDURE — 6370000000 HC RX 637 (ALT 250 FOR IP): Performed by: INTERNAL MEDICINE

## 2022-12-26 PROCEDURE — 36415 COLL VENOUS BLD VENIPUNCTURE: CPT

## 2022-12-26 PROCEDURE — 97535 SELF CARE MNGMENT TRAINING: CPT

## 2022-12-26 PROCEDURE — 80069 RENAL FUNCTION PANEL: CPT

## 2022-12-26 PROCEDURE — 2580000003 HC RX 258: Performed by: INTERNAL MEDICINE

## 2022-12-26 PROCEDURE — 1200000000 HC SEMI PRIVATE

## 2022-12-26 PROCEDURE — 83735 ASSAY OF MAGNESIUM: CPT

## 2022-12-26 RX ORDER — POTASSIUM CHLORIDE 20 MEQ/1
20 TABLET, EXTENDED RELEASE ORAL ONCE
Status: COMPLETED | OUTPATIENT
Start: 2022-12-26 | End: 2022-12-26

## 2022-12-26 RX ORDER — MAGNESIUM SULFATE IN WATER 40 MG/ML
4000 INJECTION, SOLUTION INTRAVENOUS ONCE
Status: DISCONTINUED | OUTPATIENT
Start: 2022-12-26 | End: 2023-01-02

## 2022-12-26 RX ADMIN — METOPROLOL SUCCINATE 50 MG: 50 TABLET, EXTENDED RELEASE ORAL at 08:48

## 2022-12-26 RX ADMIN — FOLIC ACID 1 MG: 1 TABLET ORAL at 08:48

## 2022-12-26 RX ADMIN — CHLORDIAZEPOXIDE HYDROCHLORIDE 5 MG: 5 CAPSULE ORAL at 08:48

## 2022-12-26 RX ADMIN — FUROSEMIDE 40 MG: 40 TABLET ORAL at 08:52

## 2022-12-26 RX ADMIN — Medication 400 MG: at 08:48

## 2022-12-26 RX ADMIN — POTASSIUM BICARBONATE 20 MEQ: 782 TABLET, EFFERVESCENT ORAL at 08:49

## 2022-12-26 RX ADMIN — VALACYCLOVIR HYDROCHLORIDE 500 MG: 500 TABLET, FILM COATED ORAL at 08:49

## 2022-12-26 RX ADMIN — POTASSIUM CHLORIDE 20 MEQ: 1500 TABLET, EXTENDED RELEASE ORAL at 13:57

## 2022-12-26 RX ADMIN — SODIUM CHLORIDE, PRESERVATIVE FREE 10 ML: 5 INJECTION INTRAVENOUS at 19:48

## 2022-12-26 RX ADMIN — CHLORDIAZEPOXIDE HYDROCHLORIDE 5 MG: 5 CAPSULE ORAL at 19:48

## 2022-12-26 RX ADMIN — ANASTROZOLE 1 MG: 1 TABLET, COATED ORAL at 08:50

## 2022-12-26 RX ADMIN — PANTOPRAZOLE SODIUM 40 MG: 40 TABLET, DELAYED RELEASE ORAL at 05:20

## 2022-12-26 RX ADMIN — Medication 100 MG: at 08:48

## 2022-12-26 RX ADMIN — MAGNESIUM SULFATE HEPTAHYDRATE 2000 MG: 40 INJECTION, SOLUTION INTRAVENOUS at 08:52

## 2022-12-26 RX ADMIN — LACTULOSE 20 G: 20 SOLUTION ORAL at 08:49

## 2022-12-26 RX ADMIN — CHLORDIAZEPOXIDE HYDROCHLORIDE 5 MG: 5 CAPSULE ORAL at 13:57

## 2022-12-26 RX ADMIN — SODIUM CHLORIDE, PRESERVATIVE FREE 10 ML: 5 INJECTION INTRAVENOUS at 08:52

## 2022-12-26 RX ADMIN — ATORVASTATIN CALCIUM 20 MG: 10 TABLET, FILM COATED ORAL at 19:49

## 2022-12-26 RX ADMIN — Medication 400 MG: at 19:49

## 2022-12-26 RX ADMIN — Medication 1 TABLET: at 08:48

## 2022-12-26 NOTE — CARE COORDINATION
Chart reviewed. S/p fall at home, BLE edema, SOB, alcohol abuse with cirrhosis, encephalopathy. Management per GI, nephro and IM. Pt from home, alone. IPTA. Declines [facility] resources. PT/OT rec SNF. Pt now agreeable. Daughter, Mikaela Khalil, called with preferences. 1) [facility] 2) [facility]) [facility]. Referral sent to [facility] on Friday 12/23. Liaison, Alia Huerta, reviewing. CM left voice message with [facility] today. Awaiting return call. CM will continue to follow.  Gisele Lindsey RN

## 2022-12-26 NOTE — PROGRESS NOTES
[facility] Progress Note           Subjective/   69y.o. year old female who we are seeing in consultation for Hyponatremia. HPI:  Sodium slowly improving per goal with diuresis, non-oliguric. Edema improving. ROS:  Intake better, weakness improving. Objective/   GEN:  Chronically ill, /74   Pulse 68   Temp 97.6 °F (36.4 °C) (Oral)   Resp 18   Ht 5' 2\" (1.575 m)   Wt 215 lb 3.2 oz (97.6 kg)   LMP  (LMP Unknown)   SpO2 100%   BMI 39.36 kg/m²   HEENT: non-icteric, no JVD  CV: S1, S2 without m/r/g; ++ LE edema  RESP: CTA B without w/r/r; breathing wnl  ABD: +bs, soft, nt, no hsm  SKIN: warm, no rashes    Data/  Recent Labs     12/26/22  0711   WBC 5.9   HGB 11.3*   HCT 32.4*   .1*   PLT 77*       Recent Labs     12/24/22  0724 12/25/22  0600 12/26/22  0711   * 132* 133*   K 3.3* 3.2* 3.3*   CL 91* 92* 92*   CO2 32 32 33*   GLUCOSE 121* 122* 105*   PHOS 4.0 3.2 3.8   MG 1.20* 1.60* 1.40*   BUN 17 18 21*   CREATININE 0.6 0.6 0.6   LABGLOM >60 >60 >60         Assessment/     -Hyponatremia multifactorial from liver cirrhosis, alcohol to volume overload                Na of 106 at 2354 on 12/16, appropriate correction, s/p course of urea, now on diuresis    -Liver cirrhosis c/b fluid overload    -Falls    -ETOH abuse with withdrawals    -Hypokalemia - started on potassium 20 meq a day, prn replacement    -Hypomagnesemia - started on oral MgOxide 400 mg po bid, prn IV replacement    Plan/     -Started lasix 40 mg a day since 12/22 with continued edema which will take days to weeks to resolve  -Started potassium 20 meq a day as maintenance while on lasix  -Fluid restriction 1500 ml/day  -Encouraged protein intake  -Trend labs     ____________________________________  Jailene Cortes MD  [facility]  [email or web address]  Office: [phone]

## 2022-12-26 NOTE — PROGRESS NOTES
Hospitalist Progress Note      PCP: Darius Santiago MD    Date of Admission: 12/16/2022    Chief Complaint: Fall at home    KALEB FORD Course: H&P reviewed     Subjective: EMR and notes reviewed pt seen and examined. Sitting up in chair no new complaints,      Medications:  Reviewed    Infusion Medications    sodium chloride Stopped (12/24/22 1729)     Scheduled Medications    magnesium sulfate  4,000 mg IntraVENous Once    magnesium oxide  400 mg Oral BID    potassium bicarb-citric acid  20 mEq Oral Daily    furosemide  40 mg Oral Daily    folic acid  1 mg Oral Daily    chlordiazePOXIDE  5 mg Oral TID    sodium chloride flush  5-40 mL IntraVENous 2 times per day    lactulose  20 g Oral Daily    thiamine  100 mg Oral Daily    pantoprazole  40 mg Oral QAM AC    anastrozole  1 mg Oral Daily    multivitamin  1 tablet Oral Daily    atorvastatin  20 mg Oral Nightly    metoprolol succinate  50 mg Oral Daily    valACYclovir  500 mg Oral Daily     PRN Meds: ipratropium-albuterol, sodium chloride flush, sodium chloride, magnesium sulfate, promethazine **OR** ondansetron, acetaminophen **OR** acetaminophen, LORazepam **OR** LORazepam **OR** LORazepam **OR** LORazepam **OR** LORazepam **OR** LORazepam **OR** LORazepam **OR** LORazepam      Intake/Output Summary (Last 24 hours) at 12/26/2022 1757  Last data filed at 12/26/2022 1756  Gross per 24 hour   Intake 1574 ml   Output 2075 ml   Net -501 ml         Physical Exam Performed:    /74   Pulse 68   Temp 97.6 °F (36.4 °C) (Oral)   Resp 18   Ht 5' 2\" (1.575 m)   Wt 215 lb 3.2 oz (97.6 kg)   LMP  (LMP Unknown)   SpO2 100%   BMI 39.36 kg/m²     General appearance: Obese. Ill appearing, NAD. HEENT: Pupils equal, round, and reactive to light. Conjunctivae/corneas clear. Neck: Supple, with full range of motion. No jugular venous distention. Trachea midline. Respiratory:  Normal respiratory effort.  Clear to auscultation, bilaterally without Rales/Wheezes/Rhonchi. Cardiovascular: Regular rate and rhythm with normal S1/S2 without murmurs, rubs or gallops. Abdomen: Soft, non-tender, non-distended with normal bowel sounds. Musculoskeletal: No clubbing/cyanosis 2+edema bilaterally. Skin: Skin color, texture, turgor normal.  No rashes or lesions. Neurologic:  Neurovascularly intact without any focal sensory/motor deficits. Cranial nerves: II-XII intact, grossly non-focal.  Psychiatric: Alert, poor insight  Capillary Refill: Brisk, 3 seconds, normal   Peripheral Pulses: +2 palpable, equal bilaterally       Labs:   Recent Labs     12/26/22  0711   WBC 5.9   HGB 11.3*   HCT 32.4*   PLT 77*       Recent Labs     12/24/22  0724 12/25/22  0600 12/26/22  0711   * 132* 133*   K 3.3* 3.2* 3.3*   CL 91* 92* 92*   CO2 32 32 33*   BUN 17 18 21*   CREATININE 0.6 0.6 0.6   CALCIUM 8.4 8.5 9.0   PHOS 4.0 3.2 3.8       No results for input(s): AST, ALT, BILIDIR, BILITOT, ALKPHOS in the last 72 hours. No results for input(s): INR in the last 72 hours. No results for input(s): Naye Parisian in the last 72 hours. Urinalysis:      Lab Results   Component Value Date/Time    NITRU Negative 12/17/2022 06:32 AM    WBCUA 0-2 12/17/2022 06:32 AM    BACTERIA 1+ 04/19/2021 04:39 PM    RBCUA None seen 12/17/2022 06:32 AM    BLOODU Negative 12/17/2022 06:32 AM    SPECGRAV 1.015 12/17/2022 06:32 AM    GLUCOSEU Negative 12/17/2022 06:32 AM       Radiology:  US ABDOMEN LIMITED   Final Result   No ascites identified         CT CHEST ABDOMEN PELVIS W CONTRAST Additional Contrast? None   Final Result   1. CHEST: Small right pleural effusion and mild atelectasis in the lung bases. 2. No consolidation is appreciated. 3. Moderate to severe cardiomegaly. 4. T12 superior endplate fracture of uncertain age. Moderate anterior height   loss of T11, likely chronic. If there is focal tenderness, non-urgent   follow-up MRI is a consideration.    5. ABDOMEN AND PELVIS: Cirrhotic morphology of the liver. 6. Prominence of the common and pancreatic ducts, also noted on 01/28/2022   Ductal ectasia or adjacent pancreatic cystic lesion(s) have been considered. Follow-up ERCP or endoscopic ultrasound was previously recommended. 7. No acute abdominopelvic abnormality identified. 8. Moderate anasarca. 9. Chronic-appearing compression fractures at L1 and L3. XR CHEST PORTABLE   Final Result   Left central line projects over the expected left brachiocephalic vein. Correlation for appropriate return is recommended. No pneumothorax. Stable enlargement of cardiac silhouette. XR CHEST PORTABLE   Final Result   The left IJ central venous catheter is deflected laterally into the proximal   left subclavian vein area. No evidence of pneumothorax. Marked cardiomegaly with minimal pulmonary vascular congestion without   pulmonary edema. CT CERVICAL SPINE WO CONTRAST   Final Result   Multilevel degenerative changes with no acute fracture or traumatic   malalignment of the cervical spine. CT HEAD WO CONTRAST   Final Result   No acute intracranial abnormality. XR CHEST PORTABLE   Final Result   Mild cardiomegaly and mild central pulmonary congestion or pulmonary artery   hypertension which is more prominent. Minimal bibasilar atelectasis or small infiltrates. Questionable small bibasilar pleural effusions which is more prominent.              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 History is limited. Apparently she fell at home, couldn't get up. EMS arrived to help her up, and ultimately decided to bring her to the ED because she was breathing heavily and edematous. When she arrived here she had \"no complaints\" but her Na was 102 (compared to baseline of upper 120's as of 11/1/2022). The patient was lethargic and a poor historian but she actually answered all the orientation questions correctly on arrival.  Hospital medicine admitted her to the ICU overnight. Acute on chronic hyponatremia. Due to cirrhosis, volume overload, poor solute intake. Encourage protein intake, also gave urea. Intermittent furosemide IV and fluid restriction per nephrology. Now on PO lasix  - improving      Alcohol dependence and withdrawal.  CIWA with chlordiazepam and PRN lorazepam.  Thiamine. Acute metabolic encephalopathy. Due to above issues, treat accordingly. No focal neurological deficits. HTN. Held lisinopril due to lower BPs. Hypomag-replete     PAF (new diagnosis). It actually looks like an EKG showed Afib back in 4/2022 during an outpatient colonoscopy but no  mention of this in the chart. Started metoprolol. F/u TTE. The patient has chronic thrombocytopenia, alcohol abuse, an falls, so she is not a good candidate for anticoagulation. Alcoholic cirrhosis. CGI was consulted by admitting physician. DCIS L breast, diagnosed 10/2019.   S/p partial mastectomy and radiation in 2019. She is prescribed anastrazole by Dr. Etta Ribera. She is on chronic valacyclovir, presumably for suppressive therapy of HSV. DVT Prophylaxis: SCDs  Diet: ADULT DIET; Regular; 1500 ml  ADULT ORAL NUTRITION SUPPLEMENT; Breakfast, Dinner; Standard High Calorie/High Protein Oral Supplement  Code Status: Full Code  PT/OT Eval Status: Consulted    Dispo -replete electrolytes, snf placement pending.  DC 1-2 days    Appropriate for A1 Discharge Unit: JOHNY Kenny - CNP

## 2022-12-26 NOTE — PLAN OF CARE
Problem: Discharge Planning  Goal: Discharge to home or other facility with appropriate resources  Outcome: Progressing     Problem: Pain  Goal: Verbalizes/displays adequate comfort level or baseline comfort level  Outcome: Progressing     Problem: Safety - Adult  Goal: Free from fall injury  Outcome: Progressing     Problem: Skin/Tissue Integrity  Goal: Absence of new skin breakdown  Description: 1. Monitor for areas of redness and/or skin breakdown  2. Assess vascular access sites hourly  3. Every 4-6 hours minimum:  Change oxygen saturation probe site  4. Every 4-6 hours:  If on nasal continuous positive airway pressure, respiratory therapy assess nares and determine need for appliance change or resting period.   Outcome: Progressing     Problem: Respiratory - Adult  Goal: Achieves optimal ventilation and oxygenation  Outcome: Progressing     Problem: Nutrition Deficit:  Goal: Optimize nutritional status  Outcome: Progressing     Problem: Metabolic/Fluid and Electrolytes - Adult  Goal: Electrolytes maintained within normal limits  Outcome: Progressing     Problem: Infection - Adult  Goal: Absence of infection during hospitalization  Outcome: Progressing     Problem: Infection - Adult  Goal: Absence of infection at discharge  Outcome: Progressing     Problem: Gastrointestinal - Adult  Goal: Maintains adequate nutritional intake  Outcome: Progressing     Problem: Musculoskeletal - Adult  Goal: Return ADL status to a safe level of function  Outcome: Progressing     Problem: Neurosensory - Adult  Goal: Absence of seizures  Outcome: Progressing

## 2022-12-26 NOTE — PROGRESS NOTES
Pt A&Ox4. VSS. Shift assessment completed. Mg replaced today. Pt denies pain or other needs. Call light within reach, bed in lowest position, wheels locked. Bed alarm on and audible. AVASYS in place for safety.

## 2022-12-26 NOTE — PROGRESS NOTES
Physical Therapy  Facility/Department: [facility] B[facility] - MED SURG  Daily Treatment Note  NAME: Mani Vaughn  : 1955  MRN: [id number]    Date of Service: 2022    Discharge Recommendations:  Subacute/Skilled Nursing Facility   PT Equipment Recommendations  Equipment Needed: No  Other: defer to next level of care. Patient Diagnosis(es): The primary encounter diagnosis was Hyponatremia. Diagnoses of Fall, initial encounter, Longstanding persistent atrial fibrillation ([address] and Shortness of breath were also pertinent to this visit. Assessment   Assessment: pt seen as cotreat with OT to maximize pt safety and gains from therapy. pt found in bed, agreebale to treatment. pt performed bed mobility with SBA, transfers to RW with CGA, ambulation with RW CGA up to 30'. pt has no LOB during ambulation but ambulates with very slow gait speed and decreased step length. pt reports little to no fatigue from ambulating and standing at sink. pt demonstrates good understanding and sequencing with RW on today's date. pt would continue to benefit from skilled PT to improve strength, endurance, and functional mobility. Activity Tolerance: Patient tolerated treatment well  Equipment Needed: No  Other: defer to next level of care. Plan    Physcial Therapy Plan  General Plan: 3-5 times per week  Current Treatment Recommendations: Strengthening;Balance training;Functional mobility training;Transfer training; Endurance training;Gait training;Neuromuscular re-education;Stair training;Home exercise program;Safety education & training;Patient/Caregiver education & training;Equipment evaluation, education, & procurement; Therapeutic activities     Restrictions  Restrictions/Precautions  Restrictions/Precautions: Fall Risk, General Precautions  Position Activity Restriction  Other position/activity restrictions: avasys, up with assistance, tele, terrazas catheter     Subjective    Subjective  Subjective: pt states she wants to get up and move aorund, agreeable to PT treatment.  Pain: pt denies pain throughout.  Orientation  Overall Orientation Status: Within Normal Limits  Orientation Level: Oriented X4  Cognition  Overall Cognitive Status: WFL  Arousal/Alertness: Appropriate responses to stimuli  Following Commands: Follows one step commands consistently  Attention Span: Appears intact  Memory: Appears intact  Safety Judgement: Decreased awareness of need for assistance;Decreased awareness of need for safety  Problem Solving: Assistance required to generate solutions  Insights: Decreased awareness of deficits  Initiation: Requires cues for some  Sequencing: Requires cues for some     Objective   Vitals  Comment: HR 65 BPM, SPO2 - could not read because pt hands were cold, pt denies shortness of breath, dyspnea, dizzyness, and has been demonstrating sats in the upper 90's, /68  Bed Mobility Training  Bed Mobility Training: Yes  Overall Level of Assistance: Stand-by assistance  Interventions: Verbal cues  Supine to Sit: Stand-by assistance  Sit to Supine: Other (comment) (BRANDON up in chair at end of session.)  Balance  Sitting: Intact  Sitting - Static: Good (unsupported)  Sitting - Dynamic: Good (unsupported)  Standing: With support  Standing - Static: Fair  Standing - Dynamic: Fair  Transfer Training  Transfer Training: Yes  Overall Level of Assistance: Contact-guard assistance (with RW.)  Interventions: Verbal cues  Sit to Stand: Contact-guard assistance  Stand to Sit: Contact-guard assistance  Toilet Transfer: Contact-guard assistance  Gait Training  Gait Training: Yes  Right Side Weight Bearing: As tolerated  Left Side Weight Bearing: As tolerated  Gait  Overall Level of Assistance: Contact-guard assistance  Interventions: Verbal cues;Tactile cues  Base of Support: Narrowed  Speed/Kate: Delayed;Pace decreased (< 100 feet/min);Shuffled;Slow  Step Length: Left shortened;Right shortened  Gait Abnormalities: Decreased step  clearance;Shuffling gait  Distance (ft): 30 Feet (pt ambulated to bathroom, performed toilet transfer, stood at sink ~5 minutes performing grooming and hand hygein, ambulated around room and then to chair.)  Assistive Device: Walker, rolling;Gait belt     PT Exercises  Exercise Treatment: AP, LAQ, QS, GS, seated march, all performed in seated 1X12. Safety Devices  Type of Devices: Telesitter in use;Nurse notified; Left in chair;Patient at risk for falls; Chair alarm in place;Gait belt;Call light within reach; All fall risk precautions in place  Restraints  Restraints Initially in Place: No       Goals  Short Term Goals  Time Frame for Short Term Goals: 7 days (12/27/22) unless otherwise noted  Short Term Goal 1: Pt will perform bed mobility with min(A)  Short Term Goal 2: Pt will perform transfers with LRAD and with min(A)  Short Term Goal 3: Pt will ambulate 15 ft with LRAD and min(A)  Short Term Goal 4: Pt will perform 12-15 reps of BLE exercises by 12/23/22  Patient Goals   Patient Goals : \"to go home\"    Education  Patient Education  Education Given To: Patient  Education Provided: Role of Therapy;Plan of Care;Transfer Training;Precautions; Equipment  Education Method: Verbal  Barriers to Learning: Cognition  Education Outcome: Verbalized understanding;Continued education needed    Therapy Time   Individual Concurrent Group Co-treatment   Time In 1040         Time Out 1108         Minutes 28         Timed Code Treatment Minutes: [address] [facility], PT, DPT  If pt is unable to be seen after this session, please let this note serve as discharge summary. Please see case management note for discharge disposition. Thank you.

## 2022-12-26 NOTE — PROGRESS NOTES
Occupational Therapy  Facility/Department: [facility] B3 - MED SURG  Daily Treatment Note  NAME: Meagan Santacruz  : 1955  MRN: [id number]    Date of Service: 2022    Discharge Recommendations:  Subacute/Skilled Nursing Facility  OT Equipment Recommendations  Equipment Needed: No  Other: Defer to next level of care      Patient Diagnosis(es): The primary encounter diagnosis was Hyponatremia. Diagnoses of Fall, initial encounter, Longstanding persistent atrial fibrillation (HCC), and Shortness of breath were also pertinent to this visit.     Assessment      Assessment: Co-tx collaboration this date to safely meet goals and will have better occupational performance outcomes with in a co-treatment than 1:1 session.  Pt tolerated treatment well, requiring CGA to SBA for transfers and mobility with RW. She performed grooming tasks while standing at sink with RW and SBA. Pt required min A for toileting tasks. Pt is showing improvement but continues to be limited by weakness and would continue to benefit from acute OT at this time. Recommend SNF upon d/c to incease strength and endurance prior to returning home.    Activity Tolerance: Patient tolerated treatment well  Discharge Recommendations: Subacute/Skilled Nursing Facility  Equipment Needed: No  Other: Defer to next level of care      Plan   Occupational Therapy Plan  Times Per Week: 3-5x/wk  Current Treatment Recommendations: Strengthening;ROM;Balance training;Functional mobility training;Endurance training;Pain management;Safety education & training;Patient/Caregiver education & training;Home management training;Self-Care / ADL     Restrictions     Restrictions/Precautions  Restrictions/Precautions: Fall Risk, General Precautions  Position Activity Restriction  Other position/activity restrictions: avasys, up with assistance, tele, terrazas catheter    Subjective   Subjective  Subjective: Pt asleep in bed upon OT arrival, easily aroused. Agreeable to OT/PT  co-tx. Pain: Denied pain throughout session. Orientation  Overall Orientation Status: Within Normal Limits  Orientation Level: Oriented X4  Cognition  Overall Cognitive Status: WFL  Arousal/Alertness: Appropriate responses to stimuli  Following Commands: Follows one step commands consistently  Attention Span: Appears intact  Memory: Appears intact  Safety Judgement: Decreased awareness of need for assistance;Decreased awareness of need for safety  Problem Solving: Assistance required to generate solutions  Insights: Decreased awareness of deficits  Initiation: Requires cues for some  Sequencing: Requires cues for some        Objective    Vitals  Vitals  Heart Rate: 65  Heart Rate Source: Monitor  BP: 126/68  BP Location: Right upper arm  BP Method: Automatic  Patient Position: Sitting  MAP (Calculated): 87  Bed Mobility Training  Bed Mobility Training: Yes  Interventions: Verbal cues  Supine to Sit: Stand-by assistance (HOB elevated, use of bed rails; VC for hand placement)  Transfer Training  Transfer Training: Yes  Sit to Stand: Stand-by assistance (from EOB to RW; VC for hand placement)  Stand to Sit: Stand-by assistance (from RW to chair with arms; VC for hand placement)  Toilet Transfer: Stand-by assistance (standard toilet, no use of grab bar)     ADL  Grooming: Stand by assistance  Grooming Skilled Clinical Factors: standing at sink with RW to perform oral hygiene, comb hair, and wash hands  Toileting: Stand by assistance;Minimal assistance  Toileting Skilled Clinical Factors: standing at toilet with RW for clothing managment; min A to perform bowel hygiene while standing at RW        Safety Devices  Type of Devices: Telesitter in use;Nurse notified; Left in chair;Patient at risk for falls; Chair alarm in place;Gait belt;Call light within reach; All fall risk precautions in place  Restraints  Restraints Initially in Place: No     Patient Education  Education Given To: Patient  Education Provided: Role of Therapy;Plan of Care;Home Exercise Program;Transfer Training;ADL Adaptive Strategies  Education Method: Demonstration;Verbal  Barriers to Learning: None  Education Outcome: Verbalized understanding;Demonstrated understanding    AM-PAC Daily Activity Inpatient   How much help for putting on and taking off regular lower body clothing?: A Lot  How much help for Bathing?: A Lot  How much help for Toileting?: A Lot  How much help for putting on and taking off regular upper body clothing?: None  How much help for taking care of personal grooming?: A Little  How much help for eating meals?: None  AM-[facility] Inpatient Daily Activity Raw Score: 17  AM-PAC Inpatient ADL T-Scale Score : 37.26  ADL Inpatient CMS 0-100% Score: 50.11  ADL Inpatient CMS G-Code Modifier : CK    Goals  Short Term Goals  Time Frame for Short Term Goals: 1 week 12/27  Short Term Goal 1: Pt will complete LE dressing with modA; 12/23 Max assist  Short Term Goal 2: Pt will complete toilet transfers with min of 1; 12/23 STG met; New STG: supervision with toilet transfers by 12-30-22- progressing  Short Term Goal 3: Pt will complete 10 reps of BUE AROM exercises to increase strength for ADLs/transfers by 12/25; 12/23 STG met, continue for light strengthening  Short Term Goal 4: Pt will complete 1 min of static standing Anna of 1 for balance for ADLs prep.; 12/23 STG met, pt CGA: New STG: SBA for 3 minutes by 12-30-22- progressing  Patient Goals   Patient goals : \"to go home\"       Therapy Time   Individual Concurrent Group Co-treatment   Time In 1040         Time Out 1108         Minutes 28         Timed Code Treatment Minutes: 28 Minutes     If pt is unable to be seen after this session, please let this note serve as discharge summary. Please see case management note for discharge disposition. Thank you.     Shahid John OTR/GEOVANNI

## 2022-12-26 NOTE — PLAN OF CARE
Problem: Discharge Planning  Goal: Discharge to home or other facility with appropriate resources  12/26/2022 0957 by Deion Schultz RN  Outcome: Progressing  Flowsheets (Taken 12/26/2022 0957)  Discharge to home or other facility with appropriate resources: Identify barriers to discharge with patient and caregiver  12/26/2022 0504 by Oscar Tobin RN  Outcome: Progressing     Problem: Pain  Goal: Verbalizes/displays adequate comfort level or baseline comfort level  12/26/2022 0957 by Deion Schultz RN  Outcome: Progressing  Flowsheets (Taken 12/26/2022 0957)  Verbalizes/displays adequate comfort level or baseline comfort level:   Encourage patient to monitor pain and request assistance   Assess pain using appropriate pain scale   Administer analgesics based on type and severity of pain and evaluate response  12/26/2022 0504 by Oscar Tobin RN  Outcome: Progressing

## 2022-12-27 LAB — MAGNESIUM: 1.6 MG/DL (ref 1.8–2.4)

## 2022-12-27 PROCEDURE — 6370000000 HC RX 637 (ALT 250 FOR IP): Performed by: INTERNAL MEDICINE

## 2022-12-27 PROCEDURE — 2580000003 HC RX 258: Performed by: INTERNAL MEDICINE

## 2022-12-27 PROCEDURE — 1200000000 HC SEMI PRIVATE

## 2022-12-27 PROCEDURE — 6370000000 HC RX 637 (ALT 250 FOR IP): Performed by: NURSE PRACTITIONER

## 2022-12-27 PROCEDURE — 6360000002 HC RX W HCPCS: Performed by: INTERNAL MEDICINE

## 2022-12-27 PROCEDURE — 83735 ASSAY OF MAGNESIUM: CPT

## 2022-12-27 PROCEDURE — 36415 COLL VENOUS BLD VENIPUNCTURE: CPT

## 2022-12-27 RX ORDER — BUMETANIDE 1 MG/1
1 TABLET ORAL DAILY
Status: DISCONTINUED | OUTPATIENT
Start: 2022-12-27 | End: 2022-12-27

## 2022-12-27 RX ORDER — POTASSIUM CHLORIDE 20 MEQ/1
20 TABLET, EXTENDED RELEASE ORAL ONCE
Status: COMPLETED | OUTPATIENT
Start: 2022-12-27 | End: 2022-12-27

## 2022-12-27 RX ORDER — FUROSEMIDE 40 MG/1
40 TABLET ORAL ONCE
Status: COMPLETED | OUTPATIENT
Start: 2022-12-27 | End: 2022-12-27

## 2022-12-27 RX ORDER — SPIRONOLACTONE 25 MG/1
25 TABLET ORAL DAILY
Status: DISCONTINUED | OUTPATIENT
Start: 2022-12-27 | End: 2023-01-04 | Stop reason: HOSPADM

## 2022-12-27 RX ORDER — FUROSEMIDE 40 MG/1
80 TABLET ORAL DAILY
Status: DISCONTINUED | OUTPATIENT
Start: 2022-12-28 | End: 2022-12-30

## 2022-12-27 RX ADMIN — Medication 400 MG: at 21:17

## 2022-12-27 RX ADMIN — Medication 100 MG: at 08:39

## 2022-12-27 RX ADMIN — LACTULOSE 20 G: 20 SOLUTION ORAL at 08:39

## 2022-12-27 RX ADMIN — Medication 400 MG: at 08:39

## 2022-12-27 RX ADMIN — SODIUM CHLORIDE, PRESERVATIVE FREE 10 ML: 5 INJECTION INTRAVENOUS at 21:18

## 2022-12-27 RX ADMIN — PANTOPRAZOLE SODIUM 40 MG: 40 TABLET, DELAYED RELEASE ORAL at 05:38

## 2022-12-27 RX ADMIN — CHLORDIAZEPOXIDE HYDROCHLORIDE 5 MG: 5 CAPSULE ORAL at 08:39

## 2022-12-27 RX ADMIN — CHLORDIAZEPOXIDE HYDROCHLORIDE 5 MG: 5 CAPSULE ORAL at 21:17

## 2022-12-27 RX ADMIN — Medication 1 TABLET: at 08:39

## 2022-12-27 RX ADMIN — FUROSEMIDE 40 MG: 40 TABLET ORAL at 16:46

## 2022-12-27 RX ADMIN — POTASSIUM CHLORIDE 20 MEQ: 1500 TABLET, EXTENDED RELEASE ORAL at 08:39

## 2022-12-27 RX ADMIN — CHLORDIAZEPOXIDE HYDROCHLORIDE 5 MG: 5 CAPSULE ORAL at 14:46

## 2022-12-27 RX ADMIN — FUROSEMIDE 40 MG: 40 TABLET ORAL at 08:39

## 2022-12-27 RX ADMIN — MAGNESIUM SULFATE HEPTAHYDRATE 2000 MG: 40 INJECTION, SOLUTION INTRAVENOUS at 09:24

## 2022-12-27 RX ADMIN — SODIUM CHLORIDE, PRESERVATIVE FREE 10 ML: 5 INJECTION INTRAVENOUS at 08:41

## 2022-12-27 RX ADMIN — SPIRONOLACTONE 25 MG: 25 TABLET ORAL at 10:43

## 2022-12-27 RX ADMIN — ATORVASTATIN CALCIUM 20 MG: 10 TABLET, FILM COATED ORAL at 21:17

## 2022-12-27 RX ADMIN — ANASTROZOLE 1 MG: 1 TABLET, COATED ORAL at 08:39

## 2022-12-27 RX ADMIN — POTASSIUM BICARBONATE 20 MEQ: 782 TABLET, EFFERVESCENT ORAL at 08:39

## 2022-12-27 RX ADMIN — FOLIC ACID 1 MG: 1 TABLET ORAL at 08:39

## 2022-12-27 RX ADMIN — SODIUM CHLORIDE: 9 INJECTION, SOLUTION INTRAVENOUS at 09:23

## 2022-12-27 RX ADMIN — VALACYCLOVIR HYDROCHLORIDE 500 MG: 500 TABLET, FILM COATED ORAL at 08:39

## 2022-12-27 RX ADMIN — METOPROLOL SUCCINATE 50 MG: 50 TABLET, EXTENDED RELEASE ORAL at 08:39

## 2022-12-27 NOTE — PLAN OF CARE
Problem: Discharge Planning  Goal: Discharge to home or other facility with appropriate resources  Outcome: Progressing     Problem: Pain  Goal: Verbalizes/displays adequate comfort level or baseline comfort level  Outcome: Progressing     Problem: Safety - Adult  Goal: Free from fall injury  Outcome: Progressing     Problem: Skin/Tissue Integrity  Goal: Absence of new skin breakdown  Description: 1. Monitor for areas of redness and/or skin breakdown  2. Assess vascular access sites hourly  3. Every 4-6 hours minimum:  Change oxygen saturation probe site  4. Every 4-6 hours:  If on nasal continuous positive airway pressure, respiratory therapy assess nares and determine need for appliance change or resting period.   Outcome: Progressing     Problem: Respiratory - Adult  Goal: Achieves optimal ventilation and oxygenation  Outcome: Progressing     Problem: Cardiovascular - Adult  Goal: Maintains optimal cardiac output and hemodynamic stability  Outcome: Progressing  Goal: Absence of cardiac dysrhythmias or at baseline  Outcome: Progressing     Problem: Skin/Tissue Integrity - Adult  Goal: Skin integrity remains intact  Outcome: Progressing     Problem: Genitourinary - Adult  Goal: Absence of urinary retention  Outcome: Progressing  Goal: Urinary catheter remains patent  Outcome: Progressing     Problem: Neurosensory - Adult  Goal: Achieves stable or improved neurological status  Outcome: Progressing  Goal: Absence of seizures  Outcome: Progressing     Problem: Musculoskeletal - Adult  Goal: Return mobility to safest level of function  Outcome: Progressing  Goal: Return ADL status to a safe level of function  Outcome: Progressing     Problem: Gastrointestinal - Adult  Goal: Maintains adequate nutritional intake  Outcome: Progressing     Problem: Infection - Adult  Goal: Absence of infection at discharge  Outcome: Progressing  Goal: Absence of infection during hospitalization  Outcome: Progressing     Problem: Metabolic/Fluid and Electrolytes - Adult  Goal: Electrolytes maintained within normal limits  Outcome: Progressing     Problem: Nutrition Deficit:  Goal: Optimize nutritional status  Outcome: Progressing

## 2022-12-27 NOTE — PLAN OF CARE
Problem: Discharge Planning  Goal: Discharge to home or other facility with appropriate resources  12/27/2022 0947 by Polina Bhakta RN  Outcome: Progressing  Flowsheets (Taken 12/27/2022 1436)  Discharge to home or other facility with appropriate resources:   Identify barriers to discharge with patient and caregiver   Arrange for needed discharge resources and transportation as appropriate   Identify discharge learning needs (meds, wound care, etc)     Problem: Safety - Adult  Goal: Free from fall injury  12/27/2022 0947 by Polina Bhakta RN  Outcome: Progressing     Problem: Skin/Tissue Integrity  Goal: Absence of new skin breakdown  Description: 1. Monitor for areas of redness and/or skin breakdown  2. Assess vascular access sites hourly  3. Every 4-6 hours minimum:  Change oxygen saturation probe site  4. Every 4-6 hours:  If on nasal continuous positive airway pressure, respiratory therapy assess nares and determine need for appliance change or resting period.   12/27/2022 0947 by Polina Bhakta RN  Outcome: Progressing     Problem: Skin/Tissue Integrity - Adult  Goal: Skin integrity remains intact  12/27/2022 0947 by Polina Bhakta RN  Outcome: Progressing  Flowsheets (Taken 12/27/2022 0844)  Skin Integrity Remains Intact: Monitor for areas of redness and/or skin breakdown     Problem: Genitourinary - Adult  Goal: Absence of urinary retention  12/27/2022 0947 by Polina Bhakta RN  Outcome: Progressing  Flowsheets (Taken 12/27/2022 0844)  Absence of urinary retention: Assess patients ability to void and empty bladder     Problem: Genitourinary - Adult  Goal: Urinary catheter remains patent  12/27/2022 0947 by Polina Bhakta RN  Outcome: Progressing  Flowsheets (Taken 12/27/2022 9302)  Urinary catheter remains patent: Assess patency of urinary catheter     Problem: Musculoskeletal - Adult  Goal: Return mobility to safest level of function  12/27/2022 0947 by Polina Bhakta RN  Outcome: Progressing  Flowsheets (Taken 12/27/2022 0844)  Return Mobility to Safest Level of Function: Assess patient stability and activity tolerance for standing, transferring and ambulating with or without assistive devices     Problem: Musculoskeletal - Adult  Goal: Return ADL status to a safe level of function  12/27/2022 0947 by Rito Squires RN  Outcome: Progressing  Flowsheets (Taken 12/27/2022 0844)  Return ADL Status to a Safe Level of Function: Administer medication as ordered     Problem: Gastrointestinal - Adult  Goal: Maintains adequate nutritional intake  12/27/2022 0947 by Rito Squires RN  Outcome: Progressing  Flowsheets (Taken 12/27/2022 0844)  Maintains adequate nutritional intake: Monitor percentage of each meal consumed     Problem: Infection - Adult  Goal: Absence of infection at discharge  12/27/2022 0947 by Rito Squires RN  Outcome: Progressing  Flowsheets (Taken 12/27/2022 0844)  Absence of infection at discharge: Assess and monitor for signs and symptoms of infection     Problem: Nutrition Deficit:  Goal: Optimize nutritional status  12/27/2022 0947 by Rito Squires RN  Outcome: Progressing

## 2022-12-27 NOTE — CARE COORDINATION
Chart reviewed. S/p fall at home, BLE edema, SOB, alcohol abuse with cirrhosis, encephalopathy (resolved). Management per GI, nephro and IM. Pt from home, alone. IPTA. Declines SA resources. PT/OT rec SNF. Pt now agreeable. Daughter, Macarena Venegas, called with preferences. 1) [facility]) [facility] 3) [facility]. Referral sent to [facility] on Friday 12/23. Liaison, Jarad Martin, reviewing. CM left voice message with [facility] today. Awaiting return call. CM called [facility] again this morning, spoke with Abilio Hayes. They have no beds available at this time. CM called [facility], spoke with Ulises Fritz. [facility] will review. CM will continue to follow. Gisele Torres RN     Addendum 12:56  CM received call from [facility]. Pt is too high-functioning for their facility. CM called referral to next choice[facility]. Spoke with Junior Crump [phone]. She will review and notify if able to accept or not. CM will continue to follow.  Gisele Torres, RN

## 2022-12-27 NOTE — PROGRESS NOTES
Hospitalist Progress Note      PCP: NIRU MENDEZ MD    Date of Admission: 12/16/2022    Chief Complaint: Fall at home    Hospital Course: H&P reviewed     Subjective: No acute events overnight no new complaints. Up awake and alert       Medications:  Reviewed    Infusion Medications    sodium chloride 25 mL/hr at 12/27/22 0923     Scheduled Medications    spironolactone  25 mg Oral Daily    [START ON 12/28/2022] furosemide  80 mg Oral Daily    furosemide  40 mg Oral Once    [START ON 12/28/2022] potassium bicarb-citric acid  40 mEq Oral Daily    magnesium sulfate  4,000 mg IntraVENous Once    magnesium oxide  400 mg Oral BID    folic acid  1 mg Oral Daily    chlordiazePOXIDE  5 mg Oral TID    sodium chloride flush  5-40 mL IntraVENous 2 times per day    lactulose  20 g Oral Daily    thiamine  100 mg Oral Daily    pantoprazole  40 mg Oral QAM AC    anastrozole  1 mg Oral Daily    multivitamin  1 tablet Oral Daily    atorvastatin  20 mg Oral Nightly    metoprolol succinate  50 mg Oral Daily    valACYclovir  500 mg Oral Daily     PRN Meds: ipratropium-albuterol, sodium chloride flush, sodium chloride, magnesium sulfate, promethazine **OR** ondansetron, acetaminophen **OR** acetaminophen, LORazepam **OR** LORazepam **OR** LORazepam **OR** LORazepam **OR** LORazepam **OR** LORazepam **OR** LORazepam **OR** LORazepam      Intake/Output Summary (Last 24 hours) at 12/27/2022 1325  Last data filed at 12/27/2022 1248  Gross per 24 hour   Intake 1610 ml   Output 3550 ml   Net -1940 ml         Physical Exam Performed:    /80   Pulse 70   Temp 98 °F (36.7 °C) (Oral)   Resp 18   Ht 5' 2\" (1.575 m)   Wt 216 lb 4.8 oz (98.1 kg)   LMP  (LMP Unknown)   SpO2 95%   BMI 39.56 kg/m²     General appearance: Obese. Ill appearing, NAD.  HEENT: Pupils equal, round, and reactive to light. Conjunctivae/corneas clear.  Neck: Supple, with full range of motion. No jugular venous distention. Trachea midline.  Respiratory:   Normal respiratory effort. Clear to auscultation, bilaterally without Rales/Wheezes/Rhonchi. Cardiovascular: Regular rate and rhythm with normal S1/S2 without murmurs, rubs or gallops. Abdomen: Soft, non-tender, non-distended with normal bowel sounds. Musculoskeletal: No clubbing/cyanosis 2+edema bilaterally. Skin: Skin color, texture, turgor normal.  No rashes or lesions. Neurologic:  Neurovascularly intact without any focal sensory/motor deficits. Cranial nerves: II-XII intact, grossly non-focal.  Psychiatric: Alert, poor insight  Capillary Refill: Brisk, 3 seconds, normal   Peripheral Pulses: +2 palpable, equal bilaterally       Labs:   Recent Labs     12/26/22  0711   WBC 5.9   HGB 11.3*   HCT 32.4*   PLT 77*       Recent Labs     12/25/22  0600 12/26/22  0711   * 133*   K 3.2* 3.3*   CL 92* 92*   CO2 32 33*   BUN 18 21*   CREATININE 0.6 0.6   CALCIUM 8.5 9.0   PHOS 3.2 3.8       No results for input(s): AST, ALT, BILIDIR, BILITOT, ALKPHOS in the last 72 hours. No results for input(s): INR in the last 72 hours. No results for input(s): Kalie Height in the last 72 hours. Urinalysis:      Lab Results   Component Value Date/Time    NITRU Negative 12/17/2022 06:32 AM    WBCUA 0-2 12/17/2022 06:32 AM    BACTERIA 1+ 04/19/2021 04:39 PM    RBCUA None seen 12/17/2022 06:32 AM    BLOODU Negative 12/17/2022 06:32 AM    SPECGRAV 1.015 12/17/2022 06:32 AM    GLUCOSEU Negative 12/17/2022 06:32 AM       Radiology:  US ABDOMEN LIMITED   Final Result   No ascites identified         CT CHEST ABDOMEN PELVIS W CONTRAST Additional Contrast? None   Final Result   1. CHEST: Small right pleural effusion and mild atelectasis in the lung bases. 2. No consolidation is appreciated. 3. Moderate to severe cardiomegaly. 4. T12 superior endplate fracture of uncertain age. Moderate anterior height   loss of T11, likely chronic. If there is focal tenderness, non-urgent   follow-up MRI is a consideration.    5. ABDOMEN AND PELVIS: Cirrhotic morphology of the liver. 6. Prominence of the common and pancreatic ducts, also noted on 01/28/2022   Ductal ectasia or adjacent pancreatic cystic lesion(s) have been considered. Follow-up ERCP or endoscopic ultrasound was previously recommended. 7. No acute abdominopelvic abnormality identified. 8. Moderate anasarca. 9. Chronic-appearing compression fractures at L1 and L3. XR CHEST PORTABLE   Final Result   Left central line projects over the expected left brachiocephalic vein. Correlation for appropriate return is recommended. No pneumothorax. Stable enlargement of cardiac silhouette. XR CHEST PORTABLE   Final Result   The left IJ central venous catheter is deflected laterally into the proximal   left subclavian vein area. No evidence of pneumothorax. Marked cardiomegaly with minimal pulmonary vascular congestion without   pulmonary edema. CT CERVICAL SPINE WO CONTRAST   Final Result   Multilevel degenerative changes with no acute fracture or traumatic   malalignment of the cervical spine. CT HEAD WO CONTRAST   Final Result   No acute intracranial abnormality. XR CHEST PORTABLE   Final Result   Mild cardiomegaly and mild central pulmonary congestion or pulmonary artery   hypertension which is more prominent. Minimal bibasilar atelectasis or small infiltrates. Questionable small bibasilar pleural effusions which is more prominent.              IP CONSULT TO NEPHROLOGY  IP CONSULT TO NEPHROLOGY  IP CONSULT TO SOCIAL WORK  IP CONSULT TO GI  IP CONSULT TO CRITICAL CARE  IP CONSULT TO SOCIAL WORK    Assessment/Plan:    Active Hospital Problems    Diagnosis     Macrocytic anemia [D53.9]      Priority: Medium    Thrombocytopenia ([facility] [address] [D69.6]      Priority: Medium    Elevated LFTs [R79.89]      Priority: Medium    Hyperbilirubinemia [E80.6]      Priority: Medium    Anasarca [R60.1]      Priority: Medium Alcoholic cirrhosis of liver with ascites (HCC) [K70.31]      Priority: Medium    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 History is limited. Apparently she fell at home, couldn't get up. EMS arrived to help her up, and ultimately decided to bring her to the ED because she was breathing heavily and edematous. When she arrived here she had \"no complaints\" but her Na was 102 (compared to baseline of upper 120's as of 11/1/2022). The patient was lethargic and a poor historian but she actually answered all the orientation questions correctly on arrival.  Hospital medicine admitted her to the ICU overnight. Acute on chronic hyponatremia. Due to cirrhosis, volume overload, poor solute intake. Encourage protein intake, also gave urea. Intermittent furosemide IV and fluid restriction per nephrology. Now on PO lasix- renal managing   - improving      Alcohol dependence and withdrawal.  CIWA with chlordiazepam and PRN lorazepam.  Thiamine. Acute metabolic encephalopathy. Due to above issues, treat accordingly. No focal neurological deficits. HTN. Held lisinopril due to lower BPs. Hypomag-replete     PAF (new diagnosis). It actually looks like an EKG showed Afib back in 4/2022 during an outpatient colonoscopy but no  mention of this in the chart. Started metoprolol. F/u TTE. The patient has chronic thrombocytopenia, alcohol abuse, an falls, so she is not a good candidate for anticoagulation. Alcoholic cirrhosis. CGI was consulted by admitting physician. DCIS L breast, diagnosed 10/2019. S/p partial mastectomy and radiation in 2019. She is prescribed anastrazole by Dr. Latonya Ahn. She is on chronic valacyclovir, presumably for suppressive therapy of HSV. DVT Prophylaxis: SCDs  Diet: ADULT DIET; Regular; 1500 ml  ADULT ORAL NUTRITION SUPPLEMENT; Breakfast, Dinner; Standard High Calorie/High Protein Oral Supplement  Code Status: Full Code  PT/OT Eval Status: Consulted    Dispo -replete electrolytes, snf placement pending.  DC 1-2 days     Appropriate for A1 Discharge Unit: Brianda Escoto, APRN - CNP alert and awake/follows commands

## 2022-12-27 NOTE — PROGRESS NOTES
[facility] Progress Note           Subjective/   69y.o. year old female who we are seeing in consultation for Hyponatremia. HPI:  Patient was seen and examined. Weight is trending up. ROS:  No nausea or vomiting    Objective/   GEN:  Chronically ill, BP (!) 142/84   Pulse 89   Temp 98 °F (36.7 °C) (Oral)   Resp 18   Ht 5' 2\" (1.575 m)   Wt 216 lb 4.8 oz (98.1 kg)   LMP  (LMP Unknown)   SpO2 95%   BMI 39.56 kg/m²   HEENT: non-icteric, no JVD  CV: S1, S2 without m/r/g; ++ LE edema  RESP: CTA B without w/r/r; breathing wnl  ABD: +bs, soft, nt, no hsm  SKIN: warm, no rashes    Data/  Recent Labs     12/26/22  0711   WBC 5.9   HGB 11.3*   HCT 32.4*   .1*   PLT 77*     Recent Labs     12/25/22  0600 12/26/22  0711 12/27/22  0638   * 133*  --    K 3.2* 3.3*  --    CL 92* 92*  --    CO2 32 33*  --    GLUCOSE 122* 105*  --    PHOS 3.2 3.8  --    MG 1.60* 1.40* 1.60*   BUN 18 21*  --    CREATININE 0.6 0.6  --    LABGLOM >60 >60  --        Assessment/     -Hyponatremia multifactorial from liver cirrhosis, alcohol to volume overload                Na of 106 at 2354 on 12/16, appropriately corrected and now stable in the low 130s. s/p course of urea, on diuretics    -Liver cirrhosis c/b fluid overload    -Falls    -ETOH abuse with withdrawals    -Hypokalemia/Hypomagnesemia from diuretic use      Plan/     - Increase Furosemide to 80mg daily.  - Start Spironolactone 25mg daily.  - Increase potassium to 40 meq a day . - Fluid restriction 1500 ml/day  - Encouraged protein intake  - Daily standing weights.     ____________________________________  Claudean Roots, MD  [facility]  [email or web address]  Office: [phone]

## 2022-12-28 LAB
ALBUMIN SERPL-MCNC: 2.6 G/DL (ref 3.4–5)
ALPHA-1 ANTITRYPSIN: 213 MG/DL (ref 90–200)
ANION GAP SERPL CALCULATED.3IONS-SCNC: 11 MMOL/L (ref 3–16)
BUN BLDV-MCNC: 22 MG/DL (ref 7–20)
CALCIUM SERPL-MCNC: 9.1 MG/DL (ref 8.3–10.6)
CERULOPLASMIN: 30 MG/DL (ref 16–45)
CHLORIDE BLD-SCNC: 93 MMOL/L (ref 99–110)
CO2: 29 MMOL/L (ref 21–32)
CREAT SERPL-MCNC: 0.7 MG/DL (ref 0.6–1.2)
GFR SERPL CREATININE-BSD FRML MDRD: >60 ML/MIN/{1.73_M2}
GLUCOSE BLD-MCNC: 149 MG/DL (ref 70–99)
MAGNESIUM: 1.3 MG/DL (ref 1.8–2.4)
PHOSPHORUS: 4.1 MG/DL (ref 2.5–4.9)
POTASSIUM SERPL-SCNC: 3.2 MMOL/L (ref 3.5–5.1)
SODIUM BLD-SCNC: 133 MMOL/L (ref 136–145)

## 2022-12-28 PROCEDURE — 6370000000 HC RX 637 (ALT 250 FOR IP): Performed by: INTERNAL MEDICINE

## 2022-12-28 PROCEDURE — 6360000002 HC RX W HCPCS: Performed by: INTERNAL MEDICINE

## 2022-12-28 PROCEDURE — 80069 RENAL FUNCTION PANEL: CPT

## 2022-12-28 PROCEDURE — 6370000000 HC RX 637 (ALT 250 FOR IP): Performed by: NURSE PRACTITIONER

## 2022-12-28 PROCEDURE — 83735 ASSAY OF MAGNESIUM: CPT

## 2022-12-28 PROCEDURE — 51702 INSERT TEMP BLADDER CATH: CPT

## 2022-12-28 PROCEDURE — 97116 GAIT TRAINING THERAPY: CPT

## 2022-12-28 PROCEDURE — 36415 COLL VENOUS BLD VENIPUNCTURE: CPT

## 2022-12-28 PROCEDURE — 97535 SELF CARE MNGMENT TRAINING: CPT

## 2022-12-28 PROCEDURE — 1200000000 HC SEMI PRIVATE

## 2022-12-28 PROCEDURE — 2580000003 HC RX 258: Performed by: INTERNAL MEDICINE

## 2022-12-28 PROCEDURE — 97530 THERAPEUTIC ACTIVITIES: CPT

## 2022-12-28 RX ADMIN — ANASTROZOLE 1 MG: 1 TABLET, COATED ORAL at 09:33

## 2022-12-28 RX ADMIN — CHLORDIAZEPOXIDE HYDROCHLORIDE 5 MG: 5 CAPSULE ORAL at 14:32

## 2022-12-28 RX ADMIN — Medication 100 MG: at 09:32

## 2022-12-28 RX ADMIN — LACTULOSE 20 G: 20 SOLUTION ORAL at 09:33

## 2022-12-28 RX ADMIN — ATORVASTATIN CALCIUM 20 MG: 10 TABLET, FILM COATED ORAL at 19:43

## 2022-12-28 RX ADMIN — SPIRONOLACTONE 25 MG: 25 TABLET ORAL at 09:32

## 2022-12-28 RX ADMIN — FOLIC ACID 1 MG: 1 TABLET ORAL at 09:33

## 2022-12-28 RX ADMIN — MAGNESIUM SULFATE HEPTAHYDRATE 2000 MG: 40 INJECTION, SOLUTION INTRAVENOUS at 18:23

## 2022-12-28 RX ADMIN — CHLORDIAZEPOXIDE HYDROCHLORIDE 5 MG: 5 CAPSULE ORAL at 19:43

## 2022-12-28 RX ADMIN — MAGNESIUM SULFATE HEPTAHYDRATE 2000 MG: 40 INJECTION, SOLUTION INTRAVENOUS at 15:54

## 2022-12-28 RX ADMIN — METOPROLOL SUCCINATE 50 MG: 50 TABLET, EXTENDED RELEASE ORAL at 09:33

## 2022-12-28 RX ADMIN — Medication 400 MG: at 09:32

## 2022-12-28 RX ADMIN — POTASSIUM BICARBONATE 40 MEQ: 782 TABLET, EFFERVESCENT ORAL at 19:42

## 2022-12-28 RX ADMIN — Medication 400 MG: at 19:43

## 2022-12-28 RX ADMIN — SODIUM CHLORIDE, PRESERVATIVE FREE 10 ML: 5 INJECTION INTRAVENOUS at 09:34

## 2022-12-28 RX ADMIN — CHLORDIAZEPOXIDE HYDROCHLORIDE 5 MG: 5 CAPSULE ORAL at 09:32

## 2022-12-28 RX ADMIN — SODIUM CHLORIDE, PRESERVATIVE FREE 10 ML: 5 INJECTION INTRAVENOUS at 19:43

## 2022-12-28 RX ADMIN — FUROSEMIDE 80 MG: 40 TABLET ORAL at 09:40

## 2022-12-28 RX ADMIN — VALACYCLOVIR HYDROCHLORIDE 500 MG: 500 TABLET, FILM COATED ORAL at 09:33

## 2022-12-28 RX ADMIN — POTASSIUM BICARBONATE 40 MEQ: 782 TABLET, EFFERVESCENT ORAL at 09:32

## 2022-12-28 RX ADMIN — PANTOPRAZOLE SODIUM 40 MG: 40 TABLET, DELAYED RELEASE ORAL at 05:35

## 2022-12-28 RX ADMIN — Medication 1 TABLET: at 09:32

## 2022-12-28 NOTE — CARE COORDINATION
Chart reviewed. S/p fall at home, BLE edema, SOB, alcohol abuse with cirrhosis, encephalopathy (resolved). Management per GI, nephro and IM. Pt from home, alone. IPTA. Declines SA resources. PT/OT rec SNF. Pt now agreeable. CM left voice message for Kendra Ramos at [facility], [phone], this morning regarding status of referral placed on 12/27. Awaiting return call. Gisele Jaimes, FRANK     Addendum 13:41  CM spoke with pt's daughter, who states that she feels pt may need LTC or memory care unit after a skilled stay. CM called and left second voice message for Maribell at [facility]. Awaiting return call. Gisele Jaimes RN     Addendum 15:21  Received call from [facility]. They are unable to accept pt. They did not give a reason. CM spoke with pt and family at bedside. Pt's daughters are going to sit down together and comprise a new list of facilities in order of preference. Daughter, Ranjeet Fu, will email CM.  Gisele Jaimes, RN

## 2022-12-28 NOTE — PROGRESS NOTES
Wilson catheter removed per order. Pt tolerated procedure without incident. Will monitor for spontaneous urine output.

## 2022-12-28 NOTE — PROGRESS NOTES
Occupational Therapy  Facility/Department: [facility] B3 - MED SURG  Daily Treatment Note  NAME: Robles Rawls  : 1955  MRN: [id number]    Date of Service: 2022    Discharge Recommendations:  Subacute/Skilled Nursing Facility  OT Equipment Recommendations  Other: Defer to next level of care      Patient Diagnosis(es): The primary encounter diagnosis was Hyponatremia. Diagnoses of Fall, initial encounter, Longstanding persistent atrial fibrillation ([address] and Shortness of breath were also pertinent to this visit. Assessment    Assessment: Pt tolerated OT session well. OT provides energy conservation training for bathing/dressing. Pt currently requiring Max-Mod A with LB ADLs and SBA-Min A with seated UB ADLs. Pt with deficits in comprehension, attention, problem solving, safety requiring min cues during session. Pt continues to demonstrate performance component deficits in balance, activity tolerance, strength, cognition, safety. Pt requiring CGA with simple transfers and ambulation using RW this date. Pt remains below baseline level of occupational performance. Pt is not safe to return home alone at this time. OT discharge recommendations remains SNF for continued therapy services  Activity Tolerance: Patient tolerated treatment well  Discharge Recommendations: Subacute/Skilled Nursing Facility  Other: Defer to next level of care      Plan   Occupational Therapy Plan  Times Per Week: 3-5x/wk  Current Treatment Recommendations: Strengthening;ROM;Balance training;Functional mobility training; Endurance training;Pain management; Safety education & training;Patient/Caregiver education & training;Home management training;Self-Care / ADL;Gait training;Cognitive reorientation;Equipment evaluation, education, & procurement;Positioning;Cognitive/Perceptual training     Restrictions  Restrictions/Precautions  Restrictions/Precautions: Fall Risk;General Precautions  Position Activity Restriction  Other position/activity restrictions: avasys, up with assistance, tele, terrazas catheter    Subjective   Subjective  Subjective: Pt seated in chair upon OT arrival. Pt agreeable to OT session. Pt report feeling \"better\"  Pain: Denied pain throughout session. Orientation  Overall Orientation Status: Within Normal Limits  Orientation Level: Oriented X4  Pain: Pt denies c/o pain  Cognition  Overall Cognitive Status: Exceptions  Arousal/Alertness: Appropriate responses to stimuli  Following Commands: Follows one step commands consistently; Follows multistep commands with repitition; Follows multistep commands with increased time  Attention Span: Appears intact  Memory: Decreased recall of recent events  Safety Judgement: Decreased awareness of need for safety  Problem Solving: Assistance required to generate solutions  Insights: Decreased awareness of deficits  Initiation: Requires cues for some  Sequencing: Requires cues for some        Objective    Vitals  Vitals  Heart Rate: 79  Heart Rate Source: Apical  BP: 110/68  BP Location: Right upper arm  Patient Position: Up in chair  MAP (Calculated): 82  SpO2: 100 %  O2 Device: None (Room air)  Balance  Sitting: Intact  Sitting - Static: Good (unsupported)  Sitting - Dynamic: Good (unsupported)  Standing: With support  Standing - Static: Fair;Constant support  Standing - Dynamic: Fair;Constant support  Transfer Training  Transfer Training: Yes  Overall Level of Assistance: Contact-guard assistance; Additional time (cues for hand placement, use of RW. Pt transfers to/from chair and toilet)  Interventions: Verbal cues; Tactile cues; Safety awareness training  Sit to Stand: Contact-guard assistance; Additional time  Stand to Sit: Contact-guard assistance  Toilet Transfer: Contact-guard assistance; Adaptive equipment; Additional time  Gait Training  Gait Training: Yes  Gait  Overall Level of Assistance: Contact-guard assistance  Interventions: Verbal cues; Tactile cues  Base of Support: Widened  Speed/Kate: Slow;Shuffled  Step Length: Left shortened;Right shortened  Stance: Left increased;Right increased;Time  Gait Abnormalities: Decreased step clearance;Shuffling gait  Distance (ft): 40 Feet (distance not a goal)  Assistive Device: Walker, rolling;Gait belt     ADL  Feeding: Setup  Grooming: Stand by assistance;Minimal assistance  Grooming Skilled Clinical Factors: standing at sink with RW to comb hair, and wash hands  UE Bathing: Setup;Verbal cueing; Increased time to complete;Stand by assistance  UE Bathing Skilled Clinical Factors: seated sponge bathing with cues for thoroughness, attention, problem solving. LE Bathing: Setup;Verbal cueing; Increased time to complete; Moderate assistance  LE Bathing Skilled Clinical Factors: sponge bathing with partial assistance for buttocks and full assistance for feet. Pt requiring extra time and cues for thoroughness, attention, problem solving. UE Dressing: Minimal assistance;Verbal cueing;Setup; Increased time to complete  UE Dressing Skilled Clinical Factors: seated to doff/don gown  LE Dressing: Maximum assistance  LE Dressing Skilled Clinical Factors: doff/don brief with assistance to thread BLE. CGA for standing balance while patient dons clothing over hips using intermittent UE support on RW  Additional Comments: OT provides energy conservation training for bathing/dressing during ADLs        Safety Devices  Type of Devices: Telesitter in use;Nurse notified; Left in chair;Patient at risk for falls; Chair alarm in place;Gait belt;Call light within reach; All fall risk precautions in place     Patient Education  Education Given To: Patient  Education Provided: Role of Therapy;Plan of Care;Transfer Training;ADL Adaptive Strategies; Energy Conservation;Equipment;Orientation  Education Provided Comments: home safety concerns, OT discharge recommendations, standing balance, safety with walker, energy conservation for bathing/dressing, problem solving for bathing/dressing  Education Method: Demonstration;Verbal  Barriers to Learning: Cognition  Education Outcome: Verbalized understanding;Demonstrated understanding;Continued education needed    Goals  Short Term Goals  Time Frame for Short Term Goals: 10 days (12/30/22)  Short Term Goal 1: Pt will complete LE dressing with modA; max A 12/28, continue goal  Short Term Goal 2: Pt will complete toilet transfers with min of 1; 12/23 STG met; New STG: supervision with toilet transfers by 12-30-22, 12/28/22 CGA using walker-continue goal  Short Term Goal 3: Pt will complete 10 reps of BUE AROM exercises to increase strength for ADLs/transfers by 12/25; 12/23 STG met, continue for light strengthening  Short Term Goal 4: Pt will complete 1 min of static standing Anna of 1 for balance for ADLs prep.; 12/23 STG met, pt CGA: New STG: SBA for 3 minutes by 12-30-22;continue goal  Patient Goals   Patient goals : \"to go home\"       Therapy Time   Individual Concurrent Group Co-treatment   Time In 1311         Time Out 1340         Minutes 29         Timed Code Treatment Minutes: 29 Minutes     If pt is discharged prior to next OT session, this note will serve as the discharge summary.     Rebecca Brewster, OT

## 2022-12-28 NOTE — PROGRESS NOTES
Physical Therapy  Facility/Department: [facility] B3 - MED SURG  Daily Treatment Note  NAME: Rebecca Enriquez  : 1955  MRN: [id number]    Date of Service: 2022    Discharge Recommendations:  Subacute/Skilled Nursing Facility   PT Equipment Recommendations  Equipment Needed: No  Other: defer to next level of care. Patient Diagnosis(es): The primary encounter diagnosis was Hyponatremia. Diagnoses of Fall, initial encounter, Longstanding persistent atrial fibrillation ([address] and Shortness of breath were also pertinent to this visit. Assessment   Assessment: Pt seen in am for PT tx, pt pleasant and agreeable and continues to demonstrate good progress and participation in session. Pt demonstrates t/f with RW and CGA, ambulation up to 120' with RW and CGA. Pt is steady but with very slow shuffling gait pattern. Pt completes ADL tasks standing at sink with RW with CGA to SBA with increased time to complete. Pt will benefit from continued skilled PT in acute care setting to address above deficits. Activity Tolerance: Patient tolerated treatment well  Equipment Needed: No  Other: defer to next level of care. Plan    Physcial Therapy Plan  General Plan: 3-5 times per week  Specific Instructions for Next Treatment: Progress mobility as toleraed  Current Treatment Recommendations: Strengthening;Balance training;Functional mobility training;Transfer training; Endurance training;Gait training;Neuromuscular re-education;Stair training;Home exercise program;Safety education & training;Patient/Caregiver education & training;Equipment evaluation, education, & procurement; Therapeutic activities     Restrictions  Restrictions/Precautions  Restrictions/Precautions: Fall Risk, General Precautions  Position Activity Restriction  Other position/activity restrictions: avasys, up with assistance, tele, terrazas catheter     Subjective    Subjective  Subjective: Pt seated in chair on arrival, agreeable to PT tx  Pain: Pt denies c/o pain  Orientation  Overall Orientation Status: Within Normal Limits     Objective   Vitals   /66  HR 75  SpO2 99% on RA    Balance  Sitting: Intact  Sitting - Static: Good (unsupported)  Sitting - Dynamic: Good (unsupported)  Standing: With support  Standing - Static: Fair;Constant support  Standing - Dynamic: Fair;Constant support  Pt standing at commode and sink x 6 minutes total with grossly CGA to SBA with assistance to complete william-care, pt also completes ADL's and handwashing at sink, no overt LOB. Transfer Training  Transfer Training: Yes  Overall Level of Assistance: Contact-guard assistance  Interventions: Verbal cues  Sit to Stand: Contact-guard assistance (to RW, cues for hand placement)  Stand to Sit: Contact-guard assistance (to RW, cues for hand placement)  Gait Training  Gait Training: Yes  Gait  Overall Level of Assistance: Contact-guard assistance  Interventions: Verbal cues; Tactile cues  Base of Support: Widened  Speed/Kate: Slow;Shuffled  Step Length: Left shortened;Right shortened  Stance: Left increased;Right increased;Time  Gait Abnormalities: Decreased step clearance;Shuffling gait (Slow, short shuffling gait with minimal foot clearance, B decreased heel strike, forward flexed trunk.  Overall steady without overt LOB.)  Distance (ft): 120 Feet + 20'  Assistive Device: Walker, rolling;Gait belt                 [facility] 6 Clicks Inpatient Mobility:  AM-PAC Mobility Inpatient   How much difficulty turning over in bed?: A Little  How much difficulty sitting down on / standing up from a chair with arms?: A Little  How much difficulty moving from lying on back to sitting on side of bed?: A Little  How much help from another person moving to and from a bed to a chair?: A Little  How much help from another person needed to walk in hospital room?: A Little  How much help from another person for climbing 3-5 steps with a railing?: Total  -PAC Inpatient Mobility Raw Score : 16  [facility] Inpatient T-Scale Score : 40.78  Mobility Inpatient CMS 0-100% Score: 54.16  Mobility Inpatient CMS G-Code Modifier : CK    Goals  Short Term Goals  Time Frame for Short Term Goals: 7 days (12/27/22) unless otherwise noted  Short Term Goal 1: Pt will perform bed mobility with min(A) -- 12/29: DNT  Short Term Goal 2: Pt will perform transfers with LRAD and with min(A) -- 12/28: GOAL MET CGA with RW; upgrade goal: Pt will complete functional t/f with LRAD with S  Short Term Goal 3: Pt will ambulate 15 ft with LRAD and min(A) -- 12/28: GOAL MET x 120' with RW CGA; upgrade goal: Pt will ambulate >80' with RW with S without LOB  Short Term Goal 4: Pt will perform 12-15 reps of BLE exercises by 12/23/22l 12/28: DNT  Patient Goals   Patient Goals : \"to go home\"    Education  Patient Education  Education Given To: Patient  Education Provided: Role of Therapy;Plan of Care;Transfer Training;Precautions; Equipment  Education Provided Comments: Educated on role of PT, progression of mobility and safe use of AD  Education Method: Verbal  Barriers to Learning: Cognition  Education Outcome: Verbalized understanding;Continued education needed    Therapy Time   Individual Concurrent Group Co-treatment   Time In 1000         Time Out 1025         Minutes 25         Timed Code Treatment Minutes: 25 Minutes     If pt is unable to be seen after this session, please let this note serve as discharge summary. Please see case management note for discharge disposition. Thank you.     Magdiel Jim, PT, DPT

## 2022-12-28 NOTE — PROGRESS NOTES
Hospitalist Progress Note      PCP: Laura Torres MD    Date of Admission: 12/16/2022    Chief Complaint: Fall at home    KALEB FORD Course: H&P reviewed     Subjective: No acute events overnight no new complaints. Up awake and alert. Leg edema look worse today. Medications:  Reviewed    Infusion Medications    sodium chloride Stopped (12/27/22 1136)     Scheduled Medications    potassium bicarb-citric acid  40 mEq Oral BID    spironolactone  25 mg Oral Daily    furosemide  80 mg Oral Daily    magnesium sulfate  4,000 mg IntraVENous Once    magnesium oxide  400 mg Oral BID    folic acid  1 mg Oral Daily    chlordiazePOXIDE  5 mg Oral TID    sodium chloride flush  5-40 mL IntraVENous 2 times per day    lactulose  20 g Oral Daily    thiamine  100 mg Oral Daily    pantoprazole  40 mg Oral QAM AC    anastrozole  1 mg Oral Daily    multivitamin  1 tablet Oral Daily    atorvastatin  20 mg Oral Nightly    metoprolol succinate  50 mg Oral Daily    valACYclovir  500 mg Oral Daily     PRN Meds: ipratropium-albuterol, sodium chloride flush, sodium chloride, magnesium sulfate, promethazine **OR** ondansetron, acetaminophen **OR** acetaminophen, LORazepam **OR** LORazepam **OR** LORazepam **OR** LORazepam **OR** LORazepam **OR** LORazepam **OR** LORazepam **OR** LORazepam      Intake/Output Summary (Last 24 hours) at 12/28/2022 1540  Last data filed at 12/28/2022 1433  Gross per 24 hour   Intake 1567.75 ml   Output 3650 ml   Net -2082.25 ml         Physical Exam Performed:    /68   Pulse 79   Temp 98.7 °F (37.1 °C) (Oral)   Resp 17   Ht 5' 2\" (1.575 m)   Wt 218 lb 11.2 oz (99.2 kg)   LMP  (LMP Unknown)   SpO2 100%   BMI 40.00 kg/m²     General appearance: Obese. Ill appearing, NAD. HEENT: Pupils equal, round, and reactive to light. Conjunctivae/corneas clear. Neck: Supple, with full range of motion. No jugular venous distention. Trachea midline. Respiratory:  Normal respiratory effort.  Decreased few crackles in the bases   Cardiovascular: Regular rate and rhythm with normal S1/S2 without murmurs, rubs or gallops. Abdomen: Soft, non-tender, non-distended with normal bowel sounds. Musculoskeletal: No clubbing/cyanosis 2+edema bilaterally. Skin: Skin color, texture, turgor normal.  No rashes or lesions. Neurologic:  Neurovascularly intact without any focal sensory/motor deficits. Cranial nerves: II-XII intact, grossly non-focal.  Psychiatric: Alert, poor insight  Capillary Refill: Brisk, 3 seconds, normal   Peripheral Pulses: +2 palpable, equal bilaterally       Labs:   Recent Labs     12/26/22  0711   WBC 5.9   HGB 11.3*   HCT 32.4*   PLT 77*       Recent Labs     12/26/22  0711 12/28/22  0730   * 133*   K 3.3* 3.2*   CL 92* 93*   CO2 33* 29   BUN 21* 22*   CREATININE 0.6 0.7   CALCIUM 9.0 9.1   PHOS 3.8 4.1       No results for input(s): AST, ALT, BILIDIR, BILITOT, ALKPHOS in the last 72 hours. No results for input(s): INR in the last 72 hours. No results for input(s): Laura Mould in the last 72 hours. Urinalysis:      Lab Results   Component Value Date/Time    NITRU Negative 12/17/2022 06:32 AM    WBCUA 0-2 12/17/2022 06:32 AM    BACTERIA 1+ 04/19/2021 04:39 PM    RBCUA None seen 12/17/2022 06:32 AM    BLOODU Negative 12/17/2022 06:32 AM    SPECGRAV 1.015 12/17/2022 06:32 AM    GLUCOSEU Negative 12/17/2022 06:32 AM       Radiology:  US ABDOMEN LIMITED   Final Result   No ascites identified         CT CHEST ABDOMEN PELVIS W CONTRAST Additional Contrast? None   Final Result   1. CHEST: Small right pleural effusion and mild atelectasis in the lung bases. 2. No consolidation is appreciated. 3. Moderate to severe cardiomegaly. 4. T12 superior endplate fracture of uncertain age. Moderate anterior height   loss of T11, likely chronic. If there is focal tenderness, non-urgent   follow-up MRI is a consideration. 5. ABDOMEN AND PELVIS: Cirrhotic morphology of the liver.    6. Prominence of the common and pancreatic ducts, also noted on 01/28/2022   Ductal ectasia or adjacent pancreatic cystic lesion(s) have been considered. Follow-up ERCP or endoscopic ultrasound was previously recommended. 7. No acute abdominopelvic abnormality identified. 8. Moderate anasarca. 9. Chronic-appearing compression fractures at L1 and L3. XR CHEST PORTABLE   Final Result   Left central line projects over the expected left brachiocephalic vein. Correlation for appropriate return is recommended. No pneumothorax. Stable enlargement of cardiac silhouette. XR CHEST PORTABLE   Final Result   The left IJ central venous catheter is deflected laterally into the proximal   left subclavian vein area. No evidence of pneumothorax. Marked cardiomegaly with minimal pulmonary vascular congestion without   pulmonary edema. CT CERVICAL SPINE WO CONTRAST   Final Result   Multilevel degenerative changes with no acute fracture or traumatic   malalignment of the cervical spine. CT HEAD WO CONTRAST   Final Result   No acute intracranial abnormality. XR CHEST PORTABLE   Final Result   Mild cardiomegaly and mild central pulmonary congestion or pulmonary artery   hypertension which is more prominent. Minimal bibasilar atelectasis or small infiltrates. Questionable small bibasilar pleural effusions which is more prominent.              IP CONSULT TO NEPHROLOGY  IP CONSULT TO NEPHROLOGY  IP CONSULT TO SOCIAL WORK  IP CONSULT TO GI  IP CONSULT TO CRITICAL CARE  IP CONSULT TO SOCIAL WORK    Assessment/Plan:    Active Hospital Problems    Diagnosis     Macrocytic anemia [D53.9]      Priority: Medium    Thrombocytopenia ([facility] [D69.6]      Priority: Medium    Elevated LFTs [R79.89]      Priority: Medium    Hyperbilirubinemia [E80.6]      Priority: Medium    Anasarca [R60.1]      Priority: Medium    Alcoholic cirrhosis of liver with ascites ([facility] [K70.31]      Priority: Medium    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 History is limited. Apparently she fell at home, couldn't get up. EMS arrived to help her up, and ultimately decided to bring her to the ED because she was breathing heavily and edematous. When she arrived here she had \"no complaints\" but her Na was 102 (compared to baseline of upper 120's as of 11/1/2022). The patient was lethargic and a poor historian but she actually answered all the orientation questions correctly on arrival.  Hospital medicine admitted her to the ICU overnight. Acute on chronic hyponatremia. Due to cirrhosis, volume overload, poor solute intake. Encourage protein intake, also gave urea. Intermittent furosemide IV and fluid restriction per nephrology. Now on PO lasix- renal managing   - improving      Alcohol dependence and withdrawal.  CIWA with chlordiazepam and PRN lorazepam.  Thiamine. Acute metabolic encephalopathy. Due to above issues, treat accordingly. No focal neurological deficits. - currently at baseline      HTN. Held lisinopril due to lower BPs. Hypomag-replete  - clarified with nursing need to give IV 9 12/28/22)      PAF (new diagnosis). It actually looks like an EKG showed Afib back in 4/2022 during an outpatient colonoscopy but no  mention of this in the chart. Started metoprolol. F/u TTE. The patient has chronic thrombocytopenia, alcohol abuse, an falls, so she is not a good candidate for anticoagulation. Alcoholic cirrhosis. CGI was consulted by admitting physician. DCIS L breast, diagnosed 10/2019. S/p partial mastectomy and radiation in 2019. She is prescribed anastrazole by Dr. Harish Tapia. She is on chronic valacyclovir, presumably for suppressive therapy of HSV. Lower extremity edema  -suspect third spacing low protein levels, Diuresing     DVT Prophylaxis: SCDs  Diet: ADULT DIET; Regular; 1500 ml  ADULT ORAL NUTRITION SUPPLEMENT; Breakfast, Dinner; Standard High Calorie/High Protein Oral Supplement  Code Status: Full Code  PT/OT Eval Status: Consulted    Dispo -replete electrolytes, snf placement pending.  DC 1-2 days     Appropriate for A1 Discharge Unit: No      JOHNY Tovar - CNP

## 2022-12-29 LAB
ANION GAP SERPL CALCULATED.3IONS-SCNC: 8 MMOL/L (ref 3–16)
BUN BLDV-MCNC: 27 MG/DL (ref 7–20)
CALCIUM SERPL-MCNC: 9.2 MG/DL (ref 8.3–10.6)
CHLORIDE BLD-SCNC: 94 MMOL/L (ref 99–110)
CO2: 31 MMOL/L (ref 21–32)
CREAT SERPL-MCNC: 0.7 MG/DL (ref 0.6–1.2)
GFR SERPL CREATININE-BSD FRML MDRD: >60 ML/MIN/{1.73_M2}
GLUCOSE BLD-MCNC: 100 MG/DL (ref 70–99)
MAGNESIUM: 1.7 MG/DL (ref 1.8–2.4)
MITOCHONDRIAL M2 AB, IGG: <0.5 U/ML (ref 0–4)
POTASSIUM REFLEX MAGNESIUM: 3.6 MMOL/L (ref 3.5–5.1)
SODIUM BLD-SCNC: 133 MMOL/L (ref 136–145)

## 2022-12-29 PROCEDURE — 83735 ASSAY OF MAGNESIUM: CPT

## 2022-12-29 PROCEDURE — 2580000003 HC RX 258: Performed by: INTERNAL MEDICINE

## 2022-12-29 PROCEDURE — 6370000000 HC RX 637 (ALT 250 FOR IP): Performed by: NURSE PRACTITIONER

## 2022-12-29 PROCEDURE — 6370000000 HC RX 637 (ALT 250 FOR IP): Performed by: INTERNAL MEDICINE

## 2022-12-29 PROCEDURE — 80048 BASIC METABOLIC PNL TOTAL CA: CPT

## 2022-12-29 PROCEDURE — 1200000000 HC SEMI PRIVATE

## 2022-12-29 PROCEDURE — 36415 COLL VENOUS BLD VENIPUNCTURE: CPT

## 2022-12-29 RX ADMIN — LACTULOSE 20 G: 20 SOLUTION ORAL at 10:08

## 2022-12-29 RX ADMIN — POTASSIUM BICARBONATE 40 MEQ: 782 TABLET, EFFERVESCENT ORAL at 10:08

## 2022-12-29 RX ADMIN — SODIUM CHLORIDE, PRESERVATIVE FREE 10 ML: 5 INJECTION INTRAVENOUS at 20:57

## 2022-12-29 RX ADMIN — Medication 1 TABLET: at 10:08

## 2022-12-29 RX ADMIN — FOLIC ACID 1 MG: 1 TABLET ORAL at 10:09

## 2022-12-29 RX ADMIN — SPIRONOLACTONE 25 MG: 25 TABLET ORAL at 10:09

## 2022-12-29 RX ADMIN — ATORVASTATIN CALCIUM 20 MG: 10 TABLET, FILM COATED ORAL at 20:57

## 2022-12-29 RX ADMIN — CHLORDIAZEPOXIDE HYDROCHLORIDE 5 MG: 5 CAPSULE ORAL at 20:56

## 2022-12-29 RX ADMIN — ANASTROZOLE 1 MG: 1 TABLET, COATED ORAL at 10:09

## 2022-12-29 RX ADMIN — CHLORDIAZEPOXIDE HYDROCHLORIDE 5 MG: 5 CAPSULE ORAL at 10:10

## 2022-12-29 RX ADMIN — SODIUM CHLORIDE, PRESERVATIVE FREE 10 ML: 5 INJECTION INTRAVENOUS at 10:19

## 2022-12-29 RX ADMIN — METOPROLOL SUCCINATE 50 MG: 50 TABLET, EXTENDED RELEASE ORAL at 10:08

## 2022-12-29 RX ADMIN — POTASSIUM BICARBONATE 40 MEQ: 782 TABLET, EFFERVESCENT ORAL at 20:56

## 2022-12-29 RX ADMIN — Medication 400 MG: at 20:57

## 2022-12-29 RX ADMIN — VALACYCLOVIR HYDROCHLORIDE 500 MG: 500 TABLET, FILM COATED ORAL at 10:08

## 2022-12-29 RX ADMIN — FUROSEMIDE 80 MG: 40 TABLET ORAL at 10:08

## 2022-12-29 RX ADMIN — PANTOPRAZOLE SODIUM 40 MG: 40 TABLET, DELAYED RELEASE ORAL at 10:11

## 2022-12-29 RX ADMIN — Medication 400 MG: at 10:09

## 2022-12-29 RX ADMIN — CHLORDIAZEPOXIDE HYDROCHLORIDE 5 MG: 5 CAPSULE ORAL at 14:45

## 2022-12-29 RX ADMIN — Medication 100 MG: at 10:10

## 2022-12-29 NOTE — PROGRESS NOTES
Nutrition Assessment     Type and Reason for Visit: Reassess    Nutrition Recommendations/Plan:   Continue general diet with 1500 mL FR   Continue Ensure to BID - monitor acceptance   Encourage PO intakes as tolerated   Monitor nutrition adequacy, pertinent labs, bowel habits, wt changes, and clinical progress     Malnutrition Assessment:  Malnutrition Status: At risk for malnutrition (Comment)    Nutrition Assessment:  Follow up: Pt stable from a nutrition standpoint AEB pt report of good appetite. Pt currently on a general diet w/ 1500 mL FR. PO intakes of % of meals this admission. Pt reports eating most or all of her meals. Pt has also been drinking ONS BID for increased protein. Renal status improving. Noted, wt fluctuations r/t fluid loss. Pt -20.8L per I&Os since admit. Pt denied having any questions at this time. Will continue to monitor. Estimated Daily Nutrient Needs:  Energy (kcal):  1635-5234 kcal Weight Used for Energy Requirements: Ideal     Protein (g):  60-75 g Weight Used for Protein Requirements: Ideal (1.2-1.5 g/kg)        Fluid (ml/day):  1500 mL FR      Nutrition Related Findings:   +BM 12/28. +2 pitting BLE, Non-pitting facial, perineal and sacral edema. Na 133, Gluc 100, BUN 27. 20.8L- per I&Os Wound Type: None    Current Nutrition Therapies:    ADULT DIET;  Regular; 1500 ml  ADULT ORAL NUTRITION SUPPLEMENT; Breakfast, Dinner; Standard High Calorie/High Protein Oral Supplement    Anthropometric Measures:  Height: 5' 2\" (157.5 cm)  Current Body Wt: 220 lb (99.8 kg)   BMI: 40.2    Nutrition Diagnosis:   Increased nutrient needs related to increase demand for energy/nutrients as evidenced by  (extended hospital stay)    Nutrition Interventions:   Food and/or Nutrient Delivery: Continue Current Diet, Continue Oral Nutrition Supplement  Nutrition Education/Counseling: No recommendation at this time  Coordination of Nutrition Care: Continue to monitor while inpatient       Goals:  Previous Goal Met: Progressing toward Goal(s)  Goals: PO intake 50% or greater, prior to discharge       Nutrition Monitoring and Evaluation:   Behavioral-Environmental Outcomes: None Identified  Food/Nutrient Intake Outcomes: Food and Nutrient Intake, Supplement Intake  Physical Signs/Symptoms Outcomes: Nutrition Focused Physical Findings, Weight, Biochemical Data    Discharge Planning:    Continue current diet, Continue Oral Nutrition Supplement     Rj Alarcon, [address] [place] [address],   Contact: [phone]

## 2022-12-29 NOTE — PROGRESS NOTES
Hospitalist Progress Note      PCP: Griselda Steen MD    Date of Admission: 12/16/2022    Chief Complaint: Fall at home    KALEB FORD Course: H&P reviewed     Subjective: No acute events overnight no new complaints. Up awake and alert. Leg edema look worse today. Medications:  Reviewed    Infusion Medications    sodium chloride Stopped (12/27/22 1136)     Scheduled Medications    potassium bicarb-citric acid  40 mEq Oral BID    spironolactone  25 mg Oral Daily    furosemide  80 mg Oral Daily    magnesium sulfate  4,000 mg IntraVENous Once    magnesium oxide  400 mg Oral BID    folic acid  1 mg Oral Daily    chlordiazePOXIDE  5 mg Oral TID    sodium chloride flush  5-40 mL IntraVENous 2 times per day    lactulose  20 g Oral Daily    thiamine  100 mg Oral Daily    pantoprazole  40 mg Oral QAM AC    anastrozole  1 mg Oral Daily    multivitamin  1 tablet Oral Daily    atorvastatin  20 mg Oral Nightly    metoprolol succinate  50 mg Oral Daily    valACYclovir  500 mg Oral Daily     PRN Meds: ipratropium-albuterol, sodium chloride flush, sodium chloride, magnesium sulfate, promethazine **OR** ondansetron, acetaminophen **OR** acetaminophen, LORazepam **OR** LORazepam **OR** LORazepam **OR** LORazepam **OR** LORazepam **OR** LORazepam **OR** LORazepam **OR** LORazepam      Intake/Output Summary (Last 24 hours) at 12/29/2022 1744  Last data filed at 12/29/2022 1628  Gross per 24 hour   Intake 360 ml   Output 1451 ml   Net -1091 ml         Physical Exam Performed:    /67   Pulse 62   Temp 97.5 °F (36.4 °C) (Oral)   Resp 18   Ht 5' 2\" (1.575 m)   Wt 220 lb (99.8 kg)   LMP  (LMP Unknown)   SpO2 100%   BMI 40.24 kg/m²     General appearance: Obese. Ill appearing, NAD. HEENT: Pupils equal, round, and reactive to light. Conjunctivae/corneas clear. Neck: Supple, with full range of motion. No jugular venous distention. Trachea midline. Respiratory:  Normal respiratory effort.  Decreased few crackles in the bases   Cardiovascular: Regular rate and rhythm with normal S1/S2 without murmurs, rubs or gallops. Abdomen: Soft, non-tender, non-distended with normal bowel sounds. Musculoskeletal: No clubbing/cyanosis 2+edema bilaterally. Skin: Skin color, texture, turgor normal.  No rashes or lesions. Neurologic:  Neurovascularly intact without any focal sensory/motor deficits. Cranial nerves: II-XII intact, grossly non-focal.  Psychiatric: Alert, poor insight  Capillary Refill: Brisk, 3 seconds, normal   Peripheral Pulses: +2 palpable, equal bilaterally       Labs:   No results for input(s): WBC, HGB, HCT, PLT in the last 72 hours. Recent Labs     12/28/22  0730 12/29/22  0746   * 133*   K 3.2* 3.6   CL 93* 94*   CO2 29 31   BUN 22* 27*   CREATININE 0.7 0.7   CALCIUM 9.1 9.2   PHOS 4.1  --        No results for input(s): AST, ALT, BILIDIR, BILITOT, ALKPHOS in the last 72 hours. No results for input(s): INR in the last 72 hours. No results for input(s): Martha Horn in the last 72 hours. Urinalysis:      Lab Results   Component Value Date/Time    NITRU Negative 12/17/2022 06:32 AM    WBCUA 0-2 12/17/2022 06:32 AM    BACTERIA 1+ 04/19/2021 04:39 PM    RBCUA None seen 12/17/2022 06:32 AM    BLOODU Negative 12/17/2022 06:32 AM    SPECGRAV 1.015 12/17/2022 06:32 AM    GLUCOSEU Negative 12/17/2022 06:32 AM       Radiology:  US ABDOMEN LIMITED   Final Result   No ascites identified         CT CHEST ABDOMEN PELVIS W CONTRAST Additional Contrast? None   Final Result   1. CHEST: Small right pleural effusion and mild atelectasis in the lung bases. 2. No consolidation is appreciated. 3. Moderate to severe cardiomegaly. 4. T12 superior endplate fracture of uncertain age. Moderate anterior height   loss of T11, likely chronic. If there is focal tenderness, non-urgent   follow-up MRI is a consideration. 5. ABDOMEN AND PELVIS: Cirrhotic morphology of the liver.    6. Prominence of the common and pancreatic ducts, also noted on 01/28/2022   Ductal ectasia or adjacent pancreatic cystic lesion(s) have been considered. Follow-up ERCP or endoscopic ultrasound was previously recommended. 7. No acute abdominopelvic abnormality identified. 8. Moderate anasarca. 9. Chronic-appearing compression fractures at L1 and L3. XR CHEST PORTABLE   Final Result   Left central line projects over the expected left brachiocephalic vein. Correlation for appropriate return is recommended. No pneumothorax. Stable enlargement of cardiac silhouette. XR CHEST PORTABLE   Final Result   The left IJ central venous catheter is deflected laterally into the proximal   left subclavian vein area. No evidence of pneumothorax. Marked cardiomegaly with minimal pulmonary vascular congestion without   pulmonary edema. CT CERVICAL SPINE WO CONTRAST   Final Result   Multilevel degenerative changes with no acute fracture or traumatic   malalignment of the cervical spine. CT HEAD WO CONTRAST   Final Result   No acute intracranial abnormality. XR CHEST PORTABLE   Final Result   Mild cardiomegaly and mild central pulmonary congestion or pulmonary artery   hypertension which is more prominent. Minimal bibasilar atelectasis or small infiltrates. Questionable small bibasilar pleural effusions which is more prominent.              IP CONSULT TO NEPHROLOGY  IP CONSULT TO NEPHROLOGY  IP CONSULT TO SOCIAL WORK  IP CONSULT TO GI  IP CONSULT TO CRITICAL CARE  IP CONSULT TO SOCIAL WORK    Assessment/Plan:    Active Hospital Problems    Diagnosis     Macrocytic anemia [D53.9]      Priority: Medium    Thrombocytopenia ([facility] [address] [D69.6]      Priority: Medium    Elevated LFTs [R79.89]      Priority: Medium    Hyperbilirubinemia [E80.6]      Priority: Medium    Anasarca [R60.1]      Priority: Medium    Alcoholic cirrhosis of liver with ascites (HCC) [K70.31]      Priority: Medium    Pancreatic duct dilated [K86.89]      Priority: Medium    Paroxysmal atrial fibrillation ([facility] [address] [I48.0]      Priority: Medium    Morbid obesity with BMI of 40.0-44.9, adult (HCC) [E66.01, Z68.41]      Priority: Medium    Suspected sleep apnea [R29.818]      Priority: Medium    Acute hyponatremia [E87.1]      Priority: Medium     69 Y F with a h/o former COPD, cirrhosis with ongoing alcoholism (possibly 3-4 cans of beer daily), HTN, and L breast cancer s/p resection in 2019. History is limited. Apparently she fell at home, couldn't get up. EMS arrived to help her up, and ultimately decided to bring her to the ED because she was breathing heavily and edematous. When she arrived here she had \"no complaints\" but her Na was 102 (compared to baseline of upper 120's as of 11/1/2022). The patient was lethargic and a poor historian but she actually answered all the orientation questions correctly on arrival.  Hospital medicine admitted her to the ICU overnight. Acute on chronic hyponatremia. Due to cirrhosis, volume overload, poor solute intake. Encourage protein intake, also gave urea. Intermittent furosemide IV and fluid restriction per nephrology. Now on PO lasix- renal managing   - improving      Alcohol dependence and withdrawal.  CIWA with chlordiazepam and PRN lorazepam.  Thiamine. Acute metabolic encephalopathy. Due to above issues, treat accordingly. No focal neurological deficits. - currently at baseline      HTN. Held lisinopril due to lower BPs. Hypomag-replete  - clarified with nursing need to give IV 9 12/28/22)      PAF (new diagnosis). It actually looks like an EKG showed Afib back in 4/2022 during an outpatient colonoscopy but no  mention of this in the chart. Started metoprolol. F/u TTE. The patient has chronic thrombocytopenia, alcohol abuse, an falls, so she is not a good candidate for anticoagulation. Alcoholic cirrhosis. CGI was consulted by admitting physician.       DCIS L breast, diagnosed 10/2019. S/p partial mastectomy and radiation in 2019. She is prescribed anastrazole by Dr. Adam Wheeler. She is on chronic valacyclovir, presumably for suppressive therapy of HSV. Lower extremity edema  -suspect third spacing low protein levels, Diuresing     DVT Prophylaxis: SCDs  Diet: ADULT DIET;  Regular; 1500 ml  ADULT ORAL NUTRITION SUPPLEMENT; Breakfast, Dinner; Standard High Calorie/High Protein Oral Supplement  Code Status: Full Code  PT/OT Eval Status: Consulted    Dispo - working on placement     Appropriate for A1 Discharge Unit: JOHNY Levine - CNP

## 2022-12-29 NOTE — PROGRESS NOTES
[facility] Progress Note           Subjective/   69y.o. year old female who we are seeing in consultation for hyponatremia. HPI:  Patient was seen and examined. No acute events overnight. ROS:  No nausea or vomiting    Objective/   GEN:  Chronically ill, BP (!) 103/58   Pulse 85   Temp 97.5 °F (36.4 °C) (Oral)   Resp 18   Ht 5' 2\" (1.575 m)   Wt 220 lb (99.8 kg)   LMP  (LMP Unknown)   SpO2 93%   BMI 40.24 kg/m²   HEENT: non-icteric, no JVD  CV: S1, S2 without m/r/g; ++ LE edema  RESP: CTA B without w/r/r; breathing wnl  ABD: +bs, soft, nt, no hsm  SKIN: warm, no rashes    Data/  No results for input(s): WBC, HGB, HCT, MCV, PLT in the last 72 hours. Recent Labs     12/27/22  0638 12/28/22  0730 12/29/22  0746   NA  --  133* 133*   K  --  3.2* 3.6   CL  --  93* 94*   CO2  --  29 31   GLUCOSE  --  149* 100*   PHOS  --  4.1  --    MG 1.60* 1.30*  --    BUN  --  22* 27*   CREATININE  --  0.7 0.7   LABGLOM  --  >60 >60       Assessment/     -Hyponatremia multifactorial from liver cirrhosis, alcohol to volume overload                Na of 106 at 2354 on 12/16, appropriately corrected and now stable in the low 130s. s/p course of urea, on diuretics    -Liver cirrhosis c/b fluid overload    -Falls    -ETOH abuse with withdrawals    -Hypokalemia/Hypomagnesemia from diuretic use      Plan/     - Continue Furosemide 80mg daily. - Continue Spironolactone 25mg daily. - Continue potassium 40 meq 2x/day. - Fluid restriction 1500 ml/day  - Encouraged protein intake  - Daily standing weights. Ok to be discharged from renal standpoint when ok with others. ____________________________________  Hiwot Edgar MD  [facility]  [email or web address]  Office: [phone]

## 2022-12-30 LAB
A/G RATIO: 0.8 (ref 1.1–2.2)
ALBUMIN SERPL-MCNC: 2.8 G/DL (ref 3.4–5)
ALP BLD-CCNC: 172 U/L (ref 40–129)
ALT SERPL-CCNC: 29 U/L (ref 10–40)
ANION GAP SERPL CALCULATED.3IONS-SCNC: 11 MMOL/L (ref 3–16)
AST SERPL-CCNC: 51 U/L (ref 15–37)
BILIRUB SERPL-MCNC: 1.4 MG/DL (ref 0–1)
BUN BLDV-MCNC: 27 MG/DL (ref 7–20)
CALCIUM SERPL-MCNC: 9.6 MG/DL (ref 8.3–10.6)
CHLORIDE BLD-SCNC: 94 MMOL/L (ref 99–110)
CO2: 31 MMOL/L (ref 21–32)
CREAT SERPL-MCNC: 0.8 MG/DL (ref 0.6–1.2)
GFR SERPL CREATININE-BSD FRML MDRD: >60 ML/MIN/{1.73_M2}
GLUCOSE BLD-MCNC: 108 MG/DL (ref 70–99)
HCT VFR BLD CALC: 33.7 % (ref 36–48)
HEMATOLOGY PATH CONSULT: NO
HEMOGLOBIN: 11.8 G/DL (ref 12–16)
MCH RBC QN AUTO: 39.1 PG (ref 26–34)
MCHC RBC AUTO-ENTMCNC: 35.1 G/DL (ref 31–36)
MCV RBC AUTO: 111.4 FL (ref 80–100)
PDW BLD-RTO: 14.9 % (ref 12.4–15.4)
PLATELET # BLD: 127 K/UL (ref 135–450)
PMV BLD AUTO: 8.9 FL (ref 5–10.5)
POTASSIUM REFLEX MAGNESIUM: 3.7 MMOL/L (ref 3.5–5.1)
RBC # BLD: 3.02 M/UL (ref 4–5.2)
SODIUM BLD-SCNC: 136 MMOL/L (ref 136–145)
TOTAL PROTEIN: 6.2 G/DL (ref 6.4–8.2)
WBC # BLD: 6.6 K/UL (ref 4–11)

## 2022-12-30 PROCEDURE — 97116 GAIT TRAINING THERAPY: CPT

## 2022-12-30 PROCEDURE — 80053 COMPREHEN METABOLIC PANEL: CPT

## 2022-12-30 PROCEDURE — 6370000000 HC RX 637 (ALT 250 FOR IP): Performed by: INTERNAL MEDICINE

## 2022-12-30 PROCEDURE — 6370000000 HC RX 637 (ALT 250 FOR IP): Performed by: NURSE PRACTITIONER

## 2022-12-30 PROCEDURE — 2580000003 HC RX 258: Performed by: INTERNAL MEDICINE

## 2022-12-30 PROCEDURE — 97110 THERAPEUTIC EXERCISES: CPT

## 2022-12-30 PROCEDURE — 85027 COMPLETE CBC AUTOMATED: CPT

## 2022-12-30 PROCEDURE — 1200000000 HC SEMI PRIVATE

## 2022-12-30 PROCEDURE — 36415 COLL VENOUS BLD VENIPUNCTURE: CPT

## 2022-12-30 RX ORDER — FUROSEMIDE 40 MG/1
40 TABLET ORAL 2 TIMES DAILY
Status: DISCONTINUED | OUTPATIENT
Start: 2022-12-30 | End: 2022-12-31

## 2022-12-30 RX ORDER — CHLORDIAZEPOXIDE HYDROCHLORIDE 5 MG/1
5 CAPSULE, GELATIN COATED ORAL 2 TIMES DAILY
Status: COMPLETED | OUTPATIENT
Start: 2022-12-30 | End: 2023-01-01

## 2022-12-30 RX ORDER — CHLORDIAZEPOXIDE HYDROCHLORIDE 5 MG/1
5 CAPSULE, GELATIN COATED ORAL DAILY
Status: COMPLETED | OUTPATIENT
Start: 2023-01-02 | End: 2023-01-04

## 2022-12-30 RX ORDER — CHLORDIAZEPOXIDE HYDROCHLORIDE 5 MG/1
5 CAPSULE, GELATIN COATED ORAL SEE ADMIN INSTRUCTIONS
Status: DISCONTINUED | OUTPATIENT
Start: 2022-12-30 | End: 2022-12-30

## 2022-12-30 RX ADMIN — POTASSIUM BICARBONATE 40 MEQ: 782 TABLET, EFFERVESCENT ORAL at 20:16

## 2022-12-30 RX ADMIN — FUROSEMIDE 40 MG: 40 TABLET ORAL at 17:43

## 2022-12-30 RX ADMIN — FOLIC ACID 1 MG: 1 TABLET ORAL at 08:30

## 2022-12-30 RX ADMIN — CHLORDIAZEPOXIDE HYDROCHLORIDE 5 MG: 5 CAPSULE ORAL at 20:16

## 2022-12-30 RX ADMIN — VALACYCLOVIR HYDROCHLORIDE 500 MG: 500 TABLET, FILM COATED ORAL at 08:29

## 2022-12-30 RX ADMIN — SODIUM CHLORIDE, PRESERVATIVE FREE 10 ML: 5 INJECTION INTRAVENOUS at 20:18

## 2022-12-30 RX ADMIN — LACTULOSE 20 G: 20 SOLUTION ORAL at 08:30

## 2022-12-30 RX ADMIN — Medication 1 TABLET: at 08:30

## 2022-12-30 RX ADMIN — PANTOPRAZOLE SODIUM 40 MG: 40 TABLET, DELAYED RELEASE ORAL at 05:23

## 2022-12-30 RX ADMIN — CHLORDIAZEPOXIDE HYDROCHLORIDE 5 MG: 5 CAPSULE ORAL at 08:30

## 2022-12-30 RX ADMIN — ANASTROZOLE 1 MG: 1 TABLET, COATED ORAL at 08:29

## 2022-12-30 RX ADMIN — SPIRONOLACTONE 25 MG: 25 TABLET ORAL at 13:06

## 2022-12-30 RX ADMIN — Medication 100 MG: at 08:33

## 2022-12-30 RX ADMIN — SODIUM CHLORIDE, PRESERVATIVE FREE 10 ML: 5 INJECTION INTRAVENOUS at 08:32

## 2022-12-30 RX ADMIN — Medication 400 MG: at 08:29

## 2022-12-30 RX ADMIN — POTASSIUM BICARBONATE 40 MEQ: 782 TABLET, EFFERVESCENT ORAL at 08:30

## 2022-12-30 RX ADMIN — Medication 400 MG: at 20:16

## 2022-12-30 RX ADMIN — ATORVASTATIN CALCIUM 20 MG: 10 TABLET, FILM COATED ORAL at 20:17

## 2022-12-30 RX ADMIN — FUROSEMIDE 40 MG: 40 TABLET ORAL at 09:52

## 2022-12-30 NOTE — PROGRESS NOTES
Hospitalist Progress Note      PCP: Darius Santiago MD    Date of Admission: 12/16/2022    Chief Complaint: Fall at home    KALEB FORD Course: H&P reviewed     Subjective: No acute events overnight no new complaints. Pt has no complaints     Medications:  Reviewed    Infusion Medications    sodium chloride Stopped (12/27/22 1136)     Scheduled Medications    potassium bicarb-citric acid  40 mEq Oral BID    spironolactone  25 mg Oral Daily    furosemide  80 mg Oral Daily    magnesium sulfate  4,000 mg IntraVENous Once    magnesium oxide  400 mg Oral BID    folic acid  1 mg Oral Daily    chlordiazePOXIDE  5 mg Oral TID    sodium chloride flush  5-40 mL IntraVENous 2 times per day    lactulose  20 g Oral Daily    thiamine  100 mg Oral Daily    pantoprazole  40 mg Oral QAM AC    anastrozole  1 mg Oral Daily    multivitamin  1 tablet Oral Daily    atorvastatin  20 mg Oral Nightly    metoprolol succinate  50 mg Oral Daily    valACYclovir  500 mg Oral Daily     PRN Meds: ipratropium-albuterol, sodium chloride flush, sodium chloride, magnesium sulfate, promethazine **OR** ondansetron, acetaminophen **OR** acetaminophen, LORazepam **OR** LORazepam **OR** LORazepam **OR** LORazepam **OR** LORazepam **OR** LORazepam **OR** LORazepam **OR** LORazepam      Intake/Output Summary (Last 24 hours) at 12/30/2022 0753  Last data filed at 12/30/2022 0200  Gross per 24 hour   Intake 865 ml   Output 2250 ml   Net -1385 ml         Physical Exam Performed:    BP (!) 100/54   Pulse 81   Temp 98.4 °F (36.9 °C) (Oral)   Resp 18   Ht 5' 2\" (1.575 m)   Wt 217 lb 11.2 oz (98.7 kg)   LMP  (LMP Unknown)   SpO2 95%   BMI 39.82 kg/m²     General appearance: Obese. NAD.  HEENT: Pupils equal, round, and reactive to light. Conjunctivae/corneas clear. Neck: Supple, with full range of motion. No jugular venous distention. Trachea midline. Respiratory:  Normal respiratory effort.  Decreased few crackles in the bases   Cardiovascular: Regular rate and rhythm with normal S1/S2 without murmurs, rubs or gallops. Abdomen: Soft, non-tender, non-distended with normal bowel sounds. Musculoskeletal: No clubbing/cyanosis 2+edema bilaterally. Skin: Skin color, texture, turgor normal.  No rashes or lesions. Neurologic:  Neurovascularly intact without any focal sensory/motor deficits. Cranial nerves: II-XII intact, grossly non-focal.  Psychiatric: Alert, poor insight  Capillary Refill: Brisk, 3 seconds, normal   Peripheral Pulses: +2 palpable, equal bilaterally       Labs:   No results for input(s): WBC, HGB, HCT, PLT in the last 72 hours. Recent Labs     12/28/22  0730 12/29/22  0746   * 133*   K 3.2* 3.6   CL 93* 94*   CO2 29 31   BUN 22* 27*   CREATININE 0.7 0.7   CALCIUM 9.1 9.2   PHOS 4.1  --        No results for input(s): AST, ALT, BILIDIR, BILITOT, ALKPHOS in the last 72 hours. No results for input(s): INR in the last 72 hours. No results for input(s): Amaya Risk in the last 72 hours. Urinalysis:      Lab Results   Component Value Date/Time    NITRU Negative 12/17/2022 06:32 AM    WBCUA 0-2 12/17/2022 06:32 AM    BACTERIA 1+ 04/19/2021 04:39 PM    RBCUA None seen 12/17/2022 06:32 AM    BLOODU Negative 12/17/2022 06:32 AM    SPECGRAV 1.015 12/17/2022 06:32 AM    GLUCOSEU Negative 12/17/2022 06:32 AM       Radiology:  US ABDOMEN LIMITED   Final Result   No ascites identified         CT CHEST ABDOMEN PELVIS W CONTRAST Additional Contrast? None   Final Result   1. CHEST: Small right pleural effusion and mild atelectasis in the lung bases. 2. No consolidation is appreciated. 3. Moderate to severe cardiomegaly. 4. T12 superior endplate fracture of uncertain age. Moderate anterior height   loss of T11, likely chronic. If there is focal tenderness, non-urgent   follow-up MRI is a consideration. 5. ABDOMEN AND PELVIS: Cirrhotic morphology of the liver.    6. Prominence of the common and pancreatic ducts, also noted on 01/28/2022 Ductal ectasia or adjacent pancreatic cystic lesion(s) have been considered. Follow-up ERCP or endoscopic ultrasound was previously recommended. 7. No acute abdominopelvic abnormality identified. 8. Moderate anasarca. 9. Chronic-appearing compression fractures at L1 and L3. XR CHEST PORTABLE   Final Result   Left central line projects over the expected left brachiocephalic vein. Correlation for appropriate return is recommended. No pneumothorax. Stable enlargement of cardiac silhouette. XR CHEST PORTABLE   Final Result   The left IJ central venous catheter is deflected laterally into the proximal   left subclavian vein area. No evidence of pneumothorax. Marked cardiomegaly with minimal pulmonary vascular congestion without   pulmonary edema. CT CERVICAL SPINE WO CONTRAST   Final Result   Multilevel degenerative changes with no acute fracture or traumatic   malalignment of the cervical spine. CT HEAD WO CONTRAST   Final Result   No acute intracranial abnormality. XR CHEST PORTABLE   Final Result   Mild cardiomegaly and mild central pulmonary congestion or pulmonary artery   hypertension which is more prominent. Minimal bibasilar atelectasis or small infiltrates. Questionable small bibasilar pleural effusions which is more prominent.              IP CONSULT TO NEPHROLOGY  IP CONSULT TO NEPHROLOGY  IP CONSULT TO SOCIAL WORK  IP CONSULT TO GI  IP CONSULT TO CRITICAL CARE  IP CONSULT TO SOCIAL WORK    Assessment/Plan:    Active Hospital Problems    Diagnosis     Macrocytic anemia [D53.9]      Priority: Medium    Thrombocytopenia ([facility] [address] [D69.6]      Priority: Medium    Elevated LFTs [R79.89]      Priority: Medium    Hyperbilirubinemia [E80.6]      Priority: Medium    Anasarca [R60.1]      Priority: Medium    Alcoholic cirrhosis of liver with ascites (HCC) [K70.31]      Priority: Medium    Pancreatic duct dilated [K86.89]      Priority: Medium Paroxysmal atrial fibrillation (HCC) [I48.0]      Priority: Medium    Morbid obesity with BMI of 40.0-44.9, adult (HCC) [E66.01, Z68.41]      Priority: Medium    Suspected sleep apnea [R29.818]      Priority: Medium    Acute hyponatremia [E87.1]      Priority: Medium     69 Y F with a h/o former COPD, cirrhosis with ongoing alcoholism (possibly 3-4 cans of beer daily), HTN, and L breast cancer s/p resection in 2019. History is limited. Apparently she fell at home, couldn't get up. EMS arrived to help her up, and ultimately decided to bring her to the ED because she was breathing heavily and edematous. When she arrived here she had \"no complaints\" but her Na was 102 (compared to baseline of upper 120's as of 11/1/2022). The patient was lethargic and a poor historian but she actually answered all the orientation questions correctly on arrival.  Hospital medicine admitted her to the ICU overnight. Acute on chronic hyponatremia. Due to cirrhosis, volume overload, poor solute intake. Encourage protein intake, also gave urea. fluid restriction per nephrology. Now on PO lasix- renal managing   - normalized      Alcohol dependence and withdrawal.  CIWA with chlordiazepam and PRN lorazepam.  Thiamine. - 12/30 taper librium      Acute metabolic encephalopathy. Due to above issues, treat accordingly. No focal neurological deficits. - currently at baseline      HTN. Held lisinopril due to lower BPs. Hypomag-replete  - clarified with nursing need to give IV 9 12/28/22)      PAF (new diagnosis). It actually looks like an EKG showed Afib back in 4/2022 during an outpatient colonoscopy but no  mention of this in the chart. Started metoprolol. F/u TTE. The patient has chronic thrombocytopenia, alcohol abuse, an falls, so she is not a good candidate for anticoagulation. Alcoholic cirrhosis. CGI was consulted by admitting physician. DCIS L breast, diagnosed 10/2019.   S/p partial mastectomy and radiation in 2019. She is prescribed anastrazole by Dr. Corrine Grant. She is on chronic valacyclovir, presumably for suppressive therapy of HSV. Lower extremity edema  -suspect third spacing low protein levels, Diuresing     DVT Prophylaxis: SCDs  Diet: ADULT DIET;  Regular; 1500 ml  ADULT ORAL NUTRITION SUPPLEMENT; Breakfast, Dinner; Standard High Calorie/High Protein Oral Supplement  Code Status: Full Code  PT/OT Eval Status: Consulted    Dispo - working on placement     Appropriate for A1 Discharge Unit: JOHNY Wellington - CNP

## 2022-12-30 NOTE — CARE COORDINATION
Chart reviewed. S/p fall at home, BLE edema, SOB, alcohol abuse with cirrhosis, encephalopathy (resolved). Electrolyte replacement, ongoing. Management per GI, nephro and IM. Pt from home, alone. IPTA. Declines SA resources. PT/OT rec SNF. Pt now agreeable. CM received facility preference list from family. [facility] ACCEPTED. Will not have bed available until Monday or Tuesday.  Gisele Raman RN

## 2022-12-30 NOTE — PROGRESS NOTES
Physical Therapy  Facility/Department: [facility] B3 - MED SURG  Daily Treatment Note  NAME: Mansoor Barker  : 1955  MRN: [id number]    Date of Service: 2022    Discharge Recommendations:  Subacute/Skilled Nursing Facility   PT Equipment Recommendations  Equipment Needed: No  Other: defer to next level of care. Patient Diagnosis(es): The primary encounter diagnosis was Hyponatremia. Diagnoses of Fall, initial encounter, Longstanding persistent atrial fibrillation ([address] and Shortness of breath were also pertinent to this visit. Assessment   Assessment: pt found up in chair, agreeable to PT treatment. pt demonstrates CGA for functional transfers with RW, CGA for ambulation up to 100', distance limited by endurance. pt offered stair training on today's date but states she does not feel up to it as she is very fatigued at this time and is very anxious about performing stair training. pt reminded she has stairs to enter her home and encouraged to attempt stair training on today's date, but continues to decline stair training. pt performed exercsies seated in chair, requires minimal rest breaks between sets. pt demonstrates very slow gait speed with decreased step length and height. pt requires VCs for proper sequencing of hands during sit<>stand transfer for safety. pt continues to demonstrate decreased endurance, activity tolerance, mobility, and functional capacity that is below her baseline and would continue to benefit from skilled PT at this time. Activity Tolerance: Patient tolerated treatment well  Equipment Needed: No  Other: defer to next level of care. Plan    Physcial Therapy Plan  General Plan: 3-5 times per week  Specific Instructions for Next Treatment: Progress mobility as tolerated, trial stair training. Current Treatment Recommendations: Strengthening;Balance training;Functional mobility training;Transfer training; Endurance training;Gait training;Neuromuscular re-education;Stair training;Home exercise program;Safety education & training;Patient/Caregiver education & training;Equipment evaluation, education, & procurement; Therapeutic activities     Restrictions  Restrictions/Precautions  Restrictions/Precautions: Fall Risk, General Precautions  Position Activity Restriction  Other position/activity restrictions: avasys, up with assistance, tele, terrazas catheter     Subjective    Subjective  Subjective: pt found in chair upon arrival, agreeable to PT treatment. Pain: pt denies pain at this time. Orientation  Overall Orientation Status: Within Normal Limits  Orientation Level: Oriented X4  Cognition  Overall Cognitive Status: Exceptions  Arousal/Alertness: Appropriate responses to stimuli  Following Commands: Follows one step commands consistently; Follows multistep commands with repitition; Follows multistep commands with increased time  Attention Span: Appears intact  Memory: Decreased recall of recent events  Safety Judgement: Decreased awareness of need for safety  Problem Solving: Assistance required to generate solutions  Insights: Decreased awareness of deficits  Initiation: Requires cues for some  Sequencing: Requires cues for some     Objective   Vitals  Heart Rate: 68  BP: 118/67  MAP (Calculated): 84  SpO2: 98 %  Comment: HR 68 BPM, SPO2 98%, /67  Bed Mobility Training  Bed Mobility Training: No (pt up in chair upon arrival, elected to stay up in chair after PT treatment.)  Balance  Sitting: Intact  Sitting - Static: Good (unsupported)  Sitting - Dynamic: Good (unsupported)  Standing: With support (with RW.)  Standing - Static: Fair;Constant support  Standing - Dynamic: Fair;Constant support  Transfer Training  Transfer Training: Yes  Overall Level of Assistance: Additional time;Contact-guard assistance  Interventions: Verbal cues; Tactile cues; Safety awareness training  Sit to Stand:  Additional time;Contact-guard assistance  Stand to Sit: Contact-guard assistance  Gait Training  Gait Training: Yes  Right Side Weight Bearing: As tolerated  Left Side Weight Bearing: As tolerated  Gait  Overall Level of Assistance: Contact-guard assistance (pt demonstrates no overt LOB, but continues to demosntrate very slow gait speed and shuffling gait pattern as well as path deviations when walking, becomes very fatigued with ambulation.)  Interventions: Verbal cues; Tactile cues  Base of Support: Widened  Speed/Kate: Slow;Shuffled  Step Length: Left shortened;Right shortened  Gait Abnormalities: Decreased step clearance;Shuffling gait; Path deviations  Distance (ft): 100 Feet  Assistive Device: Walker, rolling;Gait belt     PT Exercises  Exercise Treatment: AP, LAQ, QS, GS, seated march, all performed in seated 1X15. [facility] 6 Clicks Inpatient Mobility:  -PAC Mobility Inpatient   How much difficulty turning over in bed?: A Little  How much difficulty sitting down on / standing up from a chair with arms?: A Little  How much difficulty moving from lying on back to sitting on side of bed?: A Little  How much help from another person moving to and from a bed to a chair?: A Little  How much help from another person needed to walk in hospital room?: A Little  How much help from another person for climbing 3-5 steps with a railing?: A Lot  AM-PAC Inpatient Mobility Raw Score : 17  AM-PAC Inpatient T-Scale Score : 42.13  Mobility Inpatient CMS 0-100% Score: 50.57  Mobility Inpatient CMS G-Code Modifier : CK     Safety Devices  Type of Devices: Telesitter in use;Nurse notified; Left in chair;Patient at risk for falls; Chair alarm in place;Gait belt;Call light within reach; All fall risk precautions in place  Restraints  Restraints Initially in Place: No       Goals  Short Term Goals  Time Frame for Short Term Goals: 7 days (12/27/22) unless otherwise noted  Short Term Goal 1: Pt will perform bed mobility with min(A) -- 12/29: DNT  Short Term Goal 2: Pt will perform transfers with LRAD and with min(A) -- 12/28: GOAL MET CGA with RW; upgrade goal: Pt will complete functional t/f with LRAD with S  Short Term Goal 3: Pt will ambulate 15 ft with LRAD and min(A) -- 12/28: GOAL MET x 120' with RW CGA; upgrade goal: Pt will ambulate >80' with RW with S without LOB  Short Term Goal 4: Pt will perform 12-15 reps of BLE exercises by 12/23/22 -12/30 GOAL MET  Patient Goals   Patient Goals : \"to go home\"    Education  Patient Education  Education Given To: Patient  Education Provided: Role of Therapy;Plan of Care;Transfer Training;Precautions; Equipment  Education Provided Comments: Educated on role of PT, progression of mobility and safe use of AD  Education Method: Verbal  Barriers to Learning: Cognition  Education Outcome: Verbalized understanding;Continued education needed    Therapy Time   Individual Concurrent Group Co-treatment   Time In 1700         Time Out 1731         Minutes 31         Timed Code Treatment Minutes: [facility], PT, DPT  If pt is unable to be seen after this session, please let this note serve as discharge summary. Please see case management note for discharge disposition. Thank you.

## 2022-12-30 NOTE — PROGRESS NOTES
[facility] Progress Note           Subjective/   69y.o. year old female who we are seeing in consultation for hyponatremia. HPI:  Patient was seen and examined. BP a bit low this morning. UO=2.2L the past 24H. ROS:  No nausea or vomiting    Objective/   GEN:  Chronically ill, BP (!) 97/58   Pulse 75   Temp 97.7 °F (36.5 °C) (Oral)   Resp 18   Ht 5' 2\" (1.575 m)   Wt 217 lb 11.2 oz (98.7 kg)   LMP  (LMP Unknown)   SpO2 95%   BMI 39.82 kg/m²   HEENT: non-icteric, no JVD  CV: S1, S2 without m/r/g; ++ LE edema  RESP: CTA B without w/r/r; breathing wnl  ABD: +bs, soft, nt, no hsm  SKIN: warm, no rashes    Data/  Recent Labs     12/30/22  0727   WBC 6.6   HGB 11.8*   HCT 33.7*   .4*   *       Recent Labs     12/28/22  0730 12/29/22  0746 12/30/22  0727   * 133* 136   K 3.2* 3.6 3.7   CL 93* 94* 94*   CO2 29 31 31   GLUCOSE 149* 100* 108*   PHOS 4.1  --   --    MG 1.30* 1.70*  --    BUN 22* 27* 27*   CREATININE 0.7 0.7 0.8   LABGLOM >60 >60 >60       Assessment/     -Hyponatremia multifactorial from liver cirrhosis, alcohol to volume overload                Na of 106 at 2354 on 12/16, appropriately corrected and now improved. s/p course of urea, on diuretics    -Liver cirrhosis c/b fluid overload    -Falls    -ETOH abuse with withdrawals    -Hypokalemia/Hypomagnesemia from diuretic use     Plan/     - Continue Furosemide 80mg daily. - Continue Spironolactone 25mg daily. - Continue potassium 40 meq 2x/day. - Fluid restriction 1500 ml/day  - Encouraged protein intake  - Daily standing weights. Awaiting placement.    ____________________________________  Devyn Mckeon MD  [facility]  [email or web address]  Office: [phone]

## 2022-12-31 LAB
ANION GAP SERPL CALCULATED.3IONS-SCNC: 9 MMOL/L (ref 3–16)
BUN BLDV-MCNC: 25 MG/DL (ref 7–20)
CALCIUM SERPL-MCNC: 9.5 MG/DL (ref 8.3–10.6)
CHLORIDE BLD-SCNC: 96 MMOL/L (ref 99–110)
CO2: 30 MMOL/L (ref 21–32)
CREAT SERPL-MCNC: 0.7 MG/DL (ref 0.6–1.2)
D DIMER: 1.9 UG/ML FEU (ref 0–0.6)
GFR SERPL CREATININE-BSD FRML MDRD: >60 ML/MIN/{1.73_M2}
GLUCOSE BLD-MCNC: 107 MG/DL (ref 70–99)
MAGNESIUM: 1.3 MG/DL (ref 1.8–2.4)
POTASSIUM REFLEX MAGNESIUM: 4.1 MMOL/L (ref 3.5–5.1)
SODIUM BLD-SCNC: 135 MMOL/L (ref 136–145)

## 2022-12-31 PROCEDURE — 6370000000 HC RX 637 (ALT 250 FOR IP): Performed by: INTERNAL MEDICINE

## 2022-12-31 PROCEDURE — 6370000000 HC RX 637 (ALT 250 FOR IP): Performed by: NURSE PRACTITIONER

## 2022-12-31 PROCEDURE — 2500000003 HC RX 250 WO HCPCS: Performed by: NURSE PRACTITIONER

## 2022-12-31 PROCEDURE — 85379 FIBRIN DEGRADATION QUANT: CPT

## 2022-12-31 PROCEDURE — 80048 BASIC METABOLIC PNL TOTAL CA: CPT

## 2022-12-31 PROCEDURE — 1200000000 HC SEMI PRIVATE

## 2022-12-31 PROCEDURE — 36415 COLL VENOUS BLD VENIPUNCTURE: CPT

## 2022-12-31 PROCEDURE — 2580000003 HC RX 258: Performed by: INTERNAL MEDICINE

## 2022-12-31 PROCEDURE — 83735 ASSAY OF MAGNESIUM: CPT

## 2022-12-31 PROCEDURE — 6360000002 HC RX W HCPCS: Performed by: INTERNAL MEDICINE

## 2022-12-31 RX ORDER — FUROSEMIDE 40 MG/1
40 TABLET ORAL ONCE
Status: COMPLETED | OUTPATIENT
Start: 2022-12-31 | End: 2022-12-31

## 2022-12-31 RX ORDER — FUROSEMIDE 40 MG/1
40 TABLET ORAL DAILY
Status: DISCONTINUED | OUTPATIENT
Start: 2023-01-01 | End: 2023-01-04 | Stop reason: HOSPADM

## 2022-12-31 RX ORDER — METOPROLOL TARTRATE 5 MG/5ML
2.5 INJECTION INTRAVENOUS ONCE
Status: COMPLETED | OUTPATIENT
Start: 2022-12-31 | End: 2022-12-31

## 2022-12-31 RX ADMIN — FUROSEMIDE 40 MG: 40 TABLET ORAL at 12:50

## 2022-12-31 RX ADMIN — METOPROLOL SUCCINATE 50 MG: 50 TABLET, EXTENDED RELEASE ORAL at 10:33

## 2022-12-31 RX ADMIN — CHLORDIAZEPOXIDE HYDROCHLORIDE 5 MG: 5 CAPSULE ORAL at 10:32

## 2022-12-31 RX ADMIN — CHLORDIAZEPOXIDE HYDROCHLORIDE 5 MG: 5 CAPSULE ORAL at 19:36

## 2022-12-31 RX ADMIN — LACTULOSE 20 G: 20 SOLUTION ORAL at 10:33

## 2022-12-31 RX ADMIN — SODIUM CHLORIDE, PRESERVATIVE FREE 10 ML: 5 INJECTION INTRAVENOUS at 20:15

## 2022-12-31 RX ADMIN — Medication 400 MG: at 10:33

## 2022-12-31 RX ADMIN — FOLIC ACID 1 MG: 1 TABLET ORAL at 10:32

## 2022-12-31 RX ADMIN — MAGNESIUM SULFATE HEPTAHYDRATE 2000 MG: 40 INJECTION, SOLUTION INTRAVENOUS at 15:34

## 2022-12-31 RX ADMIN — SODIUM CHLORIDE, PRESERVATIVE FREE 10 ML: 5 INJECTION INTRAVENOUS at 10:34

## 2022-12-31 RX ADMIN — SPIRONOLACTONE 25 MG: 25 TABLET ORAL at 10:32

## 2022-12-31 RX ADMIN — MAGNESIUM SULFATE HEPTAHYDRATE 2000 MG: 40 INJECTION, SOLUTION INTRAVENOUS at 12:55

## 2022-12-31 RX ADMIN — ANASTROZOLE 1 MG: 1 TABLET, COATED ORAL at 10:40

## 2022-12-31 RX ADMIN — PANTOPRAZOLE SODIUM 40 MG: 40 TABLET, DELAYED RELEASE ORAL at 06:29

## 2022-12-31 RX ADMIN — Medication 1 TABLET: at 10:32

## 2022-12-31 RX ADMIN — METOPROLOL TARTRATE 2.5 MG: 5 INJECTION, SOLUTION INTRAVENOUS at 02:10

## 2022-12-31 RX ADMIN — VALACYCLOVIR HYDROCHLORIDE 500 MG: 500 TABLET, FILM COATED ORAL at 10:44

## 2022-12-31 RX ADMIN — Medication 100 MG: at 10:33

## 2022-12-31 RX ADMIN — ATORVASTATIN CALCIUM 20 MG: 10 TABLET, FILM COATED ORAL at 19:35

## 2022-12-31 RX ADMIN — Medication 400 MG: at 19:35

## 2022-12-31 NOTE — PROGRESS NOTES
Hospitalist Progress Note      PCP: Jessica John MD    Date of Admission: 12/16/2022    Chief Complaint:   Fall at home     KALEB FORD Course:   Reviewed      Subjective:   Seen resting in bed, no events overnight, no new complaints. Updated on plan of care. Medications:  Reviewed    Infusion Medications    sodium chloride Stopped (12/27/22 1136)     Scheduled Medications    [START ON 1/1/2023] furosemide  40 mg Oral Daily    [START ON 1/1/2023] potassium bicarb-citric acid  40 mEq Oral Daily    chlordiazePOXIDE  5 mg Oral BID    Followed by    Radha Betancur ON 1/2/2023] chlordiazePOXIDE  5 mg Oral Daily    spironolactone  25 mg Oral Daily    magnesium sulfate  4,000 mg IntraVENous Once    magnesium oxide  400 mg Oral BID    folic acid  1 mg Oral Daily    sodium chloride flush  5-40 mL IntraVENous 2 times per day    lactulose  20 g Oral Daily    thiamine  100 mg Oral Daily    pantoprazole  40 mg Oral QAM AC    anastrozole  1 mg Oral Daily    multivitamin  1 tablet Oral Daily    atorvastatin  20 mg Oral Nightly    metoprolol succinate  50 mg Oral Daily    valACYclovir  500 mg Oral Daily     PRN Meds: ipratropium-albuterol, sodium chloride flush, sodium chloride, magnesium sulfate, promethazine **OR** ondansetron, acetaminophen **OR** acetaminophen, LORazepam **OR** LORazepam **OR** LORazepam **OR** LORazepam **OR** LORazepam **OR** LORazepam **OR** LORazepam **OR** LORazepam      Intake/Output Summary (Last 24 hours) at 12/31/2022 0953  Last data filed at 12/31/2022 0352  Gross per 24 hour   Intake 700 ml   Output 2105 ml   Net -1405 ml       Physical Exam Performed:    /70   Pulse 93   Temp 98.4 °F (36.9 °C) (Oral)   Resp 18   Ht 5' 2\" (1.575 m)   Wt 215 lb 11.2 oz (97.8 kg)   LMP  (LMP Unknown)   SpO2 96%   BMI 39.45 kg/m²     General appearance: No apparent distress, appears stated age and cooperative. HEENT: Pupils equal, round, and reactive to light. Conjunctivae/corneas clear.   Neck: Supple, with full range of motion. No jugular venous distention. Trachea midline. Respiratory:  Normal respiratory effort. Clear to auscultation, bilaterally without Rales/Wheezes/Rhonchi. Cardiovascular: Regular rate and rhythm with normal S1/S2 without murmurs, rubs or gallops. Abdomen: Soft, non-tender, non-distended with normal bowel sounds. Musculoskeletal: No clubbing, cyanosis or edema bilaterally. Full range of motion without deformity. Skin: Skin color, texture, turgor normal.  No rashes or lesions. Neurologic:  Neurovascularly intact without any focal sensory/motor deficits. Cranial nerves: II-XII intact, grossly non-focal.  Psychiatric: Alert and oriented, thought content appropriate, normal insight  Capillary Refill: Brisk, 3 seconds, normal   Peripheral Pulses: +2 palpable, equal bilaterally       Labs:   Recent Labs     12/30/22  0727   WBC 6.6   HGB 11.8*   HCT 33.7*   *     Recent Labs     12/29/22  0746 12/30/22  0727 12/31/22  0244   * 136 135*   K 3.6 3.7 4.1   CL 94* 94* 96*   CO2 31 31 30   BUN 27* 27* 25*   CREATININE 0.7 0.8 0.7   CALCIUM 9.2 9.6 9.5     Recent Labs     12/30/22  0727   AST 51*   ALT 29   BILITOT 1.4*   ALKPHOS 172*     No results for input(s): INR in the last 72 hours. No results for input(s): Kathyrn Belch in the last 72 hours. Urinalysis:      Lab Results   Component Value Date/Time    NITRU Negative 12/17/2022 06:32 AM    WBCUA 0-2 12/17/2022 06:32 AM    BACTERIA 1+ 04/19/2021 04:39 PM    RBCUA None seen 12/17/2022 06:32 AM    BLOODU Negative 12/17/2022 06:32 AM    SPECGRAV 1.015 12/17/2022 06:32 AM    GLUCOSEU Negative 12/17/2022 06:32 AM       Radiology:  US ABDOMEN LIMITED   Final Result   No ascites identified         CT CHEST ABDOMEN PELVIS W CONTRAST Additional Contrast? None   Final Result   1. CHEST: Small right pleural effusion and mild atelectasis in the lung bases. 2. No consolidation is appreciated.    3. Moderate to severe cardiomegaly. 4. T12 superior endplate fracture of uncertain age. Moderate anterior height   loss of T11, likely chronic. If there is focal tenderness, non-urgent   follow-up MRI is a consideration. 5. ABDOMEN AND PELVIS: Cirrhotic morphology of the liver. 6. Prominence of the common and pancreatic ducts, also noted on 01/28/2022   Ductal ectasia or adjacent pancreatic cystic lesion(s) have been considered. Follow-up ERCP or endoscopic ultrasound was previously recommended. 7. No acute abdominopelvic abnormality identified. 8. Moderate anasarca. 9. Chronic-appearing compression fractures at L1 and L3. XR CHEST PORTABLE   Final Result   Left central line projects over the expected left brachiocephalic vein. Correlation for appropriate return is recommended. No pneumothorax. Stable enlargement of cardiac silhouette. XR CHEST PORTABLE   Final Result   The left IJ central venous catheter is deflected laterally into the proximal   left subclavian vein area. No evidence of pneumothorax. Marked cardiomegaly with minimal pulmonary vascular congestion without   pulmonary edema. CT CERVICAL SPINE WO CONTRAST   Final Result   Multilevel degenerative changes with no acute fracture or traumatic   malalignment of the cervical spine. CT HEAD WO CONTRAST   Final Result   No acute intracranial abnormality. XR CHEST PORTABLE   Final Result   Mild cardiomegaly and mild central pulmonary congestion or pulmonary artery   hypertension which is more prominent. Minimal bibasilar atelectasis or small infiltrates. Questionable small bibasilar pleural effusions which is more prominent.          VL Extremity Venous Left    (Results Pending)       IP CONSULT TO NEPHROLOGY  IP CONSULT TO NEPHROLOGY  IP CONSULT TO SOCIAL WORK  IP CONSULT TO GI  IP CONSULT TO CRITICAL CARE  IP CONSULT TO SOCIAL WORK    Assessment/Plan:    Active Hospital Problems    Diagnosis Macrocytic anemia [D53.9]      Priority: Medium    Thrombocytopenia (HCC) [D69.6]      Priority: Medium    Elevated LFTs [R79.89]      Priority: Medium    Hyperbilirubinemia [E80.6]      Priority: Medium    Anasarca [R60.1]      Priority: Medium    Alcoholic cirrhosis of liver with ascites (HCC) [K70.31]      Priority: Medium    Pancreatic duct dilated [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 History is limited. Apparently she fell at home, couldn't get up. EMS arrived to help her up, and ultimately decided to bring her to the ED because she was breathing heavily and edematous. When she arrived here she had \"no complaints\" but her Na was 102 (compared to baseline of upper 120's as of 11/1/2022). The patient was lethargic and a poor historian but she actually answered all the orientation questions correctly on arrival.  Hospital medicine admitted her to the ICU overnight. Acute on chronic hyponatremia. Due to cirrhosis, volume overload, poor solute intake. Encourage protein intake, also gave urea. fluid restriction per nephrology. Now on PO lasix- renal managing   - normalized      Alcohol dependence and withdrawal.  CIWA with chlordiazepam and PRN lorazepam.  Thiamine. - 12/30 taper librium      Acute metabolic encephalopathy. Due to above issues, treat accordingly. No focal neurological deficits. - currently at baseline      HTN. Held lisinopril due to lower BPs. Hypomag-replete  - clarified with nursing need to give IV 9 12/28/22)      PAF (new diagnosis).   It actually looks like an EKG showed Afib back in 4/2022 during an outpatient colonoscopy but no mention of this in the chart. Started metoprolol. F/u TTE. The patient has chronic thrombocytopenia, alcohol abuse, an falls, so she is not a good candidate for anticoagulation. Alcoholic cirrhosis. CGI was consulted by admitting physician. DCIS L breast, diagnosed 10/2019. S/p partial mastectomy and radiation in 2019. She is prescribed anastrazole by Dr. Afsaneh Celis. She is on chronic valacyclovir, presumably for suppressive therapy of HSV. Lower extremity edema  -suspect third spacing low protein levels, Diuresing     DVT Prophylaxis: SCDs  Diet: ADULT DIET;  Regular; 1500 ml  ADULT ORAL NUTRITION SUPPLEMENT; Breakfast, Dinner; Standard High Calorie/High Protein Oral Supplement  Code Status: Full Code  PT/OT Eval Status: Recommending SNF    Dispo - 1-2 days, needs placement     Appropriate for A1 Discharge Unit: No      SHERI Cote

## 2022-12-31 NOTE — PROGRESS NOTES
[facility] Progress Note           Subjective/   69y.o. year old female who we are seeing in consultation for hyponatremia. HPI:  Patient was seen and examined. Good urine output. ROS:  No nausea or vomiting    Objective/   GEN:  Chronically ill, /70   Pulse 93   Temp 98.4 °F (36.9 °C) (Oral)   Resp 18   Ht 5' 2\" (1.575 m)   Wt 215 lb 11.2 oz (97.8 kg)   LMP  (LMP Unknown)   SpO2 96%   BMI 39.45 kg/m²   HEENT: non-icteric, no JVD  CV: S1, S2 without m/r/g; + LE edema  RESP: CTA B without w/r/r; breathing wnl  ABD: +bs, soft, nt, no hsm  SKIN: warm, no rashes    Data/  Recent Labs     12/30/22  0727   WBC 6.6   HGB 11.8*   HCT 33.7*   .4*   *       Recent Labs     12/29/22  0746 12/30/22  0727 12/31/22  0244   * 136 135*   K 3.6 3.7 4.1   CL 94* 94* 96*   CO2 31 31 30   GLUCOSE 100* 108* 107*   MG 1.70*  --  1.30*   BUN 27* 27* 25*   CREATININE 0.7 0.8 0.7   LABGLOM >60 >60 >60       Assessment/     -Hyponatremia multifactorial from liver cirrhosis, alcohol to volume overload                Na of 106 at 2354 on 12/16, appropriately corrected and now improved. s/p course of urea, on diuretics    -Liver cirrhosis c/b fluid overload    -Falls    -ETOH abuse with withdrawals    -Hypokalemia/Hypomagnesemia from diuretic use     Plan/     - Decrease Furosemide to 40mg daily. - Continue Spironolactone 25mg daily.  - Decrease potassium to 40 meq daily.  - Fluid restriction 1500 ml/day  - Encouraged protein intake  - Daily standing weights. - Agree with magnesium supplement as ordered. Awaiting placement.    ____________________________________  Katie Mas MD  [facility]  [email or web address]  Office: [phone]

## 2023-01-01 LAB
ANION GAP SERPL CALCULATED.3IONS-SCNC: 9 MMOL/L (ref 3–16)
BASOPHILS ABSOLUTE: 0 K/UL (ref 0–0.2)
BASOPHILS RELATIVE PERCENT: 0.7 %
BUN BLDV-MCNC: 24 MG/DL (ref 7–20)
CALCIUM SERPL-MCNC: 9.6 MG/DL (ref 8.3–10.6)
CHLORIDE BLD-SCNC: 99 MMOL/L (ref 99–110)
CO2: 30 MMOL/L (ref 21–32)
CREAT SERPL-MCNC: 0.8 MG/DL (ref 0.6–1.2)
EOSINOPHILS ABSOLUTE: 0.2 K/UL (ref 0–0.6)
EOSINOPHILS RELATIVE PERCENT: 2.7 %
GFR SERPL CREATININE-BSD FRML MDRD: >60 ML/MIN/{1.73_M2}
GLUCOSE BLD-MCNC: 136 MG/DL (ref 70–99)
HCT VFR BLD CALC: 35.2 % (ref 36–48)
HEMATOLOGY PATH CONSULT: NO
HEMOGLOBIN: 12.1 G/DL (ref 12–16)
LYMPHOCYTES ABSOLUTE: 1.4 K/UL (ref 1–5.1)
LYMPHOCYTES RELATIVE PERCENT: 21.6 %
MCH RBC QN AUTO: 38.7 PG (ref 26–34)
MCHC RBC AUTO-ENTMCNC: 34.5 G/DL (ref 31–36)
MCV RBC AUTO: 112.2 FL (ref 80–100)
MONOCYTES ABSOLUTE: 1.3 K/UL (ref 0–1.3)
MONOCYTES RELATIVE PERCENT: 20.1 %
NEUTROPHILS ABSOLUTE: 3.5 K/UL (ref 1.7–7.7)
NEUTROPHILS RELATIVE PERCENT: 54.9 %
PDW BLD-RTO: 14.4 % (ref 12.4–15.4)
PLATELET # BLD: 142 K/UL (ref 135–450)
PLATELET SLIDE REVIEW: ADEQUATE
PMV BLD AUTO: 8.7 FL (ref 5–10.5)
POTASSIUM REFLEX MAGNESIUM: 3.9 MMOL/L (ref 3.5–5.1)
RBC # BLD: 3.14 M/UL (ref 4–5.2)
SLIDE REVIEW: ABNORMAL
SODIUM BLD-SCNC: 138 MMOL/L (ref 136–145)
WBC # BLD: 6.4 K/UL (ref 4–11)

## 2023-01-01 PROCEDURE — 6370000000 HC RX 637 (ALT 250 FOR IP): Performed by: INTERNAL MEDICINE

## 2023-01-01 PROCEDURE — 36415 COLL VENOUS BLD VENIPUNCTURE: CPT

## 2023-01-01 PROCEDURE — 2580000003 HC RX 258: Performed by: INTERNAL MEDICINE

## 2023-01-01 PROCEDURE — 85025 COMPLETE CBC W/AUTO DIFF WBC: CPT

## 2023-01-01 PROCEDURE — 6370000000 HC RX 637 (ALT 250 FOR IP): Performed by: NURSE PRACTITIONER

## 2023-01-01 PROCEDURE — 80048 BASIC METABOLIC PNL TOTAL CA: CPT

## 2023-01-01 PROCEDURE — 1200000000 HC SEMI PRIVATE

## 2023-01-01 RX ADMIN — ANASTROZOLE 1 MG: 1 TABLET, COATED ORAL at 08:20

## 2023-01-01 RX ADMIN — FUROSEMIDE 40 MG: 40 TABLET ORAL at 08:21

## 2023-01-01 RX ADMIN — LACTULOSE 20 G: 20 SOLUTION ORAL at 08:20

## 2023-01-01 RX ADMIN — METOPROLOL SUCCINATE 50 MG: 50 TABLET, EXTENDED RELEASE ORAL at 08:21

## 2023-01-01 RX ADMIN — Medication 100 MG: at 08:21

## 2023-01-01 RX ADMIN — ATORVASTATIN CALCIUM 20 MG: 10 TABLET, FILM COATED ORAL at 19:58

## 2023-01-01 RX ADMIN — PANTOPRAZOLE SODIUM 40 MG: 40 TABLET, DELAYED RELEASE ORAL at 06:39

## 2023-01-01 RX ADMIN — POTASSIUM BICARBONATE 40 MEQ: 782 TABLET, EFFERVESCENT ORAL at 08:20

## 2023-01-01 RX ADMIN — SPIRONOLACTONE 25 MG: 25 TABLET ORAL at 08:21

## 2023-01-01 RX ADMIN — CHLORDIAZEPOXIDE HYDROCHLORIDE 5 MG: 5 CAPSULE ORAL at 19:58

## 2023-01-01 RX ADMIN — VALACYCLOVIR HYDROCHLORIDE 500 MG: 500 TABLET, FILM COATED ORAL at 08:20

## 2023-01-01 RX ADMIN — SODIUM CHLORIDE, PRESERVATIVE FREE 10 ML: 5 INJECTION INTRAVENOUS at 19:59

## 2023-01-01 RX ADMIN — Medication 400 MG: at 08:21

## 2023-01-01 RX ADMIN — SODIUM CHLORIDE, PRESERVATIVE FREE 10 ML: 5 INJECTION INTRAVENOUS at 08:22

## 2023-01-01 RX ADMIN — Medication 400 MG: at 19:58

## 2023-01-01 RX ADMIN — FOLIC ACID 1 MG: 1 TABLET ORAL at 08:21

## 2023-01-01 RX ADMIN — Medication 1 TABLET: at 08:20

## 2023-01-01 RX ADMIN — CHLORDIAZEPOXIDE HYDROCHLORIDE 5 MG: 5 CAPSULE ORAL at 08:21

## 2023-01-01 NOTE — PROGRESS NOTES
[facility] Progress Note           Subjective/   69y.o. year old female who we are seeing in consultation for hyponatremia. HPI:  Patient was seen and examined. Good urine output. ROS:  No nausea or vomiting    Objective/   GEN:  Chronically ill, BP (!) 140/77   Pulse 83   Temp 97.7 °F (36.5 °C) (Oral)   Resp 18   Ht 5' 2\" (1.575 m)   Wt 214 lb 14.4 oz (97.5 kg)   LMP  (LMP Unknown)   SpO2 96%   BMI 39.31 kg/m²   HEENT: non-icteric, no JVD  CV: S1, S2 without m/r/g; + LE edema  RESP: CTA B without w/r/r; breathing wnl  ABD: +bs, soft, nt, no hsm  SKIN: warm, no rashes    Data/  Recent Labs     12/30/22  0727   WBC 6.6   HGB 11.8*   HCT 33.7*   .4*   *       Recent Labs     12/30/22  0727 12/31/22  0244    135*   K 3.7 4.1   CL 94* 96*   CO2 31 30   GLUCOSE 108* 107*   MG  --  1.30*   BUN 27* 25*   CREATININE 0.8 0.7   LABGLOM >60 >60       Assessment/     -Hyponatremia multifactorial from liver cirrhosis, alcohol to volume overload                Na of 106 at 2354 on 12/16, appropriately corrected and now improved. s/p course of urea, on diuretics    -Liver cirrhosis c/b fluid overload    -Falls    -ETOH abuse with withdrawals    -Hypokalemia/Hypomagnesemia from diuretic use     Plan/     - Decreased Furosemide to 40mg daily. - Continue Spironolactone 25mg daily.  - Decreased potassium to 40 meq daily.  - Fluid restriction 1500 ml/day  - Encouraged protein intake  - Daily standing weights. Awaiting placement.    ____________________________________  Hiwot Edgar MD  [facility]  [email or web address]  Office: [phone]

## 2023-01-01 NOTE — PROGRESS NOTES
Hospitalist Progress Note      PCP: Dakota Bowers MD    Date of Admission: 12/16/2022    Chief Complaint:   Fall at home     KALEB FORD Course:   Reviewed      Subjective:   Seen up in chair at bedside, on room air, denies any pain at this time, updated on plan of care. Medications:  Reviewed    Infusion Medications    sodium chloride Stopped (12/27/22 1136)     Scheduled Medications    furosemide  40 mg Oral Daily    potassium bicarb-citric acid  40 mEq Oral Daily    chlordiazePOXIDE  5 mg Oral BID    Followed by    Kerry Warren ON 1/2/2023] chlordiazePOXIDE  5 mg Oral Daily    spironolactone  25 mg Oral Daily    magnesium sulfate  4,000 mg IntraVENous Once    magnesium oxide  400 mg Oral BID    folic acid  1 mg Oral Daily    sodium chloride flush  5-40 mL IntraVENous 2 times per day    lactulose  20 g Oral Daily    thiamine  100 mg Oral Daily    pantoprazole  40 mg Oral QAM AC    anastrozole  1 mg Oral Daily    multivitamin  1 tablet Oral Daily    atorvastatin  20 mg Oral Nightly    metoprolol succinate  50 mg Oral Daily    valACYclovir  500 mg Oral Daily     PRN Meds: ipratropium-albuterol, sodium chloride flush, sodium chloride, magnesium sulfate, promethazine **OR** ondansetron, acetaminophen **OR** acetaminophen, LORazepam **OR** LORazepam **OR** LORazepam **OR** LORazepam **OR** LORazepam **OR** LORazepam **OR** LORazepam **OR** LORazepam      Intake/Output Summary (Last 24 hours) at 1/1/2023 1739  Last data filed at 1/1/2023 1557  Gross per 24 hour   Intake 1520 ml   Output 1700 ml   Net -180 ml       Physical Exam Performed:    BP (!) 101/46   Pulse 79   Temp 98.7 °F (37.1 °C) (Oral)   Resp 18   Ht 5' 2\" (1.575 m)   Wt 214 lb 14.4 oz (97.5 kg)   LMP  (LMP Unknown)   SpO2 100%   BMI 39.31 kg/m²     General appearance: No apparent distress, appears stated age and cooperative. HEENT: Pupils equal, round, and reactive to light. Conjunctivae/corneas clear. Neck: Supple, with full range of motion. No jugular venous distention. Trachea midline. Respiratory:  Normal respiratory effort. Clear to auscultation, bilaterally without Rales/Wheezes/Rhonchi. Cardiovascular: Regular rate and rhythm with normal S1/S2 without murmurs, rubs or gallops. Abdomen: Soft, non-tender, non-distended with normal bowel sounds. Musculoskeletal: No clubbing, cyanosis or edema bilaterally. Full range of motion without deformity. Skin: Skin color, texture, turgor normal.  No rashes or lesions. Neurologic:  Neurovascularly intact without any focal sensory/motor deficits. Cranial nerves: II-XII intact, grossly non-focal.  Psychiatric: Alert and oriented, thought content appropriate, normal insight  Capillary Refill: Brisk, 3 seconds, normal   Peripheral Pulses: +2 palpable, equal bilaterally       Labs:   Recent Labs     12/30/22  0727 01/01/23  1432   WBC 6.6 6.4   HGB 11.8* 12.1   HCT 33.7* 35.2*   * 142     Recent Labs     12/30/22  0727 12/31/22  0244 01/01/23  1432    135* 138   K 3.7 4.1 3.9   CL 94* 96* 99   CO2 31 30 30   BUN 27* 25* 24*   CREATININE 0.8 0.7 0.8   CALCIUM 9.6 9.5 9.6     Recent Labs     12/30/22  0727   AST 51*   ALT 29   BILITOT 1.4*   ALKPHOS 172*     No results for input(s): INR in the last 72 hours. No results for input(s): Naye Parisian in the last 72 hours. Urinalysis:      Lab Results   Component Value Date/Time    NITRU Negative 12/17/2022 06:32 AM    WBCUA 0-2 12/17/2022 06:32 AM    BACTERIA 1+ 04/19/2021 04:39 PM    RBCUA None seen 12/17/2022 06:32 AM    BLOODU Negative 12/17/2022 06:32 AM    SPECGRAV 1.015 12/17/2022 06:32 AM    GLUCOSEU Negative 12/17/2022 06:32 AM       Radiology:  US ABDOMEN LIMITED   Final Result   No ascites identified         CT CHEST ABDOMEN PELVIS W CONTRAST Additional Contrast? None   Final Result   1. CHEST: Small right pleural effusion and mild atelectasis in the lung bases. 2. No consolidation is appreciated.    3. Moderate to severe cardiomegaly. 4. T12 superior endplate fracture of uncertain age. Moderate anterior height   loss of T11, likely chronic. If there is focal tenderness, non-urgent   follow-up MRI is a consideration. 5. ABDOMEN AND PELVIS: Cirrhotic morphology of the liver. 6. Prominence of the common and pancreatic ducts, also noted on 01/28/2022   Ductal ectasia or adjacent pancreatic cystic lesion(s) have been considered. Follow-up ERCP or endoscopic ultrasound was previously recommended. 7. No acute abdominopelvic abnormality identified. 8. Moderate anasarca. 9. Chronic-appearing compression fractures at L1 and L3. XR CHEST PORTABLE   Final Result   Left central line projects over the expected left brachiocephalic vein. Correlation for appropriate return is recommended. No pneumothorax. Stable enlargement of cardiac silhouette. XR CHEST PORTABLE   Final Result   The left IJ central venous catheter is deflected laterally into the proximal   left subclavian vein area. No evidence of pneumothorax. Marked cardiomegaly with minimal pulmonary vascular congestion without   pulmonary edema. CT CERVICAL SPINE WO CONTRAST   Final Result   Multilevel degenerative changes with no acute fracture or traumatic   malalignment of the cervical spine. CT HEAD WO CONTRAST   Final Result   No acute intracranial abnormality. XR CHEST PORTABLE   Final Result   Mild cardiomegaly and mild central pulmonary congestion or pulmonary artery   hypertension which is more prominent. Minimal bibasilar atelectasis or small infiltrates. Questionable small bibasilar pleural effusions which is more prominent.          VL Extremity Venous Left    (Results Pending)       IP CONSULT TO NEPHROLOGY  IP CONSULT TO NEPHROLOGY  IP CONSULT TO SOCIAL WORK  IP CONSULT TO GI  IP CONSULT TO CRITICAL CARE  IP CONSULT TO SOCIAL WORK    Assessment/Plan:    Active Hospital Problems    Diagnosis Macrocytic anemia [D53.9]      Priority: Medium    Thrombocytopenia (HCC) [D69.6]      Priority: Medium    Elevated LFTs [R79.89]      Priority: Medium    Hyperbilirubinemia [E80.6]      Priority: Medium    Anasarca [R60.1]      Priority: Medium    Alcoholic cirrhosis of liver with ascites (HCC) [K70.31]      Priority: Medium    Pancreatic duct dilated [K86.89]      Priority: Medium    Paroxysmal atrial fibrillation ([facility] [I48.0]      Priority: Medium    Morbid obesity with BMI of 40.0-44.9, adult ([facility] [E66.01, Z68.41]      Priority: Medium    Suspected sleep apnea [R29.818]      Priority: Medium    Acute hyponatremia [E87.1]      Priority: Medium     69 Y F with a h/o former COPD, cirrhosis with ongoing alcoholism (possibly 3-4 cans of beer daily), HTN, and L breast cancer s/p resection in 2019. History is limited. Apparently she fell at home, couldn't get up. EMS arrived to help her up, and ultimately decided to bring her to the ED because she was breathing heavily and edematous. When she arrived here she had \"no complaints\" but her Na was 102 (compared to baseline of upper 120's as of 11/1/2022). The patient was lethargic and a poor historian but she actually answered all the orientation questions correctly on arrival.  Hospital medicine admitted her to the ICU overnight. Acute on chronic hyponatremia. Due to cirrhosis, volume overload, poor solute intake. Encourage protein intake, also gave urea. fluid restriction per nephrology. Now on PO lasix- renal managing   - normalized      Alcohol dependence and withdrawal.    -CIWA with chlordiazepam and PRN lorazepam.  Thiamine. - 12/30 taper librium      Acute metabolic encephalopathy. Due to above issues, treat accordingly. No focal neurological deficits. - currently at baseline      HTN    -Lisinopril held due to low BPs     Hypomagnesia  -replete and monitor    PAF (new diagnosis).     -It actually looks like an EKG showed Afib back in 4/2022 during an outpatient colonoscopy but no mention of this in the chart.    -Started metoprolol. -F/u TTE. The patient has chronic thrombocytopenia, alcohol abuse, an falls, so she is not a good candidate for anticoagulation. Alcoholic cirrhosis. CGI was consulted by admitting physician. DCIS L breast, diagnosed 10/2019.    -S/p partial mastectomy and radiation in 2019.  -She is prescribed anastrazole by Dr. Guillermina Castro. She is on chronic valacyclovir, presumably for suppressive therapy of HSV     Lower extremity edema, improving   -suspect third spacing low protein levels    DVT Prophylaxis: SCDs  Diet: ADULT DIET;  Regular; 1500 ml  ADULT ORAL NUTRITION SUPPLEMENT; Breakfast, Dinner; Standard High Calorie/High Protein Oral Supplement  Code Status: Full Code  PT/OT Eval Status: Recommending SNF    Dispo - 1-2 days, needs placement     Appropriate for A1 Discharge Unit: SHERI Larson

## 2023-01-01 NOTE — PLAN OF CARE
Problem: Discharge Planning  Goal: Discharge to home or other facility with appropriate resources  Outcome: Progressing  Flowsheets (Taken 1/1/2023 0826)  Discharge to home or other facility with appropriate resources: Identify barriers to discharge with patient and caregiver     Problem: Pain  Goal: Verbalizes/displays adequate comfort level or baseline comfort level  Outcome: Progressing  Flowsheets (Taken 1/1/2023 0818)  Verbalizes/displays adequate comfort level or baseline comfort level: Encourage patient to monitor pain and request assistance     Problem: Safety - Adult  Goal: Free from fall injury  Outcome: Progressing     Problem: Skin/Tissue Integrity  Goal: Absence of new skin breakdown  Description: 1. Monitor for areas of redness and/or skin breakdown  2. Assess vascular access sites hourly  3. Every 4-6 hours minimum:  Change oxygen saturation probe site  4. Every 4-6 hours:  If on nasal continuous positive airway pressure, respiratory therapy assess nares and determine need for appliance change or resting period.   Outcome: Progressing     Problem: Respiratory - Adult  Goal: Achieves optimal ventilation and oxygenation  Outcome: Progressing  Flowsheets (Taken 1/1/2023 0826)  Achieves optimal ventilation and oxygenation: Assess for changes in respiratory status     Problem: Cardiovascular - Adult  Goal: Maintains optimal cardiac output and hemodynamic stability  Outcome: Progressing  Flowsheets (Taken 1/1/2023 0826)  Maintains optimal cardiac output and hemodynamic stability: Monitor blood pressure and heart rate  Goal: Absence of cardiac dysrhythmias or at baseline  Outcome: Progressing  Flowsheets (Taken 1/1/2023 0826)  Absence of cardiac dysrhythmias or at baseline: Monitor cardiac rate and rhythm     Problem: Skin/Tissue Integrity - Adult  Goal: Skin integrity remains intact  Outcome: Progressing  Flowsheets  Taken 1/1/2023 1128  Skin Integrity Remains Intact: Monitor for areas of redness and/or skin breakdown  Taken 1/1/2023 0826  Skin Integrity Remains Intact: Monitor for areas of redness and/or skin breakdown     Problem: Genitourinary - Adult  Goal: Absence of urinary retention  Outcome: Progressing  Flowsheets (Taken 1/1/2023 0826)  Absence of urinary retention: Assess patients ability to void and empty bladder  Goal: Urinary catheter remains patent  Outcome: Progressing  Flowsheets (Taken 1/1/2023 0826)  Urinary catheter remains patent: Assess patency of urinary catheter     Problem: Neurosensory - Adult  Goal: Achieves stable or improved neurological status  Outcome: Progressing  Flowsheets (Taken 1/1/2023 0826)  Achieves stable or improved neurological status: Assess for and report changes in neurological status  Goal: Absence of seizures  Outcome: Progressing  Flowsheets (Taken 1/1/2023 0826)  Absence of seizures: Monitor for seizure activity.   If seizure occurs, document type and location of movements and any associated apnea     Problem: Musculoskeletal - Adult  Goal: Return mobility to safest level of function  Outcome: Progressing  Flowsheets (Taken 1/1/2023 0826)  Return Mobility to Safest Level of Function: Assess patient stability and activity tolerance for standing, transferring and ambulating with or without assistive devices  Goal: Return ADL status to a safe level of function  Outcome: Progressing  Flowsheets (Taken 1/1/2023 0826)  Return ADL Status to a Safe Level of Function: Administer medication as ordered     Problem: Gastrointestinal - Adult  Goal: Maintains adequate nutritional intake  Outcome: Progressing  Flowsheets (Taken 1/1/2023 0826)  Maintains adequate nutritional intake: Monitor percentage of each meal consumed     Problem: Infection - Adult  Goal: Absence of infection at discharge  Outcome: Progressing  Flowsheets (Taken 1/1/2023 0826)  Absence of infection at discharge: Assess and monitor for signs and symptoms of infection  Goal: Absence of infection during hospitalization  Outcome: Progressing  Flowsheets (Taken 1/1/2023 0826)  Absence of infection during hospitalization: Assess and monitor for signs and symptoms of infection     Problem: Metabolic/Fluid and Electrolytes - Adult  Goal: Electrolytes maintained within normal limits  Outcome: Progressing  Flowsheets (Taken 1/1/2023 0826)  Electrolytes maintained within normal limits: Monitor labs and assess patient for signs and symptoms of electrolyte imbalances     Problem: Nutrition Deficit:  Goal: Optimize nutritional status  Outcome: Progressing

## 2023-01-02 LAB
ANION GAP SERPL CALCULATED.3IONS-SCNC: 12 MMOL/L (ref 3–16)
BANDED NEUTROPHILS RELATIVE PERCENT: 12 % (ref 0–7)
BASOPHILS ABSOLUTE: 0.2 K/UL (ref 0–0.2)
BASOPHILS RELATIVE PERCENT: 2 %
BUN BLDV-MCNC: 27 MG/DL (ref 7–20)
CALCIUM SERPL-MCNC: 9.6 MG/DL (ref 8.3–10.6)
CHLORIDE BLD-SCNC: 100 MMOL/L (ref 99–110)
CO2: 25 MMOL/L (ref 21–32)
CREAT SERPL-MCNC: 0.8 MG/DL (ref 0.6–1.2)
EOSINOPHILS ABSOLUTE: 0.2 K/UL (ref 0–0.6)
EOSINOPHILS RELATIVE PERCENT: 2 %
GFR SERPL CREATININE-BSD FRML MDRD: >60 ML/MIN/{1.73_M2}
GLUCOSE BLD-MCNC: 122 MG/DL (ref 70–99)
HCT VFR BLD CALC: 33.9 % (ref 36–48)
HEMATOLOGY PATH CONSULT: NO
HEMOGLOBIN: 11.7 G/DL (ref 12–16)
LYMPHOCYTES ABSOLUTE: 1.8 K/UL (ref 1–5.1)
LYMPHOCYTES RELATIVE PERCENT: 24 %
MAGNESIUM: 1.5 MG/DL (ref 1.8–2.4)
MCH RBC QN AUTO: 38.5 PG (ref 26–34)
MCHC RBC AUTO-ENTMCNC: 34.6 G/DL (ref 31–36)
MCV RBC AUTO: 111.3 FL (ref 80–100)
MONOCYTES ABSOLUTE: 1.1 K/UL (ref 0–1.3)
MONOCYTES RELATIVE PERCENT: 14 %
NEUTROPHILS ABSOLUTE: 4.4 K/UL (ref 1.7–7.7)
NEUTROPHILS RELATIVE PERCENT: 46 %
PDW BLD-RTO: 14.6 % (ref 12.4–15.4)
PLATELET # BLD: 154 K/UL (ref 135–450)
PLATELET SLIDE REVIEW: ADEQUATE
PMV BLD AUTO: 9.2 FL (ref 5–10.5)
POTASSIUM REFLEX MAGNESIUM: 3.7 MMOL/L (ref 3.5–5.1)
RBC # BLD: 3.05 M/UL (ref 4–5.2)
SLIDE REVIEW: ABNORMAL
SODIUM BLD-SCNC: 137 MMOL/L (ref 136–145)
WBC # BLD: 7.5 K/UL (ref 4–11)

## 2023-01-02 PROCEDURE — 83735 ASSAY OF MAGNESIUM: CPT

## 2023-01-02 PROCEDURE — 2580000003 HC RX 258: Performed by: INTERNAL MEDICINE

## 2023-01-02 PROCEDURE — 97535 SELF CARE MNGMENT TRAINING: CPT

## 2023-01-02 PROCEDURE — 6370000000 HC RX 637 (ALT 250 FOR IP): Performed by: INTERNAL MEDICINE

## 2023-01-02 PROCEDURE — 6370000000 HC RX 637 (ALT 250 FOR IP): Performed by: NURSE PRACTITIONER

## 2023-01-02 PROCEDURE — 80048 BASIC METABOLIC PNL TOTAL CA: CPT

## 2023-01-02 PROCEDURE — 85025 COMPLETE CBC W/AUTO DIFF WBC: CPT

## 2023-01-02 PROCEDURE — 97530 THERAPEUTIC ACTIVITIES: CPT

## 2023-01-02 PROCEDURE — 36415 COLL VENOUS BLD VENIPUNCTURE: CPT

## 2023-01-02 PROCEDURE — 1200000000 HC SEMI PRIVATE

## 2023-01-02 PROCEDURE — 6360000002 HC RX W HCPCS: Performed by: PHYSICIAN ASSISTANT

## 2023-01-02 RX ORDER — KETOROLAC TROMETHAMINE 30 MG/ML
15 INJECTION, SOLUTION INTRAMUSCULAR; INTRAVENOUS ONCE
Status: COMPLETED | OUTPATIENT
Start: 2023-01-02 | End: 2023-01-02

## 2023-01-02 RX ORDER — MAGNESIUM SULFATE IN WATER 40 MG/ML
2000 INJECTION, SOLUTION INTRAVENOUS ONCE
Status: COMPLETED | OUTPATIENT
Start: 2023-01-02 | End: 2023-01-02

## 2023-01-02 RX ADMIN — MAGNESIUM SULFATE HEPTAHYDRATE 2000 MG: 40 INJECTION, SOLUTION INTRAVENOUS at 12:16

## 2023-01-02 RX ADMIN — SODIUM CHLORIDE, PRESERVATIVE FREE 10 ML: 5 INJECTION INTRAVENOUS at 21:19

## 2023-01-02 RX ADMIN — Medication 100 MG: at 09:56

## 2023-01-02 RX ADMIN — SODIUM CHLORIDE, PRESERVATIVE FREE 10 ML: 5 INJECTION INTRAVENOUS at 10:23

## 2023-01-02 RX ADMIN — LACTULOSE 20 G: 20 SOLUTION ORAL at 09:56

## 2023-01-02 RX ADMIN — METOPROLOL SUCCINATE 50 MG: 50 TABLET, EXTENDED RELEASE ORAL at 09:56

## 2023-01-02 RX ADMIN — KETOROLAC TROMETHAMINE 15 MG: 30 INJECTION, SOLUTION INTRAMUSCULAR at 14:17

## 2023-01-02 RX ADMIN — FUROSEMIDE 40 MG: 40 TABLET ORAL at 09:55

## 2023-01-02 RX ADMIN — ATORVASTATIN CALCIUM 20 MG: 10 TABLET, FILM COATED ORAL at 21:19

## 2023-01-02 RX ADMIN — PANTOPRAZOLE SODIUM 40 MG: 40 TABLET, DELAYED RELEASE ORAL at 06:09

## 2023-01-02 RX ADMIN — FOLIC ACID 1 MG: 1 TABLET ORAL at 09:55

## 2023-01-02 RX ADMIN — VALACYCLOVIR HYDROCHLORIDE 500 MG: 500 TABLET, FILM COATED ORAL at 09:54

## 2023-01-02 RX ADMIN — SPIRONOLACTONE 25 MG: 25 TABLET ORAL at 09:55

## 2023-01-02 RX ADMIN — POTASSIUM BICARBONATE 40 MEQ: 782 TABLET, EFFERVESCENT ORAL at 09:56

## 2023-01-02 RX ADMIN — SODIUM CHLORIDE: 9 INJECTION, SOLUTION INTRAVENOUS at 12:14

## 2023-01-02 RX ADMIN — Medication 400 MG: at 09:56

## 2023-01-02 RX ADMIN — ANASTROZOLE 1 MG: 1 TABLET, COATED ORAL at 09:54

## 2023-01-02 RX ADMIN — Medication 400 MG: at 21:19

## 2023-01-02 RX ADMIN — Medication 1 TABLET: at 09:56

## 2023-01-02 RX ADMIN — CHLORDIAZEPOXIDE HYDROCHLORIDE 5 MG: 5 CAPSULE ORAL at 09:55

## 2023-01-02 ASSESSMENT — PAIN DESCRIPTION - DESCRIPTORS: DESCRIPTORS: ACHING

## 2023-01-02 ASSESSMENT — PAIN DESCRIPTION - ORIENTATION: ORIENTATION: LEFT

## 2023-01-02 ASSESSMENT — PAIN DESCRIPTION - LOCATION: LOCATION: LEG

## 2023-01-02 ASSESSMENT — PAIN - FUNCTIONAL ASSESSMENT: PAIN_FUNCTIONAL_ASSESSMENT: ACTIVITIES ARE NOT PREVENTED

## 2023-01-02 ASSESSMENT — PAIN SCALES - GENERAL: PAINLEVEL_OUTOF10: 8

## 2023-01-02 NOTE — CARE COORDINATION
Chart reviewed. S/p fall at home, BLE edema, SOB, alcohol abuse with cirrhosis, encephalopathy (resolved). Electrolyte replacement, ongoing. Management per GI, nephro and IM. Pt from home, alone. IPTA. Declines SA resources. PT/OT rec SNF. Pt now agreeable. CM received facility preference list from family. [facility] ACCEPTED. Family toured facility today. Pt needs duplex of LLE, unable to be completed today. Pt will need precert now, as her insurance changed to [facility]. Facility started precert. Daughter, Ernestina Grewal, will email copy of insurance card.  Gisele Cuello RN

## 2023-01-02 NOTE — PROGRESS NOTES
Hospitalist Progress Note      PCP: Laura Torres MD    Date of Admission: 12/16/2022    Chief Complaint:   Fall at home     KALEB FORD Course:   Reviewed      Subjective:   Seen resting in bed, no new complaints, denies any pain at this time. Updated on plan of care. Medications:  Reviewed    Infusion Medications    sodium chloride 50 mL/hr at 01/02/23 1214     Scheduled Medications    furosemide  40 mg Oral Daily    potassium bicarb-citric acid  40 mEq Oral Daily    chlordiazePOXIDE  5 mg Oral Daily    spironolactone  25 mg Oral Daily    magnesium oxide  400 mg Oral BID    folic acid  1 mg Oral Daily    sodium chloride flush  5-40 mL IntraVENous 2 times per day    lactulose  20 g Oral Daily    thiamine  100 mg Oral Daily    pantoprazole  40 mg Oral QAM AC    anastrozole  1 mg Oral Daily    multivitamin  1 tablet Oral Daily    atorvastatin  20 mg Oral Nightly    metoprolol succinate  50 mg Oral Daily    valACYclovir  500 mg Oral Daily     PRN Meds: ipratropium-albuterol, sodium chloride flush, sodium chloride, magnesium sulfate, promethazine **OR** ondansetron, acetaminophen **OR** acetaminophen, LORazepam **OR** LORazepam **OR** LORazepam **OR** LORazepam **OR** LORazepam **OR** LORazepam **OR** LORazepam **OR** LORazepam      Intake/Output Summary (Last 24 hours) at 1/2/2023 1641  Last data filed at 1/2/2023 1054  Gross per 24 hour   Intake 720 ml   Output --   Net 720 ml       Physical Exam Performed:    /84   Pulse 81   Temp 98.7 °F (37.1 °C) (Axillary)   Resp 18   Ht 5' 2\" (1.575 m)   Wt 214 lb 14.4 oz (97.5 kg)   LMP  (LMP Unknown)   SpO2 98%   BMI 39.31 kg/m²     General appearance: No apparent distress, appears stated age and cooperative. HEENT: Pupils equal, round, and reactive to light. Conjunctivae/corneas clear. Neck: Supple, with full range of motion. No jugular venous distention. Trachea midline. Respiratory:  Normal respiratory effort.  Clear to auscultation, bilaterally without Rales/Wheezes/Rhonchi. Cardiovascular: Regular rate and rhythm with normal S1/S2 without murmurs, rubs or gallops. Abdomen: Soft, non-tender, non-distended with normal bowel sounds. Musculoskeletal: No clubbing, cyanosis or edema bilaterally. Full range of motion without deformity. Skin: Skin color, texture, turgor normal.  No rashes or lesions. Neurologic:  Neurovascularly intact without any focal sensory/motor deficits. Cranial nerves: II-XII intact, grossly non-focal.  Psychiatric: Alert and oriented, thought content appropriate, normal insight  Capillary Refill: Brisk, 3 seconds, normal   Peripheral Pulses: +2 palpable, equal bilaterally       Labs:   Recent Labs     01/01/23  1432 01/02/23  0629   WBC 6.4 7.5   HGB 12.1 11.7*   HCT 35.2* 33.9*    154     Recent Labs     12/31/22  0244 01/01/23  1432 01/02/23  0629   * 138 137   K 4.1 3.9 3.7   CL 96* 99 100   CO2 30 30 25   BUN 25* 24* 27*   CREATININE 0.7 0.8 0.8   CALCIUM 9.5 9.6 9.6     No results for input(s): AST, ALT, BILIDIR, BILITOT, ALKPHOS in the last 72 hours. No results for input(s): INR in the last 72 hours. No results for input(s): Mabel Lumberton in the last 72 hours. Urinalysis:      Lab Results   Component Value Date/Time    NITRU Negative 12/17/2022 06:32 AM    WBCUA 0-2 12/17/2022 06:32 AM    BACTERIA 1+ 04/19/2021 04:39 PM    RBCUA None seen 12/17/2022 06:32 AM    BLOODU Negative 12/17/2022 06:32 AM    SPECGRAV 1.015 12/17/2022 06:32 AM    GLUCOSEU Negative 12/17/2022 06:32 AM       Radiology:  US ABDOMEN LIMITED   Final Result   No ascites identified         CT CHEST ABDOMEN PELVIS W CONTRAST Additional Contrast? None   Final Result   1. CHEST: Small right pleural effusion and mild atelectasis in the lung bases. 2. No consolidation is appreciated. 3. Moderate to severe cardiomegaly. 4. T12 superior endplate fracture of uncertain age. Moderate anterior height   loss of T11, likely chronic.   If there is focal tenderness, non-urgent   follow-up MRI is a consideration. 5. ABDOMEN AND PELVIS: Cirrhotic morphology of the liver. 6. Prominence of the common and pancreatic ducts, also noted on 01/28/2022   Ductal ectasia or adjacent pancreatic cystic lesion(s) have been considered. Follow-up ERCP or endoscopic ultrasound was previously recommended. 7. No acute abdominopelvic abnormality identified. 8. Moderate anasarca. 9. Chronic-appearing compression fractures at L1 and L3. XR CHEST PORTABLE   Final Result   Left central line projects over the expected left brachiocephalic vein. Correlation for appropriate return is recommended. No pneumothorax. Stable enlargement of cardiac silhouette. XR CHEST PORTABLE   Final Result   The left IJ central venous catheter is deflected laterally into the proximal   left subclavian vein area. No evidence of pneumothorax. Marked cardiomegaly with minimal pulmonary vascular congestion without   pulmonary edema. CT CERVICAL SPINE WO CONTRAST   Final Result   Multilevel degenerative changes with no acute fracture or traumatic   malalignment of the cervical spine. CT HEAD WO CONTRAST   Final Result   No acute intracranial abnormality. XR CHEST PORTABLE   Final Result   Mild cardiomegaly and mild central pulmonary congestion or pulmonary artery   hypertension which is more prominent. Minimal bibasilar atelectasis or small infiltrates. Questionable small bibasilar pleural effusions which is more prominent.          VL Extremity Venous Left    (Results Pending)       IP CONSULT TO NEPHROLOGY  IP CONSULT TO NEPHROLOGY  IP CONSULT TO SOCIAL WORK  IP CONSULT TO GI  IP CONSULT TO CRITICAL CARE  IP CONSULT TO SOCIAL WORK    Assessment/Plan:    Active Hospital Problems    Diagnosis     Macrocytic anemia [D53.9]      Priority: Medium    Thrombocytopenia ([address] [D69.6]      Priority: Medium    Elevated LFTs [R79.89]      Priority: Medium    Hyperbilirubinemia [E80.6]      Priority: Medium    Anasarca [R60.1]      Priority: Medium    Alcoholic cirrhosis of liver with ascites ([facility] [K70.31]      Priority: Medium    Pancreatic duct dilated [K86.89]      Priority: Medium    Paroxysmal atrial fibrillation (HCC) [I48.0]      Priority: Medium    Morbid obesity with BMI of 40.0-44.9, adult ([facility] [E66.01, Z68.41]      Priority: Medium    Suspected sleep apnea [R29.818]      Priority: Medium    Acute hyponatremia [E87.1]      Priority: Medium     69 Y F with a h/o former COPD, cirrhosis with ongoing alcoholism (possibly 3-4 cans of beer daily), HTN, and L breast cancer s/p resection in 2019. History is limited. Apparently she fell at home, couldn't get up. EMS arrived to help her up, and ultimately decided to bring her to the ED because she was breathing heavily and edematous. When she arrived here she had \"no complaints\" but her Na was 102 (compared to baseline of upper 120's as of 11/1/2022). The patient was lethargic and a poor historian but she actually answered all the orientation questions correctly on arrival.  Hospital medicine admitted her to the ICU overnight. Acute on chronic hyponatremia. Due to cirrhosis, volume overload, poor solute intake. Encourage protein intake, also gave urea. fluid restriction per nephrology. Now on PO lasix- renal managing   - normalized      Alcohol dependence and withdrawal.    -CIWA with chlordiazepam and PRN lorazepam.  Thiamine. - 12/30 taper librium      Acute metabolic encephalopathy. Due to above issues, treat accordingly. No focal neurological deficits. - currently at baseline      HTN    -Lisinopril held due to low BPs     Hypomagnesia  -replete and monitor    PAF (new diagnosis). -It actually looks like an EKG showed Afib back in 4/2022 during an outpatient colonoscopy but no mention of this in the chart.    -Started metoprolol. -F/u TTE.   The patient has chronic thrombocytopenia, alcohol abuse, an falls, so she is not a good candidate for anticoagulation. Alcoholic cirrhosis. CGI was consulted by admitting physician. DCIS L breast, diagnosed 10/2019.    -S/p partial mastectomy and radiation in 2019.  -She is prescribed anastrazole by Dr. Latonya Ahn. She is on chronic valacyclovir, presumably for suppressive therapy of HSV     Left lower extremity edema and erythema  -Elevated d-dimer  -U/S of lower extremity ordered and pending       DVT Prophylaxis: SCDs  Diet: ADULT DIET;  Regular; 1500 ml  ADULT ORAL NUTRITION SUPPLEMENT; Breakfast, Dinner; Standard High Calorie/High Protein Oral Supplement  Code Status: Full Code  PT/OT Eval Status: Recommending SNF    Dispo - likely tomorrow pending LE U/S    Appropriate for A1 Discharge Unit: SHERI Alonzo

## 2023-01-02 NOTE — PLAN OF CARE
Problem: Discharge Planning  Goal: Discharge to home or other facility with appropriate resources  Outcome: Progressing  Flowsheets (Taken 1/2/2023 1002)  Discharge to home or other facility with appropriate resources: Identify barriers to discharge with patient and caregiver     Problem: Pain  Goal: Verbalizes/displays adequate comfort level or baseline comfort level  Outcome: Progressing     Problem: Safety - Adult  Goal: Free from fall injury  Outcome: Progressing     Problem: Skin/Tissue Integrity  Goal: Absence of new skin breakdown  Description: 1. Monitor for areas of redness and/or skin breakdown  2. Assess vascular access sites hourly  3. Every 4-6 hours minimum:  Change oxygen saturation probe site  4. Every 4-6 hours:  If on nasal continuous positive airway pressure, respiratory therapy assess nares and determine need for appliance change or resting period.   Outcome: Progressing     Problem: Respiratory - Adult  Goal: Achieves optimal ventilation and oxygenation  Outcome: Progressing  Flowsheets (Taken 1/2/2023 1002)  Achieves optimal ventilation and oxygenation: Assess for changes in respiratory status     Problem: Cardiovascular - Adult  Goal: Maintains optimal cardiac output and hemodynamic stability  Outcome: Progressing  Flowsheets (Taken 1/2/2023 1002)  Maintains optimal cardiac output and hemodynamic stability: Monitor blood pressure and heart rate  Goal: Absence of cardiac dysrhythmias or at baseline  Outcome: Progressing  Flowsheets (Taken 1/2/2023 1002)  Absence of cardiac dysrhythmias or at baseline: Monitor cardiac rate and rhythm     Problem: Skin/Tissue Integrity - Adult  Goal: Skin integrity remains intact  Outcome: Progressing  Flowsheets (Taken 1/2/2023 1002)  Skin Integrity Remains Intact: Monitor for areas of redness and/or skin breakdown     Problem: Genitourinary - Adult  Goal: Absence of urinary retention  Outcome: Progressing  Flowsheets (Taken 1/2/2023 1002)  Absence of urinary retention: Assess patients ability to void and empty bladder  Goal: Urinary catheter remains patent  Outcome: Progressing  Flowsheets (Taken 1/2/2023 1002)  Urinary catheter remains patent: Assess patency of urinary catheter     Problem: Neurosensory - Adult  Goal: Achieves stable or improved neurological status  Outcome: Progressing  Flowsheets (Taken 1/2/2023 1002)  Achieves stable or improved neurological status: Assess for and report changes in neurological status  Goal: Absence of seizures  Outcome: Progressing  Flowsheets (Taken 1/2/2023 1002)  Absence of seizures: Monitor for seizure activity.   If seizure occurs, document type and location of movements and any associated apnea     Problem: Musculoskeletal - Adult  Goal: Return mobility to safest level of function  Outcome: Progressing  Flowsheets (Taken 1/2/2023 1002)  Return Mobility to Safest Level of Function: Assess patient stability and activity tolerance for standing, transferring and ambulating with or without assistive devices  Goal: Return ADL status to a safe level of function  Outcome: Progressing  Flowsheets (Taken 1/2/2023 1002)  Return ADL Status to a Safe Level of Function: Administer medication as ordered     Problem: Gastrointestinal - Adult  Goal: Maintains adequate nutritional intake  Outcome: Progressing  Flowsheets (Taken 1/2/2023 1002)  Maintains adequate nutritional intake: Monitor percentage of each meal consumed     Problem: Infection - Adult  Goal: Absence of infection at discharge  Outcome: Progressing  Flowsheets (Taken 1/2/2023 1002)  Absence of infection at discharge: Assess and monitor for signs and symptoms of infection  Goal: Absence of infection during hospitalization  Outcome: Progressing  Flowsheets (Taken 1/2/2023 1002)  Absence of infection during hospitalization: Assess and monitor for signs and symptoms of infection     Problem: Metabolic/Fluid and Electrolytes - Adult  Goal: Electrolytes maintained within normal limits  Outcome: Progressing  Flowsheets (Taken 1/2/2023 1002)  Electrolytes maintained within normal limits: Monitor labs and assess patient for signs and symptoms of electrolyte imbalances     Problem: Nutrition Deficit:  Goal: Optimize nutritional status  Outcome: Progressing

## 2023-01-02 NOTE — PROGRESS NOTES
Occupational Therapy  Facility/Department: [facility] B3 - MED SURG  Daily Treatment Note  NAME: Silvia Lopez  : 1955  MRN: [id number]    Date of Service: 2023    Discharge Recommendations:  Subacute/Skilled Nursing Facility  OT Equipment Recommendations  Equipment Needed: No  Other: Defer to next level of care      Patient Diagnosis(es): The primary encounter diagnosis was Hyponatremia. Diagnoses of Fall, initial encounter, Longstanding persistent atrial fibrillation ([address] and Shortness of breath were also pertinent to this visit. Assessment    Assessment: Pt tolerated session fairly. Engaged pt in bathing and toileting ADL this date. Pt requires SBA for UB bathing and dressing, mod-max A for LB bathing and dressing, and min A for toileting. Pt completed functional mobility this date with CGA and increased time using RW. Pt continues to function below baseline and would benefit from continued OT services during acute hosptial stay. Cont per POC. Activity Tolerance: Patient tolerated treatment well;Patient limited by fatigue  Discharge Recommendations: Subacute/Skilled Nursing Facility  Equipment Needed: No  Other: Defer to next level of care      Plan   Occupational Therapy Plan  Times Per Week: 3-5x/wk  Current Treatment Recommendations: Strengthening;ROM;Balance training;Functional mobility training; Endurance training;Pain management; Safety education & training;Patient/Caregiver education & training;Home management training;Self-Care / ADL;Gait training;Cognitive reorientation;Equipment evaluation, education, & procurement;Positioning;Cognitive/Perceptual training     Restrictions  Restrictions/Precautions  Restrictions/Precautions: Fall Risk;General Precautions  Position Activity Restriction  Other position/activity restrictions: up with assistance, tele    Subjective   Subjective  Subjective: Pt seated in chair and requesting to use toilet upon OT arrival  Pain: Pt denies pain  Orientation  Overall Orientation Status: Within Normal Limits  Orientation Level: Oriented X4  Cognition  Overall Cognitive Status: Exceptions  Arousal/Alertness: Appropriate responses to stimuli  Following Commands: Follows one step commands consistently; Follows multistep commands with repitition; Follows multistep commands with increased time  Attention Span: Appears intact  Memory: Decreased recall of recent events  Safety Judgement: Decreased awareness of need for safety  Problem Solving: Assistance required to generate solutions  Insights: Decreased awareness of deficits  Initiation: Requires cues for some  Sequencing: Requires cues for some        Objective    Vitals:       BP: 121/83   Pulse: 69   Resp:    Temp:    SpO2: 98%       Bed Mobility Training  Bed Mobility Training: No (Pt seated in chair upon entry and at EOS)  Balance  Sitting: Intact  Standing: With support (w/ RW)  Transfer Training  Transfer Training: Yes  Overall Level of Assistance: Additional time;Contact-guard assistance;Assist X1;Adaptive equipment (w/ RW, cues for handplacement and RW positioning during STS)  Interventions: Verbal cues; Tactile cues; Safety awareness training  Sit to Stand: Additional time;Contact-guard assistance; Adaptive equipment;Assist X2 (w/ RW)  Stand to Sit: Contact-guard assistance; Adaptive equipment; Additional time;Assist X1 (w/ RW)  Stand Pivot Transfers: Contact-guard assistance; Adaptive equipment; Additional time;Assist X1 (w/ RW)  Toilet Transfer: Contact-guard assistance; Adaptive equipment; Additional time;Assist X1 (w/ RW)     ADL  Feeding: Setup  UE Bathing: Verbal cueing; Increased time to complete;Stand by assistance  UE Bathing Skilled Clinical Factors: seated sponge bath at chair, cues for sequencing  LE Bathing: Verbal cueing; Increased time to complete; Moderate assistance  LE Bathing Skilled Clinical Factors: Pt able to complete 2/5 parts, assist provided for B feet and buttocks with pt able to wash B thighs  UE Dressing: Verbal cueing; Increased time to complete;Contact guard assistance  UE Dressing Skilled Clinical Factors: gown exchange while seated in chair  LE Dressing: Maximum assistance  LE Dressing Skilled Clinical Factors: total A for sock management, pt required assist to thread BLEs. Pt able to manage briefs over hips while in stances. Toileting: Minimal assistance  Toileting Skilled Clinical Factors: Pt able to perform clothing managemeng however requires assist for posterior pericare after BM. Pt limited by habitus and may benefit from. Additional Comments: Pt declining further ADLs        Safety Devices  Type of Devices: Call light within reach;Nurse notified; Left in chair;Patient at risk for falls;Gait belt; Chair alarm in place (FRANK Cuevas) notified)  Restraints  Restraints Initially in Place: No     Patient Education  Education Given To: Patient  Education Provided: Role of Therapy;Plan of Care;Transfer Training;ADL Adaptive Strategies; Energy Conservation;Equipment;Orientation  Education Provided Comments: home safety concerns, OT discharge recommendations, standing balance, safety with walker, energy conservation for bathing/dressing, problem solving for bathing/dressing  Education Method: Demonstration;Verbal  Barriers to Learning: Cognition  Education Outcome: Verbalized understanding;Demonstrated understanding;Continued education needed    AM-PAC score  AM-PAC Inpatient Daily Activity Raw Score: 16 (01/02/23 1140)  AM-PAC Inpatient ADL T-Scale Score : 35.96 (01/02/23 1140)  ADL Inpatient CMS 0-100% Score: 53.32 (01/02/23 1140)  ADL Inpatient CMS G-Code Modifier : CK (01/02/23 1140)    Goals  Short Term Goals  Time Frame for Short Term Goals: 10 days (12/30/22)- goals extended to reflect LOS (1/09)  Short Term Goal 1: Pt will complete LE dressing with modA; goal progress, max A for sock management, mod A for breifs 1/02  Short Term Goal 2: supervision with toilet transfers by 12-30-22, 1/02 CGA with RW  Short Term Goal 3: Pt will complete 10 reps of BUE AROM exercises to increase strength for ADLs/transfers by 12/25; 12/23 STG met, continue for light strengthening  Short Term Goal 4: SBA for 3 minutes by 12-30-22;goal met 1/02  Patient Goals   Patient goals : \"to go home\"       Therapy Time   Individual Concurrent Group Co-treatment   Time In 1057         Time Out 1135         Minutes 38         Timed Code Treatment Minutes: 38 Minutes     If pt is unable to be seen after this session, please let this note serve as discharge summary. Please see case management note for discharge disposition. Thank you.       Dusty Kincaid, OT

## 2023-01-02 NOTE — PROGRESS NOTES
[facility] Progress Note           Subjective/   69y.o. year old female who we are seeing in consultation for hyponatremia. HPI:  Patient was seen and examined. Eating dinner. BP stable. ROS:  No nausea or vomiting    Objective/   GEN:  Chronically ill, /84   Pulse 81   Temp 98.7 °F (37.1 °C) (Axillary)   Resp 18   Ht 5' 2\" (1.575 m)   Wt 214 lb 14.4 oz (97.5 kg)   LMP  (LMP Unknown)   SpO2 98%   BMI 39.31 kg/m²   HEENT: non-icteric, no JVD  CV: S1, S2 without m/r/g; + LE edema  RESP: CTA B without w/r/r; breathing wnl  ABD: +bs, soft, nt, no hsm  SKIN: warm, no rashes    Data/  Recent Labs     01/01/23  1432 01/02/23  0629   WBC 6.4 7.5   HGB 12.1 11.7*   HCT 35.2* 33.9*   .2* 111.3*    154       Recent Labs     12/31/22  0244 01/01/23  1432 01/02/23  0629   * 138 137   K 4.1 3.9 3.7   CL 96* 99 100   CO2 30 30 25   GLUCOSE 107* 136* 122*   MG 1.30*  --  1.50*   BUN 25* 24* 27*   CREATININE 0.7 0.8 0.8   LABGLOM >60 >60 >60       Assessment/     -Hyponatremia multifactorial from liver cirrhosis, alcohol to volume overload                Na of 106 at 2354 on 12/16, appropriately corrected and now improved. s/p course of urea, on diuretics    -Liver cirrhosis c/b fluid overload    -Falls    -ETOH abuse with withdrawals    -Hypokalemia/Hypomagnesemia from diuretic use     Plan/     - Continue Furosemide 40mg daily. - Continue Spironolactone 25mg daily. - Continue potassium 40 meq daily.  - Fluid restriction 1500 ml/day  - Encouraged protein intake  - Daily standing weights. Awaiting placement.    ____________________________________  Reyes Hui MD  [facility]  [email or web address]  Office: [phone]

## 2023-01-03 ENCOUNTER — APPOINTMENT (OUTPATIENT)
Dept: VASCULAR LAB | Age: 68
DRG: 432 | End: 2023-01-03
Payer: MEDICARE

## 2023-01-03 LAB
ANION GAP SERPL CALCULATED.3IONS-SCNC: 9 MMOL/L (ref 3–16)
BASOPHILS ABSOLUTE: 0.2 K/UL (ref 0–0.2)
BASOPHILS RELATIVE PERCENT: 2.6 %
BUN BLDV-MCNC: 36 MG/DL (ref 7–20)
CALCIUM SERPL-MCNC: 9.5 MG/DL (ref 8.3–10.6)
CHLORIDE BLD-SCNC: 100 MMOL/L (ref 99–110)
CO2: 27 MMOL/L (ref 21–32)
CREAT SERPL-MCNC: 1.1 MG/DL (ref 0.6–1.2)
EOSINOPHILS ABSOLUTE: 0.2 K/UL (ref 0–0.6)
EOSINOPHILS RELATIVE PERCENT: 3.1 %
GFR SERPL CREATININE-BSD FRML MDRD: 54 ML/MIN/{1.73_M2}
GLUCOSE BLD-MCNC: 110 MG/DL (ref 70–99)
HCT VFR BLD CALC: 32.4 % (ref 36–48)
HEMATOLOGY PATH CONSULT: NO
HEMOGLOBIN: 10.9 G/DL (ref 12–16)
LYMPHOCYTES ABSOLUTE: 1.8 K/UL (ref 1–5.1)
LYMPHOCYTES RELATIVE PERCENT: 26.4 %
MCH RBC QN AUTO: 37.4 PG (ref 26–34)
MCHC RBC AUTO-ENTMCNC: 33.6 G/DL (ref 31–36)
MCV RBC AUTO: 111.5 FL (ref 80–100)
MONOCYTES ABSOLUTE: 1.4 K/UL (ref 0–1.3)
MONOCYTES RELATIVE PERCENT: 19.6 %
NEUTROPHILS ABSOLUTE: 3.4 K/UL (ref 1.7–7.7)
NEUTROPHILS RELATIVE PERCENT: 48.3 %
PDW BLD-RTO: 14.5 % (ref 12.4–15.4)
PLATELET # BLD: 156 K/UL (ref 135–450)
PMV BLD AUTO: 9.1 FL (ref 5–10.5)
POTASSIUM REFLEX MAGNESIUM: 3.8 MMOL/L (ref 3.5–5.1)
RBC # BLD: 2.91 M/UL (ref 4–5.2)
SODIUM BLD-SCNC: 136 MMOL/L (ref 136–145)
WBC # BLD: 6.9 K/UL (ref 4–11)

## 2023-01-03 PROCEDURE — 6370000000 HC RX 637 (ALT 250 FOR IP): Performed by: PHYSICIAN ASSISTANT

## 2023-01-03 PROCEDURE — 80048 BASIC METABOLIC PNL TOTAL CA: CPT

## 2023-01-03 PROCEDURE — 85025 COMPLETE CBC W/AUTO DIFF WBC: CPT

## 2023-01-03 PROCEDURE — 6370000000 HC RX 637 (ALT 250 FOR IP): Performed by: NURSE PRACTITIONER

## 2023-01-03 PROCEDURE — 6370000000 HC RX 637 (ALT 250 FOR IP): Performed by: INTERNAL MEDICINE

## 2023-01-03 PROCEDURE — 93971 EXTREMITY STUDY: CPT

## 2023-01-03 PROCEDURE — 1200000000 HC SEMI PRIVATE

## 2023-01-03 PROCEDURE — 97530 THERAPEUTIC ACTIVITIES: CPT

## 2023-01-03 PROCEDURE — 2580000003 HC RX 258: Performed by: INTERNAL MEDICINE

## 2023-01-03 PROCEDURE — 6360000002 HC RX W HCPCS: Performed by: INTERNAL MEDICINE

## 2023-01-03 PROCEDURE — 36415 COLL VENOUS BLD VENIPUNCTURE: CPT

## 2023-01-03 RX ORDER — SULFAMETHOXAZOLE AND TRIMETHOPRIM 800; 160 MG/1; MG/1
1 TABLET ORAL EVERY 12 HOURS SCHEDULED
Status: DISCONTINUED | OUTPATIENT
Start: 2023-01-03 | End: 2023-01-04 | Stop reason: HOSPADM

## 2023-01-03 RX ORDER — ATORVASTATIN CALCIUM 20 MG/1
20 TABLET, FILM COATED ORAL NIGHTLY
Qty: 30 TABLET | Refills: 3 | Status: SHIPPED | OUTPATIENT
Start: 2023-01-03

## 2023-01-03 RX ORDER — FUROSEMIDE 40 MG/1
40 TABLET ORAL DAILY
Qty: 60 TABLET | Refills: 3 | Status: SHIPPED | OUTPATIENT
Start: 2023-01-03

## 2023-01-03 RX ORDER — FOLIC ACID 1 MG/1
1 TABLET ORAL DAILY
Qty: 30 TABLET | Refills: 3 | Status: SHIPPED | OUTPATIENT
Start: 2023-01-03

## 2023-01-03 RX ORDER — LACTULOSE 10 G/15ML
20 SOLUTION ORAL DAILY
Qty: 900 ML | Refills: 0 | Status: SHIPPED | OUTPATIENT
Start: 2023-01-03 | End: 2023-02-02

## 2023-01-03 RX ORDER — FUROSEMIDE 10 MG/ML
40 INJECTION INTRAMUSCULAR; INTRAVENOUS ONCE
Status: COMPLETED | OUTPATIENT
Start: 2023-01-03 | End: 2023-01-03

## 2023-01-03 RX ORDER — CHLORDIAZEPOXIDE HYDROCHLORIDE 5 MG/1
5 CAPSULE, GELATIN COATED ORAL DAILY
Qty: 2 CAPSULE | Refills: 0 | Status: SHIPPED | OUTPATIENT
Start: 2023-01-03 | End: 2023-01-05

## 2023-01-03 RX ORDER — SACCHAROMYCES BOULARDII 250 MG
250 CAPSULE ORAL 2 TIMES DAILY
Status: DISCONTINUED | OUTPATIENT
Start: 2023-01-03 | End: 2023-01-04 | Stop reason: HOSPADM

## 2023-01-03 RX ORDER — PANTOPRAZOLE SODIUM 40 MG/1
40 TABLET, DELAYED RELEASE ORAL
Qty: 30 TABLET | Refills: 3 | Status: SHIPPED | OUTPATIENT
Start: 2023-01-04

## 2023-01-03 RX ORDER — LANOLIN ALCOHOL/MO/W.PET/CERES
100 CREAM (GRAM) TOPICAL DAILY
Qty: 30 TABLET | Refills: 3 | Status: SHIPPED | OUTPATIENT
Start: 2023-01-03

## 2023-01-03 RX ORDER — LANOLIN ALCOHOL/MO/W.PET/CERES
400 CREAM (GRAM) TOPICAL 2 TIMES DAILY
Qty: 30 TABLET | Refills: 0 | Status: SHIPPED | OUTPATIENT
Start: 2023-01-03

## 2023-01-03 RX ORDER — SPIRONOLACTONE 25 MG/1
25 TABLET ORAL DAILY
Qty: 30 TABLET | Refills: 3 | Status: SHIPPED | OUTPATIENT
Start: 2023-01-03

## 2023-01-03 RX ADMIN — ANASTROZOLE 1 MG: 1 TABLET, COATED ORAL at 10:36

## 2023-01-03 RX ADMIN — SULFAMETHOXAZOLE AND TRIMETHOPRIM 1 TABLET: 800; 160 TABLET ORAL at 21:06

## 2023-01-03 RX ADMIN — Medication 100 MG: at 10:37

## 2023-01-03 RX ADMIN — Medication 1 TABLET: at 10:36

## 2023-01-03 RX ADMIN — LACTULOSE 20 G: 20 SOLUTION ORAL at 10:36

## 2023-01-03 RX ADMIN — SODIUM CHLORIDE, PRESERVATIVE FREE 10 ML: 5 INJECTION INTRAVENOUS at 10:41

## 2023-01-03 RX ADMIN — SPIRONOLACTONE 25 MG: 25 TABLET ORAL at 10:36

## 2023-01-03 RX ADMIN — FUROSEMIDE 40 MG: 40 TABLET ORAL at 10:36

## 2023-01-03 RX ADMIN — Medication 250 MG: at 21:06

## 2023-01-03 RX ADMIN — VALACYCLOVIR HYDROCHLORIDE 500 MG: 500 TABLET, FILM COATED ORAL at 10:36

## 2023-01-03 RX ADMIN — METOPROLOL SUCCINATE 50 MG: 50 TABLET, EXTENDED RELEASE ORAL at 10:36

## 2023-01-03 RX ADMIN — PANTOPRAZOLE SODIUM 40 MG: 40 TABLET, DELAYED RELEASE ORAL at 06:09

## 2023-01-03 RX ADMIN — Medication 400 MG: at 10:36

## 2023-01-03 RX ADMIN — FUROSEMIDE 40 MG: 10 INJECTION, SOLUTION INTRAMUSCULAR; INTRAVENOUS at 15:17

## 2023-01-03 RX ADMIN — POTASSIUM BICARBONATE 40 MEQ: 782 TABLET, EFFERVESCENT ORAL at 10:35

## 2023-01-03 RX ADMIN — Medication 400 MG: at 21:06

## 2023-01-03 RX ADMIN — FOLIC ACID 1 MG: 1 TABLET ORAL at 10:36

## 2023-01-03 RX ADMIN — CHLORDIAZEPOXIDE HYDROCHLORIDE 5 MG: 5 CAPSULE ORAL at 10:36

## 2023-01-03 RX ADMIN — ATORVASTATIN CALCIUM 20 MG: 10 TABLET, FILM COATED ORAL at 21:06

## 2023-01-03 RX ADMIN — SODIUM CHLORIDE, PRESERVATIVE FREE 10 ML: 5 INJECTION INTRAVENOUS at 21:06

## 2023-01-03 ASSESSMENT — PAIN SCALES - GENERAL
PAINLEVEL_OUTOF10: 0

## 2023-01-03 NOTE — PROGRESS NOTES
Hospitalist Progress Note      PCP: Dakota Bowers MD    Date of Admission: 12/16/2022    Chief Complaint:   Fall at home     KALEB FORD Course:   Reviewed      Subjective:   Seen resting in bed, no new complaints, denies any pain at this time. Updated on plan of care. Medications:  Reviewed    Infusion Medications    sodium chloride 50 mL/hr at 01/02/23 1214     Scheduled Medications    sulfamethoxazole-trimethoprim  1 tablet Oral 2 times per day    saccharomyces boulardii  250 mg Oral BID    furosemide  40 mg Oral Daily    potassium bicarb-citric acid  40 mEq Oral Daily    chlordiazePOXIDE  5 mg Oral Daily    spironolactone  25 mg Oral Daily    magnesium oxide  400 mg Oral BID    folic acid  1 mg Oral Daily    sodium chloride flush  5-40 mL IntraVENous 2 times per day    lactulose  20 g Oral Daily    thiamine  100 mg Oral Daily    pantoprazole  40 mg Oral QAM AC    anastrozole  1 mg Oral Daily    multivitamin  1 tablet Oral Daily    atorvastatin  20 mg Oral Nightly    metoprolol succinate  50 mg Oral Daily    valACYclovir  500 mg Oral Daily     PRN Meds: ipratropium-albuterol, sodium chloride flush, sodium chloride, magnesium sulfate, promethazine **OR** ondansetron, acetaminophen **OR** acetaminophen, LORazepam **OR** LORazepam **OR** LORazepam **OR** LORazepam **OR** LORazepam **OR** LORazepam **OR** LORazepam **OR** LORazepam      Intake/Output Summary (Last 24 hours) at 1/3/2023 2216  Last data filed at 1/3/2023 2053  Gross per 24 hour   Intake 1440 ml   Output --   Net 1440 ml       Physical Exam Performed:    BP (!) 141/80   Pulse 87   Temp 97.7 °F (36.5 °C) (Oral)   Resp 18   Ht 5' 2\" (1.575 m)   Wt 215 lb 9.8 oz (97.8 kg)   LMP  (LMP Unknown)   SpO2 96%   BMI 39.44 kg/m²     General appearance: No apparent distress, appears stated age and cooperative. HEENT: Pupils equal, round, and reactive to light. Conjunctivae/corneas clear. Neck: Supple, with full range of motion.  No jugular venous distention. Trachea midline. Respiratory:  Normal respiratory effort. Clear to auscultation, bilaterally without Rales/Wheezes/Rhonchi. Cardiovascular: Regular rate and rhythm with normal S1/S2 without murmurs, rubs or gallops. Abdomen: Soft, non-tender, non-distended with normal bowel sounds. Musculoskeletal: No clubbing, cyanosis or edema bilaterally. Full range of motion without deformity. Skin: Left lower extremity edema and erythema. Otherwise  Skin color, texture, turgor normal.  No rashes or lesions. Neurologic:  Neurovascularly intact without any focal sensory/motor deficits. Cranial nerves: II-XII intact, grossly non-focal.  Psychiatric: Alert and oriented, thought content appropriate, normal insight  Capillary Refill: Brisk, 3 seconds, normal   Peripheral Pulses: +2 palpable, equal bilaterally       Labs:   Recent Labs     01/01/23  1432 01/02/23  0629 01/03/23  0733   WBC 6.4 7.5 6.9   HGB 12.1 11.7* 10.9*   HCT 35.2* 33.9* 32.4*    154 156     Recent Labs     01/01/23  1432 01/02/23  0629 01/03/23  0733    137 136   K 3.9 3.7 3.8   CL 99 100 100   CO2 30 25 27   BUN 24* 27* 36*   CREATININE 0.8 0.8 1.1   CALCIUM 9.6 9.6 9.5     No results for input(s): AST, ALT, BILIDIR, BILITOT, ALKPHOS in the last 72 hours. No results for input(s): INR in the last 72 hours. No results for input(s): Rhae Childes in the last 72 hours. Urinalysis:      Lab Results   Component Value Date/Time    NITRU Negative 12/17/2022 06:32 AM    WBCUA 0-2 12/17/2022 06:32 AM    BACTERIA 1+ 04/19/2021 04:39 PM    RBCUA None seen 12/17/2022 06:32 AM    BLOODU Negative 12/17/2022 06:32 AM    SPECGRAV 1.015 12/17/2022 06:32 AM    GLUCOSEU Negative 12/17/2022 06:32 AM       Radiology:  VL Extremity Venous Left         US ABDOMEN LIMITED   Final Result   No ascites identified         CT CHEST ABDOMEN PELVIS W CONTRAST Additional Contrast? None   Final Result   1.  CHEST: Small right pleural effusion and mild atelectasis in the lung bases. 2. No consolidation is appreciated. 3. Moderate to severe cardiomegaly. 4. T12 superior endplate fracture of uncertain age. Moderate anterior height   loss of T11, likely chronic. If there is focal tenderness, non-urgent   follow-up MRI is a consideration. 5. ABDOMEN AND PELVIS: Cirrhotic morphology of the liver. 6. Prominence of the common and pancreatic ducts, also noted on 01/28/2022   Ductal ectasia or adjacent pancreatic cystic lesion(s) have been considered. Follow-up ERCP or endoscopic ultrasound was previously recommended. 7. No acute abdominopelvic abnormality identified. 8. Moderate anasarca. 9. Chronic-appearing compression fractures at L1 and L3. XR CHEST PORTABLE   Final Result   Left central line projects over the expected left brachiocephalic vein. Correlation for appropriate return is recommended. No pneumothorax. Stable enlargement of cardiac silhouette. XR CHEST PORTABLE   Final Result   The left IJ central venous catheter is deflected laterally into the proximal   left subclavian vein area. No evidence of pneumothorax. Marked cardiomegaly with minimal pulmonary vascular congestion without   pulmonary edema. CT CERVICAL SPINE WO CONTRAST   Final Result   Multilevel degenerative changes with no acute fracture or traumatic   malalignment of the cervical spine. CT HEAD WO CONTRAST   Final Result   No acute intracranial abnormality. XR CHEST PORTABLE   Final Result   Mild cardiomegaly and mild central pulmonary congestion or pulmonary artery   hypertension which is more prominent. Minimal bibasilar atelectasis or small infiltrates. Questionable small bibasilar pleural effusions which is more prominent.              IP CONSULT TO NEPHROLOGY  IP CONSULT TO NEPHROLOGY  IP CONSULT TO SOCIAL WORK  IP CONSULT TO GI  IP CONSULT TO CRITICAL CARE  IP CONSULT TO SOCIAL WORK    Assessment/Plan:    Active Hospital Problems    Diagnosis     Macrocytic anemia [D53.9]      Priority: Medium    Thrombocytopenia ([facility] [D69.6]      Priority: Medium    Elevated LFTs [R79.89]      Priority: Medium    Hyperbilirubinemia [E80.6]      Priority: Medium    Anasarca [R60.1]      Priority: Medium    Alcoholic cirrhosis of liver with ascites (HCC) [K70.31]      Priority: Medium    Pancreatic duct dilated [K86.89]      Priority: Medium    Paroxysmal atrial fibrillation ([facility] [I48.0]      Priority: Medium    Morbid obesity with BMI of 40.0-44.9, adult ([facility] [E66.01, Z68.41]      Priority: Medium    Suspected sleep apnea [R29.818]      Priority: Medium    Acute hyponatremia [E87.1]      Priority: Medium     69 Y F with a h/o former COPD, cirrhosis with ongoing alcoholism (possibly 3-4 cans of beer daily), HTN, and L breast cancer s/p resection in 2019. History is limited. Apparently she fell at home, couldn't get up. EMS arrived to help her up, and ultimately decided to bring her to the ED because she was breathing heavily and edematous. When she arrived here she had \"no complaints\" but her Na was 102 (compared to baseline of upper 120's as of 11/1/2022). The patient was lethargic and a poor historian but she actually answered all the orientation questions correctly on arrival.  Hospital medicine admitted her to the ICU overnight. Acute on chronic hyponatremia    -Due to cirrhosis, volume overload, poor solute intake.     -Encourage protein intake  -Fluid restriction per nephrology.   -Now on PO lasix, appreciate Nephrology assistance   -Currently WNL     Alcohol dependence and withdrawal   -CIWA with chlordiazepam and PRN lorazepam.    -Thiamine   -12/30 Librium taper started      Acute metabolic encephalopathy, resolved  -Due to above issues, treat accordingly   -No focal neurological deficits.  -Currently at baseline      HTN    -Lisinopril held due to low BPs  -Continue home Metoprolol Hypomagnesia  -Replete and monitor    PAF  -It actually looks like an EKG showed Afib back in 4/2022 during an outpatient colonoscopy but no mention of this in the chart   -Started metoprolol  -Echo (12/17/2022) showed LVEF 55-60%, mild pulmonary hypertension   -The patient has chronic thrombocytopenia, alcohol abuse, an falls, so she is not a good candidate for anticoagulation     Alcoholic cirrhosis with fluid overload  -Continue Lasix and Spironolactone daily  -Continue Lactulose   -x1 dose IV Lasix (01/03/2023)  -GI consulted upon admission, outpatient follow-up     DCIS L breast, diagnosed 10/2019.    -S/p partial mastectomy and radiation in 2019.  -She is prescribed anastrazole by Dr. Mary Grace Biggs. She is on chronic valacyclovir presumably for suppressive therapy of HSV     Left lower extremity edema and erythema possibly DVT vs Cellulitis   -Elevated d-dimer  -U/S of lower extremity nonacute  -Bactrim DS BID for 7 days     DVT Prophylaxis: SCDs only  Diet: ADULT DIET;  Regular; 1500 ml  ADULT ORAL NUTRITION SUPPLEMENT; Breakfast, Dinner; Standard High Calorie/High Protein Oral Supplement  Code Status: Full Code  PT/OT Eval Status: Recommending SNF    Dispo -Possibly tomorrow pending precert to [place]     Appropriate for A1 Discharge Unit: Yes      SHERI Griffith

## 2023-01-03 NOTE — PROGRESS NOTES
[facility] Progress Note           Subjective/   69y.o. year old female who we are seeing in consultation for hyponatremia. HPI:  Patient was seen and examined. Swelling not much better    Wts; 215 lbs (peak wt 220 lbs)  Last 24h uop not charted     ROS:  No nausea or vomiting    Objective/   GEN:  Chronically ill, /66   Pulse 61   Temp 97.8 °F (36.6 °C) (Oral)   Resp 20   Ht 5' 2\" (1.575 m)   Wt 215 lb 9.8 oz (97.8 kg)   LMP  (LMP Unknown)   SpO2 98%   BMI 39.44 kg/m²   HEENT: non-icteric, no JVD  CV: S1, S2 without m/r/g; + LE edema  RESP: CTA B without w/r/r; breathing wnl  ABD: +bs, soft, nt, no hsm  SKIN: warm, no rashes    Data/  Recent Labs     01/01/23  1432 01/02/23  0629 01/03/23  0733   WBC 6.4 7.5 6.9   HGB 12.1 11.7* 10.9*   HCT 35.2* 33.9* 32.4*   .2* 111.3* 111.5*    154 156         Recent Labs     01/01/23  1432 01/02/23  0629 01/03/23  0733    137 136   K 3.9 3.7 3.8   CL 99 100 100   CO2 30 25 27   GLUCOSE 136* 122* 110*   MG  --  1.50*  --    BUN 24* 27* 36*   CREATININE 0.8 0.8 1.1   LABGLOM >60 >60 54*         Assessment/     -Hyponatremia multifactorial from liver cirrhosis, alcohol to volume overload                Na of 106 at 2354 on 12/16, appropriately corrected and now improved. s/p course of urea, on diuretics    -Liver cirrhosis c/b fluid overload    -Falls    -ETOH abuse with withdrawals    -Hypokalemia/Hypomagnesemia from diuretic use     Plan/     - Continue Furosemide 40mg daily. Extra iv lasix 40 mg times one   - Continue Spironolactone 25mg daily. - Continue potassium 40 meq daily.  - Fluid restriction 1500 ml/day  - Encouraged protein intake  - Daily standing weights. Awaiting placement.    ____________________________________  Tyler MD Benjamin  [facility]  [email or web address]  Office: [phone]

## 2023-01-03 NOTE — PLAN OF CARE
Problem: Discharge Planning  Goal: Discharge to home or other facility with appropriate resources  1/3/2023 0043 by Laura Mirza RN  Outcome: Progressing     Problem: Pain  Goal: Verbalizes/displays adequate comfort level or baseline comfort level  1/3/2023 0043 by Laura Mirza RN  Outcome: Progressing     Problem: Safety - Adult  Goal: Free from fall injury  1/3/2023 0043 by Laura Mirza RN  Outcome: Progressing     Problem: Respiratory - Adult  Goal: Achieves optimal ventilation and oxygenation  1/3/2023 0043 by Laura Mirza RN  Outcome: Progressing     Problem: Cardiovascular - Adult  Goal: Maintains optimal cardiac output and hemodynamic stability  1/3/2023 0043 by Laura Mirza RN  Outcome: Progressing     Problem: Genitourinary - Adult  Goal: Absence of urinary retention  1/3/2023 0043 by Laura Mirza RN  Outcome: Progressing     Problem: Neurosensory - Adult  Goal: Achieves stable or improved neurological status  1/3/2023 0043 by Laura Mirza RN  Outcome: Progressing     Problem: Genitourinary - Adult  Goal: Urinary catheter remains patent  1/3/2023 0043 by Laura Mirza RN  Outcome: Completed

## 2023-01-03 NOTE — CARE COORDINATION
Chart reviewed. S/p fall at home, BLE edema, SOB, alcohol abuse with cirrhosis, encephalopathy (resolved). Management per GI, nephro and IM. Pt from home, alone. IPTA. Declines SA resources. PT/OT rec SNF. Pt now agreeable. CM received facility preference list from family. [facility] ACCEPTED. Family toured facility. Insurance changed to [facility]. [facility] started precert. All requested information faxed to [phone]. Pt medically ready for discharge today.  Gisele Anderson RN

## 2023-01-03 NOTE — PROGRESS NOTES
Physical Therapy  Facility/Department: [facility] B[facility] - MED SURG  Daily Treatment Note  NAME: Robles Rawls  : 1955  MRN: [id number]    Date of Service: 1/3/2023    Discharge Recommendations:  Subacute/Skilled Nursing Facility   PT Equipment Recommendations  Equipment Needed: No  Other: defer to next level of care. [facility] 6 Clicks Inpatient Mobility:  AM-PAC Mobility Inpatient   How much difficulty turning over in bed?: A Little  How much difficulty sitting down on / standing up from a chair with arms?: A Little  How much difficulty moving from lying on back to sitting on side of bed?: A Little  How much help from another person moving to and from a bed to a chair?: A Little  How much help from another person needed to walk in hospital room?: A Little  How much help from another person for climbing 3-5 steps with a railing?: A Lot  AM-PAC Inpatient Mobility Raw Score : 17  AM-PAC Inpatient T-Scale Score : 42.13  Mobility Inpatient CMS 0-100% Score: 50.57  Mobility Inpatient CMS G-Code Modifier : CK      Patient Diagnosis(es): The primary encounter diagnosis was Hyponatremia. Diagnoses of Fall, initial encounter, Longstanding persistent atrial fibrillation ([facility] [address] and Shortness of breath were also pertinent to this visit. Assessment   Assessment: pt found in restroom with nursing at start of session, agreeable to PT treatment. pt demonstrates CGA for functional transfers with RW, CGA for ambulation up to 20 ft this date, continues to be limited by poor activity tolerance, continues to displayslow gait speed with decreased step length and height. pt requires VCs for proper sequencing of hands during sit<>stand transfer for safety. pt continues to demonstrate decreased endurance, activity tolerance, mobility, and functional capacity that is below her baseline and would continue to benefit from skilled PT at this time.  Continues to recomend SNF upon discharge to improve independence and reduce future risk for falls  Activity Tolerance: Patient tolerated treatment well;Patient limited by fatigue  Equipment Needed: No  Other: defer to next level of care. Plan    Physcial Therapy Plan  General Plan: 3-5 times per week  Specific Instructions for Next Treatment: Progress mobility as tolerated, trial stair training. Current Treatment Recommendations: Strengthening;Balance training;Functional mobility training;Transfer training; Endurance training;Gait training;Neuromuscular re-education;Stair training;Home exercise program;Safety education & training;Patient/Caregiver education & training;Equipment evaluation, education, & procurement; Therapeutic activities     Restrictions  Restrictions/Precautions  Restrictions/Precautions: Fall Risk, General Precautions  Position Activity Restriction  Other position/activity restrictions: up with assistance, tele     Subjective    Subjective  Subjective: Pt ana luisa nursing in restroom upon arrival, agreeable to PT treatment  Pain: pt denies pain at this time. Orientation  Overall Orientation Status: Within Normal Limits  Orientation Level: Oriented X4  Cognition  Overall Cognitive Status: Exceptions  Arousal/Alertness: Appropriate responses to stimuli  Following Commands: Follows one step commands consistently; Follows multistep commands with repitition; Follows multistep commands with increased time  Attention Span: Appears intact  Memory: Decreased recall of recent events  Safety Judgement: Decreased awareness of need for safety  Problem Solving: Assistance required to generate solutions  Insights: Decreased awareness of deficits  Initiation: Requires cues for some  Sequencing: Requires cues for some     Objective   Vitals  Heart Rate: 98  BP: (!) 140/87  BP Location: Right upper arm  MAP (Calculated): 105  SpO2: 98 %  O2 Device: None (Room air)  Bed Mobility Training  Bed Mobility Training: No (pt on toilet at start of session and in [facility] at end of session)  Balance  Sitting: Intact  Sitting - Static: Good (unsupported)  Sitting - Dynamic: Good (unsupported)  Standing: With support  Standing - Static: Fair;Constant support  Standing - Dynamic: Fair;Constant support  Transfer Training  Transfer Training: Yes  Overall Level of Assistance: Additional time;Contact-guard assistance;Assist X1;Adaptive equipment  Interventions: Verbal cues; Tactile cues; Safety awareness training  Sit to Stand: Additional time;Contact-guard assistance; Adaptive equipment;Assist X2  Stand to Sit: Contact-guard assistance; Adaptive equipment; Additional time;Assist X1  Toilet Transfer: Contact-guard assistance; Adaptive equipment; Additional time;Assist X1  Gait Training  Gait Training: Yes  Right Side Weight Bearing: As tolerated  Left Side Weight Bearing: As tolerated  Gait  Overall Level of Assistance: Contact-guard assistance  Interventions: Verbal cues; Tactile cues  Base of Support: Widened  Speed/Kate: Slow;Shuffled  Step Length: Left shortened;Right shortened  Stance: Left increased;Right increased;Time  Gait Abnormalities: Decreased step clearance;Shuffling gait; Path deviations  Distance (ft): 20 Feet  Assistive Device: Walker, rolling;Gait belt           Safety Devices  Type of Devices:  (pt left in WC with transport to [facility] appt)  Restraints  Restraints Initially in Place: No       Goals  Short Term Goals  Time Frame for Short Term Goals: 7 days (14/2023) unless otherwise noted  Short Term Goal 1: Pt will perform bed mobility with min(A) -- 1/3: DNT  Short Term Goal 2: Pt will perform transfers with LRAD and with min(A) -- 12/28: GOAL MET CGA with RW; upgrade goal: Pt will complete functional t/f with LRAD with S  Short Term Goal 3: Pt will ambulate 15 ft with LRAD and min(A) -- 12/28: GOAL MET x 120' with RW CGA; upgrade goal: Pt will ambulate >80' with RW with S without LOB  Short Term Goal 4: Pt will perform 12-15 reps of BLE exercises by 12/23/22 -12/30 GOAL MET  Patient Goals   Patient Goals : Emmett Osuna go home\"    Education  Patient Education  Education Given To: Patient  Education Provided: Role of Therapy;Plan of Care;Transfer Training;Precautions; Equipment  Education Provided Comments: Educated on role of PT, progression of mobility and safe use of AD  Education Method: Verbal  Barriers to Learning: Cognition  Education Outcome: Verbalized understanding;Continued education needed    Therapy Time   Individual Concurrent Group Co-treatment   Time In 0834         Time Out 0844         Minutes 10         Timed Code Treatment Minutes: 10 Minutes     If pt is unable to be seen after this session, please let this note serve as discharge summary. Please see case management note for discharge disposition. Thank you.     Adelaida Loera, PT

## 2023-01-04 ENCOUNTER — TELEPHONE (OUTPATIENT)
Dept: FAMILY MEDICINE CLINIC | Age: 68
End: 2023-01-04

## 2023-01-04 VITALS
RESPIRATION RATE: 20 BRPM | OXYGEN SATURATION: 100 % | WEIGHT: 211.2 LBS | BODY MASS INDEX: 38.87 KG/M2 | SYSTOLIC BLOOD PRESSURE: 130 MMHG | HEIGHT: 62 IN | TEMPERATURE: 97.6 F | DIASTOLIC BLOOD PRESSURE: 81 MMHG | HEART RATE: 86 BPM

## 2023-01-04 LAB
A/G RATIO: 0.8 (ref 1.1–2.2)
ALBUMIN SERPL-MCNC: 3 G/DL (ref 3.4–5)
ALP BLD-CCNC: 160 U/L (ref 40–129)
ALT SERPL-CCNC: 25 U/L (ref 10–40)
AMMONIA: 62 UMOL/L (ref 11–51)
ANION GAP SERPL CALCULATED.3IONS-SCNC: 12 MMOL/L (ref 3–16)
AST SERPL-CCNC: 42 U/L (ref 15–37)
BASOPHILS ABSOLUTE: 0.1 K/UL (ref 0–0.2)
BASOPHILS RELATIVE PERCENT: 1.3 %
BILIRUB SERPL-MCNC: 1.5 MG/DL (ref 0–1)
BUN BLDV-MCNC: 28 MG/DL (ref 7–20)
CALCIUM SERPL-MCNC: 9.6 MG/DL (ref 8.3–10.6)
CHLORIDE BLD-SCNC: 100 MMOL/L (ref 99–110)
CO2: 25 MMOL/L (ref 21–32)
CREAT SERPL-MCNC: 1 MG/DL (ref 0.6–1.2)
EOSINOPHILS ABSOLUTE: 0.2 K/UL (ref 0–0.6)
EOSINOPHILS RELATIVE PERCENT: 2.9 %
GFR SERPL CREATININE-BSD FRML MDRD: >60 ML/MIN/{1.73_M2}
GLUCOSE BLD-MCNC: 104 MG/DL (ref 70–99)
HCT VFR BLD CALC: 34.2 % (ref 36–48)
HEMATOLOGY PATH CONSULT: NO
HEMOGLOBIN: 12 G/DL (ref 12–16)
LYMPHOCYTES ABSOLUTE: 2 K/UL (ref 1–5.1)
LYMPHOCYTES RELATIVE PERCENT: 29 %
MAGNESIUM: 1.5 MG/DL (ref 1.8–2.4)
MCH RBC QN AUTO: 38.8 PG (ref 26–34)
MCHC RBC AUTO-ENTMCNC: 35.1 G/DL (ref 31–36)
MCV RBC AUTO: 110.5 FL (ref 80–100)
MONOCYTES ABSOLUTE: 1.4 K/UL (ref 0–1.3)
MONOCYTES RELATIVE PERCENT: 20.8 %
NEUTROPHILS ABSOLUTE: 3.1 K/UL (ref 1.7–7.7)
NEUTROPHILS RELATIVE PERCENT: 46 %
PDW BLD-RTO: 14.7 % (ref 12.4–15.4)
PLATELET # BLD: 189 K/UL (ref 135–450)
PMV BLD AUTO: 8.7 FL (ref 5–10.5)
POTASSIUM REFLEX MAGNESIUM: 4.2 MMOL/L (ref 3.5–5.1)
RBC # BLD: 3.09 M/UL (ref 4–5.2)
SODIUM BLD-SCNC: 137 MMOL/L (ref 136–145)
TOTAL PROTEIN: 6.9 G/DL (ref 6.4–8.2)
WBC # BLD: 6.8 K/UL (ref 4–11)

## 2023-01-04 PROCEDURE — 6360000002 HC RX W HCPCS: Performed by: PHYSICIAN ASSISTANT

## 2023-01-04 PROCEDURE — 6370000000 HC RX 637 (ALT 250 FOR IP): Performed by: PHYSICIAN ASSISTANT

## 2023-01-04 PROCEDURE — 6370000000 HC RX 637 (ALT 250 FOR IP): Performed by: NURSE PRACTITIONER

## 2023-01-04 PROCEDURE — 6370000000 HC RX 637 (ALT 250 FOR IP): Performed by: INTERNAL MEDICINE

## 2023-01-04 PROCEDURE — 83735 ASSAY OF MAGNESIUM: CPT

## 2023-01-04 PROCEDURE — 6360000002 HC RX W HCPCS: Performed by: INTERNAL MEDICINE

## 2023-01-04 PROCEDURE — 85025 COMPLETE CBC W/AUTO DIFF WBC: CPT

## 2023-01-04 PROCEDURE — 36415 COLL VENOUS BLD VENIPUNCTURE: CPT

## 2023-01-04 PROCEDURE — 82140 ASSAY OF AMMONIA: CPT

## 2023-01-04 PROCEDURE — 2580000003 HC RX 258: Performed by: INTERNAL MEDICINE

## 2023-01-04 PROCEDURE — 80053 COMPREHEN METABOLIC PANEL: CPT

## 2023-01-04 RX ORDER — SACCHAROMYCES BOULARDII 250 MG
250 CAPSULE ORAL 2 TIMES DAILY
Qty: 18 CAPSULE | Refills: 0 | Status: SHIPPED | OUTPATIENT
Start: 2023-01-04 | End: 2023-01-13

## 2023-01-04 RX ORDER — SULFAMETHOXAZOLE AND TRIMETHOPRIM 800; 160 MG/1; MG/1
1 TABLET ORAL EVERY 12 HOURS SCHEDULED
Qty: 12 TABLET | Refills: 0 | Status: SHIPPED | OUTPATIENT
Start: 2023-01-04 | End: 2023-01-10

## 2023-01-04 RX ORDER — FUROSEMIDE 10 MG/ML
40 INJECTION INTRAMUSCULAR; INTRAVENOUS ONCE
Status: COMPLETED | OUTPATIENT
Start: 2023-01-04 | End: 2023-01-04

## 2023-01-04 RX ORDER — MAGNESIUM SULFATE IN WATER 40 MG/ML
2000 INJECTION, SOLUTION INTRAVENOUS ONCE
Status: COMPLETED | OUTPATIENT
Start: 2023-01-04 | End: 2023-01-04

## 2023-01-04 RX ORDER — LACTULOSE 10 G/15ML
30 SOLUTION ORAL ONCE
Status: DISCONTINUED | OUTPATIENT
Start: 2023-01-04 | End: 2023-01-04

## 2023-01-04 RX ADMIN — SODIUM CHLORIDE, PRESERVATIVE FREE 10 ML: 5 INJECTION INTRAVENOUS at 08:23

## 2023-01-04 RX ADMIN — FUROSEMIDE 40 MG: 10 INJECTION, SOLUTION INTRAMUSCULAR; INTRAVENOUS at 10:56

## 2023-01-04 RX ADMIN — MAGNESIUM SULFATE HEPTAHYDRATE 2000 MG: 40 INJECTION, SOLUTION INTRAVENOUS at 11:03

## 2023-01-04 RX ADMIN — PANTOPRAZOLE SODIUM 40 MG: 40 TABLET, DELAYED RELEASE ORAL at 05:49

## 2023-01-04 RX ADMIN — SPIRONOLACTONE 25 MG: 25 TABLET ORAL at 08:16

## 2023-01-04 RX ADMIN — SULFAMETHOXAZOLE AND TRIMETHOPRIM 1 TABLET: 800; 160 TABLET ORAL at 08:16

## 2023-01-04 RX ADMIN — Medication 400 MG: at 08:16

## 2023-01-04 RX ADMIN — Medication 1 TABLET: at 08:16

## 2023-01-04 RX ADMIN — FUROSEMIDE 40 MG: 40 TABLET ORAL at 08:16

## 2023-01-04 RX ADMIN — FOLIC ACID 1 MG: 1 TABLET ORAL at 08:16

## 2023-01-04 RX ADMIN — LACTULOSE 20 G: 20 SOLUTION ORAL at 08:16

## 2023-01-04 RX ADMIN — Medication 100 MG: at 08:22

## 2023-01-04 RX ADMIN — VALACYCLOVIR HYDROCHLORIDE 500 MG: 500 TABLET, FILM COATED ORAL at 08:17

## 2023-01-04 RX ADMIN — Medication 250 MG: at 08:16

## 2023-01-04 RX ADMIN — CHLORDIAZEPOXIDE HYDROCHLORIDE 5 MG: 5 CAPSULE ORAL at 08:16

## 2023-01-04 RX ADMIN — POTASSIUM BICARBONATE 40 MEQ: 782 TABLET, EFFERVESCENT ORAL at 08:15

## 2023-01-04 RX ADMIN — ANASTROZOLE 1 MG: 1 TABLET, COATED ORAL at 08:17

## 2023-01-04 NOTE — PROGRESS NOTES
[facility] Progress Note           Subjective/   69y.o. year old female who we are seeing in consultation for hyponatremia. HPI:  Patient was seen and examined. Lasix iv given on 1/3    Wts; 215-->211 lbs (peak wt 220 lbs)  Last 24h uop not charted     ROS:  No nausea or vomiting    Objective/   GEN:  Chronically ill, /73   Pulse 90   Temp 97.7 °F (36.5 °C) (Oral)   Resp 20   Ht 5' 2\" (1.575 m)   Wt 211 lb 3.2 oz (95.8 kg)   LMP  (LMP Unknown)   SpO2 92%   BMI 38.63 kg/m²   HEENT: non-icteric, no JVD  CV: S1, S2 without m/r/g; + LE edema  RESP: CTA B without w/r/r; breathing wnl  ABD: +bs, soft, nt, no hsm  SKIN: warm, no rashes    Data/  Recent Labs     01/02/23  0629 01/03/23  0733 01/04/23  0631   WBC 7.5 6.9 6.8   HGB 11.7* 10.9* 12.0   HCT 33.9* 32.4* 34.2*   .3* 111.5* 110.5*    156 189         Recent Labs     01/02/23  0629 01/03/23  0733 01/04/23  0631    136 137   K 3.7 3.8 4.2    100 100   CO2 25 27 25   GLUCOSE 122* 110* 104*   MG 1.50*  --  1.50*   BUN 27* 36* 28*   CREATININE 0.8 1.1 1.0   LABGLOM >60 54* >60         Assessment/     -Hyponatremia multifactorial from liver cirrhosis, alcohol to volume overload                Na of 106 at 2354 on 12/16, appropriately corrected and now improved. s/p course of urea, on diuretics    -Liver cirrhosis c/b fluid overload    -Falls    -ETOH abuse with withdrawals    -Hypokalemia/Hypomagnesemia from diuretic use     Plan/     - Continue Furosemide 40mg daily. Extra iv lasix 40 mg times one on 1/3,4  - Continue Spironolactone 25mg daily. - Continue potassium 40 meq daily.  - Fluid restriction 1500 ml/day  - Encouraged protein intake  - Daily standing weights. Awaiting placement.    ____________________________________  Nasra Solomon MD  [facility]  [email or web address]  Office: [phone]

## 2023-01-04 NOTE — CARE COORDINATION
Chart reviewed. S/p fall at home, BLE edema, SOB, alcohol abuse with cirrhosis, encephalopathy (resolved). Management per GI, nephro and IM. Pt from home, alone. IPTA. Declines SA resources. PTOT rec SNF. [facility] accepted, started precert 0/3/50. Updated therapy notes faxed to facility yesterday. CM left  for facility this morning to follow up on status of cert. Awaiting return call.  Tammy Taft Severs, RN

## 2023-01-04 NOTE — CARE COORDINATION
CASE MANAGEMENT DISCHARGE SUMMARY      Discharge to: [facility] completed: yes  Hospital Exemption Notification (HENS) completed: NA    IMM given: (date) 1/4/23    New Durable Medical Equipment ordered/agency:     Transportation:       Medical Transport explained to [employer]. Pt/family voice no agency preference. Agency used: [facility] [place] up time: 14:45   Ambulance form completed: Yes    Confirmed discharge plan with:     Patient: yes     Family:  yes/Mary/dtr     Facility/Agency, name:  [facility] faxed   Phone number for report to facility: [phone]     RN, name: Soren Benjamin    Note: Discharging nurse to complete [facility], reconcile [facility], and place final copy with patient's discharge packet. RN to ensure that written prescriptions for  Level II medications are sent with patient to the facility as per protocol.

## 2023-01-04 NOTE — DISCHARGE SUMMARY
Hospital Medicine Discharge Summary    Patient ID: Kalie Bui      Patient's PCP: Alonzo Benavides MD    Admit Date: 12/16/2022     Discharge Date: 1/4/2023      Admitting Provider: Maxx Leos DO     Discharge Provider: SHERI Cordero     Discharge Diagnoses: Active Hospital Problems    Diagnosis     Macrocytic anemia [D53.9]      Priority: Medium    Thrombocytopenia (HCC) [D69.6]      Priority: Medium    Elevated LFTs [R79.89]      Priority: Medium    Hyperbilirubinemia [E80.6]      Priority: Medium    Anasarca [R60.1]      Priority: Medium    Alcoholic cirrhosis of liver with ascites (HCC) [K70.31]      Priority: Medium    Pancreatic duct dilated [K86.89]      Priority: Medium    Paroxysmal atrial fibrillation ([facility] [I48.0]      Priority: Medium    Morbid obesity with BMI of 40.0-44.9, adult ([facility] [address] [E66.01, Z68.41]      Priority: Medium    Suspected sleep apnea [R29.818]      Priority: Medium    Acute hyponatremia [E87.1]      Priority: Medium       The patient was seen and examined on day of discharge and this discharge summary is in conjunction with any daily progress note from day of discharge. Hospital Course:   69 y.o. female who presented to [facility] with past medical history of alcohol dependence, hypertension, cirrhosis, presented to the ED with chief complaint of fall and shortness of breath. Patient reports that she drinks around at least 4 beers per day has been on doing this for years now, patient reports she is also has been becoming more unsteady and causing to be fall. Patient had a fall and EMS was called was noted that the patient was wheezing and told the patient to come due to her being wheezing and having lower extremity edema. Patient otherwise admits noncompliant with diet including alcohol cessation and salt and fluid intake.   Patient reports that she does have history of COPD has smoked for years otherwise denies chest pain shortness of breath abdominal pain dysuria fever chills increased frequency. Acute on chronic hyponatremia    -Due to cirrhosis, volume overload, poor solute intake   -Encourage protein intake  -Fluid restriction per nephrology  -Now on PO lasix   -Currently WNL     Alcohol dependence and withdrawal   -Thiamine daily  -12/30 Librium taper started      Acute metabolic encephalopathy, resolved  -Due to above issues, treat accordingly   -No focal neurological deficits.  -Currently at baseline      HTN    -Lisinopril held due to low BPs  -Continue home Metoprolol      Hypomagnesia  -Replete and monitor     PAF  -It actually looks like an EKG showed Afib back in 4/2022 during an outpatient colonoscopy but no mention of this in the chart   -Started metoprolol  -Echo (12/17/2022) showed LVEF 55-60%, mild pulmonary hypertension   -The patient has chronic thrombocytopenia, alcohol abuse, an falls, so she is not a good candidate for anticoagulation     Alcoholic cirrhosis with fluid overload  -Continue Lasix and Spironolactone daily  -Continue Lactulose   -x1 dose IV Lasix (01/03/2023)  -GI consulted upon admission, outpatient follow-up     DCIS L breast, diagnosed 10/2019.    -S/p partial mastectomy and radiation in 2019.  -She is prescribed anastrazole by Dr. Latonya Ahn. She is on chronic valacyclovir presumably for suppressive therapy of HSV     Left lower extremity edema and erythema possibly DVT vs Cellulitis   -Elevated d-dimer  -U/S of lower extremity nonacute  -Bactrim DS BID for 7 days          Physical Exam Performed:     /81   Pulse 86   Temp 97.6 °F (36.4 °C) (Oral)   Resp 20   Ht 5' 2\" (1.575 m)   Wt 211 lb 3.2 oz (95.8 kg)   LMP  (LMP Unknown)   SpO2 100%   BMI 38.63 kg/m²       General appearance:  No apparent distress, appears stated age and cooperative. HEENT:  Normal cephalic, atraumatic without obvious deformity. Pupils equal, round, and reactive to light. Extra ocular muscles intact.  Conjunctivae/corneas clear.  Neck: Supple, with full range of motion. No jugular venous distention. Trachea midline. Respiratory:  Normal respiratory effort. Clear to auscultation, bilaterally without Rales/Wheezes/Rhonchi. Cardiovascular:  Regular rate and rhythm with normal S1/S2 without murmurs, rubs or gallops. Abdomen: Soft, non-tender, non-distended with normal bowel sounds. Musculoskeletal:  No clubbing, cyanosis or edema bilaterally. Full range of motion without deformity. Skin: Skin color, texture, turgor normal.  No rashes or lesions. Neurologic:  Neurovascularly intact without any focal sensory/motor deficits. Cranial nerves: II-XII intact, grossly non-focal.  Psychiatric:  Alert and oriented, thought content appropriate, poor insight   Capillary Refill: Brisk,< 3 seconds   Peripheral Pulses: +2 palpable, equal bilaterally       Labs: For convenience and continuity at follow-up the following most recent labs are provided:      CBC:    Lab Results   Component Value Date/Time    WBC 6.8 01/04/2023 06:31 AM    HGB 12.0 01/04/2023 06:31 AM    HCT 34.2 01/04/2023 06:31 AM     01/04/2023 06:31 AM       Renal:    Lab Results   Component Value Date/Time     01/04/2023 06:31 AM    K 4.2 01/04/2023 06:31 AM     01/04/2023 06:31 AM    CO2 25 01/04/2023 06:31 AM    BUN 28 01/04/2023 06:31 AM    CREATININE 1.0 01/04/2023 06:31 AM    CALCIUM 9.6 01/04/2023 06:31 AM    PHOS 4.1 12/28/2022 07:30 AM         Significant Diagnostic Studies    Radiology:   VL Extremity Venous Left   Final Result      US ABDOMEN LIMITED   Final Result   No ascites identified         CT CHEST ABDOMEN PELVIS W CONTRAST Additional Contrast? None   Final Result   1. CHEST: Small right pleural effusion and mild atelectasis in the lung bases. 2. No consolidation is appreciated. 3. Moderate to severe cardiomegaly. 4. T12 superior endplate fracture of uncertain age. Moderate anterior height   loss of T11, likely chronic.   If there is focal tenderness, non-urgent   follow-up MRI is a consideration. 5. ABDOMEN AND PELVIS: Cirrhotic morphology of the liver. 6. Prominence of the common and pancreatic ducts, also noted on 01/28/2022   Ductal ectasia or adjacent pancreatic cystic lesion(s) have been considered. Follow-up ERCP or endoscopic ultrasound was previously recommended. 7. No acute abdominopelvic abnormality identified. 8. Moderate anasarca. 9. Chronic-appearing compression fractures at L1 and L3. XR CHEST PORTABLE   Final Result   Left central line projects over the expected left brachiocephalic vein. Correlation for appropriate return is recommended. No pneumothorax. Stable enlargement of cardiac silhouette. XR CHEST PORTABLE   Final Result   The left IJ central venous catheter is deflected laterally into the proximal   left subclavian vein area. No evidence of pneumothorax. Marked cardiomegaly with minimal pulmonary vascular congestion without   pulmonary edema. CT CERVICAL SPINE WO CONTRAST   Final Result   Multilevel degenerative changes with no acute fracture or traumatic   malalignment of the cervical spine. CT HEAD WO CONTRAST   Final Result   No acute intracranial abnormality. XR CHEST PORTABLE   Final Result   Mild cardiomegaly and mild central pulmonary congestion or pulmonary artery   hypertension which is more prominent. Minimal bibasilar atelectasis or small infiltrates. Questionable small bibasilar pleural effusions which is more prominent.                 Consults:     IP CONSULT TO NEPHROLOGY  IP CONSULT TO NEPHROLOGY  IP CONSULT TO SOCIAL WORK  IP CONSULT TO GI  IP CONSULT TO CRITICAL CARE  IP CONSULT TO SOCIAL WORK    Disposition:  SNF, [place]     Condition at Discharge: Stable    Discharge Instructions/Follow-up:    Follow-up with PCP in 1 week  Follow-up with GI as soon as possible     Code Status:  Prior FULL    Activity: activity as tolerated    Diet: regular diet, low fat, low cholesterol diet, and renal diet      Discharge Medications:     Discharge Medication List as of 1/4/2023 10:59 AM             Details   saccharomyces boulardii (FLORASTOR) 250 MG capsule Take 1 capsule by mouth 2 times daily for 18 doses, Disp-18 capsule, R-0Normal      sulfamethoxazole-trimethoprim (BACTRIM DS;SEPTRA DS) 800-160 MG per tablet Take 1 tablet by mouth every 12 hours for 12 doses, Disp-12 tablet, R-0Normal      chlordiazePOXIDE (LIBRIUM) 5 MG capsule Take 1 capsule by mouth daily for 2 doses.  Max Daily Amount: 5 mg, Disp-2 capsule, [id number]      folic acid (FOLVITE) 1 MG tablet Take 1 tablet by mouth daily, Disp-30 tablet, R-3Normal      furosemide (LASIX) 40 MG tablet Take 1 tablet by mouth daily, Disp-60 tablet, R-3Normal      magnesium oxide (MAG-OX) 400 (240 Mg) MG tablet Take 1 tablet by mouth 2 times daily, Disp-30 tablet, R-0, DAWNormal      pantoprazole (PROTONIX) 40 MG tablet Take 1 tablet by mouth every morning (before breakfast), Disp-30 tablet, R-3Normal      potassium bicarb-citric acid (EFFER-K) 20 MEQ TBEF effervescent tablet Take 1 tablet by mouth daily, Disp-30 tablet, R-0, DAWNormal      spironolactone (ALDACTONE) 25 MG tablet Take 1 tablet by mouth daily, Disp-30 tablet, R-3Normal      thiamine 100 MG tablet Take 1 tablet by mouth daily, Disp-30 tablet, R-3Normal      lactulose (CHRONULAC) 10 GM/15ML solution Take 30 mLs by mouth daily, Disp-900 mL, R-0, DAWNormal                Details   atorvastatin (LIPITOR) 20 MG tablet Take 1 tablet by mouth nightly, Disp-30 tablet, R-3Normal                Details   lisinopril (PRINIVIL;ZESTRIL) 20 MG tablet Take 1 tablet by mouth daily Please have pt contact office for appt., Disp-90 tablet, R-3Normal      alendronate (FOSAMAX) 70 MG tablet TAKE 1 TABLET BY MOUTH EVERY 7 DAYS, Disp-12 tablet, R-3Normal      valACYclovir (VALTREX) 500 MG tablet TAKE 1 TABLET BY MOUTH DAILY, Disp-90 tablet, R-3Normal      metoprolol succinate (TOPROL XL) 50 MG extended release tablet TAKE 1 TABLET BY MOUTH DAILY, Disp-90 tablet, R-3Normal      anastrozole (ARIMIDEX) 1 MG tablet Take 1 mg by mouth dailyHistorical Med      Biotin 1 MG CAPS Take by mouth dailyHistorical Med      Multiple Vitamins-Minerals (THERAPEUTIC MULTIVITAMIN-MINERALS) tablet Take 1 tablet by mouth dailyHistorical Med             Time Spent on discharge: 34 minutes  in the examination, evaluation, counseling and review of medications and discharge plan. Signed:    SHERI Wagoner   1/8/2023      Thank you Mellissa Chavez MD for the opportunity to be involved in this patient's care. If you have any questions or concerns, please feel free to contact me at [phone].

## 2023-01-04 NOTE — TELEPHONE ENCOUNTER
Pt's daughter states today she will be discharge from [facility] and be transported to [facility] for transitional care for 30 days ,her daughter also states her health is declining . rachelle

## 2023-01-04 NOTE — PLAN OF CARE
Problem: Discharge Planning  Goal: Discharge to home or other facility with appropriate resources  Outcome: Completed     Problem: Pain  Goal: Verbalizes/displays adequate comfort level or baseline comfort level  Outcome: Completed     Problem: Safety - Adult  Goal: Free from fall injury  Outcome: Completed     Problem: Skin/Tissue Integrity  Goal: Absence of new skin breakdown  Description: 1. Monitor for areas of redness and/or skin breakdown  2. Assess vascular access sites hourly  3. Every 4-6 hours minimum:  Change oxygen saturation probe site  4. Every 4-6 hours:  If on nasal continuous positive airway pressure, respiratory therapy assess nares and determine need for appliance change or resting period.   Outcome: Completed     Problem: Respiratory - Adult  Goal: Achieves optimal ventilation and oxygenation  Outcome: Completed     Problem: Cardiovascular - Adult  Goal: Maintains optimal cardiac output and hemodynamic stability  Outcome: Completed  Goal: Absence of cardiac dysrhythmias or at baseline  Outcome: Completed     Problem: Skin/Tissue Integrity - Adult  Goal: Skin integrity remains intact  Outcome: Completed     Problem: Genitourinary - Adult  Goal: Absence of urinary retention  Outcome: Completed     Problem: Neurosensory - Adult  Goal: Achieves stable or improved neurological status  Outcome: Completed  Goal: Absence of seizures  Outcome: Completed     Problem: Musculoskeletal - Adult  Goal: Return mobility to safest level of function  Outcome: Completed  Goal: Return ADL status to a safe level of function  Outcome: Completed     Problem: Gastrointestinal - Adult  Goal: Maintains adequate nutritional intake  Outcome: Completed     Problem: Infection - Adult  Goal: Absence of infection at discharge  Outcome: Completed  Goal: Absence of infection during hospitalization  Outcome: Completed     Problem: Metabolic/Fluid and Electrolytes - Adult  Goal: Electrolytes maintained within normal limits  Outcome: Completed     Problem: Nutrition Deficit:  Goal: Optimize nutritional status  Outcome: Completed

## 2023-01-04 NOTE — PROGRESS NOTES
Patient discharged. IV removed, telemetry box and leads removed and returned. Lockbox emptied. All belongings gathered and returned to patient. Pt was taken to [facility] by transport service. No further needs.

## 2023-01-10 NOTE — PROGRESS NOTES
Blood sugar 118 [facility] Progress Note           Subjective/   69y.o. year old female who we are seeing in consultation for Hyponatremia. HPI:  Patient was seen and examined. Good urine output 3.1L the past 24H. ROS:  No nausea or vomiting    Objective/   GEN:  Chronically ill, /68   Pulse 79   Temp 98.7 °F (37.1 °C) (Oral)   Resp 17   Ht 5' 2\" (1.575 m)   Wt 218 lb 11.2 oz (99.2 kg)   LMP  (LMP Unknown)   SpO2 100%   BMI 40.00 kg/m²   HEENT: non-icteric, no JVD  CV: S1, S2 without m/r/g; ++ LE edema  RESP: CTA B without w/r/r; breathing wnl  ABD: +bs, soft, nt, no hsm  SKIN: warm, no rashes    Data/  Recent Labs     12/26/22  0711   WBC 5.9   HGB 11.3*   HCT 32.4*   .1*   PLT 77*     Recent Labs     12/26/22  0711 12/27/22  0638 12/28/22  0730   *  --  133*   K 3.3*  --  3.2*   CL 92*  --  93*   CO2 33*  --  29   GLUCOSE 105*  --  149*   PHOS 3.8  --  4.1   MG 1.40* 1.60* 1.30*   BUN 21*  --  22*   CREATININE 0.6  --  0.7   LABGLOM >60  --  >60       Assessment/     -Hyponatremia multifactorial from liver cirrhosis, alcohol to volume overload                Na of 106 at 2354 on 12/16, appropriately corrected and now stable in the low 130s. s/p course of urea, on diuretics    -Liver cirrhosis c/b fluid overload    -Falls    -ETOH abuse with withdrawals    -Hypokalemia/Hypomagnesemia from diuretic use      Plan/     - Continue Furosemide 80mg daily. - Continue Spironolactone 25mg daily.  - Increase potassium to 40 meq 2x/day. - Fluid restriction 1500 ml/day  - Encouraged protein intake  - Daily standing weights.     ____________________________________  Julia Campos MD  [facility]  [email or web address]  Office: [phone]

## 2023-02-13 ENCOUNTER — OFFICE VISIT (OUTPATIENT)
Dept: FAMILY MEDICINE CLINIC | Age: 68
End: 2023-02-13
Payer: MEDICARE

## 2023-02-13 ENCOUNTER — TELEPHONE (OUTPATIENT)
Dept: FAMILY MEDICINE CLINIC | Age: 68
End: 2023-02-13

## 2023-02-13 VITALS
HEIGHT: 62 IN | HEART RATE: 51 BPM | OXYGEN SATURATION: 100 % | WEIGHT: 192 LBS | DIASTOLIC BLOOD PRESSURE: 56 MMHG | BODY MASS INDEX: 35.33 KG/M2 | SYSTOLIC BLOOD PRESSURE: 100 MMHG

## 2023-02-13 DIAGNOSIS — R41.3 MEMORY DEFICIT: ICD-10-CM

## 2023-02-13 DIAGNOSIS — C50.212 MALIGNANT NEOPLASM OF UPPER-INNER QUADRANT OF LEFT BREAST IN FEMALE, ESTROGEN RECEPTOR POSITIVE (HCC): ICD-10-CM

## 2023-02-13 DIAGNOSIS — Z17.0 MALIGNANT NEOPLASM OF UPPER-INNER QUADRANT OF LEFT BREAST IN FEMALE, ESTROGEN RECEPTOR POSITIVE (HCC): ICD-10-CM

## 2023-02-13 DIAGNOSIS — K70.31 ALCOHOLIC CIRRHOSIS OF LIVER WITH ASCITES (HCC): Primary | ICD-10-CM

## 2023-02-13 DIAGNOSIS — I95.9 HYPOTENSION, UNSPECIFIED HYPOTENSION TYPE: ICD-10-CM

## 2023-02-13 DIAGNOSIS — D69.6 THROMBOCYTOPENIA, UNSPECIFIED (HCC): ICD-10-CM

## 2023-02-13 DIAGNOSIS — E66.01 SEVERE OBESITY (BMI 35.0-39.9) WITH COMORBIDITY (HCC): ICD-10-CM

## 2023-02-13 DIAGNOSIS — B00.9 HSV-2 (HERPES SIMPLEX VIRUS 2) INFECTION: ICD-10-CM

## 2023-02-13 DIAGNOSIS — F10.29 ALCOHOL DEPENDENCE WITH UNSPECIFIED ALCOHOL-INDUCED DISORDER (HCC): ICD-10-CM

## 2023-02-13 DIAGNOSIS — G31.2 ALCOHOLIC ENCEPHALOPATHY (HCC): Primary | ICD-10-CM

## 2023-02-13 DIAGNOSIS — I48.0 PAROXYSMAL ATRIAL FIBRILLATION (HCC): ICD-10-CM

## 2023-02-13 DIAGNOSIS — M81.8 AGE-RELATED OSTEOPOROSIS WITHOUT FRACTURE: ICD-10-CM

## 2023-02-13 PROCEDURE — 3078F DIAST BP <80 MM HG: CPT | Performed by: NURSE PRACTITIONER

## 2023-02-13 PROCEDURE — 1123F ACP DISCUSS/DSCN MKR DOCD: CPT | Performed by: NURSE PRACTITIONER

## 2023-02-13 PROCEDURE — 3074F SYST BP LT 130 MM HG: CPT | Performed by: NURSE PRACTITIONER

## 2023-02-13 PROCEDURE — 99214 OFFICE O/P EST MOD 30 MIN: CPT | Performed by: NURSE PRACTITIONER

## 2023-02-13 RX ORDER — SPIRONOLACTONE 25 MG/1
25 TABLET ORAL DAILY
Qty: 30 TABLET | Refills: 3 | Status: SHIPPED | OUTPATIENT
Start: 2023-02-13 | End: 2023-02-17 | Stop reason: ALTCHOICE

## 2023-02-13 RX ORDER — FUROSEMIDE 40 MG/1
40 TABLET ORAL DAILY
Qty: 60 TABLET | Refills: 3 | Status: SHIPPED | OUTPATIENT
Start: 2023-02-13

## 2023-02-13 RX ORDER — LACTULOSE 10 G/15ML
20 SOLUTION ORAL DAILY
Qty: 900 ML | Refills: 5 | Status: SHIPPED | OUTPATIENT
Start: 2023-02-13 | End: 2023-03-15

## 2023-02-13 RX ORDER — ATORVASTATIN CALCIUM 20 MG/1
20 TABLET, FILM COATED ORAL NIGHTLY
Qty: 30 TABLET | Refills: 5 | Status: SHIPPED | OUTPATIENT
Start: 2023-02-13

## 2023-02-13 RX ORDER — LACTULOSE 10 G/15ML
SOLUTION ORAL
COMMUNITY
Start: 2023-02-04 | End: 2023-02-13 | Stop reason: SDUPTHER

## 2023-02-13 RX ORDER — ALENDRONATE SODIUM 70 MG/1
70 TABLET ORAL
Qty: 12 TABLET | Refills: 3 | Status: SHIPPED | OUTPATIENT
Start: 2023-02-13

## 2023-02-13 RX ORDER — LANOLIN ALCOHOL/MO/W.PET/CERES
400 CREAM (GRAM) TOPICAL 2 TIMES DAILY
Qty: 30 TABLET | Refills: 3 | Status: SHIPPED | OUTPATIENT
Start: 2023-02-13

## 2023-02-13 RX ORDER — VALACYCLOVIR HYDROCHLORIDE 500 MG/1
TABLET, FILM COATED ORAL
Qty: 90 TABLET | Refills: 3 | Status: SHIPPED | OUTPATIENT
Start: 2023-02-13

## 2023-02-13 RX ORDER — ANASTROZOLE 1 MG/1
1 TABLET ORAL DAILY
Qty: 30 TABLET | Refills: 3 | Status: SHIPPED | OUTPATIENT
Start: 2023-02-13

## 2023-02-13 RX ORDER — PANTOPRAZOLE SODIUM 40 MG/1
40 TABLET, DELAYED RELEASE ORAL
Qty: 30 TABLET | Refills: 3 | Status: SHIPPED | OUTPATIENT
Start: 2023-02-13

## 2023-02-13 ASSESSMENT — ENCOUNTER SYMPTOMS
WHEEZING: 0
SHORTNESS OF BREATH: 0
GASTROINTESTINAL NEGATIVE: 1
RESPIRATORY NEGATIVE: 1
COUGH: 0

## 2023-02-13 ASSESSMENT — PATIENT HEALTH QUESTIONNAIRE - PHQ9
1. LITTLE INTEREST OR PLEASURE IN DOING THINGS: 0
SUM OF ALL RESPONSES TO PHQ QUESTIONS 1-9: 0
SUM OF ALL RESPONSES TO PHQ9 QUESTIONS 1 & 2: 0
SUM OF ALL RESPONSES TO PHQ QUESTIONS 1-9: 0
2. FEELING DOWN, DEPRESSED OR HOPELESS: 0

## 2023-02-13 NOTE — LETTER
[address] [address]  Phone: [phone]  Fax: [phone]    JOHNY Flower CNP         February 13, 2023     Patient: Curtis Spain   YOB: 1955   Date of Visit: 2/13/2023       To Whom It May Concern: It is my medical opinion that Alisia Stevens requires a disability parking placard for the following reasons:  She cannot walk without assistance from another person or the use of an assistance device (cane, crutch, prosthetic device, wheelchair, etc.). Duration of need: 5 years    If you have any questions or concerns, please don't hesitate to call.     Sincerely,        JOHNY Flower CNP

## 2023-02-13 NOTE — TELEPHONE ENCOUNTER
Last Office Visit  -  02/13/2023  Next Office Visit  -  n/a    Last Filled  -    Last UDS -    Contract -

## 2023-02-13 NOTE — PROGRESS NOTES
Patient: Elise Silva is a 69 y.o. female who presents today with the following Chief Complaint(s):  Chief Complaint   Patient presents with    Medication Check    Other     Handicap placard       Assessment:  Encounter Diagnoses   Name Primary? Alcoholic cirrhosis of liver with ascites (HCC) Yes    Memory deficit     Severe obesity (BMI 35.0-39. 9) with comorbidity ([address]     Alcohol dependence with unspecified alcohol-induced disorder (HCC)     Paroxysmal atrial fibrillation (HCC)     Thrombocytopenia, unspecified ([address]     Malignant neoplasm of upper-inner quadrant of left breast in female, estrogen receptor positive ([address]     HSV-2 (herpes simplex virus 2) infection     Age-related osteoporosis without fracture        Plan:  1. Alcoholic cirrhosis of liver with ascites ([address]  Continue follow up with GI. Will hold diuretics until blood work is back due to hypotension.   - Ammonia; Future    2. Memory deficit  Will see neurology for further evaluation. No driving until cleared by neurology  - Rocio Mckeon MD, Neurology, [facility]    3. Severe obesity (BMI 35.0-39. 9) with comorbidity ([address]  Complicating assessment. Daughter reports she is starting to following a better diet. 4. Alcohol dependence with unspecified alcohol-induced disorder ([address]  No alcohol since her hospital stay in December. 5. Paroxysmal atrial fibrillation (HCC)  Stable, rate it currently controlled. Will check blood work. Hospital report states patient is not a candidate  for blood thinner.   - Comprehensive Metabolic Panel; Future  - Magnesium; Future    6. Thrombocytopenia, unspecified ([address]  Likely related to liver. Will check blood work to monitor.   - CBC with Auto Differential; Future    7. Malignant neoplasm of upper-inner quadrant of left breast in female, estrogen receptor positive (HCC)  Stable on Arimidex. Will follow up with Dr. Beryle Marek. 8. HSV-2 (herpes simplex virus 2) infection  Stable continue valtrex. - valACYclovir (VALTREX) 500 MG tablet; TAKE 1 TABLET BY MOUTH DAILY  Dispense: 90 tablet; Refill: 3    9. Age-related osteoporosis without fracture  Stable, will need repeat Dexa scan. Given the number to call to schedule. - alendronate (FOSAMAX) 70 MG tablet; Take 1 tablet by mouth every 7 days  Dispense: 12 tablet; Refill: 3  10. Hypotension  Patient advised to D/C lisinopril. She will hold metoprolol, lasix and aldactone until blood work is back to further assess, patient is currently asymptomatic. HPI  Patient presents today for follow on chronic conditions. Patient fell on December 13th and was there for about 3 weeks. She was then discharged to a rehab facility for 30 days. Her daughter said she thought she was getting all of her medications there and then when she was transferred to [facility] assisted living she has only been getting supplements and lactulose. Her daughter wants to know if she should be taking her blood pressure and water pills. She was has a history of PAF, alcoholic cirrhosis of the liver. BP today is low. 88 systolic 610 with recheck. Patient is asymptomatic. She is currently on a 1500 ml fluid restriction. She has not been taking her water pills or her blood pressure pills. Having some short term memory concerns. Daughter is concerned about driving.          Current Outpatient Medications   Medication Sig Dispense Refill    furosemide (LASIX) 40 MG tablet Take 1 tablet by mouth daily 60 tablet 3    pantoprazole (PROTONIX) 40 MG tablet Take 1 tablet by mouth every morning (before breakfast) 30 tablet 3    spironolactone (ALDACTONE) 25 MG tablet Take 1 tablet by mouth daily 30 tablet 3    anastrozole (ARIMIDEX) 1 MG tablet Take 1 tablet by mouth daily 30 tablet 3    valACYclovir (VALTREX) 500 MG tablet TAKE 1 TABLET BY MOUTH DAILY 90 tablet 3    atorvastatin (LIPITOR) 20 MG tablet Take 1 tablet by mouth nightly 30 tablet 5    alendronate (FOSAMAX) 70 MG tablet Take 1 tablet by mouth every 7 days 12 tablet 3    magnesium oxide (MAG-OX) 400 (240 Mg) MG tablet Take 1 tablet by mouth 2 times daily 30 tablet 3    folic acid (FOLVITE) 1 MG tablet Take 1 tablet by mouth daily 30 tablet 3    potassium bicarb-citric acid (EFFER-K) 20 MEQ TBEF effervescent tablet Take 1 tablet by mouth daily 30 tablet 0    Biotin 1 MG CAPS Take by mouth daily      Multiple Vitamins-Minerals (THERAPEUTIC MULTIVITAMIN-MINERALS) tablet Take 1 tablet by mouth daily      lactulose (CHRONULAC) 10 GM/15ML solution Take 30 mLs by mouth daily 900 mL 5    thiamine 100 MG tablet Take 1 tablet by mouth daily (Patient not taking: Reported on 2/13/2023) 30 tablet 3    metoprolol succinate (TOPROL XL) 50 MG extended release tablet TAKE 1 TABLET BY MOUTH DAILY (Patient not taking: Reported on 2/13/2023) 90 tablet 3     No current facility-administered medications for this visit. Patient's past medical history, surgical history, family history, medications,and allergies  were all reviewed and updated as appropriate today. Review of Systems   Constitutional: Negative. HENT: Negative. Respiratory: Negative. Negative for cough, shortness of breath and wheezing. Cardiovascular: Negative. Negative for chest pain, palpitations and leg swelling. Gastrointestinal: Negative. Musculoskeletal: Negative. Skin: Negative. Neurological: Negative. Negative for dizziness and headaches. Psychiatric/Behavioral: Negative. Physical Exam  Vitals reviewed. Cardiovascular:      Rate and Rhythm: Normal rate. Rhythm irregular. Heart sounds: Normal heart sounds. Pulmonary:      Effort: Pulmonary effort is normal.      Breath sounds: Normal breath sounds. Skin:     General: Skin is warm and dry. Neurological:      Mental Status: She is alert and oriented to person, place, and time.      Vitals:    02/13/23 1006   BP: (!) 100/56   Pulse:    SpO2:        This chart was generated using the [employer] system. I created this record but it may contain dictation errors due to the limitation of the software.

## 2023-02-13 NOTE — TELEPHONE ENCOUNTER
Patient states lactulose wasn't called in at time of visit today. Would like this sent to [facility]. [address]   [place], [address] [facility]

## 2023-02-16 ENCOUNTER — HOSPITAL ENCOUNTER (OUTPATIENT)
Age: 68
Discharge: HOME | End: 2023-02-16
Payer: MEDICARE

## 2023-02-16 DIAGNOSIS — I48.0 PAROXYSMAL ATRIAL FIBRILLATION (HCC): ICD-10-CM

## 2023-02-16 DIAGNOSIS — D69.6 THROMBOCYTOPENIA, UNSPECIFIED (HCC): ICD-10-CM

## 2023-02-16 DIAGNOSIS — K70.31 ALCOHOLIC CIRRHOSIS OF LIVER WITH ASCITES (HCC): ICD-10-CM

## 2023-02-16 LAB
A/G RATIO: 0.9 (ref 1.1–2.2)
ALBUMIN SERPL-MCNC: 3.4 G/DL (ref 3.4–5)
ALP BLD-CCNC: 230 U/L (ref 40–129)
ALT SERPL-CCNC: 25 U/L (ref 10–40)
AMMONIA: 56 UMOL/L (ref 11–51)
ANION GAP SERPL CALCULATED.3IONS-SCNC: 15 MMOL/L (ref 3–16)
AST SERPL-CCNC: 57 U/L (ref 15–37)
BASOPHILS ABSOLUTE: 0.1 K/UL (ref 0–0.2)
BASOPHILS RELATIVE PERCENT: 1.2 %
BILIRUB SERPL-MCNC: 1.9 MG/DL (ref 0–1)
BUN BLDV-MCNC: 15 MG/DL (ref 7–20)
CALCIUM SERPL-MCNC: 9.7 MG/DL (ref 8.3–10.6)
CHLORIDE BLD-SCNC: 88 MMOL/L (ref 99–110)
CO2: 24 MMOL/L (ref 21–32)
CREAT SERPL-MCNC: 0.9 MG/DL (ref 0.6–1.2)
EOSINOPHILS ABSOLUTE: 0.1 K/UL (ref 0–0.6)
EOSINOPHILS RELATIVE PERCENT: 1.2 %
GFR SERPL CREATININE-BSD FRML MDRD: >60 ML/MIN/{1.73_M2}
GLUCOSE BLD-MCNC: 112 MG/DL (ref 70–99)
HCT VFR BLD CALC: 35.3 % (ref 36–48)
HEMOGLOBIN: 12.4 G/DL (ref 12–16)
LYMPHOCYTES ABSOLUTE: 2.2 K/UL (ref 1–5.1)
LYMPHOCYTES RELATIVE PERCENT: 37.9 %
MAGNESIUM: 1.3 MG/DL (ref 1.8–2.4)
MCH RBC QN AUTO: 37.6 PG (ref 26–34)
MCHC RBC AUTO-ENTMCNC: 35.2 G/DL (ref 31–36)
MCV RBC AUTO: 107 FL (ref 80–100)
MONOCYTES ABSOLUTE: 1 K/UL (ref 0–1.3)
MONOCYTES RELATIVE PERCENT: 16.5 %
NEUTROPHILS ABSOLUTE: 2.5 K/UL (ref 1.7–7.7)
NEUTROPHILS RELATIVE PERCENT: 43.2 %
PDW BLD-RTO: 16.8 % (ref 12.4–15.4)
PLATELET # BLD: 116 K/UL (ref 135–450)
PMV BLD AUTO: 10.2 FL (ref 5–10.5)
POTASSIUM SERPL-SCNC: 3.3 MMOL/L (ref 3.5–5.1)
RBC # BLD: 3.3 M/UL (ref 4–5.2)
SODIUM BLD-SCNC: 127 MMOL/L (ref 136–145)
TOTAL PROTEIN: 7.4 G/DL (ref 6.4–8.2)
WBC # BLD: 5.8 K/UL (ref 4–11)

## 2023-02-16 PROCEDURE — 83735 ASSAY OF MAGNESIUM: CPT

## 2023-02-16 PROCEDURE — 80053 COMPREHEN METABOLIC PANEL: CPT

## 2023-02-16 PROCEDURE — 82140 ASSAY OF AMMONIA: CPT

## 2023-02-16 PROCEDURE — 85025 COMPLETE CBC W/AUTO DIFF WBC: CPT

## 2023-02-16 PROCEDURE — 36415 COLL VENOUS BLD VENIPUNCTURE: CPT

## 2023-02-17 ENCOUNTER — PATIENT MESSAGE (OUTPATIENT)
Dept: FAMILY MEDICINE CLINIC | Age: 68
End: 2023-02-17

## 2023-02-17 DIAGNOSIS — I48.0 PAROXYSMAL A-FIB (HCC): Primary | ICD-10-CM

## 2023-02-17 RX ORDER — MULTIVITAMIN WITH IRON
250 TABLET ORAL DAILY
Qty: 30 TABLET | Refills: 0 | Status: SHIPPED | OUTPATIENT
Start: 2023-02-17 | End: 2023-03-19

## 2023-02-17 RX ORDER — POTASSIUM CHLORIDE 20 MEQ/1
TABLET, EXTENDED RELEASE ORAL
Qty: 30 TABLET | Refills: 1 | Status: SHIPPED | OUTPATIENT
Start: 2023-02-17

## 2023-02-17 NOTE — TELEPHONE ENCOUNTER
Daughter called office-disregard previous message pt has gastro provider and son will be picking up potassium medications. Magnesium not covered by insurance daughter purchased magnesium 400 mg and pt is currently taking those.

## 2023-03-01 ENCOUNTER — TELEPHONE (OUTPATIENT)
Dept: FAMILY MEDICINE CLINIC | Age: 68
End: 2023-03-01

## 2023-03-01 DIAGNOSIS — E87.1 HYPONATREMIA: Primary | ICD-10-CM

## 2023-03-01 DIAGNOSIS — E87.6 HYPOKALEMIA: ICD-10-CM

## 2023-03-01 NOTE — TELEPHONE ENCOUNTER
Robyn Barrios (daughter) called in stating that Meagan's legs and feet are starting to swell really bad.  Daughter is asking for a call as she is a little worried on what to do     [phone]

## 2023-03-01 NOTE — TELEPHONE ENCOUNTER
Pls tell dtr that I'd like her mom to resume lasix 40 1qd and continue KCL 20 mEq 1qd. Also, I'd like her mom to elevate her legs when seated or lying down. If she is not using support socks, pls encourage her to do so. I assume that since [facility] in [place] she has a nurse who can put on the socks q am and take off qhs.    I'd also like the nurse to check BP q am and call in 1 wk with those readings. Lastly, I'd like pt to have a bmp drawn in 1 wk. Does dtr know if [facility] nurse is capable of drawing blood? If so, pls fax order there. If they do not draw blood, pls have pt come to any [facility] lab in 1 wk for blood draw.  Thx.

## 2023-03-01 NOTE — TELEPHONE ENCOUNTER
Pt is currently taking PT and she informed that they are getting worse like they did in past.Dtr stated that the pt had stopped lasix,aldactone, and metoprolol on 2/17/23 until she sees cardio and GI, per lab result note. Please advise?

## 2023-03-08 ENCOUNTER — HOSPITAL ENCOUNTER (OUTPATIENT)
Age: 68
Discharge: HOME | End: 2023-03-08
Payer: MEDICARE

## 2023-03-08 ENCOUNTER — HOSPITAL ENCOUNTER (OUTPATIENT)
Dept: WOMENS IMAGING | Age: 68
Discharge: HOME | End: 2023-03-08
Payer: MEDICARE

## 2023-03-08 DIAGNOSIS — M81.0 SENILE OSTEOPOROSIS: ICD-10-CM

## 2023-03-08 DIAGNOSIS — E87.6 HYPOKALEMIA: ICD-10-CM

## 2023-03-08 DIAGNOSIS — E87.1 HYPONATREMIA: ICD-10-CM

## 2023-03-08 LAB
ANION GAP SERPL CALCULATED.3IONS-SCNC: 17 MMOL/L (ref 3–16)
BUN BLDV-MCNC: 11 MG/DL (ref 7–20)
CALCIUM SERPL-MCNC: 10.1 MG/DL (ref 8.3–10.6)
CHLORIDE BLD-SCNC: 86 MMOL/L (ref 99–110)
CO2: 29 MMOL/L (ref 21–32)
CREAT SERPL-MCNC: 1.2 MG/DL (ref 0.6–1.2)
GFR SERPL CREATININE-BSD FRML MDRD: 49 ML/MIN/{1.73_M2}
GLUCOSE BLD-MCNC: 126 MG/DL (ref 70–99)
POTASSIUM SERPL-SCNC: 3.8 MMOL/L (ref 3.5–5.1)
SODIUM BLD-SCNC: 132 MMOL/L (ref 136–145)

## 2023-03-08 PROCEDURE — 80048 BASIC METABOLIC PNL TOTAL CA: CPT

## 2023-03-08 PROCEDURE — 77080 DXA BONE DENSITY AXIAL: CPT

## 2023-03-08 PROCEDURE — 36415 COLL VENOUS BLD VENIPUNCTURE: CPT

## 2023-03-09 DIAGNOSIS — K70.31 ALCOHOLIC CIRRHOSIS OF LIVER WITH ASCITES (HCC): Primary | ICD-10-CM

## 2023-03-09 RX ORDER — POTASSIUM CHLORIDE 20 MEQ/1
20 TABLET, EXTENDED RELEASE ORAL DAILY
Qty: 30 TABLET | Refills: 5 | Status: SHIPPED | OUTPATIENT
Start: 2023-03-09

## 2023-04-03 RX ORDER — ANASTROZOLE 1 MG/1
1 TABLET ORAL DAILY
Qty: 90 TABLET | Refills: 3 | Status: SHIPPED | OUTPATIENT
Start: 2023-04-03

## 2023-04-03 RX ORDER — PANTOPRAZOLE SODIUM 40 MG/1
TABLET, DELAYED RELEASE ORAL
Qty: 90 TABLET | Refills: 3 | Status: SHIPPED | OUTPATIENT
Start: 2023-04-03

## 2023-04-03 RX ORDER — MULTIVITAMIN WITH IRON
TABLET ORAL
Qty: 90 TABLET | Refills: 3 | Status: SHIPPED | OUTPATIENT
Start: 2023-04-03

## 2023-05-30 RX ORDER — SPIRONOLACTONE 25 MG/1
25 TABLET ORAL DAILY
Qty: 30 TABLET | Refills: 3 | OUTPATIENT
Start: 2023-05-30

## 2023-06-08 ENCOUNTER — COMMUNITY OUTREACH (OUTPATIENT)
Dept: FAMILY MEDICINE CLINIC | Age: 68
End: 2023-06-08

## 2023-06-08 NOTE — PROGRESS NOTES
Patient's HM shows they are overdue for Colorectal Screening. Care Everywhere and  files searched. Results attached to order and HM updated.

## 2023-06-14 RX ORDER — SPIRONOLACTONE 25 MG/1
25 TABLET ORAL DAILY
Qty: 30 TABLET | Refills: 5 | Status: SHIPPED | OUTPATIENT
Start: 2023-06-14

## 2023-06-23 RX ORDER — ATORVASTATIN CALCIUM 20 MG/1
TABLET, FILM COATED ORAL
Qty: 30 TABLET | Refills: 5 | Status: SHIPPED | OUTPATIENT
Start: 2023-06-23

## 2023-07-25 DIAGNOSIS — G31.2 ALCOHOLIC ENCEPHALOPATHY (HCC): ICD-10-CM

## 2023-07-25 RX ORDER — LACTULOSE 10 G/15ML
SOLUTION ORAL
OUTPATIENT
Start: 2023-07-25

## 2023-07-25 RX ORDER — LACTULOSE 10 G/15ML
20 SOLUTION ORAL DAILY
Qty: 900 ML | Refills: 0 | Status: SHIPPED | OUTPATIENT
Start: 2023-07-25 | End: 2023-08-24

## 2023-07-25 NOTE — TELEPHONE ENCOUNTER
Pharmacy requesting refill  lactulose ([address]) 10 GM/15ML solution   Last Office Visit  -  2/13/23  Next Office Visit  -  none

## 2023-07-25 NOTE — TELEPHONE ENCOUNTER
Last Office Visit  -  2/13/23  Next Office Visit  -  n/a    Last Filled  -  2/13/23  Last UDS -    Contract -

## 2023-08-14 ENCOUNTER — HOSPITAL ENCOUNTER (OUTPATIENT)
Dept: MRI IMAGING | Age: 68
Discharge: HOME | End: 2023-08-14
Attending: NURSE PRACTITIONER
Payer: MEDICARE

## 2023-08-14 DIAGNOSIS — K86.2 PANCREATIC CYST: ICD-10-CM

## 2023-08-14 DIAGNOSIS — K74.3 PRIMARY BILIARY CIRRHOSIS (HCC): ICD-10-CM

## 2023-08-14 PROCEDURE — A9579 GAD-BASE MR CONTRAST NOS,1ML: HCPCS | Performed by: NURSE PRACTITIONER

## 2023-08-14 PROCEDURE — 6360000004 HC RX CONTRAST MEDICATION: Performed by: NURSE PRACTITIONER

## 2023-08-14 PROCEDURE — 74183 MRI ABD W/O CNTR FLWD CNTR: CPT

## 2023-08-14 RX ADMIN — GADOTERIDOL 16 ML: 279.3 INJECTION, SOLUTION INTRAVENOUS at 14:24

## 2023-08-25 DIAGNOSIS — G31.2 ALCOHOLIC ENCEPHALOPATHY (HCC): ICD-10-CM

## 2023-08-25 RX ORDER — LACTULOSE 10 G/15ML
SOLUTION ORAL
Qty: 900 ML | Refills: 3 | Status: SHIPPED | OUTPATIENT
Start: 2023-08-25

## 2023-09-11 ENCOUNTER — TELEPHONE (OUTPATIENT)
Dept: FAMILY MEDICINE CLINIC | Age: 68
End: 2023-09-11

## 2023-09-11 RX ORDER — SPIRONOLACTONE 25 MG/1
25 TABLET ORAL DAILY
Qty: 30 TABLET | Refills: 5 | Status: SHIPPED | OUTPATIENT
Start: 2023-09-11

## 2023-09-11 NOTE — TELEPHONE ENCOUNTER
Last Office Visit  -  2/13/23  Next Office Visit  -  n/a    Last Filled  -  6/14/23  Last UDS -    Contract -

## 2023-09-11 NOTE — TELEPHONE ENCOUNTER
Pharmacy req refill for patient on  Spironolactone 25mg tabs  Last visit 2/13/23  No future  Jairo ybarra

## 2023-09-22 NOTE — PROGRESS NOTES
[facility] [facility] COMPLAINT  AFib        HISTORY OF PRESENTING ILLNESS  Gilson Jules is a 69 y.o. patient who presents as a new patient referred by Ángela Castle MD for Afib. I have been asked to provide consultation regarding further management and testing. She has  medical history of HTN. EKG during her previous hospitalization for weakness and electrolyte abnormalities on 12/17/2022 showed atrial fibrillation with slow ventricular response. Echo 12/17/2022 EF 55-6%, SPAP 43. Today, 9/25/2023, She presents with a walker. She reports she lives in a custodial center. She walks with her cane or walker daily. She reports she can walk 20 minutes without stopping if using her walker. With this level of activity she denies chest pain/tightness/heaviness/discomfort, and shortness of breath. She denies palpitations or lightheadedness. She plans to have her knee replaced in the coming month. Patient is taking all cardiac medications as prescribed and tolerates them well. PAST MEDICAL HISTORY   has a past medical history of Age-related osteoporosis without current pathological fracture, Arthritis, EtOH dependence ([address]), HSV-2 (herpes simplex virus 2) infection, Hypertension, Kidney stone, and Malignant neoplasm of upper-inner quadrant of left breast in female, estrogen receptor positive ([address]). PAST SURGICAL HISTORY   has a past surgical history that includes Hysterectomy (1998); Dilation and curettage of uterus (1997); Colonoscopy (02/16/1999); Colonoscopy (4/19/13); Total knee arthroplasty (Left, 5/23/2019); Mastectomy (Left, 10/8/2019); and Upper gastrointestinal endoscopy (N/A, 4/20/2022). SOCIAL HISTORY   reports that she quit smoking about 46 years ago. Her smoking use included cigarettes. She has a 7.50 pack-year smoking history. She has never used smokeless tobacco. She reports current alcohol use. She reports that she does not use drugs.

## 2023-09-25 ENCOUNTER — OFFICE VISIT (OUTPATIENT)
Dept: CARDIOLOGY CLINIC | Age: 68
End: 2023-09-25

## 2023-09-25 VITALS
HEART RATE: 111 BPM | HEIGHT: 62 IN | OXYGEN SATURATION: 95 % | WEIGHT: 182.5 LBS | DIASTOLIC BLOOD PRESSURE: 60 MMHG | BODY MASS INDEX: 33.58 KG/M2 | SYSTOLIC BLOOD PRESSURE: 124 MMHG

## 2023-09-25 DIAGNOSIS — I48.0 PAROXYSMAL ATRIAL FIBRILLATION (HCC): Primary | ICD-10-CM

## 2023-09-25 DIAGNOSIS — I50.22 CHRONIC SYSTOLIC (CONGESTIVE) HEART FAILURE (HCC): ICD-10-CM

## 2023-09-25 DIAGNOSIS — I48.19 PERSISTENT ATRIAL FIBRILLATION (HCC): ICD-10-CM

## 2023-09-25 RX ORDER — URSODIOL 500 MG/1
1000 TABLET, FILM COATED ORAL 2 TIMES DAILY
COMMUNITY
Start: 2023-08-31

## 2023-09-25 RX ORDER — POTASSIUM CHLORIDE 20 MEQ/1
20 TABLET, EXTENDED RELEASE ORAL DAILY
Qty: 30 TABLET | Refills: 5 | Status: SHIPPED | OUTPATIENT
Start: 2023-09-25

## 2023-09-25 RX ORDER — FUROSEMIDE 40 MG/1
40 TABLET ORAL DAILY
Qty: 60 TABLET | Refills: 5 | Status: SHIPPED | OUTPATIENT
Start: 2023-09-25 | End: 2023-11-07

## 2023-09-25 NOTE — PATIENT INSTRUCTIONS
PLAN:  Discussed starting eliquis given history of afib. I will reach out to your doctors to discuss  No further cardiac testing  Recommend a cardiac healthy diet (low salt, avoid red meat, avoid fatty or fried foods, lots of fruits and vegetables) as well as regular moderate intensity activity for 30 minutes per day 3-5 times per week.    Follow up in 6 months

## 2023-09-25 NOTE — TELEPHONE ENCOUNTER
Jairo requesting refill      potassium chloride (KLOR-CON M) 20 MEQ extended release tablet   Last Office Visit  -  2/13/23  Next Office Visit  -  none

## 2023-09-25 NOTE — TELEPHONE ENCOUNTER
Last Office Visit  -  2/13/23  Next Office Visit  -  NA    Last Filled  -  2/13/23  FUROSEMIDE 40MG TABLETS

## 2023-10-23 ENCOUNTER — TELEPHONE (OUTPATIENT)
Dept: FAMILY MEDICINE CLINIC | Age: 68
End: 2023-10-23

## 2023-10-23 DIAGNOSIS — M25.561 CHRONIC PAIN OF BOTH KNEES: Primary | ICD-10-CM

## 2023-10-23 DIAGNOSIS — M25.562 CHRONIC PAIN OF BOTH KNEES: Primary | ICD-10-CM

## 2023-10-23 DIAGNOSIS — G89.29 CHRONIC PAIN OF BOTH KNEES: Primary | ICD-10-CM

## 2023-10-23 NOTE — TELEPHONE ENCOUNTER
Reached out to Tana Amaya (decision maker) and she stated t hat she is using PT for bilateral knee pain, and is using walker. Pt is enjoying when PT is at the facility and participates.  Pended for your approval.

## 2023-10-23 NOTE — TELEPHONE ENCOUNTER
Patient called in stating she is staying in a nursing home called \"[facility] it is on [address].  Patient stated she is needing a referral placed for physical therapy and that it can be faxed to [phone]

## 2023-11-07 RX ORDER — FUROSEMIDE 40 MG/1
40 TABLET ORAL DAILY
Qty: 90 TABLET | Refills: 0 | Status: SHIPPED | OUTPATIENT
Start: 2023-11-07

## 2023-11-07 RX ORDER — FUROSEMIDE 40 MG/1
TABLET ORAL
Qty: 90 TABLET | OUTPATIENT
Start: 2023-11-07

## 2023-11-12 PROBLEM — I50.22 CHRONIC SYSTOLIC (CONGESTIVE) HEART FAILURE (HCC): Status: ACTIVE | Noted: 2023-11-12

## 2023-11-27 RX ORDER — SPIRONOLACTONE 25 MG/1
25 TABLET ORAL DAILY
Qty: 90 TABLET | Refills: 0 | Status: SHIPPED | OUTPATIENT
Start: 2023-11-27

## 2023-11-28 DIAGNOSIS — I48.0 PAROXYSMAL ATRIAL FIBRILLATION (HCC): Primary | ICD-10-CM

## 2023-11-28 NOTE — TELEPHONE ENCOUNTER
Pt sees Dr. Valeria Quinones.  Her daughter reports she recently had a colonoscopy    LOV FXW 9/25/2023

## 2023-11-28 NOTE — TELEPHONE ENCOUNTER
----- Message from Aliyah Denise MD sent at 11/12/2023  4:29 PM EST -----  Can we find out who her gastroenterologist is, if she has one.   Thank you

## 2023-11-30 ENCOUNTER — HOSPITAL ENCOUNTER (OUTPATIENT)
Dept: WOMENS IMAGING | Age: 68
Discharge: HOME | End: 2023-11-30
Payer: MEDICARE

## 2023-11-30 VITALS — HEIGHT: 59 IN | WEIGHT: 180 LBS | BODY MASS INDEX: 36.29 KG/M2

## 2023-11-30 DIAGNOSIS — Z12.31 ENCOUNTER FOR SCREENING MAMMOGRAM FOR MALIGNANT NEOPLASM OF BREAST: ICD-10-CM

## 2023-11-30 PROCEDURE — 77063 BREAST TOMOSYNTHESIS BI: CPT

## 2023-12-01 DIAGNOSIS — G31.2 ALCOHOLIC ENCEPHALOPATHY (HCC): ICD-10-CM

## 2023-12-01 RX ORDER — LACTULOSE 10 G/15ML
SOLUTION ORAL
Qty: 900 ML | Refills: 3 | Status: SHIPPED | OUTPATIENT
Start: 2023-12-01

## 2023-12-01 NOTE — TELEPHONE ENCOUNTER
Last Office Visit  -  2/13/23  Next Office Visit  -  n/a    Last Filled  -  8/25/23  Last UDS -    Contract -

## 2023-12-04 NOTE — TELEPHONE ENCOUNTER
I have routed my last office visit note to Dr. Celena Charles in GI and also sent him a note regarding question of safety of long-term anticoagulation due to history of A-fib. Once we hear back from him, we will discuss need to start anticoagulation. Addendum    I heard back from Dr. Celena Charles. Collaborative consensus is that patient should be on anticoagulation as risk of stroke likely outweighs the risk of bleeding. Please start patient on Eliquis 5 mg twice daily and send prescription.   Thank you

## 2023-12-13 ENCOUNTER — TELEPHONE (OUTPATIENT)
Dept: FAMILY MEDICINE CLINIC | Age: 68
End: 2023-12-13

## 2023-12-13 DIAGNOSIS — B00.9 HSV-2 (HERPES SIMPLEX VIRUS 2) INFECTION: ICD-10-CM

## 2023-12-13 DIAGNOSIS — G31.2 ALCOHOLIC ENCEPHALOPATHY (HCC): ICD-10-CM

## 2023-12-13 RX ORDER — VALACYCLOVIR HYDROCHLORIDE 500 MG/1
TABLET, FILM COATED ORAL
Qty: 90 TABLET | Refills: 3 | Status: SHIPPED | OUTPATIENT
Start: 2023-12-13

## 2023-12-13 NOTE — TELEPHONE ENCOUNTER
Pharmacy request  lactulose [facility]) 10 GM/15ML solution     Lov  2/13/23  Future no

## 2024-01-08 ENCOUNTER — TELEPHONE (OUTPATIENT)
Dept: FAMILY MEDICINE CLINIC | Age: 69
End: 2024-01-08

## 2024-01-08 DIAGNOSIS — R41.3 MEMORY DEFICIT: ICD-10-CM

## 2024-01-08 DIAGNOSIS — K70.31 ALCOHOLIC CIRRHOSIS OF LIVER WITH ASCITES (HCC): ICD-10-CM

## 2024-01-08 DIAGNOSIS — G31.2 ALCOHOLIC ENCEPHALOPATHY (HCC): Primary | ICD-10-CM

## 2024-01-08 DIAGNOSIS — D75.89 MACROCYTOSIS: ICD-10-CM

## 2024-01-08 NOTE — TELEPHONE ENCOUNTER
----- Message from Marli Marci sent at 1/8/2024  3:30 PM EST -----  Subject: Referral Request    Reason for referral request? annual labs  Provider patient wants to be referred to(if known):     Provider Phone Number(if known):    Additional Information for Provider? annual physical scheduled for 2/1/24,   and needs labs 1 week prior. Please call pt once labs have been ordered  ---------------------------------------------------------------------------  --------------  CALL BACK INFO    [phone]; OK to leave message on voicemail  ---------------------------------------------------------------------------  --------------

## 2024-01-18 DIAGNOSIS — M81.8 AGE-RELATED OSTEOPOROSIS WITHOUT FRACTURE: ICD-10-CM

## 2024-01-18 RX ORDER — ALENDRONATE SODIUM 70 MG/1
70 TABLET ORAL
Qty: 12 TABLET | Refills: 3 | Status: SHIPPED | OUTPATIENT
Start: 2024-01-18

## 2024-01-18 NOTE — TELEPHONE ENCOUNTER
Last Office Visit  -  02/13/2023  Next Office Visit  -  02/01/2024    Last Filled  -    Last UDS -    Contract -

## 2024-01-25 DIAGNOSIS — K70.31 ALCOHOLIC CIRRHOSIS OF LIVER WITH ASCITES (HCC): ICD-10-CM

## 2024-01-25 DIAGNOSIS — D75.89 MACROCYTOSIS: ICD-10-CM

## 2024-01-25 LAB
ALBUMIN SERPL-MCNC: 3.6 G/DL (ref 3.4–5)
ALBUMIN/GLOB SERPL: 1 {RATIO} (ref 1.1–2.2)
ALP SERPL-CCNC: 161 U/L (ref 40–129)
ALT SERPL-CCNC: 15 U/L (ref 10–40)
ANION GAP SERPL CALCULATED.3IONS-SCNC: 14 MMOL/L (ref 3–16)
AST SERPL-CCNC: 31 U/L (ref 15–37)
BILIRUB SERPL-MCNC: 1.1 MG/DL (ref 0–1)
BUN SERPL-MCNC: 16 MG/DL (ref 7–20)
CALCIUM SERPL-MCNC: 9.3 MG/DL (ref 8.3–10.6)
CHLORIDE SERPL-SCNC: 95 MMOL/L (ref 99–110)
CO2 SERPL-SCNC: 25 MMOL/L (ref 21–32)
CREAT SERPL-MCNC: 0.9 MG/DL (ref 0.6–1.2)
GFR SERPLBLD CREATININE-BSD FMLA CKD-EPI: >60 ML/MIN/{1.73_M2}
GLUCOSE SERPL-MCNC: 117 MG/DL (ref 70–99)
INR PPP: 1.65 (ref 0.84–1.16)
PROT SERPL-MCNC: 7.1 G/DL (ref 6.4–8.2)
PROTHROMBIN TIME: 19.5 SEC (ref 11.5–14.8)
SODIUM SERPL-SCNC: 134 MMOL/L (ref 136–145)

## 2024-01-26 LAB
BASOPHILS # BLD: 0.1 K/UL (ref 0–0.2)
BASOPHILS NFR BLD: 1.4 %
DEPRECATED RDW RBC AUTO: 14.7 % (ref 12.4–15.4)
EOSINOPHIL # BLD: 0.1 K/UL (ref 0–0.6)
EOSINOPHIL NFR BLD: 0.6 %
HCT VFR BLD AUTO: 41.4 % (ref 36–48)
HGB BLD-MCNC: 14.4 G/DL (ref 12–16)
LYMPHOCYTES # BLD: 2.7 K/UL (ref 1–5.1)
LYMPHOCYTES NFR BLD: 30.3 %
MCH RBC QN AUTO: 39.2 PG (ref 26–34)
MCHC RBC AUTO-ENTMCNC: 34.9 G/DL (ref 31–36)
MCV RBC AUTO: 112.3 FL (ref 80–100)
MONOCYTES # BLD: 1.1 K/UL (ref 0–1.3)
MONOCYTES NFR BLD: 12.8 %
NEUTROPHILS # BLD: 4.8 K/UL (ref 1.7–7.7)
NEUTROPHILS NFR BLD: 54.9 %
PATH INTERP BLD-IMP: NO
PLATELET # BLD AUTO: 168 K/UL (ref 135–450)
PMV BLD AUTO: 9.6 FL (ref 5–10.5)
RBC # BLD AUTO: 3.68 M/UL (ref 4–5.2)
WBC # BLD AUTO: 8.8 K/UL (ref 4–11)

## 2024-02-01 ENCOUNTER — OFFICE VISIT (OUTPATIENT)
Dept: FAMILY MEDICINE CLINIC | Age: 69
End: 2024-02-01

## 2024-02-01 VITALS
BODY MASS INDEX: 38.51 KG/M2 | HEIGHT: 59 IN | OXYGEN SATURATION: 97 % | DIASTOLIC BLOOD PRESSURE: 70 MMHG | HEART RATE: 117 BPM | SYSTOLIC BLOOD PRESSURE: 124 MMHG | WEIGHT: 191 LBS

## 2024-02-01 DIAGNOSIS — Z17.0 MALIGNANT NEOPLASM OF UPPER-INNER QUADRANT OF LEFT BREAST IN FEMALE, ESTROGEN RECEPTOR POSITIVE (HCC): ICD-10-CM

## 2024-02-01 DIAGNOSIS — Z00.00 INITIAL MEDICARE ANNUAL WELLNESS VISIT: Primary | ICD-10-CM

## 2024-02-01 DIAGNOSIS — I50.22 CHRONIC SYSTOLIC (CONGESTIVE) HEART FAILURE (HCC): ICD-10-CM

## 2024-02-01 DIAGNOSIS — M17.11 LOCALIZED OSTEOARTHRITIS OF RIGHT KNEE: ICD-10-CM

## 2024-02-01 DIAGNOSIS — K70.31 ALCOHOLIC CIRRHOSIS OF LIVER WITH ASCITES (HCC): ICD-10-CM

## 2024-02-01 DIAGNOSIS — R53.1 GENERAL WEAKNESS: ICD-10-CM

## 2024-02-01 DIAGNOSIS — C50.212 MALIGNANT NEOPLASM OF UPPER-INNER QUADRANT OF LEFT BREAST IN FEMALE, ESTROGEN RECEPTOR POSITIVE (HCC): ICD-10-CM

## 2024-02-01 DIAGNOSIS — D68.69 SECONDARY HYPERCOAGULABLE STATE (HCC): ICD-10-CM

## 2024-02-01 DIAGNOSIS — E78.2 MIXED HYPERLIPIDEMIA: ICD-10-CM

## 2024-02-01 DIAGNOSIS — E66.01 SEVERE OBESITY (BMI 35.0-39.9) WITH COMORBIDITY (HCC): ICD-10-CM

## 2024-02-01 DIAGNOSIS — I48.20 CHRONIC ATRIAL FIBRILLATION (HCC): ICD-10-CM

## 2024-02-01 DIAGNOSIS — R73.01 IFG (IMPAIRED FASTING GLUCOSE): ICD-10-CM

## 2024-02-01 DIAGNOSIS — F10.29 ALCOHOL DEPENDENCE WITH UNSPECIFIED ALCOHOL-INDUCED DISORDER (HCC): ICD-10-CM

## 2024-02-01 ASSESSMENT — LIFESTYLE VARIABLES
HOW OFTEN DURING THE LAST YEAR HAVE YOU NEEDED AN ALCOHOLIC DRINK FIRST THING IN THE MORNING TO GET YOURSELF GOING AFTER A NIGHT OF HEAVY DRINKING: 0
HOW MANY STANDARD DRINKS CONTAINING ALCOHOL DO YOU HAVE ON A TYPICAL DAY: 1 OR 2
HAVE YOU OR SOMEONE ELSE BEEN INJURED AS A RESULT OF YOUR DRINKING: 0
HOW OFTEN DURING THE LAST YEAR HAVE YOU BEEN UNABLE TO REMEMBER WHAT HAPPENED THE NIGHT BEFORE BECAUSE YOU HAD BEEN DRINKING: 0
HOW OFTEN DURING THE LAST YEAR HAVE YOU FAILED TO DO WHAT WAS NORMALLY EXPECTED FROM YOU BECAUSE OF DRINKING: 0
HOW OFTEN DO YOU HAVE A DRINK CONTAINING ALCOHOL: 4 OR MORE TIMES A WEEK
HOW OFTEN DURING THE LAST YEAR HAVE YOU HAD A FEELING OF GUILT OR REMORSE AFTER DRINKING: 0
HAS A RELATIVE, FRIEND, DOCTOR, OR ANOTHER HEALTH PROFESSIONAL EXPRESSED CONCERN ABOUT YOUR DRINKING OR SUGGESTED YOU CUT DOWN: 0
HOW OFTEN DURING THE LAST YEAR HAVE YOU FOUND THAT YOU WERE NOT ABLE TO STOP DRINKING ONCE YOU HAD STARTED: 0

## 2024-02-01 ASSESSMENT — PATIENT HEALTH QUESTIONNAIRE - PHQ9
SUM OF ALL RESPONSES TO PHQ QUESTIONS 1-9: 0
SUM OF ALL RESPONSES TO PHQ QUESTIONS 1-9: 0
2. FEELING DOWN, DEPRESSED OR HOPELESS: 0
SUM OF ALL RESPONSES TO PHQ QUESTIONS 1-9: 0
SUM OF ALL RESPONSES TO PHQ QUESTIONS 1-9: 0
SUM OF ALL RESPONSES TO PHQ9 QUESTIONS 1 & 2: 0
1. LITTLE INTEREST OR PLEASURE IN DOING THINGS: 0

## 2024-02-01 NOTE — PROGRESS NOTES
Assessment/Plan:    Meagan was seen today for medicare awv.    Diagnoses and all orders for this visit:    Initial Medicare annual wellness visit    Localized osteoarthritis of right knee  -     External Referral To Orthopedic Surgery   As per Dr Swanson's note, pt will need stress test prior to surg. Pt will be referred back to him for pre op cardiac clearance.    Severe obesity (BMI 35.0-39.9) with comorbidity (HCC)   Wt has increased, due to combination of excess fluid/ascites, limited mobility.    Alcoholic cirrhosis of liver with ascites (HCC)  -     External Referral To Occupational Therapy   Pt has mild memory impairment, hx of encephalopathy. Cont lactulose.     Chronic systolic (congestive) heart failure (HCC)   Pt is adherent with meds and chf is compensated.    Alcohol dependence with unspecified alcohol-induced disorder (HCC)   Pt conts to consume alcohol but in limited quantities. Pt's family has come to terms with fact that she is unable to stop and that her health has been fairly stable with limited access to etoh.   Ideally, pt will cease etoh intake. Labs reviewed with pt, including progressive  macrocytosis with YWZ=956.3.    Malignant neoplasm of upper-inner quadrant of left breast in female, estrogen receptor positive (HCC)   Cont annual breast surgeon and oncology f/u since 2019 dx. Pt conts on arimidex.    IFG (impaired fasting glucose)  -     Hemoglobin A1C; Future    Mixed hyperlipidemia  -     Lipid Panel; Future    Chronic atrial fibrillation (HCC)   Pt is tolerating eliquis w/o bleeding. Pt has baseline risk for bleeding 2/2 liver dz, increased INR. PT has not had falls, uses rolling walker routinely.    Secondary hypercoagulable state (HCC)   2/2 chronic AF    General weakness   2/2 alcoholic liver dz  -     External Referral To Occupational Therapy at [facility]        Patient Instructions        Learning About Mild Cognitive Impairment (MCI)  What is mild cognitive impairment

## 2024-02-01 NOTE — PATIENT INSTRUCTIONS
high blood pressure, and high cholesterol. If you think you may have a problem with alcohol or drug use, talk to your doctor.   Medicines    Take your medicines exactly as prescribed. Call your doctor if you think you are having a problem with your medicine.     If your doctor recommends aspirin, take the amount directed each day. Make sure you take aspirin and not another kind of pain reliever, such as acetaminophen (Tylenol).   When should you call for help?   Call 911 if you have symptoms of a heart attack. These may include:    Chest pain or pressure, or a strange feeling in the chest.     Sweating.     Shortness of breath.     Pain, pressure, or a strange feeling in the back, neck, jaw, or upper belly or in one or both shoulders or arms.     Lightheadedness or sudden weakness.     A fast or irregular heartbeat.   After you call 911, the  may tell you to chew 1 adult-strength or 2 to 4 low-dose aspirin. Wait for an ambulance. Do not try to drive yourself.  Watch closely for changes in your health, and be sure to contact your doctor if you have any problems.  Where can you learn more?  Go to https://www.[employer]net/patientEd and enter F075 to learn more about \"A Healthy Heart: Care Instructions.\"  Current as of: June 25, 2023               Content Version: 13.9  © 9870-3046 [employer].   Care instructions adapted under license by [employer]. If you have questions about a medical condition or this instruction, always ask your healthcare professional. [employer] disclaims any warranty or liability for your use of this information.      Personalized Preventive Plan for Meagan Santacruz - 2/1/2024  Medicare offers a range of preventive health benefits. Some of the tests and screenings are paid in full while other may be subject to a deductible, co-insurance, and/or copay.    Some of these benefits include a comprehensive review of your medical history including lifestyle,

## 2024-02-15 RX ORDER — PANTOPRAZOLE SODIUM 40 MG/1
TABLET, DELAYED RELEASE ORAL
Qty: 90 TABLET | Refills: 3 | Status: SHIPPED | OUTPATIENT
Start: 2024-02-15

## 2024-02-15 NOTE — TELEPHONE ENCOUNTER
Last Office Visit  -  2/1/24  Next Office Visit  -  8/5/24    Last Filled  -  4/3/23  Last UDS -    Contract -

## 2024-02-16 ENCOUNTER — TELEPHONE (OUTPATIENT)
Dept: FAMILY MEDICINE CLINIC | Age: 69
End: 2024-02-16

## 2024-02-16 NOTE — TELEPHONE ENCOUNTER
Patient called office today. She states that [facility] needs her OT orders faxed to them. Pt states that she has talked to Dr. Navas about this at last visit.    Fax # [phone]

## 2024-03-07 RX ORDER — SPIRONOLACTONE 25 MG/1
25 TABLET ORAL DAILY
Qty: 90 TABLET | Refills: 0 | Status: SHIPPED | OUTPATIENT
Start: 2024-03-07

## 2024-03-07 NOTE — TELEPHONE ENCOUNTER
Last Office Visit  -  2/1/24  Next Office Visit  -  8/5/24    Last Filled  -  12/18/23  Last UDS -    Contract -

## 2024-04-01 RX ORDER — ANASTROZOLE 1 MG/1
1 TABLET ORAL DAILY
Qty: 90 TABLET | Refills: 3 | Status: SHIPPED | OUTPATIENT
Start: 2024-04-01

## 2024-04-05 RX ORDER — POTASSIUM CHLORIDE 20 MEQ/1
20 TABLET, EXTENDED RELEASE ORAL DAILY
Qty: 30 TABLET | Refills: 5 | Status: SHIPPED | OUTPATIENT
Start: 2024-04-05 | End: 2024-04-07

## 2024-04-05 NOTE — TELEPHONE ENCOUNTER
Last Office Visit  -  2/1/24  Next Office Visit  -  8/3/24    Last Filled  -    Last UDS -    Contract -

## 2024-04-07 DIAGNOSIS — G31.2 ALCOHOLIC ENCEPHALOPATHY (HCC): ICD-10-CM

## 2024-04-07 RX ORDER — POTASSIUM CHLORIDE 20 MEQ/1
20 TABLET, EXTENDED RELEASE ORAL DAILY
Qty: 90 TABLET | Refills: 0 | Status: SHIPPED | OUTPATIENT
Start: 2024-04-07

## 2024-04-07 RX ORDER — LACTULOSE 10 G/15ML
SOLUTION ORAL
Qty: 900 ML | Refills: 3 | Status: SHIPPED | OUTPATIENT
Start: 2024-04-07

## 2024-04-12 ENCOUNTER — TELEPHONE (OUTPATIENT)
Dept: FAMILY MEDICINE CLINIC | Age: 69
End: 2024-04-12

## 2024-04-12 DIAGNOSIS — S09.90XA CLOSED HEAD INJURY, INITIAL ENCOUNTER: ICD-10-CM
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
## 2024-04-12 DIAGNOSIS — Z92.29 HX OF LONG TERM USE OF BLOOD THINNERS: ICD-10-CM

## 2024-04-12 NOTE — TELEPHONE ENCOUNTER
Daughter called with concerns about her mother's condition, and that her mother had a fall at the nursing home at 430am on Wednesday morning. Daughter states that her mother has been sleeping a lot lately and not eating for the past two days. Pt refuses to go to hospital and refuses to have a squad come out. Daughter states that she doesn't know what to do and needs some advise.

## 2024-04-12 NOTE — TELEPHONE ENCOUNTER
Pls tell dtr that in my opinion, it's important that her mom goes to the ED for an eval today. She is on a blood thinner, and a CT head is needed to rule out a possible brain bleed since she had a fall. Her mom has the right to refuse an evaluation, but I believe that would be an unwise decision.     Pls let dtr know that you will call Meagan to let her know that we agree that an ED visit is necessary, and pls do so.    If Meagan still refuses, pls call [facility], where pt lives. Pls ask facility if their house doctor can evaluate Meagan. Thx.

## 2024-04-12 NOTE — TELEPHONE ENCOUNTER
I have placed an order, but my best recommendation is for pt to go to ED bc scans done in ED are read immediately. We have a shortage of radiologists, and outpatient scans can take up to 7 days to get read. It pt absolutely refuses to go to ED (where she could also have her foot evaluated), dtr can call [facility] to schedule CT head.    Here's a thought--can the nursing home call 911 so that the dtr doesn't have to do it?

## 2024-04-12 NOTE — TELEPHONE ENCOUNTER
Pt daughter notified. I again stressed the importance of going to ED for evaluation. Daughter did not want 911 called.

## 2024-04-12 NOTE — TELEPHONE ENCOUNTER
I called the daughter and she said that she knows her mother will not go to the ED. The daughter said that she will be taking her later to the ortho after hours clinic because her foot is all black and blue and swollen and she thinks it may be broken. The daughter assumes this is from the fall, but the pt can not remember what happened.  I stressed the importance of having a head scan done to rule out a brain bleed, but daughter insists her mother would not agree to go to ED.

## 2024-04-12 NOTE — TELEPHONE ENCOUNTER
Pt's daughter called back. She said she spoke with the staff at [facility] and they only have an NP on staff, but that she will only see residents that see her on a regular basis. She will not see pt for eval. Daughter is asking if Dr Navas will order a CT scan.

## 2024-04-15 ENCOUNTER — TELEPHONE (OUTPATIENT)
Dept: FAMILY MEDICINE CLINIC | Age: 69
End: 2024-04-15

## 2024-04-15 NOTE — TELEPHONE ENCOUNTER
Pts daughter called and states pt received a jury duty summons. Daughter asking for medical excuse note. Pt does not drive and is unfit to be sitting on a jury. Please advise

## 2024-04-15 NOTE — TELEPHONE ENCOUNTER
Daughter called back and states she took pt to ortho [facility] Friday night. She states that she \"shattered\" her left foot. She states her and pt are going back to meet with orthopedic surgeon tomorrow to make a plan.

## 2024-04-16 ENCOUNTER — TRANSCRIBE ORDERS (OUTPATIENT)
Dept: ADMINISTRATIVE | Age: 69
End: 2024-04-16

## 2024-04-16 DIAGNOSIS — S92.352A CLOSED DISPLACED FRACTURE OF FIFTH METATARSAL BONE OF LEFT FOOT, INITIAL ENCOUNTER: ICD-10-CM

## 2024-04-16 DIAGNOSIS — S92.415A CLOSED NONDISPLACED FRACTURE OF PROXIMAL PHALANX OF LEFT GREAT TOE, INITIAL ENCOUNTER: Primary | ICD-10-CM

## 2024-04-16 DIAGNOSIS — S92.325A CLOSED NONDISPLACED FRACTURE OF SECOND METATARSAL BONE OF LEFT FOOT, INITIAL ENCOUNTER: ICD-10-CM

## 2024-04-18 ENCOUNTER — TELEPHONE (OUTPATIENT)
Dept: FAMILY MEDICINE CLINIC | Age: 69
End: 2024-04-18

## 2024-04-18 NOTE — TELEPHONE ENCOUNTER
Miriam from [facility] called at 6:28 p.m to inform  that Meagan had two falls in 7 days. First fall resulted in a fractured foot. The second no injury Miriam has noticed some increased weakness and wanted to know if the DFredy Wanted to draw some Labs?   Advise

## 2024-04-19 ENCOUNTER — HOSPITAL ENCOUNTER (OUTPATIENT)
Dept: CT IMAGING | Age: 69
Discharge: HOME | End: 2024-04-19
Attending: FAMILY MEDICINE
Payer: MEDICARE
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
## 2024-04-19 DIAGNOSIS — S92.352A CLOSED DISPLACED FRACTURE OF FIFTH METATARSAL BONE OF LEFT FOOT, INITIAL ENCOUNTER: ICD-10-CM

## 2024-04-19 DIAGNOSIS — Z92.29 HX OF LONG TERM USE OF BLOOD THINNERS: ICD-10-CM

## 2024-04-19 DIAGNOSIS — S92.325A CLOSED NONDISPLACED FRACTURE OF SECOND METATARSAL BONE OF LEFT FOOT, INITIAL ENCOUNTER: ICD-10-CM

## 2024-04-19 DIAGNOSIS — S92.415A CLOSED NONDISPLACED FRACTURE OF PROXIMAL PHALANX OF LEFT GREAT TOE, INITIAL ENCOUNTER: ICD-10-CM

## 2024-04-19 DIAGNOSIS — S09.90XA CLOSED HEAD INJURY, INITIAL ENCOUNTER: ICD-10-CM

## 2024-04-19 PROCEDURE — 70450 CT HEAD/BRAIN W/O DYE: CPT

## 2024-04-19 PROCEDURE — 73700 CT LOWER EXTREMITY W/O DYE: CPT

## 2024-06-05 RX ORDER — SPIRONOLACTONE 25 MG/1
25 TABLET ORAL DAILY
Qty: 90 TABLET | Refills: 1 | Status: SHIPPED | OUTPATIENT
Start: 2024-06-05

## 2024-06-05 RX ORDER — ATORVASTATIN CALCIUM 20 MG/1
TABLET, FILM COATED ORAL
Qty: 30 TABLET | Refills: 5 | Status: SHIPPED | OUTPATIENT
Start: 2024-06-05 | End: 2024-06-06 | Stop reason: SDUPTHER

## 2024-06-05 NOTE — TELEPHONE ENCOUNTER
Last Office Visit  -  2/1/24  Next Office Visit  -  8/5/24    Last Filled  -  12/20/23  Last UDS -    Contract -

## 2024-06-05 NOTE — TELEPHONE ENCOUNTER
Last Office Visit  -  02/012024  Next Office Visit  -  08/05/2024    Last Filled  -    Last UDS -    Contract -

## 2024-06-06 RX ORDER — ATORVASTATIN CALCIUM 20 MG/1
20 TABLET, FILM COATED ORAL NIGHTLY
Qty: 90 TABLET | Refills: 3 | Status: SHIPPED | OUTPATIENT
Start: 2024-06-06

## 2024-06-06 NOTE — TELEPHONE ENCOUNTER
[facility] is requesting a 90 day rx for     atorvastatin (LIPITOR) 20 MG tablet       Last OV 2/1/2024    Future OV 8/5/2024

## 2024-06-18 RX ORDER — POTASSIUM CHLORIDE 20 MEQ/1
20 TABLET, EXTENDED RELEASE ORAL DAILY
Qty: 90 TABLET | Refills: 0 | Status: SHIPPED | OUTPATIENT
Start: 2024-06-18

## 2024-06-18 NOTE — TELEPHONE ENCOUNTER
Last Office Visit  -  2/1/24  Next Office Visit  -  8/5/24    Last Filled  -  4/7/24  Last UDS -    Contract -

## 2024-06-18 NOTE — TELEPHONE ENCOUNTER
[facility] is requesting a rx for potassium chloride (KLOR-CON M) 20 MEQ extended release table .    Last OV 02/1/2024    Next OV 08/05/2024

## 2024-06-27 ENCOUNTER — TELEPHONE (OUTPATIENT)
Dept: FAMILY MEDICINE CLINIC | Age: 69
End: 2024-06-27

## 2024-06-27 NOTE — TELEPHONE ENCOUNTER
Yaneli from the nursing facility phone number [phone].     Calling because patient went out with her friends for lunch and had 4 alcoholic beverages. Patients friend states she is concerned because patient was slurring her words and took a spill. Friend states patient is not hurt nor did she hit her head. Yaneli from the facility states she did not know if the patient was on medication that contradicted with alcohol consumption...

## 2024-06-27 NOTE — TELEPHONE ENCOUNTER
Pls tell nurse that none of her meds conflict with alcohol, but it'd be best if pt did not drink. I don't expect this to happen, however.

## 2024-07-09 RX ORDER — URSODIOL 500 MG/1
1000 TABLET, FILM COATED ORAL 2 TIMES DAILY
Qty: 90 TABLET | Refills: 0 | Status: SHIPPED | OUTPATIENT
Start: 2024-07-09

## 2024-07-09 NOTE — TELEPHONE ENCOUNTER
Last Office Visit  -  2/1/24  Next Office Visit  -  8/5/24    Last Filled  -    Last UDS -    Contract -

## 2024-07-09 NOTE — TELEPHONE ENCOUNTER
Pt's daughter requesting refill    ursodiol (ACTIGALL) 500 MG tablet     [facility] [address]

## 2024-07-15 RX ORDER — URSODIOL 500 MG/1
1000 TABLET, FILM COATED ORAL 2 TIMES DAILY
Qty: 90 TABLET | Refills: 0 | OUTPATIENT
Start: 2024-07-15

## 2024-08-05 RX ORDER — URSODIOL 500 MG/1
1000 TABLET, FILM COATED ORAL 2 TIMES DAILY
Qty: 120 TABLET | Refills: 5 | Status: SHIPPED | OUTPATIENT
Start: 2024-08-05

## 2024-08-09 RX ORDER — FUROSEMIDE 40 MG/1
40 TABLET ORAL DAILY
Qty: 60 TABLET | Refills: 0 | Status: SHIPPED | OUTPATIENT
Start: 2024-08-09

## 2024-08-09 NOTE — TELEPHONE ENCOUNTER
Last Office Visit  -  2/1/24  Next Office Visit  -  9/30/24    Last Filled  -  11/7/23  Last UDS -    Contract -

## 2024-09-04 RX ORDER — SPIRONOLACTONE 25 MG/1
25 TABLET ORAL DAILY
Qty: 90 TABLET | Refills: 3 | Status: SHIPPED | OUTPATIENT
Start: 2024-09-04

## 2024-09-09 DIAGNOSIS — I48.0 PAROXYSMAL ATRIAL FIBRILLATION (HCC): ICD-10-CM

## 2024-09-09 RX ORDER — APIXABAN 5 MG/1
5 TABLET, FILM COATED ORAL 2 TIMES DAILY
Qty: 180 TABLET | Refills: 3 | Status: SHIPPED | OUTPATIENT
Start: 2024-09-09

## 2024-09-10 RX ORDER — POTASSIUM CHLORIDE 1500 MG/1
20 TABLET, EXTENDED RELEASE ORAL DAILY
Qty: 90 TABLET | Refills: 0 | Status: SHIPPED | OUTPATIENT
Start: 2024-09-10

## 2024-09-25 DIAGNOSIS — M81.8 AGE-RELATED OSTEOPOROSIS WITHOUT FRACTURE: ICD-10-CM

## 2024-09-25 RX ORDER — ALENDRONATE SODIUM 70 MG/1
70 TABLET ORAL
Qty: 12 TABLET | Refills: 3 | Status: SHIPPED | OUTPATIENT
Start: 2024-09-25

## 2024-09-30 ENCOUNTER — OFFICE VISIT (OUTPATIENT)
Dept: FAMILY MEDICINE CLINIC | Age: 69
End: 2024-09-30
Payer: MEDICARE

## 2024-09-30 VITALS
HEART RATE: 87 BPM | WEIGHT: 184 LBS | OXYGEN SATURATION: 97 % | DIASTOLIC BLOOD PRESSURE: 70 MMHG | BODY MASS INDEX: 37.09 KG/M2 | SYSTOLIC BLOOD PRESSURE: 110 MMHG | HEIGHT: 59 IN

## 2024-09-30 DIAGNOSIS — Z23 NEEDS FLU SHOT: ICD-10-CM

## 2024-09-30 DIAGNOSIS — R06.4 LABORED BREATHING: ICD-10-CM

## 2024-09-30 DIAGNOSIS — K70.31 ALCOHOLIC CIRRHOSIS OF LIVER WITH ASCITES (HCC): ICD-10-CM

## 2024-09-30 DIAGNOSIS — K70.31 ALCOHOLIC CIRRHOSIS OF LIVER WITH ASCITES (HCC): Primary | ICD-10-CM

## 2024-09-30 DIAGNOSIS — G31.2 ALCOHOLIC ENCEPHALOPATHY (HCC): ICD-10-CM

## 2024-09-30 DIAGNOSIS — M17.11 LOCALIZED OSTEOARTHRITIS OF RIGHT KNEE: ICD-10-CM

## 2024-09-30 PROBLEM — J96.91 RESPIRATORY FAILURE WITH HYPOXIA: Status: RESOLVED | Noted: 2021-04-19 | Resolved: 2024-09-30

## 2024-09-30 PROCEDURE — G2211 COMPLEX E/M VISIT ADD ON: HCPCS | Performed by: FAMILY MEDICINE

## 2024-09-30 PROCEDURE — 90653 IIV ADJUVANT VACCINE IM: CPT | Performed by: FAMILY MEDICINE

## 2024-09-30 PROCEDURE — 3074F SYST BP LT 130 MM HG: CPT | Performed by: FAMILY MEDICINE

## 2024-09-30 PROCEDURE — 99214 OFFICE O/P EST MOD 30 MIN: CPT | Performed by: FAMILY MEDICINE

## 2024-09-30 PROCEDURE — 1123F ACP DISCUSS/DSCN MKR DOCD: CPT | Performed by: FAMILY MEDICINE

## 2024-09-30 PROCEDURE — 3078F DIAST BP <80 MM HG: CPT | Performed by: FAMILY MEDICINE

## 2024-09-30 PROCEDURE — G0008 ADMIN INFLUENZA VIRUS VAC: HCPCS | Performed by: FAMILY MEDICINE

## 2024-09-30 RX ORDER — LACTULOSE 10 G/15ML
SOLUTION ORAL
Qty: 900 ML | Refills: 3 | Status: SHIPPED | OUTPATIENT
Start: 2024-09-30

## 2024-09-30 RX ORDER — URSODIOL 500 MG/1
1000 TABLET, FILM COATED ORAL 2 TIMES DAILY
Qty: 140 TABLET | Refills: 5 | Status: SHIPPED | OUTPATIENT
Start: 2024-09-30

## 2024-09-30 SDOH — ECONOMIC STABILITY: INCOME INSECURITY: HOW HARD IS IT FOR YOU TO PAY FOR THE VERY BASICS LIKE FOOD, HOUSING, MEDICAL CARE, AND HEATING?: NOT HARD AT ALL

## 2024-09-30 SDOH — ECONOMIC STABILITY: FOOD INSECURITY: WITHIN THE PAST 12 MONTHS, THE FOOD YOU BOUGHT JUST DIDN'T LAST AND YOU DIDN'T HAVE MONEY TO GET MORE.: NEVER TRUE

## 2024-09-30 SDOH — ECONOMIC STABILITY: FOOD INSECURITY: WITHIN THE PAST 12 MONTHS, YOU WORRIED THAT YOUR FOOD WOULD RUN OUT BEFORE YOU GOT MONEY TO BUY MORE.: NEVER TRUE

## 2024-09-30 NOTE — PROGRESS NOTES
Assessment/Plan:    Meagan was seen today for hypertension.    Diagnoses and all orders for this visit:    Alcoholic cirrhosis of liver with ascites (HCC)  -     Comprehensive Metabolic Panel; Future  -     CBC with Auto Differential; Future  -     Protime-INR; Future  -     ursodiol (ACTIGALL) 500 MG tablet; Take 2 tablets by mouth 2 times daily. 140 pills requested instead of 120 as dtr states she is short ~1 wk supply of med q mo.    Localized osteoarthritis of right knee   Pt is not surgical candidate. Pt benefits from using rolling walker at all times of walking or standing.    Alcoholic encephalopathy (HCC)  -     lactulose (CHRONULAC) 10 GM/15ML solution; TAKE 30 ML BY MOUTH DAILY, rf   Short term memory loss is likely combo of etoh dependence and dementia of aging.    Needs flu shot  -     Influenza, FLUAD Trivalent, (age 65 y+), IM, Preservative Free, 0.5mL    Labored breathing   Lung exam neg. SaO2 nl. Dtr reports sx's are more noticeable after etoh intake. CXR, PFT mentioned. Pt opts to follow for now, which seems very reasonable.      Patient Instructions   Please consider getting your RSV and Shingles vaccines at a local pharmacy.        Patient: Meagan Santacruz is a 68 y.o.female who presents today with the following Chief Complaint(s):  Chief Complaint   Patient presents with    Hypertension         HPI: Pt with osteoporosis without current pathological fracture, Arthritis, EtOH dependence (HCC), alcoholic cirrhosis of liver with ascites, HSV-2 (herpes simplex virus 2) infection, Hypertension, Kidney stone, and Malignant neoplasm of upper-inner quadrant of left breast in female, estrogen receptor positive (HCC) was found to be in AF 12/2022 during hospitalization for weakness, lyte abnormalitiy. AF is chronic, pt had not been on AC 2/2 liver dz, high risk of bleeding. However, AC was started by Dr Swanson ~1 yr ago. Pt denies bleeding. AF is asymptomatic. Has 11/1/24 appt with Dr Swanson.    Pt had fall

## 2024-10-01 LAB
ALBUMIN SERPL-MCNC: 3.8 G/DL (ref 3.4–5)
ALBUMIN/GLOB SERPL: 1.1 {RATIO} (ref 1.1–2.2)
ALP SERPL-CCNC: 156 U/L (ref 40–129)
ALT SERPL-CCNC: 11 U/L (ref 10–40)
ANION GAP SERPL CALCULATED.3IONS-SCNC: 15 MMOL/L (ref 3–16)
AST SERPL-CCNC: 25 U/L (ref 15–37)
BASOPHILS # BLD: 0.1 K/UL (ref 0–0.2)
BASOPHILS NFR BLD: 1.1 %
BILIRUB SERPL-MCNC: 1.1 MG/DL (ref 0–1)
BUN SERPL-MCNC: 7 MG/DL (ref 7–20)
CALCIUM SERPL-MCNC: 9.6 MG/DL (ref 8.3–10.6)
CHLORIDE SERPL-SCNC: 97 MMOL/L (ref 99–110)
CO2 SERPL-SCNC: 24 MMOL/L (ref 21–32)
CREAT SERPL-MCNC: 0.7 MG/DL (ref 0.6–1.2)
DEPRECATED RDW RBC AUTO: 14.3 % (ref 12.4–15.4)
EOSINOPHIL # BLD: 0.1 K/UL (ref 0–0.6)
EOSINOPHIL NFR BLD: 1.1 %
GFR SERPLBLD CREATININE-BSD FMLA CKD-EPI: >90 ML/MIN/{1.73_M2}
GLUCOSE SERPL-MCNC: 97 MG/DL (ref 70–99)
HCT VFR BLD AUTO: 41.4 % (ref 36–48)
HGB BLD-MCNC: 14.3 G/DL (ref 12–16)
INR PPP: 1.42 (ref 0.85–1.15)
LYMPHOCYTES # BLD: 2 K/UL (ref 1–5.1)
LYMPHOCYTES NFR BLD: 30.3 %
MCH RBC QN AUTO: 38.9 PG (ref 26–34)
MCHC RBC AUTO-ENTMCNC: 34.5 G/DL (ref 31–36)
MCV RBC AUTO: 112.9 FL (ref 80–100)
MONOCYTES # BLD: 0.7 K/UL (ref 0–1.3)
MONOCYTES NFR BLD: 11.3 %
NEUTROPHILS # BLD: 3.7 K/UL (ref 1.7–7.7)
NEUTROPHILS NFR BLD: 56.2 %
PATH INTERP BLD-IMP: NO
PLATELET # BLD AUTO: 186 K/UL (ref 135–450)
PMV BLD AUTO: 9.4 FL (ref 5–10.5)
POTASSIUM SERPL-SCNC: 3.3 MMOL/L (ref 3.5–5.1)
PROT SERPL-MCNC: 7.3 G/DL (ref 6.4–8.2)
PROTHROMBIN TIME: 17.5 SEC (ref 11.9–14.9)
RBC # BLD AUTO: 3.67 M/UL (ref 4–5.2)
SODIUM SERPL-SCNC: 136 MMOL/L (ref 136–145)
WBC # BLD AUTO: 6.6 K/UL (ref 4–11)

## 2024-10-01 RX ORDER — POTASSIUM CHLORIDE 1500 MG/1
TABLET, EXTENDED RELEASE ORAL
Qty: 108 TABLET | Refills: 3 | Status: SHIPPED | OUTPATIENT
Start: 2024-10-01

## 2024-10-02 NOTE — DISCHARGE INSTR - COC
Clarified with breast ctr since due for yearly and 6 mos diagnostic follow up     Order pended     LOV 6/12/24   NOV 1/20/25    Continuity of Care Form    Patient Name: Janay Mcghee   :  1955  MRN:  [id number]    Admit date:  2022  Discharge date:  2023    Code Status Order: Full Code   Advance Directives:     Admitting Physician:  No admitting provider for patient encounter. PCP: Laura Torres MD    Discharging Nurse: Bonilla Sal [facility]/Room#: 1901/4019-39  Discharging Unit Phone Number: [phone]    Emergency Contact:   Extended Emergency Contact Information  Primary Emergency Contact: [address] Phone: [phone]  Mobile Phone: [phone]  Relation: Child    Past Surgical History:  Past Surgical History:   Procedure Laterality Date    COLONOSCOPY  1999    Rectal ulcer, s/p cautery    COLONOSCOPY  11    next due 10 yrs    [address] ([address] [facility])  1998    Ovaries in. Had fibroids, DUB.     MASTECTOMY Left 10/8/2019    LEFT BREAST NEEDLE LOCALIZATION, PARTIAL MASTECTOMY performed by Asad Knowles MD at [address] Left 2019    LEFT TOTAL KNEE REPLACEMENT            [facility]MAN [employer] NEPHEW performed by Chi Sierra MD at . [address] N/A 2022    EGD W/EUS FNA performed by Maribel Figueredo MD at [address] [facility]       Immunization History:   Immunization History   Administered Date(s) Administered    COVID-19, PFIZER PURPLE top, DILUTE for use, (age 15 y+), 30mcg/0.3mL 2021    Influenza Virus Vaccine 10/01/2018    Influenza, FLUAD, (age 72 y+), Adjuvanted, 0.5mL 2022    Influenza, FLUZONE (age 72 y+), High Dose, 0.7mL 2020    Influenza, High Dose (Fluzone 65 yrs and older) 2018, 2019, 2019    Influenza, Intradermal, Preservative free 10/07/2015    Pneumococcal Conjugate 13-valent (Ogrdqfd16) 2019    Pneumococcal Polysaccharide (Dxwqqzzwj22) 2021    Tdap (Boostrix, Adacel) 10/07/2015       Active Problems:  Patient Active Problem List   Diagnosis Code    Benign essential HTN I10    Status post total left knee replacement Z96.652    Malignant neoplasm of upper-inner quadrant of left breast in female, estrogen receptor positive ([facility] C50.212, Z17.0    Abnormal mammogram R92.8    Hypokalemia E87.6    Hypomagnesemia E83.42    Bandemia D72.825    Respiratory failure with hypoxia (HCC) J96.91    Alcohol dependence with unspecified alcohol-induced disorder (HCC) F10.29    Acute hyponatremia E87.1    Macrocytic anemia D53.9    Thrombocytopenia (HCC) D69.6    Elevated LFTs R79.89    Hyperbilirubinemia E80.6    Anasarca D59.0    Alcoholic cirrhosis of liver with ascites (HCC) K70.31    Pancreatic duct dilated K86.89    Paroxysmal atrial fibrillation (HCC) I48.0    Morbid obesity with BMI of 40.0-44.9, adult (HCC) E66.01, Z68.41    Suspected sleep apnea R29.818       Isolation/Infection:   Isolation            No Isolation          Patient Infection Status       Infection Onset Added Last Indicated Last Indicated By Review Planned Expiration Resolved Resolved By    None active    Resolved    COVID-19 (Rule Out) 12/16/22 12/16/22 12/17/22 COVID-19 & Influenza Combo (Ordered)   12/17/22 Rule-Out Test Resulted    COVID-19 (Rule Out) 04/20/21 04/20/21 04/20/21 COVID-19 (Ordered)   04/21/21 Rule-Out Test Resulted    COVID-19 (Rule Out) 04/19/21 04/19/21 04/19/21 COVID-19, Rapid (Ordered)   04/19/21 Rule-Out Test Resulted            Nurse Assessment:  Last Vital Signs: BP (!) 140/87   Pulse 98   Temp 97.8 °F (36.6 °C) (Oral)   Resp 18   Ht 5' 2\" (1.575 m)   Wt 215 lb 9.8 oz (97.8 kg)   LMP  (LMP Unknown)   SpO2 98%   BMI 39.44 kg/m²     Last documented pain score (0-10 scale): Pain Level: 0  Last Weight:   Wt Readings from Last 1 Encounters:   01/03/23 215 lb 9.8 oz (97.8 kg)     Mental Status:  oriented, alert, and intermittent confusion     IV Access:  - None    Nursing Mobility/ADLs:  Walking   Assisted  Transfer Assisted  Bathing  Assisted  Dressing  Assisted  Toileting  Assisted  Feeding  Assisted  Med Admin  Assisted  Med Delivery   whole    Wound Care Documentation and Therapy:  Wound 12/17/22 Rectum moisture associated/pressure wound from perianal area to coccyx  (Active)   Dressing Status Other (Comment) 01/03/23 0834   Wound Cleansed Not Cleansed 01/03/23 0834   Dressing/Treatment Moisture barrier 01/03/23 0834   Wound Assessment Pink/red 01/03/23 0834   Drainage Amount Small 01/03/23 0834   Drainage Description Serosanguinous 01/01/23 1956   Odor None 01/03/23 0834   Chandrika-wound Assessment Blanchable erythema 01/03/23 0834   Margins Undefined edges 01/03/23 0834   Number of days: 17        Elimination:  Continence: Bowel: Yes  Bladder: Yes  Urinary Catheter: None   Colostomy/Ileostomy/Ileal Conduit: No       Date of Last BM: 1/4/2023    Intake/Output Summary (Last 24 hours) at 1/3/2023 1035  Last data filed at 1/3/2023 0311  Gross per 24 hour   Intake 480 ml   Output --   Net 480 ml     I/O last 3 completed shifts: In: 5 [P.O.:720]  Out: -     Safety Concerns:     History of Falls (last 30 days) and At Risk for Falls    Impairments/Disabilities:          Nutrition Therapy:  Current Nutrition Therapy:   - Oral Diet:  General    Routes of Feeding: Oral  Liquids: 1500 mL fluid restriction   Daily Fluid Restriction: yes - amount 1500 mL  Last Modified Barium Swallow with Video (Video Swallowing Test): not done    Treatments at the Time of Hospital Discharge:   Respiratory Treatments:  none  Oxygen Therapy:  is not on home oxygen therapy.   Ventilator:    - No ventilator support    Rehab Therapies: Physical Therapy, OT   Weight Bearing Status/Restrictions: No weight bearing restrictions  Other Medical Equipment (for information only, NOT a DME order):  walker, bath bench, and bedside commode  Other Treatments: none     Patient's personal belongings (please select all that are sent with patient):  None    RN SIGNATURE: Electronically signed by Shane Uriostegui RN on 1/4/23 at 1:45 PM EST    CASE MANAGEMENT/SOCIAL WORK SECTION    Inpatient Status Date: 12/17/22    Readmission Risk Assessment Score:  Readmission Risk              Risk of Unplanned Readmission:  26           Discharging to Facility/ [address]    / signature: Electronically signed by Jeremy Cowan RN on 1/4/23 at 10:58 AM EST    PHYSICIAN SECTION    Prognosis: Good    Condition at Discharge: Stable    Rehab Potential (if transferring to Rehab): Fair    Recommended Labs or Other Treatments After Discharge: Follow-up with PCP in 1 week  Follow-up with GI, Dr. Adina Mckeon, as soon as possible   Follow-up with Heme/Onc, Dr. Guillermina Castro, as soon as possible  PT/OT/SN  Fluid restriction of 1500 mL    Physician Certification: I certify the above information and transfer of Mani Vaughn  is necessary for the continuing treatment of the diagnosis listed and that she requires [place] for less 30 days.      Update Admission H&P: No change in H&P    PHYSICIAN SIGNATURE:  Electronically signed by SHERI Jefferson on 1/4/23 at 10:44 AM EST

## 2024-10-07 RX ORDER — FUROSEMIDE 40 MG
40 TABLET ORAL DAILY
Qty: 60 TABLET | Refills: 5 | Status: SHIPPED | OUTPATIENT
Start: 2024-10-07

## 2024-10-28 NOTE — PROGRESS NOTES
[facility] - FOLLOW UP        CHIEF COMPLAINT  AFib      HISTORY OF PRESENTING ILLNESS  Meagan Santacruz is a 69 y.o. patient who presents for follow up. She was referred by Kristi Navas MD for Afib. I have been asked to provide consultation regarding further management and testing. She has  medical history of HTN.     EKG during her previous hospitalization for weakness and electrolyte abnormalities on 12/17/2022 showed atrial fibrillation with slow ventricular response.  Echo 12/17/2022 EF 55-6%, SPAP 43.    LOV with Dr. Swanson, 9/25/2023, she presents with a walker. She reports she lives in a senior care center. She walks with her cane or walker daily. She reports she can walk 20 minutes without stopping if using her walker.  With this level of activity she denies chest pain/tightness/heaviness/discomfort, and shortness of breath.  She denies palpitations or lightheadedness.  She plans to have her knee replaced in the coming month.  Risks and benefits of starting anticoagulation were discussed specially in the light of her liver disease and bleeding risk.  After discussion with GI, Eliquis was recommended and started.    Today, 11/1/2024, she presents with a walker. She reports she is doing well overall. She reports she is able to walk around her nursing facility daily without limitations. Patient is taking all cardiac medications as prescribed and tolerates them well.  Denies recent issues with bleeding or bruising. Patient denies current edema, chest pain, shortness of breath, palpitations, dizziness or syncope.       Past medical, surgical, social and family history updated and necessary changes made      HOME CARDIAC MEDICATIONS  Aldactone 25  Lipitor 20  Eliquis 5 BID    ALLERGIES  Patient has no known allergies.       REVIEW OF SYSTEMS  14 point ROS done and negative other than HPI      PHYSICAL EXAMINATION    Vitals:    11/01/24 1028   BP: 108/68   Pulse: (!) 104   SpO2: 97%      Weight -

## 2024-11-01 ENCOUNTER — OFFICE VISIT (OUTPATIENT)
Dept: CARDIOLOGY CLINIC | Age: 69
End: 2024-11-01
Payer: MEDICARE

## 2024-11-01 VITALS
SYSTOLIC BLOOD PRESSURE: 108 MMHG | HEIGHT: 59 IN | BODY MASS INDEX: 36.08 KG/M2 | OXYGEN SATURATION: 97 % | DIASTOLIC BLOOD PRESSURE: 68 MMHG | HEART RATE: 104 BPM | WEIGHT: 179 LBS

## 2024-11-01 DIAGNOSIS — I50.22 CHRONIC SYSTOLIC (CONGESTIVE) HEART FAILURE (HCC): ICD-10-CM

## 2024-11-01 DIAGNOSIS — I48.0 PAROXYSMAL ATRIAL FIBRILLATION (HCC): Primary | ICD-10-CM

## 2024-11-01 DIAGNOSIS — I10 BENIGN ESSENTIAL HTN: ICD-10-CM

## 2024-11-01 PROCEDURE — 99213 OFFICE O/P EST LOW 20 MIN: CPT | Performed by: INTERNAL MEDICINE

## 2024-11-01 PROCEDURE — 3078F DIAST BP <80 MM HG: CPT | Performed by: INTERNAL MEDICINE

## 2024-11-01 PROCEDURE — 1159F MED LIST DOCD IN RCRD: CPT | Performed by: INTERNAL MEDICINE

## 2024-11-01 PROCEDURE — 3074F SYST BP LT 130 MM HG: CPT | Performed by: INTERNAL MEDICINE

## 2024-11-01 PROCEDURE — 1123F ACP DISCUSS/DSCN MKR DOCD: CPT | Performed by: INTERNAL MEDICINE

## 2024-11-01 NOTE — PATIENT INSTRUCTIONS
PLAN:  Continue current cardiac medications eliquis 5 mg twice daily, lipitor 20 mg daily, spironolactone 25 mg daily,   No further cardiac testing at this time  Follow up in 1 year

## 2024-11-18 DIAGNOSIS — B00.9 HSV-2 (HERPES SIMPLEX VIRUS 2) INFECTION: ICD-10-CM

## 2024-11-18 RX ORDER — VALACYCLOVIR HYDROCHLORIDE 500 MG/1
TABLET, FILM COATED ORAL
Qty: 90 TABLET | Refills: 3 | Status: SHIPPED | OUTPATIENT
Start: 2024-11-18

## 2024-11-18 NOTE — TELEPHONE ENCOUNTER
[facility] is requesting a rx for     valACYclovir (VALTREX) 500 MG tablet      Last OV 9/30/2024      Next OV 3/31/2025

## 2024-11-18 NOTE — TELEPHONE ENCOUNTER
Last Office Visit  -  9/30/24  Next Office Visit  -  3/31/25    Last Filled  -  12/13/23  Last UDS -    Contract -

## 2024-12-04 RX ORDER — POTASSIUM CHLORIDE 1500 MG/1
TABLET, EXTENDED RELEASE ORAL
Qty: 90 TABLET | Refills: 3 | Status: SHIPPED | OUTPATIENT
Start: 2024-12-04

## 2024-12-04 NOTE — TELEPHONE ENCOUNTER
Last Office Visit  -  9/30/24  Next Office Visit  -  3/31/25    Last Filled  -  10/1/24  Last UDS -    Contract -

## 2025-01-13 RX ORDER — PANTOPRAZOLE SODIUM 40 MG/1
TABLET, DELAYED RELEASE ORAL
Qty: 90 TABLET | Refills: 3 | Status: SHIPPED | OUTPATIENT
Start: 2025-01-13

## 2025-01-13 NOTE — TELEPHONE ENCOUNTER
Last Office Visit  -  9/30/24  Next Office Visit  -  3/31/25    Last Filled  -  2/15/24  Last UDS -    Contract -

## 2025-02-12 DIAGNOSIS — G31.2 ALCOHOLIC ENCEPHALOPATHY (HCC): ICD-10-CM

## 2025-02-12 RX ORDER — LACTULOSE 10 G/15ML
SOLUTION ORAL
Qty: 900 ML | Refills: 3 | Status: SHIPPED | OUTPATIENT
Start: 2025-02-12

## 2025-02-12 NOTE — TELEPHONE ENCOUNTER
Last Office Visit  -  9/30/24  Next Office Visit  -  3/31/25    Last Filled  -  9/30/24  Last UDS -    Contract -

## 2025-02-12 NOTE — TELEPHONE ENCOUNTER
[facility] is requesting a rx for     lactulose (CHRONULAC) 10 GM/15ML solution     Last OV 9/30/2024      Next OV 3/31/2025

## 2025-03-26 ENCOUNTER — TELEPHONE (OUTPATIENT)
Dept: FAMILY MEDICINE CLINIC | Age: 70
End: 2025-03-26

## 2025-03-26 RX ORDER — ANASTROZOLE 1 MG/1
1 TABLET ORAL DAILY
Qty: 90 TABLET | Refills: 3 | Status: SHIPPED | OUTPATIENT
Start: 2025-03-26

## 2025-03-26 NOTE — TELEPHONE ENCOUNTER
Pt's daughter called to inform that the pt fell at 2am from slipping on her own feces. The pt has a number of open sores on her lower back, glutes, and back upper thighs that are more than just standard diaper rash. Pt has been applying Vaseline but daughter would like something medicated called in if possible.

## 2025-03-26 NOTE — TELEPHONE ENCOUNTER
[facility] is requesting a 90 day rx for     anastrozole (ARIMIDEX) 1 MG tablet [       Last OV 9/30/2024    Future OV 3/31/2025

## 2025-03-26 NOTE — TELEPHONE ENCOUNTER
Last Office Visit  -  9/30/24  Next Office Visit  -  3/31/25    Last Filled  -  4/1/24  Last UDS -    Contract -

## 2025-03-31 ENCOUNTER — OFFICE VISIT (OUTPATIENT)
Dept: FAMILY MEDICINE CLINIC | Age: 70
End: 2025-03-31

## 2025-03-31 VITALS
OXYGEN SATURATION: 98 % | WEIGHT: 173 LBS | HEIGHT: 59 IN | SYSTOLIC BLOOD PRESSURE: 110 MMHG | BODY MASS INDEX: 34.88 KG/M2 | DIASTOLIC BLOOD PRESSURE: 72 MMHG | HEART RATE: 96 BPM

## 2025-03-31 DIAGNOSIS — R73.01 IFG (IMPAIRED FASTING GLUCOSE): ICD-10-CM

## 2025-03-31 DIAGNOSIS — G31.2 ALCOHOLIC ENCEPHALOPATHY: ICD-10-CM

## 2025-03-31 DIAGNOSIS — K70.31 ALCOHOLIC CIRRHOSIS OF LIVER WITH ASCITES (HCC): ICD-10-CM

## 2025-03-31 DIAGNOSIS — I48.0 PAROXYSMAL ATRIAL FIBRILLATION (HCC): ICD-10-CM

## 2025-03-31 DIAGNOSIS — R23.8 SKIN BREAKDOWN: ICD-10-CM

## 2025-03-31 DIAGNOSIS — F10.29 ALCOHOL DEPENDENCE WITH UNSPECIFIED ALCOHOL-INDUCED DISORDER: ICD-10-CM

## 2025-03-31 DIAGNOSIS — Z17.0 MALIGNANT NEOPLASM OF UPPER-INNER QUADRANT OF LEFT BREAST IN FEMALE, ESTROGEN RECEPTOR POSITIVE: ICD-10-CM

## 2025-03-31 DIAGNOSIS — I50.22 CHRONIC SYSTOLIC (CONGESTIVE) HEART FAILURE: ICD-10-CM

## 2025-03-31 DIAGNOSIS — C50.212 MALIGNANT NEOPLASM OF UPPER-INNER QUADRANT OF LEFT BREAST IN FEMALE, ESTROGEN RECEPTOR POSITIVE: ICD-10-CM

## 2025-03-31 DIAGNOSIS — Z00.00 MEDICARE ANNUAL WELLNESS VISIT, SUBSEQUENT: Primary | ICD-10-CM

## 2025-03-31 LAB
ALBUMIN SERPL-MCNC: 3.7 G/DL (ref 3.4–5)
ALBUMIN/GLOB SERPL: 0.9 {RATIO} (ref 1.1–2.2)
ALP SERPL-CCNC: 109 U/L (ref 40–129)
ALT SERPL-CCNC: 12 U/L (ref 10–40)
ANION GAP SERPL CALCULATED.3IONS-SCNC: 15 MMOL/L (ref 3–16)
AST SERPL-CCNC: 25 U/L (ref 15–37)
BASOPHILS # BLD: 0 K/UL (ref 0–0.2)
BASOPHILS NFR BLD: 0.3 %
BILIRUB SERPL-MCNC: 1.3 MG/DL (ref 0–1)
BUN SERPL-MCNC: 14 MG/DL (ref 7–20)
CALCIUM SERPL-MCNC: 9.6 MG/DL (ref 8.3–10.6)
CHLORIDE SERPL-SCNC: 98 MMOL/L (ref 99–110)
CO2 SERPL-SCNC: 22 MMOL/L (ref 21–32)
CREAT SERPL-MCNC: 0.8 MG/DL (ref 0.6–1.2)
DEPRECATED RDW RBC AUTO: 14.8 % (ref 12.4–15.4)
EOSINOPHIL # BLD: 0.1 K/UL (ref 0–0.6)
EOSINOPHIL NFR BLD: 0.7 %
GFR SERPLBLD CREATININE-BSD FMLA CKD-EPI: 78 ML/MIN/{1.73_M2}
GLUCOSE SERPL-MCNC: 101 MG/DL (ref 70–99)
HCT VFR BLD AUTO: 38.3 % (ref 36–48)
HGB BLD-MCNC: 13.2 G/DL (ref 12–16)
INR PPP: 1.36 (ref 0.85–1.15)
LYMPHOCYTES # BLD: 1.4 K/UL (ref 1–5.1)
LYMPHOCYTES NFR BLD: 17.3 %
MCH RBC QN AUTO: 39 PG (ref 26–34)
MCHC RBC AUTO-ENTMCNC: 34.6 G/DL (ref 31–36)
MCV RBC AUTO: 112.8 FL (ref 80–100)
MONOCYTES # BLD: 1.1 K/UL (ref 0–1.3)
MONOCYTES NFR BLD: 14.1 %
NEUTROPHILS # BLD: 5.4 K/UL (ref 1.7–7.7)
NEUTROPHILS NFR BLD: 67.6 %
PATH INTERP BLD-IMP: NO
PLATELET # BLD AUTO: 170 K/UL (ref 135–450)
PMV BLD AUTO: 9.5 FL (ref 5–10.5)
POTASSIUM SERPL-SCNC: 3.6 MMOL/L (ref 3.5–5.1)
PROT SERPL-MCNC: 7.6 G/DL (ref 6.4–8.2)
PROTHROMBIN TIME: 16.9 SEC (ref 11.9–14.9)
RBC # BLD AUTO: 3.39 M/UL (ref 4–5.2)
SODIUM SERPL-SCNC: 135 MMOL/L (ref 136–145)
WBC # BLD AUTO: 8 K/UL (ref 4–11)

## 2025-03-31 RX ORDER — URSODIOL 500 MG/1
1000 TABLET, FILM COATED ORAL 2 TIMES DAILY
Qty: 140 TABLET | Refills: 5 | Status: SHIPPED | OUTPATIENT
Start: 2025-03-31

## 2025-03-31 SDOH — ECONOMIC STABILITY: FOOD INSECURITY: WITHIN THE PAST 12 MONTHS, THE FOOD YOU BOUGHT JUST DIDN'T LAST AND YOU DIDN'T HAVE MONEY TO GET MORE.: NEVER TRUE

## 2025-03-31 SDOH — ECONOMIC STABILITY: FOOD INSECURITY: WITHIN THE PAST 12 MONTHS, YOU WORRIED THAT YOUR FOOD WOULD RUN OUT BEFORE YOU GOT MONEY TO BUY MORE.: NEVER TRUE

## 2025-03-31 ASSESSMENT — LIFESTYLE VARIABLES
HOW OFTEN DO YOU HAVE A DRINK CONTAINING ALCOHOL: 4 OR MORE TIMES A WEEK
HOW OFTEN DURING THE LAST YEAR HAVE YOU HAD A FEELING OF GUILT OR REMORSE AFTER DRINKING: NEVER
HOW OFTEN DURING THE LAST YEAR HAVE YOU BEEN UNABLE TO REMEMBER WHAT HAPPENED THE NIGHT BEFORE BECAUSE YOU HAD BEEN DRINKING: NEVER
HAS A RELATIVE, FRIEND, DOCTOR, OR ANOTHER HEALTH PROFESSIONAL EXPRESSED CONCERN ABOUT YOUR DRINKING OR SUGGESTED YOU CUT DOWN: NO
HOW OFTEN DURING THE LAST YEAR HAVE YOU NEEDED AN ALCOHOLIC DRINK FIRST THING IN THE MORNING TO GET YOURSELF GOING AFTER A NIGHT OF HEAVY DRINKING: NEVER
HOW OFTEN DURING THE LAST YEAR HAVE YOU FOUND THAT YOU WERE NOT ABLE TO STOP DRINKING ONCE YOU HAD STARTED: NEVER
HOW MANY STANDARD DRINKS CONTAINING ALCOHOL DO YOU HAVE ON A TYPICAL DAY: 1 OR 2
HOW OFTEN DURING THE LAST YEAR HAVE YOU FAILED TO DO WHAT WAS NORMALLY EXPECTED FROM YOU BECAUSE OF DRINKING: NEVER
HAVE YOU OR SOMEONE ELSE BEEN INJURED AS A RESULT OF YOUR DRINKING: NO

## 2025-03-31 ASSESSMENT — PATIENT HEALTH QUESTIONNAIRE - PHQ9
SUM OF ALL RESPONSES TO PHQ QUESTIONS 1-9: 0
1. LITTLE INTEREST OR PLEASURE IN DOING THINGS: NOT AT ALL
SUM OF ALL RESPONSES TO PHQ QUESTIONS 1-9: 0
SUM OF ALL RESPONSES TO PHQ QUESTIONS 1-9: 0
2. FEELING DOWN, DEPRESSED OR HOPELESS: NOT AT ALL
SUM OF ALL RESPONSES TO PHQ QUESTIONS 1-9: 0

## 2025-03-31 NOTE — PATIENT INSTRUCTIONS
Please apply Eucerin cream to buttocks and back of legs 1-2 times daily.      Preventing Falls: Care Instructions  Injuries and health problems such as trouble walking or poor eyesight can increase your risk of falling. So can some medicines. But there are things you can do to help prevent falls. You can exercise to get stronger. You can also arrange your home to make it safer.    Talk to your doctor about the medicines you take. Ask if any of them increase the risk of falls and whether they can be changed or stopped.   Try to exercise regularly. It can help improve your strength and balance. This can help lower your risk of falling.         Practice fall safety and prevention.   Wear low-heeled shoes that fit well and give your feet good support. Talk to your doctor if you have foot problems that make this hard.  Carry a cellphone or wear a medical alert device that you can use to call for help.  Use stepladders instead of chairs to reach high objects. Don't climb if you're at risk for falls. Ask for help, if needed.  Wear the correct eyeglasses, if you need them.        Make your home safer.   Remove rugs, cords, clutter, and furniture from walkways.  Keep your house well lit. Use night-lights in hallways and bathrooms.  Install and use sturdy handrails on stairways.  Wear nonskid footwear, even inside. Don't walk barefoot or in socks without shoes.        Be safe outside.   Use handrails, curb cuts, and ramps whenever possible.  Keep your hands free by using a shoulder bag or backpack.  Try to walk in well-lit areas. Watch out for uneven ground, changes in pavement, and debris.  Be careful in the winter. Walk on the grass or gravel when sidewalks are slippery. Use de-icer on steps and walkways. Add non-slip devices to shoes.    Put grab bars and nonskid mats in your shower or tub and near the toilet. Try to use a shower chair or bath bench when bathing.   Get into a tub or shower by putting in your weaker leg

## 2025-03-31 NOTE — TELEPHONE ENCOUNTER
Last Office Visit  -  9/30/2024  Next Office Visit  -  10/1/2025    Last Filled  -  9/30/2024  Last UDS -    Contract -

## 2025-03-31 NOTE — TELEPHONE ENCOUNTER
[facility] is requesting a rx for   ursodiol (ACTIGALL) 500 MG tablet     Last OV 9/30/2024      Next OV 3/31/2025

## 2025-03-31 NOTE — PROGRESS NOTES
Assessment/Plan:    Meagan was seen today for medicare awv.    Diagnoses and all orders for this visit:    Medicare annual wellness visit, subsequent    IFG (impaired fasting glucose)  -     Hemoglobin A1C; Future    Alcoholic encephalopathy  -     CBC with Auto Differential; Future  -     Comprehensive Metabolic Panel; Future  -     Protime-INR; Future   Discussed with dtr that poor short term memory may be 2/2 dementia, aging, alcohol induced dementia, cirrhosis, etc. Pt declined aricpet per neuro in past.     Alcoholic cirrhosis of liver with ascites (HCC)  -     Protime-INR; Future   Pt continues daily beer, 4-5 per day per family. No withdrawal. Family has come to terms with allowing her to drink in controlled environment with much less etoh than past.     Chronic systolic (congestive) heart failure (HCC)   Well compensated    Paroxysmal atrial fibrillation (HCC)   Rate controlled, is tolerating AC w/o abx.    Alcohol dependence with unspecified alcohol-induced disorder (HCC)   Discussed with dtr that pt could benefit from [facility] aide to help with bathing/clean up of diarrheal accidents. [facility] may be able to provide information about outside resources. Pt declines, however.    Malignant neoplasm of upper-inner quadrant of left breast in female, estrogen receptor positive (HCC)   Per [facility]    Skin breakdown   Likely related to diarrhea with urgency and therefore stool incontinence and subsequent skin irritation. Pt is asked to minimize contact time of stool on skin. Lesions do not look infected. Pt is to apply Eucerin Cr bid to moisturize skin and provide barrier.       Patient Instructions   Please apply Eucerin cream to buttocks and back of legs 1-2 times daily.      Preventing Falls: Care Instructions  Injuries and health problems such as trouble walking or poor eyesight can increase your risk of falling. So can some medicines. But there are things you can do to help prevent falls. You can exercise to get

## 2025-04-01 ENCOUNTER — RESULTS FOLLOW-UP (OUTPATIENT)
Dept: FAMILY MEDICINE CLINIC | Age: 70
End: 2025-04-01

## 2025-04-01 LAB
EST. AVERAGE GLUCOSE BLD GHB EST-MCNC: 105.4 MG/DL
HBA1C MFR BLD: 5.3 %

## 2025-04-28 DIAGNOSIS — I48.0 PAROXYSMAL ATRIAL FIBRILLATION (HCC): ICD-10-CM

## 2025-04-28 RX ORDER — APIXABAN 5 MG/1
5 TABLET, FILM COATED ORAL 2 TIMES DAILY
Qty: 180 TABLET | Refills: 0 | Status: SHIPPED | OUTPATIENT
Start: 2025-04-28

## 2025-04-28 NOTE — TELEPHONE ENCOUNTER
Last Office Visit: 11/1/2024 Provider: LUZ MARINA  **Is provider OOT? No    Next Office Visit: 10/31/25 Provider: LUZ MARINA      LAST LABS:   CBC:  Lab Results   Component Value Date    WBC 8.0 03/31/2025    HGB 13.2 03/31/2025    HCT 38.3 03/31/2025    .8 (H) 03/31/2025     03/31/2025    LYMPHOPCT 17.3 03/31/2025    RBC 3.39 (L) 03/31/2025    MCH 39.0 (H) 03/31/2025    MCHC 34.6 03/31/2025    RDW 14.8 03/31/2025           BMP:  Lab Results   Component Value Date/Time     03/31/2025 11:44 AM    K 3.6 03/31/2025 11:44 AM    K 4.2 01/04/2023 06:31 AM    CL 98 03/31/2025 11:44 AM    CO2 22 03/31/2025 11:44 AM    BUN 14 03/31/2025 11:44 AM    CREATININE 0.8 03/31/2025 11:44 AM    GLUCOSE 101 03/31/2025 11:44 AM    CALCIUM 9.6 03/31/2025 11:44 AM    LABGLOM 78 03/31/2025 11:44 AM    LABGLOM >60 01/25/2024 01:06 PM      Lab orders needed? no

## 2025-05-03 DIAGNOSIS — I48.0 PAROXYSMAL ATRIAL FIBRILLATION (HCC): ICD-10-CM

## 2025-05-05 RX ORDER — APIXABAN 5 MG/1
5 TABLET, FILM COATED ORAL 2 TIMES DAILY
Qty: 180 TABLET | Refills: 0 | OUTPATIENT
Start: 2025-05-05

## 2025-05-05 RX ORDER — ATORVASTATIN CALCIUM 20 MG/1
20 TABLET, FILM COATED ORAL NIGHTLY
Qty: 90 TABLET | Refills: 3 | Status: SHIPPED | OUTPATIENT
Start: 2025-05-05

## 2025-05-05 NOTE — TELEPHONE ENCOUNTER
Last Office Visit  -  03/31/2025  Next Office Visit  -  10/01/2025    Last Filled  -    Last UDS -    Contract -

## 2025-06-22 DIAGNOSIS — G31.2 ALCOHOLIC ENCEPHALOPATHY: ICD-10-CM

## 2025-06-23 RX ORDER — LACTULOSE 10 G/15ML
SOLUTION ORAL
Qty: 900 ML | Refills: 3 | Status: SHIPPED | OUTPATIENT
Start: 2025-06-23

## 2025-07-15 ENCOUNTER — TELEPHONE (OUTPATIENT)
Dept: FAMILY MEDICINE CLINIC | Age: 70
End: 2025-07-15

## 2025-07-15 NOTE — TELEPHONE ENCOUNTER
Mary patients nurse contacted the office to let PCP know that patient had a fall, no injuries, not complaining of pain. Any questions [phone]

## 2025-08-13 DIAGNOSIS — I48.0 PAROXYSMAL ATRIAL FIBRILLATION (HCC): ICD-10-CM

## 2025-08-14 RX ORDER — APIXABAN 5 MG/1
5 TABLET, FILM COATED ORAL 2 TIMES DAILY
Qty: 180 TABLET | Refills: 0 | Status: SHIPPED | OUTPATIENT
Start: 2025-08-14

## 2025-08-29 RX ORDER — FUROSEMIDE 40 MG/1
40 TABLET ORAL DAILY
Qty: 60 TABLET | Refills: 5 | Status: SHIPPED | OUTPATIENT
Start: 2025-08-29

## (undated) DEVICE — STAPLER EXT SKIN 35 WIDE S STL STPL SQUEEZE HNDL VISISTAT

## (undated) DEVICE — 3 BONE CEMENT MIXER: Brand: MIXEVAC

## (undated) DEVICE — CLEANER ES TIP W2XL2IN ADH BK RADPQ FOR S STL ELECTRD

## (undated) DEVICE — ENDOSCOPIC ULTRASOUND ASPIRATION NEEDLE: Brand: EXPECT SLIMLINE SL

## (undated) DEVICE — DRILL BIT 3.2MM (1/8'') X 128.0MM

## (undated) DEVICE — NEEDLE HYPO 20GA L1.5IN YEL POLYPR HUB S STL REG BVL STR

## (undated) DEVICE — GOWN SIRUS NONREIN LG W/TWL: Brand: MEDLINE INDUSTRIES, INC.

## (undated) DEVICE — COVER,TABLE,HEAVY DUTY,50"X90",STRL: Brand: MEDLINE

## (undated) DEVICE — Device

## (undated) DEVICE — RIMMED SPEED PIN 45MM STERILE

## (undated) DEVICE — SHEET,DRAPE,53X77,STERILE: Brand: MEDLINE

## (undated) DEVICE — SYSTEM SKIN CLSR 22CM DERMBND PRINEO

## (undated) DEVICE — 3M™ TEGADERM™ TRANSPARENT FILM DRESSING FRAME STYLE WITH BORDER, 1616, 4 IN X 4-3/4 IN (10 CM X 12 CM), 50/CT 4CT/CASE: Brand: 3M™ TEGADERM™

## (undated) DEVICE — DRAIN SURG 10FR L1/8IN DIA3.2MM SIL CHN RND HUBLESS FULL

## (undated) DEVICE — GLOVE,SURG,SENSICARE SLT,LF,PF,6: Brand: MEDLINE

## (undated) DEVICE — SOLUTION IV IRRIG POUR BRL 0.9% SODIUM CHL 2F7124

## (undated) DEVICE — GAUZE,SPONGE,2"X2",8PLY,STERILE,LF,2'S: Brand: MEDLINE

## (undated) DEVICE — HANDPIECE SET WITH HIGH FLOW TIP AND SUCTION TUBE: Brand: INTERPULSE

## (undated) DEVICE — SMARTGOWN BREATHABLE SURGICAL GOWN: Brand: CONVERTORS

## (undated) DEVICE — SOLUTION IRRIG 2000ML 0.9% SOD CHL USP UROMATIC PLAS CONT

## (undated) DEVICE — 3M™ COBAN™ SELF-ADHERENT WRAP, 1586S, STERILE, 6 IN X 5 YD (15 CM X 4,5 M), 12 ROLLS/CASE: Brand: 3M™ COBAN™

## (undated) DEVICE — ELECTRODE ECG MONITR FOAM TEAR DROP ADLT RED

## (undated) DEVICE — 35 ML SYRINGE LUER-LOCK TIP: Brand: MONOJECT

## (undated) DEVICE — SMARTGOWN SURGICAL GOWN, LARGE: Brand: CONVERTORS

## (undated) DEVICE — SUTURE PERMAHAND SZ 2-0 L18IN NONABSORBABLE BLK L26MM SH C012D

## (undated) DEVICE — BLADE SURG SAW SAG S STL AGG 905MM LEN 185MM W 127MM THCK

## (undated) DEVICE — SUTURE STRATAFIX SZ 3-0 L30CM NONABSORBABLE UD L19MM FS-2 SXMP2B408

## (undated) DEVICE — STERILE POLYISOPRENE POWDER-FREE SURGICAL GLOVES: Brand: PROTEXIS

## (undated) DEVICE — SUTURE VCRL + SZ 2-0 L18IN ABSRB UD CT1 L36MM 1/2 CIR VCP839D

## (undated) DEVICE — PENCIL SMK EVAC ALL IN 1 DSGN ENH VISIBILITY IMPROVED AIR

## (undated) DEVICE — GENESIS TROCHLEAR PIN 1/8 X 3: Brand: GENESIS

## (undated) DEVICE — CONMED SCOPE SAVER BITE BLOCK, 20X27 MM: Brand: SCOPE SAVER

## (undated) DEVICE — SUTURE SURGLON SZ 1 L18IN NONABSORBABLE BLK GS 21 L37MM 1 2 8886196272

## (undated) DEVICE — TUBE IRRIG HNDPC HI FLO TP INTRPULS W/SUCTION TUBE

## (undated) DEVICE — HOOD, PEEL-AWAY: Brand: FLYTE

## (undated) DEVICE — ZIMMER® STERILE DISPOSABLE TOURNIQUET CUFF WITH PROTECTIVE SLEEVE AND PLC, DUAL PORT, SINGLE BLADDER, 30 IN. (76 CM)

## (undated) DEVICE — STERILE LATEX POWDER-FREE SURGICAL GLOVESWITH NITRILE COATING: Brand: PROTEXIS

## (undated) DEVICE — ENDO CARRY-ON PROCEDURE KIT INCLUDES SUCTION TUBING, LUBRICANT, GAUZE, BIOHAZARD STICKER, TRANSPORT PAD AND INTERCEPT BEDSIDE KIT.: Brand: ENDO CARRY-ON PROCEDURE KIT

## (undated) DEVICE — Z CONVERTED USE 2271377 BANDAGE COMPR W6INXL5YD EC E REB

## (undated) DEVICE — DRESSING FOAM W4XL10IN SIL RECT ADH WTRPRF FLM BK W/ BORD

## (undated) DEVICE — SUTURE MCRYL + SZ 4-0 L18IN ABSRB UD L19MM PS-2 3/8 CIR MCP496G

## (undated) DEVICE — BLADE SAW THK185MM SAG

## (undated) DEVICE — SUCTION RESERVOIR 400CC LG VOL: Brand: CARDINAL HEALTH